# Patient Record
Sex: FEMALE | Race: BLACK OR AFRICAN AMERICAN | NOT HISPANIC OR LATINO | Employment: OTHER | URBAN - METROPOLITAN AREA
[De-identification: names, ages, dates, MRNs, and addresses within clinical notes are randomized per-mention and may not be internally consistent; named-entity substitution may affect disease eponyms.]

---

## 2017-03-12 PROBLEM — D64.9 ANEMIA: Status: ACTIVE | Noted: 2017-03-12

## 2017-03-12 PROBLEM — R06.02 SHORTNESS OF BREATH: Status: ACTIVE | Noted: 2017-03-12

## 2017-03-12 PROBLEM — R06.00 DYSPNEA: Status: ACTIVE | Noted: 2017-03-12

## 2017-03-12 PROBLEM — E87.20 LACTIC ACID ACIDOSIS: Status: ACTIVE | Noted: 2017-03-12

## 2017-03-13 PROBLEM — E87.20 LACTIC ACID ACIDOSIS: Status: RESOLVED | Noted: 2017-03-12 | Resolved: 2017-03-13

## 2017-03-13 PROBLEM — I48.91 ATRIAL FIBRILLATION WITH RVR: Status: ACTIVE | Noted: 2017-03-13

## 2017-03-14 PROBLEM — J44.9 COPD (CHRONIC OBSTRUCTIVE PULMONARY DISEASE): Status: ACTIVE | Noted: 2017-03-14

## 2017-03-14 PROBLEM — J44.1 CHRONIC OBSTRUCTIVE PULMONARY DISEASE WITH ACUTE EXACERBATION: Status: ACTIVE | Noted: 2017-03-14

## 2017-03-27 ENCOUNTER — TELEPHONE (OUTPATIENT)
Dept: CARDIOLOGY | Facility: CLINIC | Age: 82
End: 2017-03-27

## 2017-03-27 NOTE — TELEPHONE ENCOUNTER
Received records from Dr. Clifford Jasmine for Mitral Valve referral.  Patient recently discharged from [facility] for heart failure.  Attempted to contact patient.  No answer at either number.  Left message.  Will need CD of echo done at [facility] for Dr. Mathew to review before scheduling appointments.  Office numbers provided.

## 2017-04-03 DIAGNOSIS — I50.9 CONGESTIVE HEART FAILURE, UNSPECIFIED CONGESTIVE HEART FAILURE CHRONICITY, UNSPECIFIED CONGESTIVE HEART FAILURE TYPE: ICD-10-CM

## 2017-04-03 DIAGNOSIS — I34.0 MITRAL VALVE INSUFFICIENCY, UNSPECIFIED ETIOLOGY: Primary | ICD-10-CM

## 2017-05-08 ENCOUNTER — TELEPHONE (OUTPATIENT)
Dept: CARDIOLOGY | Facility: CLINIC | Age: 82
End: 2017-05-08

## 2017-05-08 NOTE — TELEPHONE ENCOUNTER
Patient was referred by Dr Jasmine for evaluation of MR and ws scheduled to see Dr Mathew today with lab work and CCFD.  SHe cancelled and would like to call back to schedule when she can come.

## 2017-06-02 ENCOUNTER — HOSPITAL ENCOUNTER (OUTPATIENT)
Facility: HOSPITAL | Age: 82
Discharge: HOME | End: 2017-06-05
Attending: EMERGENCY MEDICINE | Admitting: INTERNAL MEDICINE
Payer: MEDICARE

## 2017-06-02 DIAGNOSIS — I21.4 NSTEMI (NON-ST ELEVATED MYOCARDIAL INFARCTION): ICD-10-CM

## 2017-06-02 DIAGNOSIS — I48.91 ATRIAL FIBRILLATION WITH RVR: ICD-10-CM

## 2017-06-02 DIAGNOSIS — N18.30 ACUTE RENAL FAILURE SUPERIMPOSED ON STAGE 3 CHRONIC KIDNEY DISEASE: ICD-10-CM

## 2017-06-02 DIAGNOSIS — I35.1 NONRHEUMATIC AORTIC VALVE INSUFFICIENCY: ICD-10-CM

## 2017-06-02 DIAGNOSIS — R06.02 SHORTNESS OF BREATH: ICD-10-CM

## 2017-06-02 DIAGNOSIS — I50.33 ACUTE ON CHRONIC DIASTOLIC HEART FAILURE: ICD-10-CM

## 2017-06-02 DIAGNOSIS — I36.9 TRICUSPID VALVE DISORDERS, SPECIFIED AS NONRHEUMATIC: ICD-10-CM

## 2017-06-02 DIAGNOSIS — R05.9 COUGH: ICD-10-CM

## 2017-06-02 DIAGNOSIS — R00.0 TACHYCARDIA: ICD-10-CM

## 2017-06-02 DIAGNOSIS — J43.2 CENTRILOBULAR EMPHYSEMA: ICD-10-CM

## 2017-06-02 DIAGNOSIS — R74.8 ABNORMAL LIVER ENZYMES: ICD-10-CM

## 2017-06-02 DIAGNOSIS — E87.20 LACTIC ACIDOSIS: ICD-10-CM

## 2017-06-02 DIAGNOSIS — D50.9 IRON DEFICIENCY ANEMIA, UNSPECIFIED IRON DEFICIENCY ANEMIA TYPE: ICD-10-CM

## 2017-06-02 DIAGNOSIS — I35.1 AORTIC VALVE REGURGITATION, UNSPECIFIED ETIOLOGY: ICD-10-CM

## 2017-06-02 DIAGNOSIS — I49.5 SSS (SICK SINUS SYNDROME): ICD-10-CM

## 2017-06-02 DIAGNOSIS — I35.9 NONRHEUMATIC AORTIC VALVE DISORDER: ICD-10-CM

## 2017-06-02 DIAGNOSIS — R05.3 CHRONIC COUGH: Primary | ICD-10-CM

## 2017-06-02 DIAGNOSIS — N17.9 ACUTE RENAL FAILURE SUPERIMPOSED ON STAGE 3 CHRONIC KIDNEY DISEASE: ICD-10-CM

## 2017-06-02 DIAGNOSIS — I27.20 PULMONARY HYPERTENSION: ICD-10-CM

## 2017-06-02 DIAGNOSIS — R05.8 RECURRENT NON-PRODUCTIVE COUGH: ICD-10-CM

## 2017-06-02 PROBLEM — J44.9 COPD (CHRONIC OBSTRUCTIVE PULMONARY DISEASE): Status: ACTIVE | Noted: 2017-06-02

## 2017-06-02 LAB
ALBUMIN SERPL BCP-MCNC: 3.4 G/DL
ALP SERPL-CCNC: 85 U/L
ALT SERPL W/O P-5'-P-CCNC: 96 U/L
ANION GAP SERPL CALC-SCNC: 15 MMOL/L
AST SERPL-CCNC: 141 U/L
B-OH-BUTYR BLD STRIP-SCNC: 0.5 MMOL/L
BASOPHILS # BLD AUTO: 0.03 K/UL
BASOPHILS NFR BLD: 0.5 %
BILIRUB SERPL-MCNC: 2.5 MG/DL
BNP SERPL-MCNC: 2488 PG/ML
BUN SERPL-MCNC: 21 MG/DL
CALCIUM SERPL-MCNC: 10.4 MG/DL
CHLORIDE SERPL-SCNC: 106 MMOL/L
CO2 SERPL-SCNC: 17 MMOL/L
CREAT SERPL-MCNC: 1.4 MG/DL
DIFFERENTIAL METHOD: ABNORMAL
EOSINOPHIL # BLD AUTO: 0 K/UL
EOSINOPHIL NFR BLD: 0.2 %
ERYTHROCYTE [DISTWIDTH] IN BLOOD BY AUTOMATED COUNT: 17.1 %
EST. GFR  (AFRICAN AMERICAN): 40.9 ML/MIN/1.73 M^2
EST. GFR  (NON AFRICAN AMERICAN): 35.5 ML/MIN/1.73 M^2
GLUCOSE SERPL-MCNC: 145 MG/DL
HCT VFR BLD AUTO: 29.9 %
HGB BLD-MCNC: 9.5 G/DL
LACTATE SERPL-SCNC: 3 MMOL/L
LYMPHOCYTES # BLD AUTO: 1.2 K/UL
LYMPHOCYTES NFR BLD: 18.2 %
MCH RBC QN AUTO: 25 PG
MCHC RBC AUTO-ENTMCNC: 31.8 %
MCV RBC AUTO: 79 FL
MONOCYTES # BLD AUTO: 0.7 K/UL
MONOCYTES NFR BLD: 11.4 %
NEUTROPHILS # BLD AUTO: 4.4 K/UL
NEUTROPHILS NFR BLD: 69.4 %
PLATELET # BLD AUTO: 251 K/UL
PMV BLD AUTO: 10.2 FL
POTASSIUM SERPL-SCNC: 4.2 MMOL/L
PROT SERPL-MCNC: 7 G/DL
RBC # BLD AUTO: 3.8 M/UL
SODIUM SERPL-SCNC: 138 MMOL/L
TROPONIN I SERPL DL<=0.01 NG/ML-MCNC: 0.08 NG/ML
WBC # BLD AUTO: 6.31 K/UL

## 2017-06-02 PROCEDURE — G0378 HOSPITAL OBSERVATION PER HR: HCPCS

## 2017-06-02 PROCEDURE — 25000003 PHARM REV CODE 250: Performed by: INTERNAL MEDICINE

## 2017-06-02 PROCEDURE — 25000003 PHARM REV CODE 250: Performed by: STUDENT IN AN ORGANIZED HEALTH CARE EDUCATION/TRAINING PROGRAM

## 2017-06-02 PROCEDURE — 63600175 PHARM REV CODE 636 W HCPCS: Performed by: STUDENT IN AN ORGANIZED HEALTH CARE EDUCATION/TRAINING PROGRAM

## 2017-06-02 PROCEDURE — 99285 EMERGENCY DEPT VISIT HI MDM: CPT | Mod: 25

## 2017-06-02 PROCEDURE — 96374 THER/PROPH/DIAG INJ IV PUSH: CPT

## 2017-06-02 PROCEDURE — 25000003 PHARM REV CODE 250: Performed by: EMERGENCY MEDICINE

## 2017-06-02 PROCEDURE — 83605 ASSAY OF LACTIC ACID: CPT

## 2017-06-02 PROCEDURE — 85025 COMPLETE CBC W/AUTO DIFF WBC: CPT

## 2017-06-02 PROCEDURE — 82010 KETONE BODYS QUAN: CPT

## 2017-06-02 PROCEDURE — 94761 N-INVAS EAR/PLS OXIMETRY MLT: CPT

## 2017-06-02 PROCEDURE — 99285 EMERGENCY DEPT VISIT HI MDM: CPT | Mod: ,,, | Performed by: EMERGENCY MEDICINE

## 2017-06-02 PROCEDURE — 25000242 PHARM REV CODE 250 ALT 637 W/ HCPCS

## 2017-06-02 PROCEDURE — 94640 AIRWAY INHALATION TREATMENT: CPT | Mod: 77

## 2017-06-02 PROCEDURE — 80053 COMPREHEN METABOLIC PANEL: CPT

## 2017-06-02 PROCEDURE — 83880 ASSAY OF NATRIURETIC PEPTIDE: CPT

## 2017-06-02 PROCEDURE — 93010 ELECTROCARDIOGRAM REPORT: CPT | Mod: ,,, | Performed by: INTERNAL MEDICINE

## 2017-06-02 PROCEDURE — 25000242 PHARM REV CODE 250 ALT 637 W/ HCPCS: Performed by: STUDENT IN AN ORGANIZED HEALTH CARE EDUCATION/TRAINING PROGRAM

## 2017-06-02 PROCEDURE — 84484 ASSAY OF TROPONIN QUANT: CPT

## 2017-06-02 PROCEDURE — 63600175 PHARM REV CODE 636 W HCPCS

## 2017-06-02 RX ORDER — HYDRALAZINE HYDROCHLORIDE 50 MG/1
50 TABLET, FILM COATED ORAL EVERY 8 HOURS
Status: DISCONTINUED | OUTPATIENT
Start: 2017-06-02 | End: 2017-06-05 | Stop reason: HOSPADM

## 2017-06-02 RX ORDER — FUROSEMIDE 10 MG/ML
20 INJECTION INTRAMUSCULAR; INTRAVENOUS ONCE
Status: COMPLETED | OUTPATIENT
Start: 2017-06-02 | End: 2017-06-02

## 2017-06-02 RX ORDER — ACETAMINOPHEN 325 MG/1
650 TABLET ORAL EVERY 6 HOURS PRN
Status: DISCONTINUED | OUTPATIENT
Start: 2017-06-02 | End: 2017-06-05 | Stop reason: HOSPADM

## 2017-06-02 RX ORDER — FUROSEMIDE 10 MG/ML
10 INJECTION INTRAMUSCULAR; INTRAVENOUS
Status: COMPLETED | OUTPATIENT
Start: 2017-06-02 | End: 2017-06-02

## 2017-06-02 RX ORDER — IPRATROPIUM BROMIDE AND ALBUTEROL SULFATE 2.5; .5 MG/3ML; MG/3ML
3 SOLUTION RESPIRATORY (INHALATION) EVERY 6 HOURS PRN
Status: DISCONTINUED | OUTPATIENT
Start: 2017-06-02 | End: 2017-06-02

## 2017-06-02 RX ORDER — IPRATROPIUM BROMIDE AND ALBUTEROL SULFATE 2.5; .5 MG/3ML; MG/3ML
3 SOLUTION RESPIRATORY (INHALATION)
Status: COMPLETED | OUTPATIENT
Start: 2017-06-02 | End: 2017-06-02

## 2017-06-02 RX ORDER — PROMETHAZINE HYDROCHLORIDE AND CODEINE PHOSPHATE 6.25; 1 MG/5ML; MG/5ML
5 SOLUTION ORAL
Status: COMPLETED | OUTPATIENT
Start: 2017-06-02 | End: 2017-06-02

## 2017-06-02 RX ORDER — ISOSORBIDE DINITRATE 10 MG/1
20 TABLET ORAL 3 TIMES DAILY
Status: DISCONTINUED | OUTPATIENT
Start: 2017-06-02 | End: 2017-06-05 | Stop reason: HOSPADM

## 2017-06-02 RX ORDER — IPRATROPIUM BROMIDE AND ALBUTEROL SULFATE 2.5; .5 MG/3ML; MG/3ML
3 SOLUTION RESPIRATORY (INHALATION) EVERY 4 HOURS PRN
Status: DISCONTINUED | OUTPATIENT
Start: 2017-06-02 | End: 2017-06-05 | Stop reason: HOSPADM

## 2017-06-02 RX ORDER — ROSUVASTATIN CALCIUM 10 MG/1
10 TABLET, COATED ORAL DAILY
Status: DISCONTINUED | OUTPATIENT
Start: 2017-06-03 | End: 2017-06-05 | Stop reason: HOSPADM

## 2017-06-02 RX ORDER — PANTOPRAZOLE SODIUM 40 MG/1
40 TABLET, DELAYED RELEASE ORAL DAILY
Status: DISCONTINUED | OUTPATIENT
Start: 2017-06-03 | End: 2017-06-05 | Stop reason: HOSPADM

## 2017-06-02 RX ORDER — PROMETHAZINE HYDROCHLORIDE AND CODEINE PHOSPHATE 6.25; 1 MG/5ML; MG/5ML
5 SOLUTION ORAL EVERY 4 HOURS PRN
Status: DISCONTINUED | OUTPATIENT
Start: 2017-06-02 | End: 2017-06-05 | Stop reason: HOSPADM

## 2017-06-02 RX ORDER — NAPROXEN SODIUM 220 MG/1
81 TABLET, FILM COATED ORAL DAILY
Status: DISCONTINUED | OUTPATIENT
Start: 2017-06-03 | End: 2017-06-05 | Stop reason: HOSPADM

## 2017-06-02 RX ORDER — DILTIAZEM HYDROCHLORIDE 120 MG/1
240 CAPSULE, COATED, EXTENDED RELEASE ORAL DAILY
Status: DISCONTINUED | OUTPATIENT
Start: 2017-06-02 | End: 2017-06-05 | Stop reason: HOSPADM

## 2017-06-02 RX ORDER — ONDANSETRON 4 MG/1
8 TABLET, ORALLY DISINTEGRATING ORAL EVERY 8 HOURS PRN
Status: DISCONTINUED | OUTPATIENT
Start: 2017-06-02 | End: 2017-06-05 | Stop reason: HOSPADM

## 2017-06-02 RX ADMIN — FUROSEMIDE 20 MG: 10 INJECTION, SOLUTION INTRAVENOUS at 09:06

## 2017-06-02 RX ADMIN — IPRATROPIUM BROMIDE AND ALBUTEROL SULFATE 3 ML: .5; 3 SOLUTION RESPIRATORY (INHALATION) at 03:06

## 2017-06-02 RX ADMIN — DILTIAZEM HYDROCHLORIDE 240 MG: 240 CAPSULE, EXTENDED RELEASE ORAL at 06:06

## 2017-06-02 RX ADMIN — HYDRALAZINE HYDROCHLORIDE 50 MG: 50 TABLET ORAL at 11:06

## 2017-06-02 RX ADMIN — IPRATROPIUM BROMIDE AND ALBUTEROL SULFATE 3 ML: .5; 3 SOLUTION RESPIRATORY (INHALATION) at 07:06

## 2017-06-02 RX ADMIN — PROMETHAZINE HYDROCHLORIDE AND CODEINE PHOSPHATE 5 ML: 6.25; 1 SYRUP ORAL at 09:06

## 2017-06-02 RX ADMIN — APIXABAN 2.5 MG: 2.5 TABLET, FILM COATED ORAL at 06:06

## 2017-06-02 RX ADMIN — FUROSEMIDE 10 MG: 10 INJECTION, SOLUTION INTRAVENOUS at 03:06

## 2017-06-02 RX ADMIN — ISOSORBIDE DINITRATE 20 MG: 10 TABLET ORAL at 11:06

## 2017-06-02 RX ADMIN — PROMETHAZINE HYDROCHLORIDE AND CODEINE PHOSPHATE 5 ML: 6.25; 1 SYRUP ORAL at 03:06

## 2017-06-02 NOTE — ED NOTES
Patient identifiers verified and correct for Christy Webber.    LOC: The patient is awake, alert and aware of environment with an appropriate affect, the patient is oriented x 3 and speaking appropriately.  APPEARANCE: Patient resting comfortably and in no acute distress, patient is clean and well groomed, patient's clothing is properly fastened.  SKIN: The skin is warm and dry, color consistent with ethnicity, patient has normal skin turgor and moist mucus membranes, skin intact, no breakdown or bruising noted.  MUSCULOSKELETAL: Patient moving all extremities spontaneously, no obvious swelling or deformities noted.  RESPIRATORY: Airway is open and patent, respirations are spontaneous, patient has a normal effort and rate, no accessory muscle use noted, clear bilateral breath sounds noted through out the chest.

## 2017-06-02 NOTE — ED PROVIDER NOTES
"Encounter Date: 6/2/2017    SCRIBE #1 NOTE: I, Heidy Bass, am scribing for, and in the presence of,  Dr. Melgar. I have scribed the entire note.       History     Chief Complaint   Patient presents with    Cough     "cold symptoms"/cough x 3 weeks     Review of patient's allergies indicates:  No Known Allergies  Time patient was seen by the provider: 12:53 PM      The patient is a 81 y.o. female with hx of: A-flutter, HLD, HTN, and sick sinus syndrome that presents to the ED with a complaint of dry cough associated decrease in activity, shortness of breath with exertion, lightheadedness, and headache. The cough has been intermittent over the last 6 months and most recently returned and has been persistent for the last 3 weeks. The cough is worse with laying down, and patient denies fever or chest pain. Patient started lisinopril recently, but she is no longer taking the lisinopril but the cough is persisting. Patient does not believe she was ever scoped to determine if she has reflux, though she did undergo many tests at outside facilities regarding this cough. She was admitted at [facility] 3 months ago after an episode of dizziness and A-fib. She presented here after her son urged her to come to the ED. Patient was given inhalers                   Past Medical History:   Diagnosis Date    Atrial flutter     Hyperlipidemia     Hypertension     Pacemaker 08/2016    Single lead [employer] Pacemaker for sick sinus syndrome    Sick sinus syndrome      Past Surgical History:   Procedure Laterality Date    CARDIAC PACEMAKER PLACEMENT      HYSTERECTOMY      KIDNEY SURGERY       History reviewed. No pertinent family history.  Social History   Substance Use Topics    Smoking status: Former Smoker     Packs/day: 1.50     Years: 63.00     Types: Cigarettes     Quit date: 6/14/2016    Smokeless tobacco: Not on file    Alcohol use No     Review of Systems   Constitutional: Positive for activity change " (decreased). Negative for fever.   Respiratory: Positive for cough (dry) and shortness of breath (with exertion).    Cardiovascular: Negative for chest pain.   Neurological: Positive for light-headedness and headaches.       Physical Exam     Initial Vitals [06/02/17 1230]   BP Pulse Resp Temp SpO2   (!) 191/96 104 16 98.3 °F (36.8 °C) 98 %     Physical Exam    ED Course   Procedures  Labs Reviewed - No data to display  EKG Readings: (Independently Interpreted)   Heart Rate: 98.   A-fib/flutter          Medical Decision Making:   History:   Old Medical Records: I decided to obtain old medical records.  Old Records Summarized: other records.       <> Summary of Records: Patient with a history of HTN, HLD, sick sinus syndrome with A-flutter status post pacemaker placement            Scribe Attestation:   Scribe #1: I performed the above scribed service and the documentation accurately describes the services I performed. I attest to the accuracy of the note.    Attending Attestation:           Physician Attestation for Scribe:  Physician Attestation Statement for Scribe #1: I, Dr. Melgar, reviewed documentation, as scribed by Heidy Bass in my presence, and it is both accurate and complete.                 ED Course     Clinical Impression:   The encounter diagnosis was Tachycardia.

## 2017-06-02 NOTE — ED TRIAGE NOTES
Patient came in with a c/o a non productive cough x's a few weeks. Patient states that she was recently seen by her PCP and was prescribed and inhaler. Patient states that she still been coughing.

## 2017-06-02 NOTE — PROVIDER PROGRESS NOTES - EMERGENCY DEPT.
Encounter Date: 6/2/2017    ED Physician Progress Notes       SCRIBE NOTE: I, Heidy Bass, am scribing for, and in the presence of,  Dr. Melgar.  Physician Statement: I, Dr. Melgar, personally performed the services described in this documentation as scribed by Heidy Bass in my presence, and it is both accurate and complete.     Physician Note:   Time patient was seen by the provider: 12:53 PM      The patient is a 81 y.o. female with hx of: HTN, HLD, sick sinus syndrome with A-flutter status post pacemaker placement that presents to the ED per her son's urging with a complaint of dry cough associated decrease in activity, shortness of breath with exertion, lightheadedness, and headache. The cough has been intermittent over the last 6 months and most recently returned and has been persistent for the last 3 weeks. The cough is worse with laying down, and patient denies fever or chest pain. Patient started lisinopril recently, but she is no longer taking the lisinopril. Patient does not believe she was ever scoped to determine if she has reflux, though she did undergo many tests at outside facilities regarding this cough. She was admitted at [facility] 3 months ago after an episode of dizziness and A-fib. Patient was given inhalers in the past for this persistent cough, though she was not given a cough syrup or cough suppressants at any time. She denies a history of DM.     EKG A-fib/flutter at 98 BPM.     On exam, patient is coughing throughout encounter. Lungs clear. Cardiovascular: patient is tachycardic but with normal heart sounds. Abdomen soft, non-tender. No pedal edema.     In view of history and physical exam, I have initiated work up. Will transfer patient to main ED. I initially evaluated this patient and ordered workup while in intake.  The patient will receive a full evaluation in an ED pod when space is available.  All results from tests ordered in intake will not be followed by the  intake team, including myself. All results will be followed by the ED Pod team.

## 2017-06-02 NOTE — ED PROVIDER NOTES
"Encounter Date: 6/2/2017    SCRIBE #1 NOTE: I, Colt Rendon, am scribing for, and in the presence of,  Javon Barreto MD. I have scribed the following portions of the note - the Resident attestation. Other sections scribed: CXR Reading.       History     Chief Complaint   Patient presents with    Cough     "cold symptoms"/cough x 3 weeks     Review of patient's allergies indicates:  No Known Allergies  Christy Webber is a 81 y.o. Lady with a pMHx of centrilobar emphysema, Atrial fibrillation on anticoagulation, sick sinus syndrome, diastolic dysfunction, HTN who presents to [facility] ED for evaluation of cough.  History is provided by the patient and her son.  They state that her symptoms began approximately 3 weeks ago, but may have been going on for a longer period of time.  She states she has a chronic dry, non-productive cough that is worse than lying down.  Patient states that she has been adherent to her breo and ventolin without much improvement of her symptoms.  She also notes being progressively short of breath with exertion, stating she would be short of breath when walking approximately 10-15 feet.  Otherwise ,denies fevers, chills, peripheral swelling, chest or abdominal pains, urinary symptoms, and weaknesses.            Past Medical History:   Diagnosis Date    Atrial flutter     Hyperlipidemia     Hypertension     Pacemaker 08/2016    Single lead [employer] Pacemaker for sick sinus syndrome    Sick sinus syndrome      Past Surgical History:   Procedure Laterality Date    CARDIAC PACEMAKER PLACEMENT      HYSTERECTOMY      KIDNEY SURGERY       History reviewed. No pertinent family history.  Social History   Substance Use Topics    Smoking status: Former Smoker     Packs/day: 1.50     Years: 63.00     Types: Cigarettes     Quit date: 6/14/2016    Smokeless tobacco: Not on file    Alcohol use No     Review of Systems   Constitutional: Positive for fatigue. Negative for chills, diaphoresis and " unexpected weight change.   HENT: Negative for sinus pressure and sore throat.    Respiratory: Positive for cough and shortness of breath. Negative for choking and wheezing.    Cardiovascular: Negative for chest pain, palpitations and leg swelling.   Gastrointestinal: Negative for abdominal distention, abdominal pain, constipation, diarrhea, nausea and vomiting.   Genitourinary: Negative for difficulty urinating, dysuria and flank pain.   Musculoskeletal: Negative for arthralgias and myalgias.   Skin: Negative for pallor and rash.   Neurological: Positive for headaches. Negative for syncope, weakness and light-headedness.   Psychiatric/Behavioral: Negative for confusion and decreased concentration.       Physical Exam     Initial Vitals [06/02/17 1230]   BP Pulse Resp Temp SpO2   (!) 191/96 104 16 98.3 °F (36.8 °C) 98 %     Physical Exam    Vitals reviewed.  Constitutional: She appears well-developed and well-nourished. She is not diaphoretic. She appears distressed (coughing ).   HENT:   Head: Normocephalic and atraumatic.   Mouth/Throat: No oropharyngeal exudate.   Eyes: Pupils are equal, round, and reactive to light. No scleral icterus.   Neck: Normal range of motion. No thyromegaly present. No JVD present.   No accessory muscle usage    Cardiovascular: Normal rate and normal heart sounds.   No murmur heard.  Pulmonary/Chest: No respiratory distress. She has no wheezes. She has no rales.   Abdominal: Soft. Bowel sounds are normal. She exhibits no distension. There is no tenderness.   Musculoskeletal: Normal range of motion. She exhibits no edema.   Neurological: She is alert and oriented to person, place, and time. No cranial nerve deficit.   Skin: Skin is warm and dry. There is erythema.   Psychiatric: She has a normal mood and affect. Thought content normal.         ED Course   Procedures  Labs Reviewed   CBC W/ AUTO DIFFERENTIAL - Abnormal; Notable for the following:        Result Value    RBC 3.80 (*)      Hemoglobin 9.5 (*)     Hematocrit 29.9 (*)     MCV 79 (*)     MCH 25.0 (*)     MCHC 31.8 (*)     RDW 17.1 (*)     All other components within normal limits   COMPREHENSIVE METABOLIC PANEL - Abnormal; Notable for the following:     CO2 17 (*)     Glucose 145 (*)     Albumin 3.4 (*)     Total Bilirubin 2.5 (*)      (*)     ALT 96 (*)     eGFR if  40.9 (*)     eGFR if non  35.5 (*)     All other components within normal limits   B-TYPE NATRIURETIC PEPTIDE - Abnormal; Notable for the following:     BNP 2,488 (*)     All other components within normal limits   TROPONIN I - Abnormal; Notable for the following:     Troponin I 0.080 (*)     All other components within normal limits    Narrative:     ADD ON PER DR LOW ORDER [id number] TROP I @1621 6/2/17   LACTIC ACID, PLASMA - Abnormal; Notable for the following:     Lactate (Lactic Acid) 3.0 (*)     All other components within normal limits   TROPONIN I   BETA - HYDROXYBUTYRATE, SERUM     EKG Readings: (Independently Interpreted)    A-fib with some premature ventricular contractions. Non specific ST/T wave changes. No STEMI.       X-Rays:   Independently Interpreted Readings:   Chest X-Ray: Cardiomegaly is unchanged. Has some obscuring of left costophrenic angle, possible effusion. No congestive changes. Some engorgement of proximal pulmonary vasculature.     Medical Decision Making:   History:   Old Medical Records: I decided to obtain old medical records.  Initial Assessment:   Patient with COPD and possible HFpEF with cough and shortness of breath.  CXR without acute changes, chronic lung disease and cardiomegaly with possible LLL effusion, but minimal.  BNP elevated to 2.5k, considering heart failure at this time.  CMP also revealing anion gap metabolic acidosis.  Will have nebulizers at this time.  BP also elevated on admission with , recheck at 170 SBP.    Differential Diagnosis:   Differential includes COPD  exacerbation, acute heart failure exacerbation, pneumonia, URI   Independently Interpreted Test(s):   I have ordered and independently interpreted X-rays - see prior notes.  I have ordered and independently interpreted EKG Reading(s) - see prior notes  Clinical Tests:   Lab Tests: Ordered and Reviewed  Radiological Study: Ordered and Reviewed  Medical Tests: Ordered and Reviewed       APC / Resident Notes:   1526: Patient seen, labs reviewed.  New NURIA and transaminitis, likely 2/2 CHF exacerbation and fluid overload.  Also noted AGMA.  Will anticipate admission to observation.  Patient informed.         Scribe Attestation:   Scribe #1: I performed the above scribed service and the documentation accurately describes the services I performed. I attest to the accuracy of the note.    Attending Attestation:   Physician Attestation Statement for Resident:  As the supervising MD   Physician Attestation Statement: I have personally seen and examined this patient.   I agree with the above history. -: 81 year old female with Hx of A-flutter presents for evaluation of increasing SOB with HYLTON. Pt unable to walk more than 15 steps without becoming short of breath.    As the supervising MD I agree with the above PE.   -: Lungs clear. Heart is irregular. Grade 2/6 systolic murmur at left sternal border. Abd soft, non tender. Extremities non tender. Pulses 2+. Trace edema noted.   As the supervising MD I agree with the above treatment, course, plan, and disposition.  I have reviewed and agree with the residents interpretation of the following: lab data, x-rays and EKG.          Physician Attestation for Scribe:  Physician Attestation Statement for Scribe #1: I, Javon Barreto MD, reviewed documentation, as scribed by Colt Rendon in my presence, and it is both accurate and complete.                 ED Course     Clinical Impression:   The primary encounter diagnosis was Chronic cough. Diagnoses of Tachycardia, Cough, Recurrent  non-productive cough, NSTEMI (non-ST elevated myocardial infarction), Acute on chronic diastolic heart failure, Atrial fibrillation with RVR, Abnormal liver enzymes, Acute renal failure superimposed on stage 3 chronic kidney disease, Aortic valve regurgitation, unspecified etiology, Centrilobular emphysema, Iron deficiency anemia, unspecified iron deficiency anemia type, Lactic acidosis, Pulmonary hypertension, Shortness of breath, SSS (sick sinus syndrome), Tricuspid valve disorders, specified as nonrheumatic, Nonrheumatic aortic valve insufficiency, and Nonrheumatic aortic valve disorder were also pertinent to this visit.    Disposition:   Disposition: Placed in Observation  Condition: Stable  Placed in observation.         Javon Barreto MD  06/04/17 2018

## 2017-06-03 PROBLEM — I50.33 ACUTE ON CHRONIC DIASTOLIC HEART FAILURE: Status: ACTIVE | Noted: 2017-06-03

## 2017-06-03 PROBLEM — D50.9 IRON DEFICIENCY ANEMIA: Status: ACTIVE | Noted: 2017-03-12

## 2017-06-03 LAB
ALBUMIN SERPL BCP-MCNC: 3.3 G/DL
ALP SERPL-CCNC: 71 U/L
ALT SERPL W/O P-5'-P-CCNC: 83 U/L
ANION GAP SERPL CALC-SCNC: 14 MMOL/L
ANISOCYTOSIS BLD QL SMEAR: SLIGHT
AST SERPL-CCNC: 81 U/L
BASO STIPL BLD QL SMEAR: ABNORMAL
BASOPHILS # BLD AUTO: ABNORMAL K/UL
BASOPHILS NFR BLD: 0.5 %
BILIRUB SERPL-MCNC: 2.2 MG/DL
BUN SERPL-MCNC: 22 MG/DL
CALCIUM SERPL-MCNC: 10.2 MG/DL
CHLORIDE SERPL-SCNC: 104 MMOL/L
CO2 SERPL-SCNC: 20 MMOL/L
CREAT SERPL-MCNC: 1.3 MG/DL
DIFFERENTIAL METHOD: ABNORMAL
EOSINOPHIL # BLD AUTO: ABNORMAL K/UL
EOSINOPHIL NFR BLD: 1.5 %
ERYTHROCYTE [DISTWIDTH] IN BLOOD BY AUTOMATED COUNT: 17 %
EST. GFR  (AFRICAN AMERICAN): 44.8 ML/MIN/1.73 M^2
EST. GFR  (NON AFRICAN AMERICAN): 38.8 ML/MIN/1.73 M^2
FERRITIN SERPL-MCNC: 28 NG/ML
GLUCOSE SERPL-MCNC: 130 MG/DL
HCT VFR BLD AUTO: 27.8 %
HGB BLD-MCNC: 8.7 G/DL
HYPOCHROMIA BLD QL SMEAR: ABNORMAL
INR PPP: 1.3
IRON SERPL-MCNC: 14 UG/DL
LACTATE SERPL-SCNC: 2.6 MMOL/L
LYMPHOCYTES # BLD AUTO: ABNORMAL K/UL
LYMPHOCYTES NFR BLD: 16 %
MAGNESIUM SERPL-MCNC: 1.5 MG/DL
MCH RBC QN AUTO: 24.5 PG
MCHC RBC AUTO-ENTMCNC: 31.3 %
MCV RBC AUTO: 78 FL
MONOCYTES # BLD AUTO: ABNORMAL K/UL
MONOCYTES NFR BLD: 9 %
NEUTROPHILS NFR BLD: 73 %
NRBC BLD-RTO: ABNORMAL /100 WBC
PHOSPHATE SERPL-MCNC: 2.7 MG/DL
PLATELET # BLD AUTO: 212 K/UL
PLATELET BLD QL SMEAR: ABNORMAL
PMV BLD AUTO: 10.9 FL
POLYCHROMASIA BLD QL SMEAR: ABNORMAL
POTASSIUM SERPL-SCNC: 3.5 MMOL/L
PROT SERPL-MCNC: 6.4 G/DL
PROTHROMBIN TIME: 13.5 SEC
RBC # BLD AUTO: 3.55 M/UL
SATURATED IRON: 4 %
SODIUM SERPL-SCNC: 138 MMOL/L
TOTAL IRON BINDING CAPACITY: 400 UG/DL
TRANSFERRIN SERPL-MCNC: 270 MG/DL
TROPONIN I SERPL DL<=0.01 NG/ML-MCNC: 0.07 NG/ML
TROPONIN I SERPL DL<=0.01 NG/ML-MCNC: 0.08 NG/ML
TROPONIN I SERPL DL<=0.01 NG/ML-MCNC: 0.09 NG/ML
WBC # BLD AUTO: 6.89 K/UL

## 2017-06-03 PROCEDURE — 97530 THERAPEUTIC ACTIVITIES: CPT

## 2017-06-03 PROCEDURE — 25000003 PHARM REV CODE 250: Performed by: INTERNAL MEDICINE

## 2017-06-03 PROCEDURE — 84484 ASSAY OF TROPONIN QUANT: CPT | Mod: 91

## 2017-06-03 PROCEDURE — 83605 ASSAY OF LACTIC ACID: CPT

## 2017-06-03 PROCEDURE — 93005 ELECTROCARDIOGRAM TRACING: CPT

## 2017-06-03 PROCEDURE — 25000242 PHARM REV CODE 250 ALT 637 W/ HCPCS: Performed by: STUDENT IN AN ORGANIZED HEALTH CARE EDUCATION/TRAINING PROGRAM

## 2017-06-03 PROCEDURE — 94640 AIRWAY INHALATION TREATMENT: CPT

## 2017-06-03 PROCEDURE — 97165 OT EVAL LOW COMPLEX 30 MIN: CPT

## 2017-06-03 PROCEDURE — 93010 ELECTROCARDIOGRAM REPORT: CPT | Mod: ,,, | Performed by: INTERNAL MEDICINE

## 2017-06-03 PROCEDURE — 80053 COMPREHEN METABOLIC PANEL: CPT

## 2017-06-03 PROCEDURE — 85007 BL SMEAR W/DIFF WBC COUNT: CPT | Mod: NCS

## 2017-06-03 PROCEDURE — 85610 PROTHROMBIN TIME: CPT

## 2017-06-03 PROCEDURE — 83540 ASSAY OF IRON: CPT

## 2017-06-03 PROCEDURE — 99220 PR INITIAL OBSERVATION CARE,LEVL III: CPT | Mod: ,,, | Performed by: INTERNAL MEDICINE

## 2017-06-03 PROCEDURE — G8988 SELF CARE GOAL STATUS: HCPCS | Mod: CH

## 2017-06-03 PROCEDURE — G8989 SELF CARE D/C STATUS: HCPCS | Mod: CH

## 2017-06-03 PROCEDURE — 63600175 PHARM REV CODE 636 W HCPCS: Performed by: STUDENT IN AN ORGANIZED HEALTH CARE EDUCATION/TRAINING PROGRAM

## 2017-06-03 PROCEDURE — 25000003 PHARM REV CODE 250: Performed by: STUDENT IN AN ORGANIZED HEALTH CARE EDUCATION/TRAINING PROGRAM

## 2017-06-03 PROCEDURE — G8987 SELF CARE CURRENT STATUS: HCPCS | Mod: CH

## 2017-06-03 PROCEDURE — 82728 ASSAY OF FERRITIN: CPT

## 2017-06-03 PROCEDURE — 83735 ASSAY OF MAGNESIUM: CPT

## 2017-06-03 PROCEDURE — G0378 HOSPITAL OBSERVATION PER HR: HCPCS

## 2017-06-03 PROCEDURE — 84484 ASSAY OF TROPONIN QUANT: CPT

## 2017-06-03 PROCEDURE — 36415 COLL VENOUS BLD VENIPUNCTURE: CPT

## 2017-06-03 PROCEDURE — 85027 COMPLETE CBC AUTOMATED: CPT

## 2017-06-03 PROCEDURE — 84100 ASSAY OF PHOSPHORUS: CPT

## 2017-06-03 RX ORDER — FUROSEMIDE 10 MG/ML
20 INJECTION INTRAMUSCULAR; INTRAVENOUS DAILY
Status: DISCONTINUED | OUTPATIENT
Start: 2017-06-03 | End: 2017-06-04

## 2017-06-03 RX ORDER — MAGNESIUM SULFATE HEPTAHYDRATE 40 MG/ML
2 INJECTION, SOLUTION INTRAVENOUS ONCE
Status: COMPLETED | OUTPATIENT
Start: 2017-06-03 | End: 2017-06-03

## 2017-06-03 RX ADMIN — IPRATROPIUM BROMIDE AND ALBUTEROL SULFATE 3 ML: .5; 3 SOLUTION RESPIRATORY (INHALATION) at 12:06

## 2017-06-03 RX ADMIN — HYDRALAZINE HYDROCHLORIDE 50 MG: 50 TABLET ORAL at 01:06

## 2017-06-03 RX ADMIN — DILTIAZEM HYDROCHLORIDE 240 MG: 240 CAPSULE, EXTENDED RELEASE ORAL at 08:06

## 2017-06-03 RX ADMIN — PROMETHAZINE HYDROCHLORIDE AND CODEINE PHOSPHATE 5 ML: 6.25; 1 SYRUP ORAL at 09:06

## 2017-06-03 RX ADMIN — APIXABAN 2.5 MG: 2.5 TABLET, FILM COATED ORAL at 10:06

## 2017-06-03 RX ADMIN — PROMETHAZINE HYDROCHLORIDE AND CODEINE PHOSPHATE 5 ML: 6.25; 1 SYRUP ORAL at 01:06

## 2017-06-03 RX ADMIN — PROMETHAZINE HYDROCHLORIDE AND CODEINE PHOSPHATE 5 ML: 6.25; 1 SYRUP ORAL at 10:06

## 2017-06-03 RX ADMIN — HYDRALAZINE HYDROCHLORIDE 50 MG: 50 TABLET ORAL at 10:06

## 2017-06-03 RX ADMIN — IPRATROPIUM BROMIDE AND ALBUTEROL SULFATE 3 ML: .5; 3 SOLUTION RESPIRATORY (INHALATION) at 04:06

## 2017-06-03 RX ADMIN — APIXABAN 2.5 MG: 2.5 TABLET, FILM COATED ORAL at 08:06

## 2017-06-03 RX ADMIN — PROMETHAZINE HYDROCHLORIDE AND CODEINE PHOSPHATE 5 ML: 6.25; 1 SYRUP ORAL at 05:06

## 2017-06-03 RX ADMIN — MAGNESIUM SULFATE IN WATER 2 G: 40 INJECTION, SOLUTION INTRAVENOUS at 08:06

## 2017-06-03 RX ADMIN — ISOSORBIDE DINITRATE 20 MG: 10 TABLET ORAL at 10:06

## 2017-06-03 RX ADMIN — ISOSORBIDE DINITRATE 20 MG: 10 TABLET ORAL at 06:06

## 2017-06-03 RX ADMIN — PANTOPRAZOLE SODIUM 40 MG: 40 TABLET, DELAYED RELEASE ORAL at 08:06

## 2017-06-03 RX ADMIN — ROSUVASTATIN CALCIUM 10 MG: 10 TABLET ORAL at 08:06

## 2017-06-03 RX ADMIN — ASPIRIN 81 MG CHEWABLE TABLET 81 MG: 81 TABLET CHEWABLE at 08:06

## 2017-06-03 RX ADMIN — FUROSEMIDE 20 MG: 10 INJECTION, SOLUTION INTRAVENOUS at 11:06

## 2017-06-03 RX ADMIN — ISOSORBIDE DINITRATE 20 MG: 10 TABLET ORAL at 01:06

## 2017-06-03 RX ADMIN — HYDRALAZINE HYDROCHLORIDE 50 MG: 50 TABLET ORAL at 06:06

## 2017-06-03 NOTE — HOSPITAL COURSE
6/2 In the ED, patient was found in aflutter with HR from . Denied palpitations, chest pain, or shortness of breath unless during her coughing spells. Her SpO2 remained at 95% or above on room air despite frequent dry coughing spells during interview. Labs were significant for NURIA with creatinine of 1.4 and liver dysfunction with tbili of 2.5 and AST//96. BNP was significantly elevated at 2488. Lactic acid was elevated at 3.0. Physical examination was not significant for accessory muscle use and without crackles or wheezing on lung auscultation. No LE edema was present. Patient was diuresed in ED with furosemide 10 mg IV once and continued on 20 mg IV daily. Furosemide was transitioned to PO 6/4 to 40 mg daily. Otherwise, patient's cough significantly improved during her stay with BNP on 6/4 was 693. Echo otherwise was demonstrable for pEF with a estimated PA pressure of 53.  Patient discharged 6/5, however missed cardiology appointment.

## 2017-06-03 NOTE — ASSESSMENT & PLAN NOTE
- Placed back on home Breo-Ellipta.  - Duo-neb PRN for wheezing and shortness of breath.   - Cough and shortness of breath may have component of COPD, but patient without purulent sputum and with good SpO2. Not on home O2.

## 2017-06-03 NOTE — ASSESSMENT & PLAN NOTE
- Multifactorial, with COPD, pulmonary hypertension, CHF exacerbation and ACEI (stopped here).  - Non-productive and 6-month history.  - Given promethazine-codeine syrup and duo-neb PRN.   - Good SpO2 on room air.   - BNP significantly elevated on admission at 2488 with lab findings suspicious for congestive hepatopathy although physical examination no clear on admission for fluid overload. With appreciable JVP today.   - Repeat echo to investigate for pulmonary hypertension and heart function, strong suspicion of RV disease.   - Diurese with furosemide 20 mg IV daily.

## 2017-06-03 NOTE — PROGRESS NOTES
Pt was admitted for chronic cough, and promethezine-codeine was administered at 0903. SpO2 was 96% on room air Upon morning assessment, pt became SOB while laying in bed. Denied chest pain, but stated she has abdominal tightness. Pt was able to converse and answer questions appropriately. 2L O2 via n/c was applied to help easy with dyspnea. Pt was pulled up in bed x 2 assist and HOB was elevated. Paged IM3 and Dr. Lewis came to bedside for assessment. MD ordered to call RT and give PRN breathing treatment. This nurse notified RT. Will continue to monitor.

## 2017-06-03 NOTE — H&P
"[facility] Medicine  History & Physical    Patient Name: Christy Webber  MRN: [id number]  Admission Date: 6/2/2017  Attending Physician: Veronica Moncada MD   Primary Care Provider: Elisabet George MD    [facility] [place] Medicine Team: [facility] [place] [facility]P MED 3 Charlette Rahman MD     Patient information was obtained from patient, caregiver / friend, past medical records and ER records.     Subjective:     Principal Problem:Chronic cough    Chief Complaint:   Chief Complaint   Patient presents with    Cough     "cold symptoms"/cough x 3 weeks        HPI: Christy Webber is an 81 year old female with a medical history significant for paroxysmal atrial fibrillation/aflutter (on apixaban and diltiazem), sick sinus syndrome (with PPM), COPD/centrilobar emphysema), essential HTN, and CHFpEF (with most recent echo on 3/2017 with moderate AR, MR, and TR) who presents to the ED on 6/2 with chief complaint of chronic, dry non-productive cough for nearly half a year. History is gathered from patient and son and daughter-in-law sitting at bedside. The patient has been a chronic issue that has gotten worse in the past 3 weeks. Patient's family states that she had been on nebulizers recently without much efficacy. The patient takes Breo at home and states that she only uses her rescue albuterol inhaler approximately twice/week without resolution of symptoms. Patient's cough is worse when she lays down flat and on exertion, but the cough has essentially been constant and not worsened otherwise positionally. Patient does states that she seems to have worsening cough at nighttime. Patient states that her appetite has been poor for the last several weeks and has increasing fatigue just from her cough. She did have some nausea and diarrhea two days ago. She denies productive sputum, fevers, chills, rhinorrhea, post-nasal drip, nausea, vomiting, chest pain, or diaphoresis.     Her medical history is significant for smoking " history of 100+ pack years from age 18 to last year. Patient does states she lives in a very old house without any recent changes to house or otherwise symptoms from other inhabitants in the household. She had been recently admitted in [place] on 3/12-3/15 for dizziness and atrial fibrillation; at the time she was given EGD for concern for UGIB as there was precipitous drop in H/H; iron studies were suspicious for anemia of chronic disease. Per chart review, she was also admitted cardiology on 8/20-8/25 for dizziness/weakness and found to be in aflutter and discharged on apixaban and diltiazem. Recent echo on 3/13 found diastolic HF with pulmonary HTN with estimated PA pressure of 44 with moderate MR, AR, and TR.       6/2 In the ED, patient was found in aflutter with HR from . Denied palpitations, chest pain, or shortness of breath unless during her coughing spells. Her SpO2 remained at 95% or above on room air despite frequent dry coughing spells during interview. Labs were significant for NURIA with creatinine of 1.4 and liver dysfunction with tbili of 2.5 and AST//96. BNP was significantly elevated at 2488. Lactic acid was elevated at 3.0. Physical examination was not significant for accessory muscle use and without crackles or wheezing on lung auscultation. No LE edema was present. Patient was diuresed in ED with furosemide 10 mg IV once.     Past Medical History:   Diagnosis Date    Atrial flutter     Hyperlipidemia     Hypertension     Pacemaker 08/2016    Single lead [employer] Pacemaker for sick sinus syndrome    Sick sinus syndrome        Past Surgical History:   Procedure Laterality Date    CARDIAC PACEMAKER PLACEMENT      HYSTERECTOMY      KIDNEY SURGERY         Review of patient's allergies indicates:  No Known Allergies    No current facility-administered medications on file prior to encounter.      Current Outpatient Prescriptions on File Prior to Encounter   Medication Sig     albuterol (VENTOLIN HFA) 90 mcg/actuation inhaler Inhale 2 puffs into the lungs every 6 (six) hours as needed for Wheezing. Rescue    apixaban 2.5 mg Tab Take 1 tablet (2.5 mg total) by mouth 2 (two) times daily.    aspirin 81 MG Chew Take 81 mg by mouth once daily.    diltiazem (CARDIZEM CD) 240 MG 24 hr capsule Take 1 capsule (240 mg total) by mouth once daily.    ferrous sulfate 325 (65 FE) MG EC tablet Take 1 tablet (325 mg total) by mouth 2 (two) times daily.    fluticasone-vilanterol (BREO) 100-25 mcg/dose diskus inhaler Inhale 1 puff into the lungs once daily. Controller    hydrALAZINE (APRESOLINE) 50 MG tablet Take 1 tablet (50 mg total) by mouth every 8 (eight) hours.    hydrochlorothiazide (HYDRODIURIL) 12.5 MG Tab Take 12.5 mg by mouth once daily.    isosorbide dinitrate (ISORDIL) 20 MG tablet Take 1 tablet (20 mg total) by mouth 3 (three) times daily.    lisinopril (PRINIVIL,ZESTRIL) 40 MG tablet Take 1 tablet (40 mg total) by mouth once daily.    omeprazole (PRILOSEC) 20 MG capsule Take 20 mg by mouth once daily.    rosuvastatin (CRESTOR) 10 MG tablet Take 10 mg by mouth once daily.     Family History     None        Social History Main Topics    Smoking status: Former Smoker     Packs/day: 1.50     Years: 63.00     Types: Cigarettes     Quit date: 6/14/2016    Smokeless tobacco: Not on file    Alcohol use No    Drug use: No    Sexual activity: No     Review of Systems   Constitutional: Positive for activity change, appetite change and fatigue. Negative for chills and fever.   HENT: Positive for sore throat. Negative for congestion.    Respiratory: Positive for cough, shortness of breath and wheezing. Negative for chest tightness.    Cardiovascular: Negative for chest pain, palpitations and leg swelling.   Gastrointestinal: Positive for nausea and vomiting. Negative for abdominal distention and abdominal pain.   Genitourinary: Negative for dysuria.   Musculoskeletal: Negative for  arthralgias.   Skin: Negative for color change and rash.   Neurological: Positive for weakness.     Objective:     Vital Signs (Most Recent):  Temp: 98.3 °F (36.8 °C) (06/02/17 1230)  Pulse: 100 (06/02/17 2111)  Resp: (!) 27 (06/02/17 1956)  BP: (!) 166/99 (06/02/17 2111)  SpO2: 97 % (06/02/17 2111) Vital Signs (24h Range):  Temp:  [98.3 °F (36.8 °C)] 98.3 °F (36.8 °C)  Pulse:  [] 100  Resp:  [16-27] 27  SpO2:  [96 %-100 %] 97 %  BP: (114-191)/(77-99) 166/99     Weight: 70.3 kg (155 lb)  Body mass index is 27.46 kg/m².    Physical Exam   Constitutional: She is oriented to person, place, and time. No distress.   Frail  woman coughing during interview; dry, hacking, and non-productive.    HENT:   Head: Normocephalic and atraumatic.   Eyes: Pupils are equal, round, and reactive to light.   Neck: Normal range of motion. Neck supple. No JVD present.   Cardiovascular: Intact distal pulses.    Irregularly irregular rhythm, tachycardic, murmurs unable to be auscultated secondary to arrhythmia.    Pulmonary/Chest: Breath sounds normal. No respiratory distress. She has no wheezes. She has no rales.   Poor air movement, but no appreciable wheezing or rales   Abdominal: Soft. Bowel sounds are normal. She exhibits no distension. There is no tenderness.   Musculoskeletal: Normal range of motion. She exhibits no edema.   Neurological: She is alert and oriented to person, place, and time.   Skin: Skin is warm and dry. Capillary refill takes less than 2 seconds. She is not diaphoretic.        Significant Labs:   Bilirubin:   Recent Labs  Lab 06/02/17  1310   BILITOT 2.5*     CBC:   Recent Labs  Lab 06/02/17  1310   WBC 6.31   HGB 9.5*   HCT 29.9*        CMP:   Recent Labs  Lab 06/02/17  1310      K 4.2      CO2 17*   *   BUN 21   CREATININE 1.4   CALCIUM 10.4   PROT 7.0   ALBUMIN 3.4*   BILITOT 2.5*   ALKPHOS 85   *   ALT 96*   ANIONGAP 15   EGFRNONAA 35.5*     Lactic Acid:    Recent Labs  Lab 06/02/17  1955   LACTATE 3.0*     Troponin:   Recent Labs  Lab 06/02/17  1310   TROPONINI 0.080*       Significant Imaging:   Imaging Results          X-Ray Chest PA And Lateral (Final result)  Result time 06/02/17 13:42:12    Final result by Robbie Lambert MD (06/02/17 13:42:12)                 Impression:     Continued demonstration of cardiomegaly and a small amount of right pleural fluid, but there has been no significant detrimental interval change in the appearance of the chest since 3/12/17.      Electronically signed by: Robbie Lambert MD  Date:     06/02/17  Time:    13:42              Narrative:    2 views of the chest were obtained, with PA and lateral projections submitted.  Comparison is made to 3/12/17.    Cardiac pacemaker again observed.  The heart is enlarged, but the degree of cardiomegaly and the appearance of the cardiomediastinal silhouette have not changed appreciably since the examination referenced above.  Pulmonary vascularity is also unchanged in appearance.  Lung zones are stable, and free of significant airspace consolidation or volume loss.  Very minimal blunting of the right costophrenic sulcus is consistent with a small amount of pleural fluid, but no pleural fluid of any substantial volume is seen on either side.  No pneumothorax.  Calcification in the wall of the aortic arch is again incidentally observed.                                Assessment/Plan:     * Chronic cough    - Multifactorial, with COPD, pulmonary hypertension, and ACEI (stopped here).  - Non-productive and 6-month history.  - Given promethazine-codeine syrup and duo-neb PRN.   - Good SpO2 on room air.   - Repeat echo to investigate for pulmonary hypertension, strong suspicion.          Shortness of breath    - Multifactorial, including pulmonary hypertension, worsening COPD, anemia, and poorly controlled atrial fibrillation/flutter.   - See chronic cough.           Acute renal failure superimposed on  stage 3 chronic kidney disease    - Creatinine on admission at 1.4.(last on 3/2017 at 0.9)   - Per history, patient is fluid down for past three weeks and likely pre-renal azotemia in play.  - Urine lytes ordered to investigate etiology.  - Per lab findings, there is suspicion for CHF exacerbation and possible cardio-renal, but not significant for fluid overload on physical examination or CXR; trial diuresis at this time but per trend of creatinine may consider providing IVF.           Abnormal liver enzymes    - Elevated Tbili at 2.5 with AST//96.  - Labs are suggestive of congestive hepatopathy (also with elevated BNP at 2488), although physical examination is unremarkable for fluid overload.  - Cause of liver dysfunction unclear; will trial diuresis to see if values resolve.  - Physical examination and history suggests patient is fluid down and may need fluids.  - Daily CMP; reassess in morning.           Atrial fibrillation with RVR    - On home diltiazem 240 mg PO daily and apixaban 2.5 mg PO BID.   - CHADVASC 4.             Pulmonary hypertension    - Seen on echo 3/13/2017.  - Estimated PA pressure at time 44  - Repeat 2D echo with CFD.            COPD (chronic obstructive pulmonary disease)    - Placed back on home Breo-Ellipta.  - Duo-neb PRN for wheezing and shortness of breath.   - Cough and shortness of breath may have component of COPD, but patient without purulent sputum and with good SpO2. Not on home O2.          Tricuspid valve disorders, specified as nonrheumatic    - Seen on echo 3/13/2017.  - Repeat 2D echo with CFD.          Aortic regurgitation    - Seen on echo 3/13/2017.  - Repeat 2D echo with CFD.          Anemia    - Patient scoped at OSH recently, no findings of GIB and iron studies at time suspicious for anemia of chronic disease.  - Hgb stable at 9.5 with MCV of 79.   - Iron studies in the AM.          Lactic acidosis    - Unclear etiology at this current stage.  - No  leukocytosis. Patient has remained afebrile without clear source of infection.  - UA ordered.  - Consideration including poor perfusion with diastolic HF with moderate TR and MR with poorly controlled atrial fibrillation/flutter.  - Repeat Echo and lactic acid.           SSS (sick sinus syndrome)    - S/p PPM.          Chronic kidney disease, stage III (moderate)              Essential hypertension    - Placed back on home blood pressure medication including diltiazem 240 mg PO daily, isosorbide dinitrate 20 mg PO TID, hydralazine 50 mg PO TID.   - Holding HCTZ considering NURIA as well as lisinopril (also for concern for chronic cough).             VTE Risk Mitigation         Ordered     apixaban tablet 2.5 mg  2 times daily     Route:  Oral        06/02/17 1446        Iain Rahman MD  PGY-1 Internal Medicine  [phone]    Department of Hospital Medicine   [facility]

## 2017-06-03 NOTE — PLAN OF CARE
"Problem: Patient Care Overview  Goal: Plan of Care Review  Outcome: Ongoing (interventions implemented as appropriate)  Pt AAOx4. Pt had two short episodes early this morning of SOB. Able to continue to talk in complete sentences during both. Possibly could have resulted from coughing spell. This nurse put some supplemental O2 on patient when SOB noted, it has been removed since and O2 sats >95% on room air. Since 1000, pt continued to have an intermittent dry cough but no episodes of SOB noted or reported. Family at bedside and stated they "have noticed her cough is much better than when they were taking care of her at home". Have been administering promethazine-codeine every 4 hours PRN to keep cough at a minimum and to avoid another coughing spell. Appears to be working well. No further complaints from patient. Appetite good. T/P self in bed frequently. Ambulates to BR with SBA only. Gait/balance good. This nurse spoke with RT about duo-nebs and administering those throughout shift. VSS. Participated well with OT. Call light in reach.      "

## 2017-06-03 NOTE — HPI
Christy Webber, a 81 year old female with a medical history significant for paroxysmal atrial fibrillation/aflutter (on apixaban and diltiazem), sick sinus syndrome (with PPM), COPD/centrilobar emphysema), essential HTN, and CHFpEF (with most recent echo on 3/2017 with moderate AR, MR, and TR) who presented to the ED on 6/2 with chief complaint of chronic, dry non-productive cough for nearly half a year. History was initially gathered from patient and son and daughter-in-law sitting at bedside. The patient has been a chronic issue that has gotten worse in the past 3 weeks. Patient's family stated that she had been on nebulizers recently without much efficacy. The patient taked Breo at home and states that she only uses her rescue albuterol inhaler approximately twice/week without resolution of symptoms. Patient's cough was worse when she laid down flat and on exertion, but the cough has essentially been constant and not worsened otherwise positionally. Patient stated that she seems to have worsening cough at nighttime. Patient stated that her appetite has been poor for the last several weeks and has increasing fatigue just from her cough. She did have some nausea and diarrhea two days ago. She denied productive sputum, fevers, chills, rhinorrhea, post-nasal drip, nausea, vomiting, chest pain, or diaphoresis.     Her medical history is significant for smoking history of 100+ pack years from age 18 to last year. Patient stated she lives in a very old house without any recent changes to house or otherwise symptoms from other inhabitants in the household. She had been recently admitted in [place] on 3/12-3/15 for dizziness and atrial fibrillation; at the time she was given EGD for concern for UGIB as there was precipitous drop in H/H; iron studies were suspicious for anemia of chronic disease.   Per chart review, she was also admitted cardiology on 8/20-8/25 for dizziness/weakness and found to be in aflutter and  discharged on apixaban and diltiazem. Recent echo on 3/13 found diastolic HF with pulmonary HTN with estimated PA pressure of 44 with moderate MR, AR, and TR.

## 2017-06-03 NOTE — SUBJECTIVE & OBJECTIVE
Past Medical History:   Diagnosis Date    Atrial flutter     Hyperlipidemia     Hypertension     Pacemaker 08/2016    Single lead [employer] Pacemaker for sick sinus syndrome    Sick sinus syndrome        Past Surgical History:   Procedure Laterality Date    CARDIAC PACEMAKER PLACEMENT      HYSTERECTOMY      KIDNEY SURGERY         Review of patient's allergies indicates:  No Known Allergies    No current facility-administered medications on file prior to encounter.      Current Outpatient Prescriptions on File Prior to Encounter   Medication Sig    albuterol (VENTOLIN HFA) 90 mcg/actuation inhaler Inhale 2 puffs into the lungs every 6 (six) hours as needed for Wheezing. Rescue    apixaban 2.5 mg Tab Take 1 tablet (2.5 mg total) by mouth 2 (two) times daily.    aspirin 81 MG Chew Take 81 mg by mouth once daily.    diltiazem (CARDIZEM CD) 240 MG 24 hr capsule Take 1 capsule (240 mg total) by mouth once daily.    ferrous sulfate 325 (65 FE) MG EC tablet Take 1 tablet (325 mg total) by mouth 2 (two) times daily.    fluticasone-vilanterol (BREO) 100-25 mcg/dose diskus inhaler Inhale 1 puff into the lungs once daily. Controller    hydrALAZINE (APRESOLINE) 50 MG tablet Take 1 tablet (50 mg total) by mouth every 8 (eight) hours.    hydrochlorothiazide (HYDRODIURIL) 12.5 MG Tab Take 12.5 mg by mouth once daily.    isosorbide dinitrate (ISORDIL) 20 MG tablet Take 1 tablet (20 mg total) by mouth 3 (three) times daily.    lisinopril (PRINIVIL,ZESTRIL) 40 MG tablet Take 1 tablet (40 mg total) by mouth once daily.    omeprazole (PRILOSEC) 20 MG capsule Take 20 mg by mouth once daily.    rosuvastatin (CRESTOR) 10 MG tablet Take 10 mg by mouth once daily.     Family History     None        Social History Main Topics    Smoking status: Former Smoker     Packs/day: 1.50     Years: 63.00     Types: Cigarettes     Quit date: 6/14/2016    Smokeless tobacco: Not on file    Alcohol use No    Drug use: No    Sexual  activity: No     Review of Systems   Constitutional: Positive for activity change, appetite change and fatigue. Negative for chills and fever.   HENT: Positive for sore throat. Negative for congestion.    Respiratory: Positive for cough, shortness of breath and wheezing. Negative for chest tightness.    Cardiovascular: Negative for chest pain, palpitations and leg swelling.   Gastrointestinal: Positive for nausea and vomiting. Negative for abdominal distention and abdominal pain.   Genitourinary: Negative for dysuria.   Musculoskeletal: Negative for arthralgias.   Skin: Negative for color change and rash.   Neurological: Positive for weakness.     Objective:     Vital Signs (Most Recent):  Temp: 98.3 °F (36.8 °C) (06/02/17 1230)  Pulse: 100 (06/02/17 2111)  Resp: (!) 27 (06/02/17 1956)  BP: (!) 166/99 (06/02/17 2111)  SpO2: 97 % (06/02/17 2111) Vital Signs (24h Range):  Temp:  [98.3 °F (36.8 °C)] 98.3 °F (36.8 °C)  Pulse:  [] 100  Resp:  [16-27] 27  SpO2:  [96 %-100 %] 97 %  BP: (114-191)/(77-99) 166/99     Weight: 70.3 kg (155 lb)  Body mass index is 27.46 kg/m².    Physical Exam   Constitutional: She is oriented to person, place, and time. No distress.   Frail  woman coughing during interview; dry, hacking, and non-productive.    HENT:   Head: Normocephalic and atraumatic.   Eyes: Pupils are equal, round, and reactive to light.   Neck: Normal range of motion. Neck supple. No JVD present.   Cardiovascular: Intact distal pulses.    Irregularly irregular rhythm, tachycardic, murmurs unable to be auscultated secondary to arrhythmia.    Pulmonary/Chest: Breath sounds normal. No respiratory distress. She has no wheezes. She has no rales.   Poor air movement, but no appreciable wheezing or rales   Abdominal: Soft. Bowel sounds are normal. She exhibits no distension. There is no tenderness.   Musculoskeletal: Normal range of motion. She exhibits no edema.   Neurological: She is alert and oriented to  person, place, and time.   Skin: Skin is warm and dry. Capillary refill takes less than 2 seconds. She is not diaphoretic.        Significant Labs:   Bilirubin:   Recent Labs  Lab 06/02/17  1310   BILITOT 2.5*     CBC:   Recent Labs  Lab 06/02/17  1310   WBC 6.31   HGB 9.5*   HCT 29.9*        CMP:   Recent Labs  Lab 06/02/17  1310      K 4.2      CO2 17*   *   BUN 21   CREATININE 1.4   CALCIUM 10.4   PROT 7.0   ALBUMIN 3.4*   BILITOT 2.5*   ALKPHOS 85   *   ALT 96*   ANIONGAP 15   EGFRNONAA 35.5*     Lactic Acid:   Recent Labs  Lab 06/02/17  1955   LACTATE 3.0*     Troponin:   Recent Labs  Lab 06/02/17  1310   TROPONINI 0.080*       Significant Imaging:   Imaging Results          X-Ray Chest PA And Lateral (Final result)  Result time 06/02/17 13:42:12    Final result by Robbie Lambert MD (06/02/17 13:42:12)                 Impression:     Continued demonstration of cardiomegaly and a small amount of right pleural fluid, but there has been no significant detrimental interval change in the appearance of the chest since 3/12/17.      Electronically signed by: Robbie Lambert MD  Date:     06/02/17  Time:    13:42              Narrative:    2 views of the chest were obtained, with PA and lateral projections submitted.  Comparison is made to 3/12/17.    Cardiac pacemaker again observed.  The heart is enlarged, but the degree of cardiomegaly and the appearance of the cardiomediastinal silhouette have not changed appreciably since the examination referenced above.  Pulmonary vascularity is also unchanged in appearance.  Lung zones are stable, and free of significant airspace consolidation or volume loss.  Very minimal blunting of the right costophrenic sulcus is consistent with a small amount of pleural fluid, but no pleural fluid of any substantial volume is seen on either side.  No pneumothorax.  Calcification in the wall of the aortic arch is again incidentally observed.

## 2017-06-03 NOTE — ASSESSMENT & PLAN NOTE
- Patient scoped at OSH recently, no findings of GIB and iron studies at time suspicious for anemia of chronic disease.  - Hgb stable; here, at 8.7 this morning  - Iron studies here suggestive of iron deficiency anemia with total iron of 15 and saturated iron of 4%; MCV 78.  - Likely for poor PO intake.  - Continue home ferrous sulfate on discharge.

## 2017-06-03 NOTE — ASSESSMENT & PLAN NOTE
- Creatinine on admission at 1.4.(last on 3/2017 at 0.9)   - Per history, patient is fluid down for past three weeks and likely pre-renal azotemia in play.  - Urine lytes ordered to investigate etiology.  - Per lab findings, there is suspicion for CHF exacerbation and possible cardio-renal, but not significant for fluid overload on physical examination or CXR; trial diuresis at this time.  - Creatinine has not worsened with mild improvement of creatinine to 1.3 today.

## 2017-06-03 NOTE — PROGRESS NOTES
[facility] Hospital Medicine  Progress Note    Patient Name: Christy Webber  MRN: [id number]  Patient Class: OP- Observation   Admission Date: 6/2/2017  Length of Stay: 0 days  Attending Physician: Veronica Moncada MD  Primary Care Provider: Elisabet George MD    [facility] Medicine Team: [facility] MED 3 Iain Rahman MD    Subjective:     Principal Problem:Acute on chronic diastolic heart failure    HPI:  Christy Webber is an 81 year old female with a medical history significant for paroxysmal atrial fibrillation/aflutter (on apixaban and diltiazem), sick sinus syndrome (with PPM), COPD/centrilobar emphysema), essential HTN, and CHFpEF (with most recent echo on 3/2017 with moderate AR, MR, and TR) who presents to the ED on 6/2 with chief complaint of chronic, dry non-productive cough for nearly half a year. History is gathered from patient and son and daughter-in-law sitting at bedside. The patient has been a chronic issue that has gotten worse in the past 3 weeks. Patient's family states that she had been on nebulizers recently without much efficacy. The patient takes Breo at home and states that she only uses her rescue albuterol inhaler approximately twice/week without resolution of symptoms. Patient's cough is worse when she lays down flat and on exertion, but the cough has essentially been constant and not worsened otherwise positionally. Patient does states that she seems to have worsening cough at nighttime. Patient states that her appetite has been poor for the last several weeks and has increasing fatigue just from her cough. She did have some nausea and diarrhea two days ago. She denies productive sputum, fevers, chills, rhinorrhea, post-nasal drip, nausea, vomiting, chest pain, or diaphoresis.     Her medical history is significant for smoking history of 100+ pack years from age 18 to last year. Patient does states she lives in a very old house without any recent changes to house or  otherwise symptoms from other inhabitants in the household. She had been recently admitted in [place] on 3/12-3/15 for dizziness and atrial fibrillation; at the time she was given EGD for concern for UGIB as there was precipitous drop in H/H; iron studies were suspicious for anemia of chronic disease. Per chart review, she was also admitted cardiology on 8/20-8/25 for dizziness/weakness and found to be in aflutter and discharged on apixaban and diltiazem. Recent echo on 3/13 found diastolic HF with pulmonary HTN with estimated PA pressure of 44 with moderate MR, AR, and TR.         Hospital Course:  6/2 In the ED, patient was found in aflutter with HR from . Denied palpitations, chest pain, or shortness of breath unless during her coughing spells. Her SpO2 remained at 95% or above on room air despite frequent dry coughing spells during interview. Labs were significant for NURIA with creatinine of 1.4 and liver dysfunction with tbili of 2.5 and AST//96. BNP was significantly elevated at 2488. Lactic acid was elevated at 3.0. Physical examination was not significant for accessory muscle use and without crackles or wheezing on lung auscultation. No LE edema was present. Patient was diuresed in ED with furosemide 10 mg IV once.     Interval History:   No acute events overnight. Slept well but still with intermittent dry cough. Denies fevers, chills, nausea, vomiting, or shortness of breath.     Review of Systems   Constitutional: Positive for appetite change and fatigue. Negative for chills and fever.   HENT: Negative for congestion and sore throat.    Respiratory: Positive for cough and shortness of breath. Negative for chest tightness and wheezing.    Cardiovascular: Negative for chest pain, palpitations and leg swelling.   Gastrointestinal: Negative for abdominal distention, abdominal pain, nausea and vomiting.   Genitourinary: Negative for dysuria.   Musculoskeletal: Negative for arthralgias.   Skin:  Negative for color change and rash.   Neurological: Positive for weakness.     Objective:     Vital Signs (Most Recent):  Temp: 97.6 °F (36.4 °C) (06/03/17 0754)  Pulse: 62 (06/03/17 0754)  Resp: 17 (06/03/17 0754)  BP: (!) 145/58 (06/03/17 0754)  SpO2: 96 % (06/03/17 0754) Vital Signs (24h Range):  Temp:  [97.6 °F (36.4 °C)-99.3 °F (37.4 °C)] 97.6 °F (36.4 °C)  Pulse:  [] 62  Resp:  [16-27] 17  SpO2:  [95 %-100 %] 96 %  BP: (114-191)/(58-99) 145/58     Weight: 70.3 kg (155 lb)  Body mass index is 27.46 kg/m².  No intake or output data in the 24 hours ending 06/03/17 0829   Physical Exam   Constitutional: She is oriented to person, place, and time. No distress.   Frail  woman coughing during interview; dry, hacking, and non-productive.    HENT:   Head: Normocephalic and atraumatic.   Eyes: Pupils are equal, round, and reactive to light.   Neck: Normal range of motion. Neck supple. No JVD (To jawline) present.   Cardiovascular: Intact distal pulses.    Irregularly irregular rhythm, tachycardic, murmurs unable to be auscultated secondary to arrhythmia.    Pulmonary/Chest: Breath sounds normal. No respiratory distress. She has no wheezes. She has no rales.   Poor air movement, but no appreciable wheezing or rales   Abdominal: Soft. Bowel sounds are normal. She exhibits no distension. There is no tenderness.   Musculoskeletal: Normal range of motion. She exhibits no edema.   Neurological: She is alert and oriented to person, place, and time.   Skin: Skin is warm and dry. Capillary refill takes less than 2 seconds. She is not diaphoretic.       Significant Labs:   BMP:   Recent Labs  Lab 06/03/17  0220   *      K 3.5      CO2 20*   BUN 22   CREATININE 1.3   CALCIUM 10.2   MG 1.5*     CBC:   Recent Labs  Lab 06/02/17  1310 06/03/17  0220   WBC 6.31 6.89   HGB 9.5* 8.7*   HCT 29.9* 27.8*    212       Significant Imaging:   Imaging Results          X-Ray Chest PA And Lateral  (Final result)  Result time 06/02/17 13:42:12    Final result by Robbie Lambert MD (06/02/17 13:42:12)                 Impression:     Continued demonstration of cardiomegaly and a small amount of right pleural fluid, but there has been no significant detrimental interval change in the appearance of the chest since 3/12/17.      Electronically signed by: Robbie Lambert MD  Date:     06/02/17  Time:    13:42              Narrative:    2 views of the chest were obtained, with PA and lateral projections submitted.  Comparison is made to 3/12/17.    Cardiac pacemaker again observed.  The heart is enlarged, but the degree of cardiomegaly and the appearance of the cardiomediastinal silhouette have not changed appreciably since the examination referenced above.  Pulmonary vascularity is also unchanged in appearance.  Lung zones are stable, and free of significant airspace consolidation or volume loss.  Very minimal blunting of the right costophrenic sulcus is consistent with a small amount of pleural fluid, but no pleural fluid of any substantial volume is seen on either side.  No pneumothorax.  Calcification in the wall of the aortic arch is again incidentally observed.                                  Assessment/Plan:      Chronic cough    - See acute on chronic diastolic HF.           * Acute on chronic diastolic heart failure    - Multifactorial, with COPD, pulmonary hypertension, CHF exacerbation and ACEI (stopped here).  - Non-productive and 6-month history.  - Given promethazine-codeine syrup and duo-neb PRN.   - Good SpO2 on room air.   - BNP significantly elevated on admission at 2488 with lab findings suspicious for congestive hepatopathy although physical examination no clear on admission for fluid overload. With appreciable JVP today.   - Repeat echo to investigate for pulmonary hypertension and heart function, strong suspicion of RV disease.   - Diurese with furosemide 20 mg IV daily.           Shortness of breath     - Multifactorial, including pulmonary hypertension, worsening COPD, anemia, and poorly controlled atrial fibrillation/flutter.   - See chronic cough.           Acute renal failure superimposed on stage 3 chronic kidney disease    - Creatinine on admission at 1.4.(last on 3/2017 at 0.9)   - Per history, patient is fluid down for past three weeks and likely pre-renal azotemia in play.  - Urine lytes ordered to investigate etiology.  - Per lab findings, there is suspicion for CHF exacerbation and possible cardio-renal, but not significant for fluid overload on physical examination or CXR; trial diuresis at this time.  - Creatinine has not worsened with mild improvement of creatinine to 1.3 today.           Abnormal liver enzymes    - Elevated Tbili at 2.5 with AST//96.  - Labs are suggestive of congestive hepatopathy (also with elevated BNP at 2488), although physical examination is unremarkable for fluid overload.  - Cause of liver dysfunction unclear; will trial diuresis to see if values resolve.  - Physical examination and history suggests patient is fluid down and may need fluids.  - Improving with AST/ALT now at 81/83 with Tbili 2.2.  - Continued diuresis.          Atrial fibrillation with RVR    - On home diltiazem 240 mg PO daily and apixaban 2.5 mg PO BID.   - CHADVASC 4.             Pulmonary hypertension    - Seen on echo 3/13/2017.  - Estimated PA pressure at time 44  - Repeat 2D echo with CFD.            COPD (chronic obstructive pulmonary disease)    - Placed back on home Breo-Ellipta.  - Duo-neb PRN for wheezing and shortness of breath.   - Cough and shortness of breath may have component of COPD, but patient without purulent sputum and with good SpO2. Not on home O2.          Tricuspid valve disorders, specified as nonrheumatic    - Seen on echo 3/13/2017.  - Repeat 2D echo with CFD.          Aortic regurgitation    - Seen on echo 3/13/2017.  - Repeat 2D echo with CFD.          Iron deficiency  anemia    - Patient scoped at OSH recently, no findings of GIB and iron studies at time suspicious for anemia of chronic disease.  - Hgb stable; here, at 8.7 this morning  - Iron studies here suggestive of iron deficiency anemia with total iron of 15 and saturated iron of 4%; MCV 78.  - Likely for poor PO intake.  - Continue home ferrous sulfate on discharge.           Lactic acidosis    - Unclear etiology at this current stage.  - No leukocytosis. Patient has remained afebrile without clear source of infection.  - UA ordered.  - Consideration including poor perfusion with diastolic HF with moderate TR and MR with poorly controlled atrial fibrillation/flutter.  - Lactic acid trended from 3 to 2.6.          SSS (sick sinus syndrome)    - S/p PPM.          Chronic kidney disease, stage III (moderate)              Essential hypertension    - Placed back on home blood pressure medication including diltiazem 240 mg PO daily, isosorbide dinitrate 20 mg PO TID, hydralazine 50 mg PO TID.   - Holding HCTZ considering NURIA as well as lisinopril (also for concern for chronic cough).             VTE Risk Mitigation         Ordered     apixaban tablet 2.5 mg  2 times daily     Route:  Oral        06/02/17 9335        Disposition: Pending echo. Continue diuresis for presumed acute on chronic diastolic heart failure.     Iain Rahman MD  PGY-1 Internal Medicine  [phone]    Department of Hospital Medicine   [facility]

## 2017-06-03 NOTE — ASSESSMENT & PLAN NOTE
- Multifactorial, including pulmonary hypertension, worsening COPD, anemia, and poorly controlled atrial fibrillation/flutter.   - See chronic cough.

## 2017-06-03 NOTE — ASSESSMENT & PLAN NOTE
- Unclear etiology at this current stage.  - No leukocytosis. Patient has remained afebrile without clear source of infection.  - UA ordered.  - Consideration including poor perfusion with diastolic HF with moderate TR and MR with poorly controlled atrial fibrillation/flutter.  - Lactic acid trended from 3 to 2.6.

## 2017-06-03 NOTE — PT/OT/SLP EVAL
Occupational Therapy  Evaluation/ Treatment/ Discharge Summary    Christy Webber   MRN: [id number]   Admitting Diagnosis: Acute on chronic diastolic heart failure    OT Date of Treatment: 06/03/17   OT Start Time: 0748  OT Stop Time: 0815  OT Total Time (min): 27 min    Billable Minutes:  Evaluation 15 minutes  Therapeutic Activity 8 minutes    Diagnosis: Acute on chronic diastolic heart failure   Decreased endurance    Past Medical History:   Diagnosis Date    Atrial flutter     Hyperlipidemia     Hypertension     Pacemaker 08/2016    Single lead [employer] Pacemaker for sick sinus syndrome    Sick sinus syndrome       Past Surgical History:   Procedure Laterality Date    CARDIAC PACEMAKER PLACEMENT      HYSTERECTOMY      KIDNEY SURGERY         Referring physician: LIZ Moncada  Date referred to OT: 6/3/2017    General Precautions: Standard, fall    Do you have any cultural, spiritual, Shinto conflicts, given your current situation?: none stated     Patient History:  Living Environment  Lives With: child(courtney), adult  Living Arrangements: house  Home Accessibility: stairs to enter home  Number of Stairs to Enter Home: 5  Stair Railings at Home: outside, present at both sides  Transportation Available: car, family or friend will provide  Living Environment Comment: Pt lives with 2 adult sons in a 1SH with 5 TERESA and B rails. Pt has a tub/shower with grab bars and regular toilet. Pt was (I) in ADLs and ambulation. Pt enjoys pier fishing.  Equipment Currently Used at Home: grab bar    Prior level of function:   Bed Mobility/Transfers: independent  Grooming: independent  Bathing: needs device (grab bars)  Upper Body Dressing: independent  Lower Body Dressing: independent  Toileting: independent  Home Management Skills: independent  Homemaking Responsibilities: Yes  Mode of Transportation: Family     Dominant hand: right    Subjective:  Communicated with RN prior to session.    Chief Complaint:  fatigue  Patient/Family stated goals: go home    Pain/Comfort  Pain Rating 1: 0/10  Pain Rating Post-Intervention 1: 0/10    Objective:   Pt found supine with HOB elevated and agreeable to OT eval.    Cognitive Exam:  Oriented to: Person, Place, Time and Situation  Follows Commands/attention: Follows multistep  commands  Communication: clear/fluent  Memory:  No Deficits noted  Safety awareness/insight to disability: intact  Coping skills/emotional control: Appropriate to situation    Visual/perceptual:  Intact    Physical Exam:  Postural examination/scapula alignment: No postural abnormalities identified  Skin integrity: Visible skin intact  Edema: None noted     Sensation:   Intact    Upper Extremity Range of Motion:  Right Upper Extremity: WFL  Left Upper Extremity: WFL    Upper Extremity Strength:  Right Upper Extremity: WFL  Left Upper Extremity: WFL   Strength: Good    Fine motor coordination:   Intact    Gross motor coordination: WFL    Functional Mobility:  Bed Mobility:  Rolling/Turning to Left: Independent  Rolling/Turning Right: Independent  Scooting/Bridging: Independent  Supine to Sit: Independent    Transfers:  Sit <> Stand Assistance: Modified Independent  Sit <> Stand Assistive Device: No Assistive Device  Bed <> Chair Technique: Stand Pivot  Bed <> Chair Transfer Assistance: Modified Independent  Bed <> Chair Assistive Device: No Assistive Device  Toilet Transfer Technique: Stand Pivot  Toilet Transfer Assistance: Independent  Toilet Transfer Assistive Device: No Assistive Device    Functional Ambulation: (I) - Mod (I) for increased time and rest breaks; ambulated in hallway and asc/descended stairs with SBA and use of rails.    Activities of Daily Living:  Feeding Level of Assistance: Set-up Assistance  UE Dressing Level of Assistance: Set-up Assistance (gown as robe)  LE Dressing Level of Assistance: Set-up Assistance (socks)  Grooming Position: Standing at sink  Grooming Level of Assistance:  "Independent (washed hands at the sink)  Toileting Where Assessed: Toilet  Toileting Level of Assistance: Independent    Balance:   Static Sit: NORMAL: No deviations seen in posture held statically  Dynamic Sit: GOOD+: Maintains balance through MAXIMAL excursions of active trunk motion  Static Stand: GOOD+: Takes MAXIMAL challenges from all directions  Dynamic stand: GOOD+: Independent gait (with or without assistive device)    Therapeutic Activities and Exercises:  Pt ed re OT role and POC. Pt performed supine to sit and scoot to EOB. Pt performed strength and ROM testing. Pt donned gown as robe and socks. Pt performed sit to stand t/f and ambulated into hallway. Pt rested in chair at end of hallway. Pt stood, then entered stairwell. Pt asc/descended 1 flight of stairs with SBA and use of HR. Pt returned to room and entered the bathroom for (I) toileting and hand washing. Pt returned to the room and sat in recliner.    AM-PAC 6 CLICK ADL  How much help from another person does this patient currently need?  1 = Unable, Total/Dependent Assistance  2 = A lot, Maximum/Moderate Assistance  3 = A little, Minimum/Contact Guard/Supervision  4 = None, Modified [place]/Independent    Putting on and taking off regular lower body clothing? : 4  Bathing (including washing, rinsing, drying)?: 4  Toileting, which includes using toilet, bedpan, or urinal? : 4  Putting on and taking off regular upper body clothing?: 4  Taking care of personal grooming such as brushing teeth?: 4  Eating meals?: 4  Total Score: 24    AM-PAC Raw Score CMS "G-Code Modifier Level of Impairment Assistance   6 % Total / Unable   7 - 9 CM 80 - 100% Maximal Assist   10-14 CL 60 - 80% Moderate Assist   15 - 19 CK 40 - 60% Moderate Assist   20 - 22 CJ 20 - 40% Minimal Assist   23 CI 1-20% SBA / CGA   24 CH 0% Independent/ Mod I       Patient left up in chair with all lines intact and call button in reach    Assessment:  Christy Webber is a 81 y.o. " female with a medical diagnosis of Acute on chronic diastolic heart failure and presents with the impairments listed below. Pt is pleasant and motivated to return home. Pt is able to perform all necessary ADLs and is safe with mobility as she is aware of her environment and takes appropriate rest breaks. Pt is safe to return home with no needs. Pt ed re continued physical activity to improve endurance. Pt understood. OT to d/c.    Rehab identified problem list/impairments: Rehab identified problem list/impairments: impaired endurance, impaired cardiopulmonary response to activity    Rehab potential is good.    Activity tolerance: Fair-Good    Discharge recommendations: Discharge Facility/Level Of Care Needs: home     Barriers to discharge: Barriers to Discharge: None    Equipment recommendations: none     GOALS:    Occupational Therapy Goals     Not on file          Multidisciplinary Problems (Resolved)        Problem: Occupational Therapy Goal    Goal Priority Disciplines Outcome Interventions   Occupational Therapy Goal   (Resolved)     OT, PT/OT Outcome(s) achieved                    PLAN:  OT to d/c.    MARKOS Brown  6/3/2017  Pager: [phone]

## 2017-06-03 NOTE — ASSESSMENT & PLAN NOTE
- Patient scoped at OSH recently, no findings of GIB and iron studies at time suspicious for anemia of chronic disease.  - Hgb stable at 9.5 with MCV of 79.   - Iron studies in the AM.

## 2017-06-03 NOTE — PLAN OF CARE
Problem: Occupational Therapy Goal  Goal: Occupational Therapy Goal  Outcome: Outcome(s) achieved Date Met: 06/03/17  Pt is able to perform all necessary ADLs and is safe during mobility. Pt has good family support. OT to d/cristhian.    MARKOS Brown  6/3/2017  Pager: [phone]

## 2017-06-03 NOTE — ASSESSMENT & PLAN NOTE
- Creatinine on admission at 1.4.(last on 3/2017 at 0.9)   - Per history, patient is fluid down for past three weeks and likely pre-renal azotemia in play.  - Urine lytes ordered to investigate etiology.  - Per lab findings, there is suspicion for CHF exacerbation and possible cardio-renal, but not significant for fluid overload on physical examination or CXR; trial diuresis at this time but per trend of creatinine may consider providing IVF.

## 2017-06-03 NOTE — ASSESSMENT & PLAN NOTE
- Unclear etiology at this current stage.  - No leukocytosis. Patient has remained afebrile without clear source of infection.  - UA ordered.  - Consideration including poor perfusion with diastolic HF with moderate TR and MR with poorly controlled atrial fibrillation/flutter.  - Repeat Echo and lactic acid.

## 2017-06-03 NOTE — ASSESSMENT & PLAN NOTE
- Placed back on home blood pressure medication including diltiazem 240 mg PO daily, isosorbide dinitrate 20 mg PO TID, hydralazine 50 mg PO TID.   - Holding HCTZ considering NURIA as well as lisinopril (also for concern for chronic cough).

## 2017-06-03 NOTE — SUBJECTIVE & OBJECTIVE
Interval History:   No acute events overnight. Slept well but still with intermittent dry cough. Denies fevers, chills, nausea, vomiting, or shortness of breath.     Review of Systems   Constitutional: Positive for appetite change and fatigue. Negative for chills and fever.   HENT: Negative for congestion and sore throat.    Respiratory: Positive for cough and shortness of breath. Negative for chest tightness and wheezing.    Cardiovascular: Negative for chest pain, palpitations and leg swelling.   Gastrointestinal: Negative for abdominal distention, abdominal pain, nausea and vomiting.   Genitourinary: Negative for dysuria.   Musculoskeletal: Negative for arthralgias.   Skin: Negative for color change and rash.   Neurological: Positive for weakness.     Objective:     Vital Signs (Most Recent):  Temp: 97.6 °F (36.4 °C) (06/03/17 0754)  Pulse: 62 (06/03/17 0754)  Resp: 17 (06/03/17 0754)  BP: (!) 145/58 (06/03/17 0754)  SpO2: 96 % (06/03/17 0754) Vital Signs (24h Range):  Temp:  [97.6 °F (36.4 °C)-99.3 °F (37.4 °C)] 97.6 °F (36.4 °C)  Pulse:  [] 62  Resp:  [16-27] 17  SpO2:  [95 %-100 %] 96 %  BP: (114-191)/(58-99) 145/58     Weight: 70.3 kg (155 lb)  Body mass index is 27.46 kg/m².  No intake or output data in the 24 hours ending 06/03/17 0829   Physical Exam   Constitutional: She is oriented to person, place, and time. No distress.   Frail  woman coughing during interview; dry, hacking, and non-productive.    HENT:   Head: Normocephalic and atraumatic.   Eyes: Pupils are equal, round, and reactive to light.   Neck: Normal range of motion. Neck supple. No JVD (To jawline) present.   Cardiovascular: Intact distal pulses.    Irregularly irregular rhythm, tachycardic, murmurs unable to be auscultated secondary to arrhythmia.    Pulmonary/Chest: Breath sounds normal. No respiratory distress. She has no wheezes. She has no rales.   Poor air movement, but no appreciable wheezing or rales    Abdominal: Soft. Bowel sounds are normal. She exhibits no distension. There is no tenderness.   Musculoskeletal: Normal range of motion. She exhibits no edema.   Neurological: She is alert and oriented to person, place, and time.   Skin: Skin is warm and dry. Capillary refill takes less than 2 seconds. She is not diaphoretic.       Significant Labs:   BMP:   Recent Labs  Lab 06/03/17  0220   *      K 3.5      CO2 20*   BUN 22   CREATININE 1.3   CALCIUM 10.2   MG 1.5*     CBC:   Recent Labs  Lab 06/02/17  1310 06/03/17  0220   WBC 6.31 6.89   HGB 9.5* 8.7*   HCT 29.9* 27.8*    212       Significant Imaging:   Imaging Results          X-Ray Chest PA And Lateral (Final result)  Result time 06/02/17 13:42:12    Final result by Robbie Lambert MD (06/02/17 13:42:12)                 Impression:     Continued demonstration of cardiomegaly and a small amount of right pleural fluid, but there has been no significant detrimental interval change in the appearance of the chest since 3/12/17.      Electronically signed by: Robbie Lambert MD  Date:     06/02/17  Time:    13:42              Narrative:    2 views of the chest were obtained, with PA and lateral projections submitted.  Comparison is made to 3/12/17.    Cardiac pacemaker again observed.  The heart is enlarged, but the degree of cardiomegaly and the appearance of the cardiomediastinal silhouette have not changed appreciably since the examination referenced above.  Pulmonary vascularity is also unchanged in appearance.  Lung zones are stable, and free of significant airspace consolidation or volume loss.  Very minimal blunting of the right costophrenic sulcus is consistent with a small amount of pleural fluid, but no pleural fluid of any substantial volume is seen on either side.  No pneumothorax.  Calcification in the wall of the aortic arch is again incidentally observed.

## 2017-06-03 NOTE — ASSESSMENT & PLAN NOTE
- Elevated Tbili at 2.5 with AST//96.  - Labs are suggestive of congestive hepatopathy (also with elevated BNP at 2488), although physical examination is unremarkable for fluid overload.  - Cause of liver dysfunction unclear; will trial diuresis to see if values resolve.  - Physical examination and history suggests patient is fluid down and may need fluids.  - Daily CMP; reassess in morning.

## 2017-06-03 NOTE — ASSESSMENT & PLAN NOTE
- Multifactorial, with COPD, pulmonary hypertension, and ACEI (stopped here).  - Non-productive and 6-month history.  - Given promethazine-codeine syrup and duo-neb PRN.   - Good SpO2 on room air.   - Repeat echo to investigate for pulmonary hypertension, strong suspicion.

## 2017-06-03 NOTE — ASSESSMENT & PLAN NOTE
- Elevated Tbili at 2.5 with AST//96.  - Labs are suggestive of congestive hepatopathy (also with elevated BNP at 2488), although physical examination is unremarkable for fluid overload.  - Cause of liver dysfunction unclear; will trial diuresis to see if values resolve.  - Physical examination and history suggests patient is fluid down and may need fluids.  - Improving with AST/ALT now at 81/83 with Tbili 2.2.  - Continued diuresis.

## 2017-06-04 PROBLEM — K76.9 HEPATOPATHY: Status: ACTIVE | Noted: 2017-06-02

## 2017-06-04 LAB
ALBUMIN SERPL BCP-MCNC: 3 G/DL
ALP SERPL-CCNC: 64 U/L
ALT SERPL W/O P-5'-P-CCNC: 67 U/L
ANION GAP SERPL CALC-SCNC: 11 MMOL/L
ANION GAP SERPL CALC-SCNC: 9 MMOL/L
AORTIC VALVE REGURGITATION: ABNORMAL
AORTIC VALVE STENOSIS: ABNORMAL
APTT BLDCRRT: 23.2 SEC
AST SERPL-CCNC: 52 U/L
BASOPHILS # BLD AUTO: 0.03 K/UL
BASOPHILS NFR BLD: 0.5 %
BILIRUB SERPL-MCNC: 1.6 MG/DL
BNP SERPL-MCNC: 693 PG/ML
BUN SERPL-MCNC: 21 MG/DL
BUN SERPL-MCNC: 22 MG/DL
CALCIUM SERPL-MCNC: 10.3 MG/DL
CALCIUM SERPL-MCNC: 9.7 MG/DL
CHLORIDE SERPL-SCNC: 104 MMOL/L
CHLORIDE SERPL-SCNC: 105 MMOL/L
CO2 SERPL-SCNC: 22 MMOL/L
CO2 SERPL-SCNC: 23 MMOL/L
CREAT SERPL-MCNC: 1.3 MG/DL
CREAT SERPL-MCNC: 1.6 MG/DL
DIFFERENTIAL METHOD: ABNORMAL
EOSINOPHIL # BLD AUTO: 0.3 K/UL
EOSINOPHIL NFR BLD: 4.9 %
ERYTHROCYTE [DISTWIDTH] IN BLOOD BY AUTOMATED COUNT: 17.5 %
ERYTHROCYTE [DISTWIDTH] IN BLOOD BY AUTOMATED COUNT: 17.5 %
EST. GFR  (AFRICAN AMERICAN): 34.8 ML/MIN/1.73 M^2
EST. GFR  (AFRICAN AMERICAN): 44.8 ML/MIN/1.73 M^2
EST. GFR  (NON AFRICAN AMERICAN): 30.2 ML/MIN/1.73 M^2
EST. GFR  (NON AFRICAN AMERICAN): 38.8 ML/MIN/1.73 M^2
ESTIMATED PA SYSTOLIC PRESSURE: 53.19
GLUCOSE SERPL-MCNC: 108 MG/DL
GLUCOSE SERPL-MCNC: 124 MG/DL
HCT VFR BLD AUTO: 25.7 %
HCT VFR BLD AUTO: 28.7 %
HGB BLD-MCNC: 8.3 G/DL
HGB BLD-MCNC: 9 G/DL
INR PPP: 1.2
LYMPHOCYTES # BLD AUTO: 1 K/UL
LYMPHOCYTES NFR BLD: 16.9 %
MAGNESIUM SERPL-MCNC: 2.2 MG/DL
MCH RBC QN AUTO: 24.9 PG
MCH RBC QN AUTO: 25.2 PG
MCHC RBC AUTO-ENTMCNC: 31.4 %
MCHC RBC AUTO-ENTMCNC: 32.3 %
MCV RBC AUTO: 78 FL
MCV RBC AUTO: 79 FL
MITRAL VALVE REGURGITATION: ABNORMAL
MONOCYTES # BLD AUTO: 0.9 K/UL
MONOCYTES NFR BLD: 14.6 %
NEUTROPHILS # BLD AUTO: 3.8 K/UL
NEUTROPHILS NFR BLD: 62.9 %
PHOSPHATE SERPL-MCNC: 2.6 MG/DL
PLATELET # BLD AUTO: 225 K/UL
PLATELET # BLD AUTO: 253 K/UL
PMV BLD AUTO: 10.7 FL
PMV BLD AUTO: 11.3 FL
POTASSIUM SERPL-SCNC: 2.9 MMOL/L
POTASSIUM SERPL-SCNC: 4.5 MMOL/L
PROT SERPL-MCNC: 6 G/DL
PROTHROMBIN TIME: 12.2 SEC
RBC # BLD AUTO: 3.3 M/UL
RBC # BLD AUTO: 3.62 M/UL
RETIRED EF AND QEF - SEE NOTES: 58 (ref 55–65)
SODIUM SERPL-SCNC: 136 MMOL/L
SODIUM SERPL-SCNC: 138 MMOL/L
TRICUSPID VALVE REGURGITATION: ABNORMAL
WBC # BLD AUTO: 5.97 K/UL
WBC # BLD AUTO: 6.33 K/UL

## 2017-06-04 PROCEDURE — 80053 COMPREHEN METABOLIC PANEL: CPT

## 2017-06-04 PROCEDURE — 25000003 PHARM REV CODE 250: Performed by: INTERNAL MEDICINE

## 2017-06-04 PROCEDURE — 99226 PR SUBSEQUENT OBSERVATION CARE,LEVEL III: CPT | Mod: ,,, | Performed by: INTERNAL MEDICINE

## 2017-06-04 PROCEDURE — 84100 ASSAY OF PHOSPHORUS: CPT

## 2017-06-04 PROCEDURE — 25000003 PHARM REV CODE 250: Performed by: STUDENT IN AN ORGANIZED HEALTH CARE EDUCATION/TRAINING PROGRAM

## 2017-06-04 PROCEDURE — 83880 ASSAY OF NATRIURETIC PEPTIDE: CPT

## 2017-06-04 PROCEDURE — 63600175 PHARM REV CODE 636 W HCPCS: Performed by: STUDENT IN AN ORGANIZED HEALTH CARE EDUCATION/TRAINING PROGRAM

## 2017-06-04 PROCEDURE — 83735 ASSAY OF MAGNESIUM: CPT

## 2017-06-04 PROCEDURE — 63600175 PHARM REV CODE 636 W HCPCS: Performed by: INTERNAL MEDICINE

## 2017-06-04 PROCEDURE — 85027 COMPLETE CBC AUTOMATED: CPT

## 2017-06-04 PROCEDURE — 85025 COMPLETE CBC W/AUTO DIFF WBC: CPT

## 2017-06-04 PROCEDURE — 80048 BASIC METABOLIC PNL TOTAL CA: CPT

## 2017-06-04 PROCEDURE — 93306 TTE W/DOPPLER COMPLETE: CPT | Mod: 26,,, | Performed by: INTERNAL MEDICINE

## 2017-06-04 PROCEDURE — 36415 COLL VENOUS BLD VENIPUNCTURE: CPT

## 2017-06-04 PROCEDURE — 85610 PROTHROMBIN TIME: CPT

## 2017-06-04 PROCEDURE — G0378 HOSPITAL OBSERVATION PER HR: HCPCS

## 2017-06-04 PROCEDURE — 93306 TTE W/DOPPLER COMPLETE: CPT

## 2017-06-04 PROCEDURE — 85730 THROMBOPLASTIN TIME PARTIAL: CPT

## 2017-06-04 RX ORDER — LOSARTAN POTASSIUM 50 MG/1
50 TABLET ORAL DAILY
Qty: 90 TABLET | Refills: 3 | Status: SHIPPED | OUTPATIENT
Start: 2017-06-04 | End: 2017-06-05 | Stop reason: HOSPADM

## 2017-06-04 RX ORDER — FUROSEMIDE 10 MG/ML
40 INJECTION INTRAMUSCULAR; INTRAVENOUS DAILY
Status: DISCONTINUED | OUTPATIENT
Start: 2017-06-04 | End: 2017-06-04

## 2017-06-04 RX ORDER — FUROSEMIDE 40 MG/1
40 TABLET ORAL DAILY
Status: DISCONTINUED | OUTPATIENT
Start: 2017-06-05 | End: 2017-06-05 | Stop reason: HOSPADM

## 2017-06-04 RX ORDER — POTASSIUM CHLORIDE 20 MEQ/1
40 TABLET, EXTENDED RELEASE ORAL EVERY 4 HOURS
Status: COMPLETED | OUTPATIENT
Start: 2017-06-04 | End: 2017-06-04

## 2017-06-04 RX ORDER — ALBUTEROL SULFATE 0.83 MG/ML
2.5 SOLUTION RESPIRATORY (INHALATION) EVERY 6 HOURS PRN
Status: ON HOLD | COMMUNITY
End: 2017-08-02 | Stop reason: SDUPTHER

## 2017-06-04 RX ORDER — FUROSEMIDE 40 MG/1
40 TABLET ORAL DAILY
Qty: 60 TABLET | Refills: 6 | Status: ON HOLD | OUTPATIENT
Start: 2017-06-05 | End: 2017-08-02

## 2017-06-04 RX ORDER — BENZONATATE 100 MG/1
100 CAPSULE ORAL 3 TIMES DAILY PRN
Status: DISCONTINUED | OUTPATIENT
Start: 2017-06-04 | End: 2017-06-05 | Stop reason: HOSPADM

## 2017-06-04 RX ORDER — BENZONATATE 100 MG/1
100 CAPSULE ORAL 3 TIMES DAILY PRN
Qty: 60 CAPSULE | Refills: 1 | Status: SHIPPED | OUTPATIENT
Start: 2017-06-04 | End: 2017-06-14

## 2017-06-04 RX ORDER — SODIUM FERRIC GLUCONATE COMPLEX IN SUCROSE 12.5 MG/ML
125 INJECTION INTRAVENOUS ONCE
Status: DISCONTINUED | OUTPATIENT
Start: 2017-06-04 | End: 2017-06-04

## 2017-06-04 RX ADMIN — SODIUM FERRIC GLUCONATE COMPLEX 125 MG: 12.5 INJECTION INTRAVENOUS at 11:06

## 2017-06-04 RX ADMIN — FUROSEMIDE 40 MG: 10 INJECTION, SOLUTION INTRAVENOUS at 08:06

## 2017-06-04 RX ADMIN — ISOSORBIDE DINITRATE 20 MG: 10 TABLET ORAL at 02:06

## 2017-06-04 RX ADMIN — ISOSORBIDE DINITRATE 20 MG: 10 TABLET ORAL at 06:06

## 2017-06-04 RX ADMIN — PROMETHAZINE HYDROCHLORIDE AND CODEINE PHOSPHATE 5 ML: 6.25; 1 SYRUP ORAL at 09:06

## 2017-06-04 RX ADMIN — PROMETHAZINE HYDROCHLORIDE AND CODEINE PHOSPHATE 5 ML: 6.25; 1 SYRUP ORAL at 08:06

## 2017-06-04 RX ADMIN — DILTIAZEM HYDROCHLORIDE 240 MG: 240 CAPSULE, EXTENDED RELEASE ORAL at 08:06

## 2017-06-04 RX ADMIN — HYDRALAZINE HYDROCHLORIDE 50 MG: 50 TABLET ORAL at 06:06

## 2017-06-04 RX ADMIN — PANTOPRAZOLE SODIUM 40 MG: 40 TABLET, DELAYED RELEASE ORAL at 08:06

## 2017-06-04 RX ADMIN — ASPIRIN 81 MG CHEWABLE TABLET 81 MG: 81 TABLET CHEWABLE at 08:06

## 2017-06-04 RX ADMIN — PROMETHAZINE HYDROCHLORIDE AND CODEINE PHOSPHATE 5 ML: 6.25; 1 SYRUP ORAL at 02:06

## 2017-06-04 RX ADMIN — HYDRALAZINE HYDROCHLORIDE 50 MG: 50 TABLET ORAL at 09:06

## 2017-06-04 RX ADMIN — HYDRALAZINE HYDROCHLORIDE 50 MG: 50 TABLET ORAL at 02:06

## 2017-06-04 RX ADMIN — ROSUVASTATIN CALCIUM 10 MG: 10 TABLET ORAL at 08:06

## 2017-06-04 RX ADMIN — ISOSORBIDE DINITRATE 20 MG: 10 TABLET ORAL at 09:06

## 2017-06-04 RX ADMIN — APIXABAN 2.5 MG: 2.5 TABLET, FILM COATED ORAL at 08:06

## 2017-06-04 RX ADMIN — APIXABAN 2.5 MG: 2.5 TABLET, FILM COATED ORAL at 09:06

## 2017-06-04 RX ADMIN — POTASSIUM CHLORIDE 40 MEQ: 1500 TABLET, EXTENDED RELEASE ORAL at 09:06

## 2017-06-04 RX ADMIN — POTASSIUM CHLORIDE 40 MEQ: 1500 TABLET, EXTENDED RELEASE ORAL at 05:06

## 2017-06-04 RX ADMIN — POTASSIUM CHLORIDE 40 MEQ: 1500 TABLET, EXTENDED RELEASE ORAL at 02:06

## 2017-06-04 NOTE — PHARMACY MED REC
"MedMined Medication Reconciliation  Template  Admission Medication Reconciliation - Pharmacy Consult Note    The home medication history was taken by Nathaly Galindo, Pharmacy Technician.  Based on information gathered and subsequent review by the clinical pharmacist, the items below may need attention.     You may go to "Admission" then "Reconcile Home Medications" tabs to review and/or act upon these items. Based on information gathered and subsequent review by the clinical pharmacist, the items below may need attention.    Potentially problematic discrepancies with current MAR  o Patient IS taking the following which was not ordered upon admit  o Ferrous sulfate 325mg po BID  o Fluticasone-vilanterol (BREO) 100-25mcg/dose - Inhale 1 puff once daily   o Patient IS NOT taking the following which was ordered upon admit  o Patient has not filled rosuvastatin 10mg po daily since December 2016 which was for a 3 month supply.     Please address this information as you see fit.  Feel free to contact us if you have any questions or require assistance.    Lorraine Barnes, PharmD, PGY-1 Pharmacy Resident  EXT [id number]    Patient's prior to admission medication regimen was as follows:  Prescriptions Prior to Admission   Medication Sig Dispense Refill Last Dose    albuterol (PROVENTIL) 2.5 mg /3 mL (0.083 %) nebulizer solution Take 2.5 mg by nebulization every 6 (six) hours as needed for Wheezing or Shortness of Breath. Rescue       albuterol (VENTOLIN HFA) 90 mcg/actuation inhaler Inhale 2 puffs into the lungs every 6 (six) hours as needed for Wheezing. Rescue 1 each 3     aspirin 81 MG Chew Take 81 mg by mouth once daily.   3/11/2017    ferrous sulfate 325 (65 FE) MG EC tablet Take 1 tablet (325 mg total) by mouth 2 (two) times daily.  0     fluticasone-vilanterol (BREO) 100-25 mcg/dose diskus inhaler Inhale 1 puff into the lungs once daily. Controller 60 each 3     hydrochlorothiazide (HYDRODIURIL) 12.5 MG Tab " Take 12.5 mg by mouth once daily.   3/11/2017    omeprazole (PRILOSEC) 20 MG capsule Take 20 mg by mouth once daily.   3/11/2017    apixaban 2.5 mg Tab Take 1 tablet (2.5 mg total) by mouth 2 (two) times daily. 60 tablet 11 3/11/2017    diltiazem (CARDIZEM CD) 240 MG 24 hr capsule Take 1 capsule (240 mg total) by mouth once daily. 30 capsule 11 Unknown    hydrALAZINE (APRESOLINE) 50 MG tablet Take 1 tablet (50 mg total) by mouth every 8 (eight) hours. 90 tablet 3     isosorbide dinitrate (ISORDIL) 20 MG tablet Take 1 tablet (20 mg total) by mouth 3 (three) times daily. 90 tablet 3     lisinopril (PRINIVIL,ZESTRIL) 40 MG tablet Take 1 tablet (40 mg total) by mouth once daily. 30 tablet 3          Please add appropriate    SmartPhrase below:

## 2017-06-04 NOTE — PLAN OF CARE
Problem: Patient Care Overview  Goal: Plan of Care Review  Outcome: Ongoing (interventions implemented as appropriate)  Pt AAOx4. No episodes of SOB noted. PRN cough medicine provided, working well. Cough is becoming less and less, continues to be nonproductive. Up to BR multiple times due to lasix. Urine clear, yellow. Attempted to accurately measure output but pt kept dumping hat on accident. Performs ADL's independently. Very minimal assist needed. Family at bedside. T/P self independently in bed. Nonskid sock in place, call light in reach, room clear of clutter.

## 2017-06-05 VITALS
WEIGHT: 155 LBS | DIASTOLIC BLOOD PRESSURE: 55 MMHG | TEMPERATURE: 98 F | HEART RATE: 67 BPM | RESPIRATION RATE: 20 BRPM | HEIGHT: 63 IN | SYSTOLIC BLOOD PRESSURE: 115 MMHG | BODY MASS INDEX: 27.46 KG/M2 | OXYGEN SATURATION: 99 %

## 2017-06-05 LAB
ALBUMIN SERPL BCP-MCNC: 3.1 G/DL
ALP SERPL-CCNC: 68 U/L
ALT SERPL W/O P-5'-P-CCNC: 62 U/L
ANION GAP SERPL CALC-SCNC: 9 MMOL/L
AST SERPL-CCNC: 46 U/L
BASOPHILS # BLD AUTO: 0.03 K/UL
BASOPHILS NFR BLD: 0.5 %
BILIRUB SERPL-MCNC: 1.2 MG/DL
BUN SERPL-MCNC: 20 MG/DL
CALCIUM SERPL-MCNC: 10.1 MG/DL
CHLORIDE SERPL-SCNC: 105 MMOL/L
CO2 SERPL-SCNC: 22 MMOL/L
CREAT SERPL-MCNC: 1.4 MG/DL
DIFFERENTIAL METHOD: ABNORMAL
EOSINOPHIL # BLD AUTO: 0.3 K/UL
EOSINOPHIL NFR BLD: 5.2 %
ERYTHROCYTE [DISTWIDTH] IN BLOOD BY AUTOMATED COUNT: 17.7 %
EST. GFR  (AFRICAN AMERICAN): 40.9 ML/MIN/1.73 M^2
EST. GFR  (NON AFRICAN AMERICAN): 35.5 ML/MIN/1.73 M^2
GLUCOSE SERPL-MCNC: 109 MG/DL
HCT VFR BLD AUTO: 26.5 %
HGB BLD-MCNC: 8.4 G/DL
LYMPHOCYTES # BLD AUTO: 1 K/UL
LYMPHOCYTES NFR BLD: 18.5 %
MAGNESIUM SERPL-MCNC: 2 MG/DL
MCH RBC QN AUTO: 25 PG
MCHC RBC AUTO-ENTMCNC: 31.7 %
MCV RBC AUTO: 79 FL
MONOCYTES # BLD AUTO: 0.9 K/UL
MONOCYTES NFR BLD: 16.3 %
NEUTROPHILS # BLD AUTO: 3.3 K/UL
NEUTROPHILS NFR BLD: 59.1 %
PHOSPHATE SERPL-MCNC: 2.3 MG/DL
PLATELET # BLD AUTO: 261 K/UL
PMV BLD AUTO: 11.1 FL
POTASSIUM SERPL-SCNC: 4.1 MMOL/L
PROT SERPL-MCNC: 6.2 G/DL
RBC # BLD AUTO: 3.36 M/UL
SODIUM SERPL-SCNC: 136 MMOL/L
WBC # BLD AUTO: 5.58 K/UL

## 2017-06-05 PROCEDURE — 83735 ASSAY OF MAGNESIUM: CPT

## 2017-06-05 PROCEDURE — 25000003 PHARM REV CODE 250: Performed by: INTERNAL MEDICINE

## 2017-06-05 PROCEDURE — 84100 ASSAY OF PHOSPHORUS: CPT

## 2017-06-05 PROCEDURE — 80053 COMPREHEN METABOLIC PANEL: CPT

## 2017-06-05 PROCEDURE — 99217 PR OBSERVATION CARE DISCHARGE: CPT | Mod: ,,, | Performed by: INTERNAL MEDICINE

## 2017-06-05 PROCEDURE — 85025 COMPLETE CBC W/AUTO DIFF WBC: CPT

## 2017-06-05 PROCEDURE — 25000003 PHARM REV CODE 250: Performed by: STUDENT IN AN ORGANIZED HEALTH CARE EDUCATION/TRAINING PROGRAM

## 2017-06-05 PROCEDURE — G0378 HOSPITAL OBSERVATION PER HR: HCPCS

## 2017-06-05 PROCEDURE — 36415 COLL VENOUS BLD VENIPUNCTURE: CPT

## 2017-06-05 RX ORDER — PROMETHAZINE HYDROCHLORIDE AND CODEINE PHOSPHATE 6.25; 1 MG/5ML; MG/5ML
5 SOLUTION ORAL EVERY 4 HOURS PRN
Qty: 1 BOTTLE | Refills: 0 | Status: SHIPPED | OUTPATIENT
Start: 2017-06-05 | End: 2017-06-15

## 2017-06-05 RX ADMIN — PANTOPRAZOLE SODIUM 40 MG: 40 TABLET, DELAYED RELEASE ORAL at 09:06

## 2017-06-05 RX ADMIN — ASPIRIN 81 MG CHEWABLE TABLET 81 MG: 81 TABLET CHEWABLE at 09:06

## 2017-06-05 RX ADMIN — DILTIAZEM HYDROCHLORIDE 240 MG: 240 CAPSULE, EXTENDED RELEASE ORAL at 09:06

## 2017-06-05 RX ADMIN — APIXABAN 2.5 MG: 2.5 TABLET, FILM COATED ORAL at 09:06

## 2017-06-05 RX ADMIN — ISOSORBIDE DINITRATE 20 MG: 10 TABLET ORAL at 05:06

## 2017-06-05 RX ADMIN — ROSUVASTATIN CALCIUM 10 MG: 10 TABLET ORAL at 09:06

## 2017-06-05 RX ADMIN — HYDRALAZINE HYDROCHLORIDE 50 MG: 50 TABLET ORAL at 05:06

## 2017-06-05 RX ADMIN — FUROSEMIDE 40 MG: 40 TABLET ORAL at 09:06

## 2017-06-05 NOTE — PROGRESS NOTES
Patient seen and examined around 2pm 6/4/2014.     STAFF PHYSICIAN NOTE  Attending Attestation for Rounds with Resident  I have personally interviewed and examined the patient at bedside. I have reviewed and agree with Dr. Rahman's history, physical, assessment, and plan with the following additions and/or modifications:    Iron Deficiency Anemia - Severe. Fe Sat 4%! Will give IV Iron. This could be nutritional, vs malabsorption, vs chronic occult blood loss. Patient is not sure whether she's taking her home Iron PO BID. Recommend IV iron infusions as an outpatient. Patient in need of age appropriate CA screening if she's amenable.   Acute on chronic diastolic CHF exacerbation in setting of AFwRVR, HTN Urgency, superimposed on valvular regurgitation - Patient is warm but congested. Diurese. F/U Echo. Telemetry. Daily WTs. Keep Mg > 2, K > 4. Rate control. Patient not on BB in setting of SSS, however she has a PPM, thus would recommend starting low dose BB and GDMT with cardiology. F/U amb O2 sat.   NSTEMI - likely demand ischemia in setting of above. F/U repeat ECG. Continue ASA, Statin. D/C'd ACEi in setting of NURIA and cough. Would trial ARB in future.   NURIA on CKD - Stable Cr while in house despite some diuresis. Possibly cardiorenal vs intrinsic. F/U lytes. Trend Cr. Suspect we haven't diuresed patient enough.  Strict I/O's. Daily WTs needed.   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 Trend LFTS and Lactate. Further w/u with labs, abdominal imaging if does not improve.   COPD/Emphesema - Suspect may be contributing to symptoms. Patient with long tobacco use history and centrilobular emphysema on CT Chest 3/2017. Nebs. F/U Echo.      Noted bilateral renal hypo densities on prior CT Chest 3/2017. Will obtain further corroborating history from patient in AM.   PT/OT  Medication review     I spent 35 minutes with the patient; more than 50% was in evaluating, treating, counseling and coordinating  care for this patient.     Signed:    Veronica Moncada MD  [facility]

## 2017-06-05 NOTE — PLAN OF CARE
06/05/17 0916   Discharge Assessment   Assessment Type Discharge Planning Assessment   Confirmed/corrected address and phone number on facesheet? Yes   Assessment information obtained from? Patient;Medical Record   Expected Length of Stay (days) 3   Communicated expected length of stay with patient/caregiver yes   Prior to hospitilization cognitive status: Alert/Oriented   Prior to hospitalization functional status: Independent   Current cognitive status: Alert/Oriented   Current Functional Status: Independent   Arrived From home or self-care   Lives With child(courtney), adult  (son and DIL)   Able to Return to Prior Arrangements yes   Is patient able to care for self after discharge? Yes   How many people do you have in your home that can help with your care after discharge? 2   Readmission Within The Last 30 Days no previous admission in last 30 days   Patient currently being followed by outpatient case management? No   Patient currently receives home health services? No   Does the patient currently use HME? No   Patient currently receives private duty nursing? No   Patient currently receives any other outside agency services? No   Equipment Currently Used at Home none   Do you have any problems affording any of your prescribed medications? No   Is the patient taking medications as prescribed? yes   Do you have any financial concerns preventing you from receiving the healthcare you need? No   Does the patient have transportation to healthcare appointments? Yes   Transportation Available car;family or friend will provide  (son will transport home from the hospital)   On Dialysis? No   Does the patient receive services at the [facility]mad[facility] Clinic? No   Are there any open cases? No   Discharge Plan A Home with family   Patient/Family In Agreement With Plan yes

## 2017-06-05 NOTE — ASSESSMENT & PLAN NOTE
- See acute on chronic diastolic HF.   - discharged with prescription of codeine to help with cough symptoms.

## 2017-06-05 NOTE — PROGRESS NOTES
Discharge education completed with patient. Patient verbalized understanding. Prescriptions given to patient. IV removed, catheter intact, patient tolerated well. Awaiting transportation. Family at the bedside. TREE.

## 2017-06-05 NOTE — ASSESSMENT & PLAN NOTE
- Stable troponin leak at ~0.08 likely type II demand with acute on chronic diastolic HF with poorly controlled atrial fibrillation/flutter.

## 2017-06-05 NOTE — ASSESSMENT & PLAN NOTE
- Seen on echo 3/13/2017.  - Estimated PA pressure at time 44  - Repeat 2D echo with CFD with estimated PA pressure of 53.

## 2017-06-05 NOTE — ASSESSMENT & PLAN NOTE
- Patient scoped at OSH recently, no findings of GIB and iron studies at time suspicious for anemia of chronic disease.  - Hgb stable. 9 today.   - Iron studies here suggestive of iron deficiency anemia with total iron of 15 and saturated iron of 4%; MCV 78.  - Likely for poor PO intake as without known signs of GIB.  - Continue home ferrous sulfate on discharge and heme/onc appointment made for iron deficiency anemia.

## 2017-06-05 NOTE — ASSESSMENT & PLAN NOTE
- Placed back on home Breo-Ellipta.  - Duo-neb PRN for wheezing and shortness of breath.   - Cough and shortness of breath may have component of COPD, but patient without purulent sputum and with good SpO2. Not on home O2.  - Good SpO2 here without supplemental O2; place with SpO2>88%

## 2017-06-05 NOTE — ASSESSMENT & PLAN NOTE
- Multifactorial, including pulmonary hypertension, worsening COPD, anemia, and poorly controlled atrial fibrillation/flutter.   - See chronic cough.   - SOB improving

## 2017-06-05 NOTE — ASSESSMENT & PLAN NOTE
- Unclear etiology at this current stage.  - No leukocytosis. Patient has remained afebrile without clear source of infection.  - UA ordered.  - Consideration including poor perfusion with diastolic HF with moderate TR and MR with poorly controlled atrial fibrillation/flutter.  - Lactic acid trended from 3 to 2.6 and resolved.

## 2017-06-05 NOTE — ASSESSMENT & PLAN NOTE
- On home diltiazem 240 mg PO daily and apixaban 2.5 mg PO BID.   - Now rate controlled.  - CHADVASC 4.

## 2017-06-05 NOTE — ASSESSMENT & PLAN NOTE
- Placed back on home blood pressure medication including diltiazem 240 mg PO daily, isosorbide dinitrate 20 mg PO TID, hydralazine 50 mg PO TID  - Continued to hold ARB (losartan) until follow-up with PCP or cardiology appointment.

## 2017-06-05 NOTE — ASSESSMENT & PLAN NOTE
- Multifactorial, with COPD, pulmonary hypertension, CHF exacerbation and ACEI (stopped here).  - Non-productive and 6-month history.  - Given promethazine-codeine syrup and duo-neb PRN.   - Good SpO2 on room air.   - BNP significantly elevated on admission at 2488 with lab findings suspicious for congestive hepatopathy although physical examination no clear on admission for fluid overload. Without appreciable JVP   - Repeat BNP improved at 693.   - Diurese with furosemide 20 mg IV daily now transitioned to 40 mg PO daily.   - Repeat echo to investigate for pulmonary hypertension and heart function, strong suspicion of RV disease   - Findings of increased PA pressure of 53 and preserved EF. Sniff test demonstrable for elevated RA pressure (note with moderate to severe triscuspid regurgitation which could effect this test)   - Would need outpatient cardiology follow-up; would like to keep appointment tomorrow 6/5 with Dr. Mathew - labs ordered for visit.   -

## 2017-06-05 NOTE — ASSESSMENT & PLAN NOTE
- Multifactorial, with COPD, pulmonary hypertension, CHF exacerbation and ACEI (stopped here).  - Non-productive and 6-month history.  - Given promethazine-codeine syrup and duo-neb PRN.   - Good SpO2 on room air.   - BNP significantly elevated on admission at 2488 with lab findings suspicious for congestive hepatopathy although physical examination no clear on admission for fluid overload. Without appreciable JVP   - Repeat BNP improved at 693.   - Diurese with furosemide 20 mg IV daily now transitioned to 40 mg PO daily.   - Repeat echo to investigate for pulmonary hypertension and heart function, strong suspicion of RV disease   - Findings of increased PA pressure of 53 and preserved EF. Sniff test demonstrable for elevated RA pressure (note with moderate to severe triscuspid regurgitation which could effect this test)

## 2017-06-05 NOTE — ASSESSMENT & PLAN NOTE
- Creatinine on admission at 1.4.(last on 3/2017 at 0.9).  - Urine lytes were ordered but not collected; yield low now that she has been diuresed.  - Per history, patient is fluid down for past three weeks and likely pre-renal azotemia in play.  - Per lab findings, there is suspicion for CHF exacerbation and possible cardio-renal, but not significant for fluid overload on physical examination or CXR; trial diuresis at this time.  - Creatinine has not worsened with mild improvement of creatinine to 1.3 today.

## 2017-06-05 NOTE — ASSESSMENT & PLAN NOTE
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 Diuresis successful in resolving enzymes.  - Improving with AST/ALT now at 52/38 with Tbili 1.6  - Continued diuresis.

## 2017-06-05 NOTE — DISCHARGE SUMMARY
[facility] Hospital Medicine  Discharge Summary      Patient Name: Christy Webber  MRN: [id number]  Admission Date: 6/2/2017  Hospital Length of Stay: 0 days  Discharge Date and Time: 6/5/2017 11:21 AM  Attending Physician: Veronica Moncada MD   Discharging Provider: Keith Boswell DO  Primary Care Provider: Elisabet George MD  [facility] Medicine Team: [facility] 3 Keith Boswell DO    HPI:   Christy Webber, a 81 year old female with a medical history significant for paroxysmal atrial fibrillation/aflutter (on apixaban and diltiazem), sick sinus syndrome (with PPM), COPD/centrilobar emphysema), essential HTN, and CHFpEF (with most recent echo on 3/2017 with moderate AR, MR, and TR) who presented to the ED on 6/2 with chief complaint of chronic, dry non-productive cough for nearly half a year. History was initially gathered from patient and son and daughter-in-law sitting at bedside. The patient has been a chronic issue that has gotten worse in the past 3 weeks. Patient's family stated that she had been on nebulizers recently without much efficacy. The patient taked Breo at home and states that she only uses her rescue albuterol inhaler approximately twice/week without resolution of symptoms. Patient's cough was worse when she laid down flat and on exertion, but the cough has essentially been constant and not worsened otherwise positionally. Patient stated that she seems to have worsening cough at nighttime. Patient stated that her appetite has been poor for the last several weeks and has increasing fatigue just from her cough. She did have some nausea and diarrhea two days ago. She denied productive sputum, fevers, chills, rhinorrhea, post-nasal drip, nausea, vomiting, chest pain, or diaphoresis.     Her medical history is significant for smoking history of 100+ pack years from age 18 to last year. Patient stated she lives in a very old house without any recent changes to house or  otherwise symptoms from other inhabitants in the household. She had been recently admitted in [facility] on 3/12-3/15 for dizziness and atrial fibrillation; at the time she was given EGD for concern for UGIB as there was precipitous drop in H/H; iron studies were suspicious for anemia of chronic disease.   Per chart review, she was also admitted cardiology on 8/20-8/25 for dizziness/weakness and found to be in aflutter and discharged on apixaban and diltiazem. Recent echo on 3/13 found diastolic HF with pulmonary HTN with estimated PA pressure of 44 with moderate MR, AR, and TR.         * No surgery found *      Review of Systems   Constitutional: Positive for appetite change and fatigue. Negative for chills and fever.   HENT: Negative for congestion and sore throat.    Respiratory: Positive for cough and shortness of breath. Negative for chest tightness and wheezing.    Cardiovascular: Negative for chest pain, palpitations and leg swelling.   Gastrointestinal: Negative for abdominal distention, abdominal pain, nausea and vomiting.   Genitourinary: Negative for dysuria.   Musculoskeletal: Negative for arthralgias.   Skin: Negative for color change and rash.   Neurological: Positive for weakness.     Indwelling Lines/Drains at time of discharge:   Lines/Drains/Airways          No matching active lines, drains, or airways        Hospital Course:   6/2 In the ED, patient was found in aflutter with HR from . Denied palpitations, chest pain, or shortness of breath unless during her coughing spells. Her SpO2 remained at 95% or above on room air despite frequent dry coughing spells during interview. Labs were significant for NURIA with creatinine of 1.4 and liver dysfunction with tbili of 2.5 and AST//96. BNP was significantly elevated at 2488. Lactic acid was elevated at 3.0. Physical examination was not significant for accessory muscle use and without crackles or wheezing on lung auscultation. No LE edema was  present. Patient was diuresed in ED with furosemide 10 mg IV once and continued on 20 mg IV daily. Furosemide was transitioned to PO 6/4 to 40 mg daily. Otherwise, patient's cough significantly improved during her stay with BNP on 6/4 was 693. Echo otherwise was demonstrable for pEF with a estimated PA pressure of 53.  Patient discharged 6/5, however missed cardiology appointment.    Physical Exam   Constitutional: She is oriented to person, place, and time. No distress.   Frail  woman intermittent dry coughs during interview.    HENT:   Head: Normocephalic and atraumatic.   Eyes: Pupils are equal, round, and reactive to light.   Neck: Normal range of motion. Neck supple. No JVD (To jawline) present.   Cardiovascular: Normal rate, regular rhythm and intact distal pulses.    Murmur (2/6 Holosystolic murmur at apex) heard.  Pulmonary/Chest: Breath sounds normal. No respiratory distress. She has no wheezes. She has no rales.   Poor air movement, but no appreciable wheezing or rales   Abdominal: Soft. Bowel sounds are normal. She exhibits no distension. There is no tenderness.   Musculoskeletal: Normal range of motion. She exhibits no edema.   Neurological: She is alert and oriented to person, place, and time.   Skin: Skin is warm and dry. Capillary refill takes less than 2 seconds. She is not diaphoretic.     Consults:     Significant Diagnostic Studies: Labs:   CMP   Recent Labs  Lab 06/04/17 0619 06/04/17  2208 06/05/17  0519    138 136   K 2.9* 4.5 4.1    105 105   CO2 23 22* 22*    124* 109   BUN 21 22 20   CREATININE 1.3 1.6* 1.4   CALCIUM 9.7 10.3 10.1   PROT 6.0  --  6.2   ALBUMIN 3.0*  --  3.1*   BILITOT 1.6*  --  1.2*   ALKPHOS 64  --  68   AST 52*  --  46*   ALT 67*  --  62*   ANIONGAP 9 11 9   ESTGFRAFRICA 44.8* 34.8* 40.9*   EGFRNONAA 38.8* 30.2* 35.5*    and CBC   Recent Labs  Lab 06/04/17  0619 06/04/17  1353 06/05/17  0519   WBC 5.97 6.33 5.58   HGB 8.3* 9.0* 8.4*    HCT 25.7* 28.7* 26.5*    253 261       Pending Diagnostic Studies:     None        Final Active Diagnoses:    Diagnosis Date Noted POA    PRINCIPAL PROBLEM:  Acute on chronic diastolic heart failure [I50.33] 06/03/2017 Yes    NSTEMI (non-ST elevated myocardial infarction) [I21.4]  Yes    Chronic cough [R05] 06/02/2017 Yes    Hepatopathy [K76.9] 06/02/2017 Yes    Aortic regurgitation [I35.1] 06/02/2017 Yes    Tricuspid valve disorders, specified as nonrheumatic [I36.9] 06/02/2017 Yes    COPD (chronic obstructive pulmonary disease) [J44.9] 06/02/2017 Yes    Pulmonary hypertension [I27.2] 06/02/2017 Yes    Acute renal failure superimposed on stage 3 chronic kidney disease [N17.9, N18.3] 06/02/2017 Yes    Atrial fibrillation with RVR [I48.91] 03/13/2017 Yes    Iron deficiency anemia [D50.9] 03/12/2017 Yes    Shortness of breath [R06.02] 03/12/2017 Yes    Lactic acidosis [E87.2] 03/12/2017 Yes    SSS (sick sinus syndrome) [I49.5] 08/24/2016 Yes    Essential hypertension [I10] 08/21/2016 Yes      Problems Resolved During this Admission:    Diagnosis Date Noted Date Resolved POA      NSTEMI (non-ST elevated myocardial infarction)    - Stable troponin leak at ~0.08 likely type II demand with acute on chronic diastolic HF with poorly controlled atrial fibrillation/flutter.           Acute renal failure superimposed on stage 3 chronic kidney disease    - Creatinine on admission at 1.4.(last on 3/2017 at 0.9).  - Urine lytes were ordered but not collected; yield low now that she has been diuresed.  - Per history, patient is fluid down for past three weeks and likely pre-renal azotemia in play.  - Per lab findings, there is suspicion for CHF exacerbation and possible cardio-renal, but not significant for fluid overload on physical examination or CXR; trial diuresis at this time.  - Improved with diuresis and discharged with PO Lasix.          Pulmonary hypertension    - Seen on echo 3/13/2017.  -  Estimated PA pressure at time 44  - Repeat 2D echo with CFD with estimated PA pressure of 53.             COPD (chronic obstructive pulmonary disease)    - Placed back on home Breo-Ellipta.  - Duo-neb PRN for wheezing and shortness of breath.   - Cough and shortness of breath may have component of COPD, but patient without purulent sputum and with good SpO2. Not on home O2.  - Good SpO2 here without supplemental O2; place with SpO2>88%          Tricuspid valve disorders, specified as nonrheumatic    - Seen on echo 3/13/2017 w/ continued moderate to severe tricuspid regurg.           Aortic regurgitation    - Seen on echo 3/13/2017 w/ trivial to mild noted on updated echo.          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 Diuresis successful in resolving enzymes.  - Improving with AST/ALT now at 52/38 with Tbili 1.6  - Continued diuresis and discharge with new 40 mg of PO Lasix.           Chronic cough    - See acute on chronic diastolic HF.   - discharged with prescription of codeine to help with cough symptoms.           Atrial fibrillation with RVR    - On home diltiazem 240 mg PO daily and apixaban 2.5 mg PO BID.   - Now rate controlled.  - CHADVASC 4.             Iron deficiency anemia    - Patient scoped at OSH recently, no findings of GIB and iron studies at time suspicious for anemia of chronic disease.  - Hgb stable. 9 today.   - Iron studies here suggestive of iron deficiency anemia with total iron of 15 and saturated iron of 4%; MCV 78.  - Likely for poor PO intake as without known signs of GIB.  - Continue home ferrous sulfate on discharge and heme/onc appointment made for iron deficiency anemia.            Lactic acidosis    - Unclear etiology at this current stage.  - No leukocytosis. Patient has remained afebrile without  clear source of infection.  - UA ordered.  - Consideration including poor perfusion with diastolic HF with moderate TR and MR with poorly controlled atrial fibrillation/flutter.  - Lactic acid trended from 3 to 2.6 and resolved.           Shortness of breath    - Multifactorial, including pulmonary hypertension, worsening COPD, anemia, and poorly controlled atrial fibrillation/flutter.   - See chronic cough.   - SOB improving           SSS (sick sinus syndrome)    - S/p PPM.          Essential hypertension    - Placed back on home blood pressure medication including diltiazem 240 mg PO daily, isosorbide dinitrate 20 mg PO TID, hydralazine 50 mg PO TID  - Continued to hold ARB (losartan) until follow-up with PCP or cardiology appointment.           * Acute on chronic diastolic heart failure    - Multifactorial, with COPD, pulmonary hypertension, CHF exacerbation and ACEI (stopped here).  - Non-productive and 6-month history.  - Given promethazine-codeine syrup and duo-neb PRN.   - Good SpO2 on room air.   - BNP significantly elevated on admission at 2488 with lab findings suspicious for congestive hepatopathy although physical examination no clear on admission for fluid overload. Without appreciable JVP   - Repeat BNP improved at 693.   - Diurese with furosemide 20 mg IV daily now transitioned to 40 mg PO daily.   - Repeat echo to investigate for pulmonary hypertension and heart function, strong suspicion of RV disease   - Findings of increased PA pressure of 53 and preserved EF. Sniff test demonstrable for elevated RA pressure (note with moderate to severe triscuspid regurgitation which could effect this test)            Discharged Condition: fair    Disposition: Home or Self Care    Follow Up:  Follow-up Information     Juan Mathew MD.    Specialties:  Cardiology, INTERVENTIONAL CARDIOLOGY  Why:  office will call you to reschedule  Contact information:  [id number]  [facility]  [id number]  [phone]             Elisabet George MD On 6/8/2017.    Specialty:  General Practice  Why:  11:00am  Contact information:  [address] [place] [address] [address]  [id number]  [place] [address]  [phone]                 Patient Instructions:     Ambulatory Referral to Hematology / Oncology   Referral Priority: Routine Referral Type: Consultation   Referral Reason: Specialty Services Required    Requested Specialty: Hematology and Oncology    Number of Visits Requested: 1        Medications:  Reconciled Home Medications:   Discharge Medication List as of 6/5/2017 10:35 AM      START taking these medications    Details   benzonatate (TESSALON) 100 MG capsule Take 1 capsule (100 mg total) by mouth 3 (three) times daily as needed for Cough., Starting Sun 6/4/2017, Until Wed 6/14/2017, Normal      furosemide (LASIX) 40 MG tablet Take 1 tablet (40 mg total) by mouth once daily., Starting Mon 6/5/2017, Until Tue 6/5/2018, Normal      promethazine-codeine 6.25-10 mg/5 ml (PHENERGAN WITH CODEINE) 6.25-10 mg/5 mL syrup Take 5 mLs by mouth every 4 (four) hours as needed for Cough., Starting Mon 6/5/2017, Until Thu 6/15/2017, Print         CONTINUE these medications which have NOT CHANGED    Details   albuterol (PROVENTIL) 2.5 mg /3 mL (0.083 %) nebulizer solution Take 2.5 mg by nebulization every 6 (six) hours as needed for Wheezing or Shortness of Breath. Rescue, Historical Med      albuterol (VENTOLIN HFA) 90 mcg/actuation inhaler Inhale 2 puffs into the lungs every 6 (six) hours as needed for Wheezing. Rescue, Starting 3/15/2017, Until Discontinued, Normal      apixaban 2.5 mg Tab Take 1 tablet (2.5 mg total) by mouth 2 (two) times daily., Starting 8/25/2016, Until Discontinued, Print      aspirin 81 MG Chew Take 81 mg by mouth once daily., Until Discontinued, Historical Med      diltiazem (CARDIZEM CD) 240 MG 24 hr capsule Take 1 capsule (240 mg total) by mouth once daily., Starting 8/25/2016, Until Fri 8/25/17, Print       ferrous sulfate 325 (65 FE) MG EC tablet Take 1 tablet (325 mg total) by mouth 2 (two) times daily., Starting 3/14/2017, Until Discontinued, OTC      fluticasone-vilanterol (BREO) 100-25 mcg/dose diskus inhaler Inhale 1 puff into the lungs once daily. Controller, Starting 3/15/2017, Until Discontinued, Normal      hydrALAZINE (APRESOLINE) 50 MG tablet Take 1 tablet (50 mg total) by mouth every 8 (eight) hours., Starting 3/15/2017, Until Thu 3/15/18, Normal      hydrochlorothiazide (HYDRODIURIL) 12.5 MG Tab Take 12.5 mg by mouth once daily., Until Discontinued, Historical Med      isosorbide dinitrate (ISORDIL) 20 MG tablet Take 1 tablet (20 mg total) by mouth 3 (three) times daily., Starting 3/15/2017, Until Thu 3/15/18, Normal      omeprazole (PRILOSEC) 20 MG capsule Take 20 mg by mouth once daily., Until Discontinued, Historical Med         STOP taking these medications       lisinopril (PRINIVIL,ZESTRIL) 40 MG tablet Comments:   Reason for Stopping:         rosuvastatin (CRESTOR) 10 MG tablet Comments:   Reason for Stopping:             Time spent on the discharge of patient: 30 minutes    Keith Boswell DO  Department of Hospital Medicine  [facility]

## 2017-06-05 NOTE — ASSESSMENT & PLAN NOTE
- Creatinine on admission at 1.4.(last on 3/2017 at 0.9).  - Urine lytes were ordered but not collected; yield low now that she has been diuresed.  - Per history, patient is fluid down for past three weeks and likely pre-renal azotemia in play.  - Per lab findings, there is suspicion for CHF exacerbation and possible cardio-renal, but not significant for fluid overload on physical examination or CXR; trial diuresis at this time.  - Improved with diuresis and discharged with PO Lasix.

## 2017-06-05 NOTE — ASSESSMENT & PLAN NOTE
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 Diuresis successful in resolving enzymes.  - Improving with AST/ALT now at 52/38 with Tbili 1.6  - Continued diuresis and discharge with new 40 mg of PO Lasix.

## 2017-06-05 NOTE — ASSESSMENT & PLAN NOTE
- Placed back on home blood pressure medication including diltiazem 240 mg PO daily, isosorbide dinitrate 20 mg PO TID, hydralazine 50 mg PO TID.   - Holding HCTZ considering NURIA as well as lisinopril (also for concern for chronic cough).   - Discharge with comparable losartan dosage.  - BP well-controled now; uncontrolled BP at home likely contributing to decompensated heart failure.

## 2017-06-05 NOTE — ASSESSMENT & PLAN NOTE
- Patient scoped at OSH recently, no findings of GIB and iron studies at time suspicious for anemia of chronic disease.  - Hgb stable. 9 today.   - Iron studies here suggestive of iron deficiency anemia with total iron of 15 and saturated iron of 4%; MCV 78.  - Likely for poor PO intake as without known signs of GIB.  - Ordered parenteral iron on 6/4 one time per staff suggestion.   - Continue home ferrous sulfate on discharge.

## 2017-06-05 NOTE — PROGRESS NOTES
[facility] Medicine  Progress Note    Patient Name: Christy Webber  MRN: [id number]  Patient Class: OP- Observation   Admission Date: 6/2/2017  Length of Stay: 0 days  Attending Physician: Veronica Moncada MD  Primary Care Provider: Elisabet George MD    [facility] Medicine Team: [facility] HOSP MED 3 Iain Rahman MD    Subjective:     Principal Problem:Acute on chronic diastolic heart failure    HPI:  Christy Webber is an 81 year old female with a medical history significant for paroxysmal atrial fibrillation/aflutter (on apixaban and diltiazem), sick sinus syndrome (with PPM), COPD/centrilobar emphysema), essential HTN, and CHFpEF (with most recent echo on 3/2017 with moderate AR, MR, and TR) who presents to the ED on 6/2 with chief complaint of chronic, dry non-productive cough for nearly half a year. History is gathered from patient and son and daughter-in-law sitting at bedside. The patient has been a chronic issue that has gotten worse in the past 3 weeks. Patient's family states that she had been on nebulizers recently without much efficacy. The patient takes Breo at home and states that she only uses her rescue albuterol inhaler approximately twice/week without resolution of symptoms. Patient's cough is worse when she lays down flat and on exertion, but the cough has essentially been constant and not worsened otherwise positionally. Patient does states that she seems to have worsening cough at nighttime. Patient states that her appetite has been poor for the last several weeks and has increasing fatigue just from her cough. She did have some nausea and diarrhea two days ago. She denies productive sputum, fevers, chills, rhinorrhea, post-nasal drip, nausea, vomiting, chest pain, or diaphoresis.     Her medical history is significant for smoking history of 100+ pack years from age 18 to last year. Patient does states she lives in a very old house without any recent changes to house or  otherwise symptoms from other inhabitants in the household. She had been recently admitted in [facility] on 3/12-3/15 for dizziness and atrial fibrillation; at the time she was given EGD for concern for UGIB as there was precipitous drop in H/H; iron studies were suspicious for anemia of chronic disease. Per chart review, she was also admitted cardiology on 8/20-8/25 for dizziness/weakness and found to be in aflutter and discharged on apixaban and diltiazem. Recent echo on 3/13 found diastolic HF with pulmonary HTN with estimated PA pressure of 44 with moderate MR, AR, and TR.         Hospital Course:  6/2 In the ED, patient was found in aflutter with HR from . Denied palpitations, chest pain, or shortness of breath unless during her coughing spells. Her SpO2 remained at 95% or above on room air despite frequent dry coughing spells during interview. Labs were significant for NURIA with creatinine of 1.4 and liver dysfunction with tbili of 2.5 and AST//96. BNP was significantly elevated at 2488. Lactic acid was elevated at 3.0. Physical examination was not significant for accessory muscle use and without crackles or wheezing on lung auscultation. No LE edema was present. Patient was diuresed in ED with furosemide 10 mg IV once and continued on 20 mg IV daily. Furosemide was transitioned to PO 6/4 to 40 mg daily. Otherwise, patient's cough significantly improved during her stay with BNP on 6/4 was 693. Echo otherwise was demonstrable for pEF with a estimated PA pressure of 53.    Interval History:   No acute events overnight. Patient states she is still voiding quite frequently. Otherwise, fatigue has mildly improved and she feels that the episodes of her coughing spells are decreasing in frequency significantly. Denies sputum production, fevers, chills, nausea, or vomiting.     Review of Systems   Constitutional: Positive for appetite change and fatigue. Negative for chills and fever.   HENT: Negative for  congestion and sore throat.    Respiratory: Positive for cough and shortness of breath. Negative for chest tightness and wheezing.    Cardiovascular: Negative for chest pain, palpitations and leg swelling.   Gastrointestinal: Negative for abdominal distention, abdominal pain, nausea and vomiting.   Genitourinary: Negative for dysuria.   Musculoskeletal: Negative for arthralgias.   Skin: Negative for color change and rash.   Neurological: Positive for weakness.     Objective:     Vital Signs (Most Recent):  Temp: 98.7 °F (37.1 °C) (06/04/17 1702)  Pulse: 70 (06/04/17 1800)  Resp: 18 (06/04/17 1702)  BP: (!) 101/48 (06/04/17 1702)  SpO2: 95 % (06/04/17 1702) Vital Signs (24h Range):  Temp:  [97.8 °F (36.6 °C)-98.7 °F (37.1 °C)] 98.7 °F (37.1 °C)  Pulse:  [60-80] 70  Resp:  [18] 18  SpO2:  [94 %-98 %] 95 %  BP: (101-130)/(48-61) 101/48     Weight: 70.3 kg (155 lb)  Body mass index is 27.46 kg/m².    Intake/Output Summary (Last 24 hours) at 06/04/17 1941  Last data filed at 06/04/17 1400   Gross per 24 hour   Intake              360 ml   Output                0 ml   Net              360 ml      Physical Exam   Constitutional: She is oriented to person, place, and time. No distress.   Frail  woman intermittent dry coughs during interview.    HENT:   Head: Normocephalic and atraumatic.   Eyes: Pupils are equal, round, and reactive to light.   Neck: Normal range of motion. Neck supple. No JVD (To jawline) present.   Cardiovascular: Normal rate, regular rhythm and intact distal pulses.    Murmur (2/6 Holosystolic murmur at apex) heard.  Pulmonary/Chest: Breath sounds normal. No respiratory distress. She has no wheezes. She has no rales.   Poor air movement, but no appreciable wheezing or rales   Abdominal: Soft. Bowel sounds are normal. She exhibits no distension. There is no tenderness.   Musculoskeletal: Normal range of motion. She exhibits no edema.   Neurological: She is alert and oriented to person,  place, and time.   Skin: Skin is warm and dry. Capillary refill takes less than 2 seconds. She is not diaphoretic.       Significant Labs:   BMP:   Recent Labs  Lab 17  06         K 2.9*      CO2 23   BUN 21   CREATININE 1.3   CALCIUM 9.7   MG 2.2     CBC:   Recent Labs  Lab 17  0220 17  0619 17  1353   WBC 6.89 5.97 6.33   HGB 8.7* 8.3* 9.0*   HCT 27.8* 25.7* 28.7*    225 253     Cardiac Markers:   Recent Labs  Lab 17  1353   *     Coagulation:   Recent Labs  Lab 1719 17  1353   INR 1.2  --    APTT  --  23.2       Significant ImaginD Echo with CFD CONCLUSIONS     1 - Normal left ventricular systolic function (EF 55-60%).     2 - Concentric remodeling.     3 - No wall motion abnormalities.     4 - Biatrial enlargement.     5 - Indeterminate LV diastolic function.     6 - Low normal right ventricular systolic function .     7 - Mild to moderate aortic stenosis, BERNIE = 1.38 cm2, peak velocity = 1.8 m/s, mean gradient = 6 mmHg.     8 - Trivial to mild aortic regurgitation.     9 - Mild to moderate mitral regurgitation.     10 - Moderate to severe tricuspid regurgitation.     11 - Trivial to mild pulmonic regurgitation.     12 - Increased central venous pressure.     13 - Pulmonary hypertension. The estimated PA systolic pressure is 53 mmHg.     This document has been electronically    SIGNED BY: Raheel Betancourt MD On: 2017 15:57    Assessment/Plan:      * Acute on chronic diastolic heart failure    - Multifactorial, with COPD, pulmonary hypertension, CHF exacerbation and ACEI (stopped here).  - Non-productive and 6-month history.  - Given promethazine-codeine syrup and duo-neb PRN.   - Good SpO2 on room air.   - BNP significantly elevated on admission at 2488 with lab findings suspicious for congestive hepatopathy although physical examination no clear on admission for fluid overload. Without appreciable JVP   - Repeat BNP improved  at 693.   - Diurese with furosemide 20 mg IV daily now transitioned to 40 mg PO daily.   - Repeat echo to investigate for pulmonary hypertension and heart function, strong suspicion of RV disease   - Findings of increased PA pressure of 53 and preserved EF. Sniff test demonstrable for elevated RA pressure (note with moderate to severe triscuspid regurgitation which could effect this test)   - Would need outpatient cardiology follow-up; would like to keep appointment tomorrow 6/5 with Dr. Mathew - labs ordered for visit.   -           Chronic cough    - See acute on chronic diastolic HF.           Shortness of breath    - Multifactorial, including pulmonary hypertension, worsening COPD, anemia, and poorly controlled atrial fibrillation/flutter.   - See chronic cough.           Acute renal failure superimposed on stage 3 chronic kidney disease    - Creatinine on admission at 1.4.(last on 3/2017 at 0.9).  - Urine lytes were ordered but not collected; yield low now that she has been diuresed.  - Per history, patient is fluid down for past three weeks and likely pre-renal azotemia in play.  - Per lab findings, there is suspicion for CHF exacerbation and possible cardio-renal, but not significant for fluid overload on physical examination or CXR; trial diuresis at this time.  - Creatinine has not worsened with mild improvement of creatinine to 1.3 today.           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 Diuresis successful in resolving enzymes.  - Improving with AST/ALT now at 52/38 with Tbili 1.6  - Continued diuresis.          Atrial fibrillation with RVR    - On home diltiazem 240 mg PO daily and apixaban 2.5 mg PO BID.   - Now rate controlled.  - CHADVASC 4.             NSTEMI (non-ST elevated myocardial infarction)    - Stable troponin  leak at ~0.08 likely type II demand with acute on chronic diastolic HF with poorly controlled atrial fibrillation/flutter.           Pulmonary hypertension    - Seen on echo 3/13/2017.  - Estimated PA pressure at time 44  - Repeat 2D echo with CFD with estimated PA pressure of 53.            COPD (chronic obstructive pulmonary disease)    - Placed back on home Breo-Ellipta.  - Duo-neb PRN for wheezing and shortness of breath.   - Cough and shortness of breath may have component of COPD, but patient without purulent sputum and with good SpO2. Not on home O2.  - Good SpO2 here without supplemental O2; place with SpO2>88%          Tricuspid valve disorders, specified as nonrheumatic    - Seen on echo 3/13/2017.  - Repeat 2D echo with CFD.          Aortic regurgitation    - Seen on echo 3/13/2017.  - Repeat 2D echo with CFD.          Iron deficiency anemia    - Patient scoped at OSH recently, no findings of GIB and iron studies at time suspicious for anemia of chronic disease.  - Hgb stable. 9 today.   - Iron studies here suggestive of iron deficiency anemia with total iron of 15 and saturated iron of 4%; MCV 78.  - Likely for poor PO intake as without known signs of GIB.  - Ordered parenteral iron on 6/4 one time per staff suggestion.   - Continue home ferrous sulfate on discharge.           Lactic acidosis    - Unclear etiology at this current stage.  - No leukocytosis. Patient has remained afebrile without clear source of infection.  - UA ordered.  - Consideration including poor perfusion with diastolic HF with moderate TR and MR with poorly controlled atrial fibrillation/flutter.  - Lactic acid trended from 3 to 2.6.          SSS (sick sinus syndrome)    - S/p PPM.          Chronic kidney disease, stage III (moderate)              Essential hypertension    - Placed back on home blood pressure medication including diltiazem 240 mg PO daily, isosorbide dinitrate 20 mg PO TID, hydralazine 50 mg PO TID.   - Holding HCTZ  considering NURIA as well as lisinopril (also for concern for chronic cough).   - Discharge with comparable losartan dosage.  - BP well-controled now; uncontrolled BP at home likely contributing to decompensated heart failure.             VTE Risk Mitigation         Ordered     apixaban tablet 2.5 mg  2 times daily     Route:  Oral        06/02/17 1746        Iain Rahman MD  PGY-1 Internal Medicine  [phone]    Department of [facility] Medicine   [facility]

## 2017-06-08 ENCOUNTER — HOSPITAL ENCOUNTER (EMERGENCY)
Facility: HOSPITAL | Age: 82
Discharge: HOME | End: 2017-06-08
Attending: EMERGENCY MEDICINE | Admitting: EMERGENCY MEDICINE
Payer: MEDICARE

## 2017-06-08 VITALS
DIASTOLIC BLOOD PRESSURE: 77 MMHG | TEMPERATURE: 98 F | HEART RATE: 89 BPM | BODY MASS INDEX: 24.91 KG/M2 | SYSTOLIC BLOOD PRESSURE: 164 MMHG | WEIGHT: 155 LBS | RESPIRATION RATE: 18 BRPM | HEIGHT: 66 IN | OXYGEN SATURATION: 100 %

## 2017-06-08 DIAGNOSIS — D64.9 ANEMIA, UNSPECIFIED TYPE: Primary | ICD-10-CM

## 2017-06-08 DIAGNOSIS — I95.9 HYPOTENSION: ICD-10-CM

## 2017-06-08 LAB
ALBUMIN SERPL BCP-MCNC: 3.5 G/DL
ALP SERPL-CCNC: 79 U/L
ALT SERPL W/O P-5'-P-CCNC: 35 U/L
ANION GAP SERPL CALC-SCNC: 9 MMOL/L
AST SERPL-CCNC: 30 U/L
BASOPHILS # BLD AUTO: 0.05 K/UL
BASOPHILS NFR BLD: 0.8 %
BILIRUB SERPL-MCNC: 1 MG/DL
BILIRUB UR QL STRIP: NEGATIVE
BNP SERPL-MCNC: 431 PG/ML
BUN SERPL-MCNC: 26 MG/DL
CALCIUM SERPL-MCNC: 11.1 MG/DL
CHLORIDE SERPL-SCNC: 101 MMOL/L
CLARITY UR REFRACT.AUTO: CLEAR
CO2 SERPL-SCNC: 28 MMOL/L
COLOR UR AUTO: YELLOW
CREAT SERPL-MCNC: 1.8 MG/DL
DIFFERENTIAL METHOD: ABNORMAL
EOSINOPHIL # BLD AUTO: 0.2 K/UL
EOSINOPHIL NFR BLD: 2.7 %
ERYTHROCYTE [DISTWIDTH] IN BLOOD BY AUTOMATED COUNT: 18.2 %
EST. GFR  (AFRICAN AMERICAN): 30.2 ML/MIN/1.73 M^2
EST. GFR  (NON AFRICAN AMERICAN): 26.2 ML/MIN/1.73 M^2
GLUCOSE SERPL-MCNC: 139 MG/DL
GLUCOSE UR QL STRIP: NEGATIVE
HCT VFR BLD AUTO: 35.9 %
HGB BLD-MCNC: 11.1 G/DL
HGB UR QL STRIP: NEGATIVE
KETONES UR QL STRIP: NEGATIVE
LEUKOCYTE ESTERASE UR QL STRIP: NEGATIVE
LYMPHOCYTES # BLD AUTO: 1.6 K/UL
LYMPHOCYTES NFR BLD: 25 %
MCH RBC QN AUTO: 25.1 PG
MCHC RBC AUTO-ENTMCNC: 30.9 %
MCV RBC AUTO: 81 FL
MONOCYTES # BLD AUTO: 0.6 K/UL
MONOCYTES NFR BLD: 9.6 %
NEUTROPHILS # BLD AUTO: 4 K/UL
NEUTROPHILS NFR BLD: 61.6 %
NITRITE UR QL STRIP: NEGATIVE
PH UR STRIP: 6 [PH] (ref 5–8)
PLATELET # BLD AUTO: 354 K/UL
PMV BLD AUTO: 11.5 FL
POTASSIUM SERPL-SCNC: 4.1 MMOL/L
PROT SERPL-MCNC: 7.7 G/DL
PROT UR QL STRIP: NEGATIVE
RBC # BLD AUTO: 4.43 M/UL
SODIUM SERPL-SCNC: 138 MMOL/L
SP GR UR STRIP: 1.01 (ref 1–1.03)
URN SPEC COLLECT METH UR: NORMAL
UROBILINOGEN UR STRIP-ACNC: NEGATIVE EU/DL
WBC # BLD AUTO: 6.55 K/UL

## 2017-06-08 PROCEDURE — 81003 URINALYSIS AUTO W/O SCOPE: CPT

## 2017-06-08 PROCEDURE — 80053 COMPREHEN METABOLIC PANEL: CPT

## 2017-06-08 PROCEDURE — 93010 ELECTROCARDIOGRAM REPORT: CPT | Mod: S$GLB,,, | Performed by: INTERNAL MEDICINE

## 2017-06-08 PROCEDURE — 99285 EMERGENCY DEPT VISIT HI MDM: CPT | Mod: ,,,

## 2017-06-08 PROCEDURE — 85025 COMPLETE CBC W/AUTO DIFF WBC: CPT

## 2017-06-08 PROCEDURE — 99284 EMERGENCY DEPT VISIT MOD MDM: CPT | Mod: 25

## 2017-06-08 PROCEDURE — 93005 ELECTROCARDIOGRAM TRACING: CPT

## 2017-06-08 PROCEDURE — 83880 ASSAY OF NATRIURETIC PEPTIDE: CPT

## 2017-06-08 NOTE — ED NOTES
Bed: 07  Expected date:   Expected time:   Means of arrival:   Comments:  Sanjuana Forrest  LVAD from clinic

## 2017-06-12 DIAGNOSIS — I34.0 MITRAL VALVE INSUFFICIENCY, UNSPECIFIED ETIOLOGY: Primary | ICD-10-CM

## 2017-06-12 DIAGNOSIS — N18.9 CHRONIC KIDNEY DISEASE, UNSPECIFIED STAGE: ICD-10-CM

## 2017-06-26 ENCOUNTER — HOSPITAL ENCOUNTER (OUTPATIENT)
Dept: CARDIOLOGY | Facility: CLINIC | Age: 82
Discharge: HOME | End: 2017-06-26
Payer: MEDICARE

## 2017-06-26 ENCOUNTER — INITIAL CONSULT (OUTPATIENT)
Dept: CARDIOLOGY | Facility: CLINIC | Age: 82
End: 2017-06-26
Payer: MEDICARE

## 2017-06-26 VITALS
WEIGHT: 138.88 LBS | HEIGHT: 64 IN | BODY MASS INDEX: 23.71 KG/M2 | HEART RATE: 70 BPM | DIASTOLIC BLOOD PRESSURE: 80 MMHG | SYSTOLIC BLOOD PRESSURE: 138 MMHG

## 2017-06-26 DIAGNOSIS — I48.0 PAROXYSMAL ATRIAL FIBRILLATION: Primary | ICD-10-CM

## 2017-06-26 DIAGNOSIS — I34.0 NON-RHEUMATIC MITRAL REGURGITATION: ICD-10-CM

## 2017-06-26 DIAGNOSIS — I34.0 MITRAL VALVE INSUFFICIENCY, UNSPECIFIED ETIOLOGY: ICD-10-CM

## 2017-06-26 PROCEDURE — 99204 OFFICE O/P NEW MOD 45 MIN: CPT | Mod: S$GLB,,, | Performed by: INTERNAL MEDICINE

## 2017-06-26 PROCEDURE — 1159F MED LIST DOCD IN RCRD: CPT | Mod: S$GLB,,, | Performed by: INTERNAL MEDICINE

## 2017-06-26 PROCEDURE — 99999 PR PBB SHADOW E&M-EST. PATIENT-LVL III: CPT | Mod: PBBFAC,,, | Performed by: INTERNAL MEDICINE

## 2017-06-26 PROCEDURE — 1126F AMNT PAIN NOTED NONE PRSNT: CPT | Mod: S$GLB,,, | Performed by: INTERNAL MEDICINE

## 2017-06-26 RX ORDER — PROMETHAZINE HYDROCHLORIDE AND CODEINE PHOSPHATE 6.25; 1 MG/5ML; MG/5ML
5 SOLUTION ORAL EVERY 4 HOURS PRN
Status: ON HOLD | COMMUNITY
End: 2017-08-02 | Stop reason: ALTCHOICE

## 2017-06-26 RX ORDER — ASPIRIN 81 MG/1
81 TABLET ORAL DAILY
Status: ON HOLD | COMMUNITY
End: 2020-10-29 | Stop reason: HOSPADM

## 2017-06-26 RX ORDER — HYDROCHLOROTHIAZIDE 12.5 MG/1
CAPSULE ORAL
Refills: 3 | Status: ON HOLD | COMMUNITY
Start: 2017-03-20 | End: 2017-08-02 | Stop reason: HOSPADM

## 2017-06-26 RX ORDER — LOSARTAN POTASSIUM 50 MG/1
50 TABLET ORAL DAILY
Status: ON HOLD | COMMUNITY
End: 2017-08-02

## 2017-06-26 RX ORDER — BENZONATATE 100 MG/1
100 CAPSULE ORAL 3 TIMES DAILY PRN
COMMUNITY
End: 2018-11-12

## 2017-06-26 RX ORDER — ACETAMINOPHEN 500 MG
5000 TABLET ORAL DAILY
COMMUNITY
End: 2017-12-18

## 2017-06-26 RX ORDER — ROSUVASTATIN CALCIUM 10 MG/1
10 TABLET, COATED ORAL DAILY
Status: ON HOLD | COMMUNITY
End: 2017-08-02

## 2017-06-26 NOTE — LETTER
June 26, 2017      Clifford Ely MD  [address] [place] [address] Bld  [address]  Cardiology Center  [place] [id number]           [facility]  [address]  [facility] [address]  Phone: [phone]          Patient: Christy Webber   MR Number: [id number]   YOB: 1935   Date of Visit: 6/26/2017       Dear Dr. Clifford Ely:    Thank you for referring Christy Webber to me for evaluation. Attached you will find relevant portions of my assessment and plan of care.    If you have questions, please do not hesitate to call me. I look forward to following Christy Webber along with you.    Sincerely,    Juan Mathew MD    Enclosure  CC:  No Recipients    If you would like to receive this communication electronically, please contact [email or web address] or [phone] to request more information on [employer] access.    For providers and/or their staff who would like to refer a patient to [facility], please contact us through our one-stop-shop provider referral line, [facility], at [phone].    If you feel you have received this communication in error or would no longer like to receive these types of communications, please e-mail [email or web address]

## 2017-06-26 NOTE — PROGRESS NOTES
Interventional structural Cardiology Clinic Note  Reason for Visit: evaluation for mitral regurgitation intervention  Referring MD: Dr Clifford Jasmine     HPI:   Christy Webber is a 81 y.o. lady referred by Dr Clifford Jasmine for evaluation of mitral regurgitation (NYHA Class I sx). Has history of COPD, HTN, HLD, Atrial fibrillation on Eliquis, ex smoker, s/p PPM (08/16) for SSS, chronic cough that has now resolved after stopping the ACEI. She has never been admitted with heart failure. She does not recall having being diagnosed with heart failure. She is here with her son. Currently her son lives with her, she was admitted 2 weeks ago with chronic cough, that has resolved after stopping ACEI.     The patient has undergone the following mitral clip work-up:   ECHO (Date 06/04/2017): moderate mitral regurgitation, EF= 60%.   LHC : none   STS: 3.8%   Frailty: 2/3 (albumin pending)  CT Surgery risk assessment: pending  Rhythm issues: Atrial fibrillation     ROS:    Constitution: Negative for fever or chills. Negative for weight loss or gain.   HENT: Negative for sore throat or headaches. Negative for rhinorrhea.  Eyes: Negative for blurred or double vision.   Cardiovascular: See above  Pulmonary: Negative for SOB. Negative for cough.   Gastrointestinal: Negative for abdominal pain. Negative for nausea/ vomiting. Negative for diarrhea.   : Negative for dysuria.   Neurological: Negative for focal weakness or sensory changes.  PMH:     Past Medical History:   Diagnosis Date    Atrial flutter     Hyperlipidemia     Hypertension     Pacemaker 08/2016    Single lead [employer] Pacemaker for sick sinus syndrome    Sick sinus syndrome      Past Surgical History:   Procedure Laterality Date    CARDIAC PACEMAKER PLACEMENT      HYSTERECTOMY      KIDNEY SURGERY       Allergies:   Review of patient's allergies indicates:  No Known Allergies  Medications:     Current Outpatient Prescriptions on File Prior to Visit  "  Medication Sig Dispense Refill    albuterol (PROVENTIL) 2.5 mg /3 mL (0.083 %) nebulizer solution Take 2.5 mg by nebulization every 6 (six) hours as needed for Wheezing or Shortness of Breath. Rescue      albuterol (VENTOLIN HFA) 90 mcg/actuation inhaler Inhale 2 puffs into the lungs every 6 (six) hours as needed for Wheezing. Rescue 1 each 3    apixaban 2.5 mg Tab Take 1 tablet (2.5 mg total) by mouth 2 (two) times daily. 60 tablet 11    diltiazem (CARDIZEM CD) 240 MG 24 hr capsule Take 1 capsule (240 mg total) by mouth once daily. 30 capsule 11    furosemide (LASIX) 40 MG tablet Take 1 tablet (40 mg total) by mouth once daily. 60 tablet 6    hydrALAZINE (APRESOLINE) 50 MG tablet Take 1 tablet (50 mg total) by mouth every 8 (eight) hours. 90 tablet 3    isosorbide dinitrate (ISORDIL) 20 MG tablet Take 1 tablet (20 mg total) by mouth 3 (three) times daily. 90 tablet 3    [DISCONTINUED] hydrochlorothiazide (HYDRODIURIL) 12.5 MG Tab Take 12.5 mg by mouth once daily.      ferrous sulfate 325 (65 FE) MG EC tablet Take 1 tablet (325 mg total) by mouth 2 (two) times daily.  0    fluticasone-vilanterol (BREO) 100-25 mcg/dose diskus inhaler Inhale 1 puff into the lungs once daily. Controller 60 each 3    omeprazole (PRILOSEC) 20 MG capsule Take 20 mg by mouth once daily.      [DISCONTINUED] aspirin 81 MG Chew Take 81 mg by mouth once daily.       No current facility-administered medications on file prior to visit.      Social History:     Social History   Substance Use Topics    Smoking status: Former Smoker     Packs/day: 1.50     Years: 63.00     Types: Cigarettes     Quit date: 6/14/2016    Smokeless tobacco: Never Used    Alcohol use No     Family History:   History reviewed. No pertinent family history.  Physical Exam:   /80 (BP Location: Left arm, Patient Position: Sitting, BP Method: Manual)   Pulse 70   Ht 5' 3.5" (1.613 m)   Wt 63 kg (138 lb 14.2 oz)   LMP  (LMP Unknown)   BMI 24.22 " kg/m²      Constitutional: No acute distress, conversant  HEENT: Sclera anicteric, Pupils equal, round and reactive to light, extraocular motions intact, Oropharynx clear  Neck: No JVD, no carotid bruits  Cardiovascular: irregular rate and rhythm, mid systolic murmur, no rubs or gallops, normal S1/S2  Pulmonary: Clear to auscultation bilaterally  Abdominal: Abdomen soft, nontender, nondistended, positive bowel sounds  Extremities: No lower extremity edema,   Pulses: 2+ BL Radial, 2+ BL carotid, 2+ BL DP, 2+ BL PT, normal Allens Test BL  Skin: No ecchymosis, erythema, or ulcers  Psych: Alert and oriented x 3, appropriate affect  Neuro: CNII-XII intact, no focal deficits    Labs:     Lab Results   Component Value Date     2017    K 4.1 2017     2017    CO2 28 2017    BUN 26 (H) 2017    CREATININE 1.8 (H) 2017    ANIONGAP 9 2017     Lab Results   Component Value Date    AST 30 2017    ALT 35 2017    ALKPHOS 79 2017    BILITOT 1.0 2017    ALBUMIN 3.5 2017     Lab Results   Component Value Date    CALCIUM 11.1 (H) 2017    MG 2.0 2017    PHOS 2.3 (L) 2017     Lab Results   Component Value Date     (H) 2017     (H) 2017    BNP 2,488 (H) 2017    Lab Results   Component Value Date    WBC 4.35 2017    HGB 10.9 (L) 2017    HCT 34.6 (L) 2017     2017    GRAN 4.0 2017    GRAN 61.6 2017     Lab Results   Component Value Date    INR 1.0 2017     Lab Results   Component Value Date    CHOL 88 (L) 2017    HDL 28 (L) 2017    LDLCALC 49.2 (L) 2017    TRIG 54 2017     No results found for: HGBA1C  Lab Results   Component Value Date    TSH 1.160 2017          Imagin2017   1 - Normal left ventricular systolic function (EF 55-60%).     2 - Concentric remodeling.     3 - No wall motion abnormalities.     4 - Biatrial  enlargement.     5 - Indeterminate LV diastolic function.     6 - Low normal right ventricular systolic function .     7 - Mild to moderate aortic stenosis, BERNIE = 1.38 cm2, peak velocity = 1.8 m/s, mean gradient = 6 mmHg.     8 - Trivial to mild aortic regurgitation.     9 - Mild to moderate mitral regurgitation.     10 - Moderate to severe tricuspid regurgitation.     11 - Trivial to mild pulmonic regurgitation.     12 - Increased central venous pressure.     13 - Pulmonary hypertension. The estimated PA systolic pressure is 53 mmHg.    EF   Date Value Ref Range Status   06/04/2017 58 55 - 65    03/13/2017 60 55 - 65    08/22/2016 57 55 - 65        EKG: atrial fibrillation   Assessment:   Christy Webber is a 81 y.o. lady referred by Dr Clifford Jasmine for evaluation of mitral regurgitation (NYHA Class I sx). Has history of COPD, HTN, HLD, Atrial fibrillation on Eliquis, ex smoker, s/p PPM (08/16) for SSS, chronic cough that has now resolved after stopping the ACEI.  1. Moderate mitral regurgitation, asymptomatic hence not a mitral clip candidate at this time  2. Permanent atrial fibrillation on Eliqus with bi-atrial enlargement  3. HTN    Plan:     Regular follow up with Dr Clifford Jasmine  Follow up with Dr Nestor GATES for possible atrial fibrillation ablation and evaluation for watchman  Discussed in details with the patient and her son about LAAO device, if she would like to be considered for this in the future.   Appointment for EP and Dr Mathew's contact information mailed to her as pt left before giving her the appointment   Pt seen and case discussed with Dr Mathew    Signed:  Subhash Dobbins MD  Interventional / Structural  Cardiology Fellow  Beeper:  [phone]  6/26/2017 9:32 AM    Staff:  I have personally taken the history and examined this patient and agree with the fellow's note as stated above and amended it accordingly :-)  This nice lady doesn't have significant mitral regurgitation.  She does have  bilateral atrial enlargement and chronic afib/aflutter.  She may be a candidate for watchman or other EP treatments so will ask Gerardo Baez to see her.

## 2017-07-10 DIAGNOSIS — I48.0 PAF (PAROXYSMAL ATRIAL FIBRILLATION): Primary | ICD-10-CM

## 2017-07-11 ENCOUNTER — HOSPITAL ENCOUNTER (OUTPATIENT)
Dept: CARDIOLOGY | Facility: CLINIC | Age: 82
Discharge: HOME | End: 2017-07-11
Payer: MEDICARE

## 2017-07-11 ENCOUNTER — CLINICAL SUPPORT (OUTPATIENT)
Dept: ELECTROPHYSIOLOGY | Facility: CLINIC | Age: 82
End: 2017-07-11
Payer: MEDICARE

## 2017-07-11 ENCOUNTER — OFFICE VISIT (OUTPATIENT)
Dept: ELECTROPHYSIOLOGY | Facility: CLINIC | Age: 82
End: 2017-07-11
Payer: MEDICARE

## 2017-07-11 VITALS
SYSTOLIC BLOOD PRESSURE: 128 MMHG | BODY MASS INDEX: 23.07 KG/M2 | HEIGHT: 64 IN | HEART RATE: 74 BPM | DIASTOLIC BLOOD PRESSURE: 62 MMHG | WEIGHT: 135.13 LBS

## 2017-07-11 DIAGNOSIS — I10 ESSENTIAL HYPERTENSION: ICD-10-CM

## 2017-07-11 DIAGNOSIS — I48.0 PAF (PAROXYSMAL ATRIAL FIBRILLATION): ICD-10-CM

## 2017-07-11 DIAGNOSIS — I49.5 SSS (SICK SINUS SYNDROME): ICD-10-CM

## 2017-07-11 DIAGNOSIS — D50.9 IRON DEFICIENCY ANEMIA, UNSPECIFIED IRON DEFICIENCY ANEMIA TYPE: ICD-10-CM

## 2017-07-11 DIAGNOSIS — R05.3 CHRONIC COUGH: Primary | ICD-10-CM

## 2017-07-11 DIAGNOSIS — I48.21 PERMANENT ATRIAL FIBRILLATION: ICD-10-CM

## 2017-07-11 DIAGNOSIS — Z95.0 CARDIAC PACEMAKER IN SITU: ICD-10-CM

## 2017-07-11 DIAGNOSIS — I49.5 SSS (SICK SINUS SYNDROME): Primary | ICD-10-CM

## 2017-07-11 PROCEDURE — 99999 PR PBB SHADOW E&M-EST. PATIENT-LVL III: CPT | Mod: PBBFAC,,, | Performed by: INTERNAL MEDICINE

## 2017-07-11 PROCEDURE — 1159F MED LIST DOCD IN RCRD: CPT | Mod: S$GLB,,, | Performed by: INTERNAL MEDICINE

## 2017-07-11 PROCEDURE — 93279 PRGRMG DEV EVAL PM/LDLS PM: CPT | Mod: S$GLB,,, | Performed by: INTERNAL MEDICINE

## 2017-07-11 PROCEDURE — 1126F AMNT PAIN NOTED NONE PRSNT: CPT | Mod: S$GLB,,, | Performed by: INTERNAL MEDICINE

## 2017-07-11 PROCEDURE — 93000 ELECTROCARDIOGRAM COMPLETE: CPT | Mod: S$GLB,,, | Performed by: INTERNAL MEDICINE

## 2017-07-11 PROCEDURE — 99205 OFFICE O/P NEW HI 60 MIN: CPT | Mod: S$GLB,,, | Performed by: INTERNAL MEDICINE

## 2017-07-11 RX ORDER — PROMETHAZINE HYDROCHLORIDE 6.25 MG/5ML
12.5 SYRUP ORAL EVERY 6 HOURS PRN
Qty: 240 ML | Refills: 1 | Status: ON HOLD | OUTPATIENT
Start: 2017-07-11 | End: 2017-11-14

## 2017-07-11 NOTE — PROGRESS NOTES
Subjective:    Patient ID:  Christy Webber is a 81 y.o. female who presents for evaluation of Atrial Fibrillation and Pacemaker Check      81 yoF chronic AF here for arrhythmia management. She has had AF for over 10 years, maybe 15 years. She has a SC PM that is functioning normally. She was started on eliquis 2.5 mg bid for CVA prophylaxis. She had recent severe iron deficient anemia with drop on Hgb from 14 to 8. She was started on iron with modest improvement in Hgb. She is asymptomatic in AF with good rate control. She was seen by Dr Mathew for mitral valve clip but was not deemed suitable for the procedure. She has no history of TIA/CVA, CHF, PVD. She paces <40% of the time. Her EF is normal.     Echo 6/17:  CONCLUSIONS     1 - Normal left ventricular systolic function (EF 55-60%).     2 - Concentric remodeling.     3 - No wall motion abnormalities.     4 - Biatrial enlargement.     5 - Indeterminate LV diastolic function.     6 - Low normal right ventricular systolic function .     7 - Mild to moderate aortic stenosis, BERNIE = 1.38 cm2, peak velocity = 1.8 m/s, mean gradient = 6 mmHg.     8 - Trivial to mild aortic regurgitation.     9 - Mild to moderate mitral regurgitation.     10 - Moderate to severe tricuspid regurgitation.     11 - Trivial to mild pulmonic regurgitation.     12 - Increased central venous pressure.     13 - Pulmonary hypertension. The estimated PA systolic pressure is 53 mmHg.     Past Medical History:  No date: Atrial flutter  No date: Hyperlipidemia  No date: Hypertension  08/2016: Pacemaker      Comment: Single lead [employer] Pacemaker for sick sinus                syndrome  No date: Sick sinus syndrome    Past Surgical History:  No date: CARDIAC PACEMAKER PLACEMENT  No date: HYSTERECTOMY  No date: KIDNEY SURGERY    Social History    Marital status:              Spouse name:                       Years of education:                 Number of children:               Occupational  History    None on file    Social History Main Topics    Smoking status: Former Smoker                                                                Packs/day: 1.50      Years: 63.00          Types: Cigarettes       Quit date: 6/14/2016    Smokeless tobacco: Never Used                        Alcohol use: No              Drug use: No              Sexual activity: No                   Other Topics            Concern    None on file    Social History Narrative    None on file        History reviewed.  No pertinent family history.          Review of Systems   Constitution: Negative.   HENT: Negative.    Eyes: Negative.    Cardiovascular: Negative for chest pain, dyspnea on exertion, leg swelling, near-syncope, palpitations and syncope.   Respiratory: Negative.  Negative for shortness of breath.    Endocrine: Negative.    Hematologic/Lymphatic: Negative.    Skin: Negative.    Musculoskeletal: Negative.    Gastrointestinal: Negative.    Genitourinary: Negative.    Neurological: Negative.  Negative for dizziness and light-headedness.   Psychiatric/Behavioral: Negative.    Allergic/Immunologic: Negative.         Objective:    Physical Exam   Constitutional: She is oriented to person, place, and time. She appears well-developed and well-nourished. No distress.   HENT:   Head: Normocephalic and atraumatic.   Eyes: EOM are normal. Pupils are equal, round, and reactive to light.   Neck: Normal range of motion. No JVD present. No thyromegaly present.   Cardiovascular: Normal rate, S1 normal, S2 normal and normal heart sounds.  An irregularly irregular rhythm present. PMI is not displaced.  Exam reveals no gallop and no friction rub.    No murmur heard.  Pulmonary/Chest: Effort normal and breath sounds normal. No respiratory distress. She has no wheezes. She has no rales.   Abdominal: Soft. Bowel sounds are normal. She exhibits no distension. There is no tenderness. There is no rebound and no guarding.   Musculoskeletal: Normal  range of motion. She exhibits no edema or tenderness.   Neurological: She is alert and oriented to person, place, and time. No cranial nerve deficit.   Skin: Skin is warm and dry. No rash noted. No erythema.   Psychiatric: She has a normal mood and affect. Her behavior is normal. Judgment and thought content normal.   Vitals reviewed.    ECG: AF with controlled V rate and intermittent V pacing        Assessment:       1. Chronic cough    2. Permanent atrial fibrillation    3. Essential hypertension    4. Iron deficiency anemia, unspecified iron deficiency anemia type         Plan:       81 yoM chronic AF, HTN here for arrhythmia evaluation. She has rate controlled, asymptomatic AF with a functioning SC PM. She has developed severe iron deficiency anemia since starting eliquis. She has a CHADS VASC score of 4. I offered LAAO (watchman) to limit long term OAC. Extensive discussion regarding risks, benefits, and alternative approaches to the procedure was had with the patient.  The patient voices understanding and all questions have been answered.  The patient would like to proceed as planned. She wishes to pursue the procedure sometime in late September at the earliest.

## 2017-07-11 NOTE — Clinical Note
Can we get this patient set up for a Watchman. She wishes to have it performed in late September. We will need to switch to warfarin one month prior.   Thanks, MB

## 2017-07-11 NOTE — LETTER
July 11, 2017      Juan Mathew MD  [address]  [facility] [address]           [facility] - Arrhythmia  [address]  [facility] [address]  Phone: [phone]  Fax: [phone]          Patient: Christy Webber   MR Number: [id number]   YOB: 1935   Date of Visit: 7/11/2017       Dear Dr. Juan Mathew:    Thank you for referring Christy Webber to me for evaluation. Attached you will find relevant portions of my assessment and plan of care.    If you have questions, please do not hesitate to call me. I look forward to following Christy Webber along with you.    Sincerely,    Edvin Baez MD    Enclosure  CC:  No Recipients    If you would like to receive this communication electronically, please contact [email or web address] or [phone] to request more information on [email or web address] Link access.    For providers and/or their staff who would like to refer a patient to [facility], please contact us through our one-stop-shop provider referral line, [facility], at [phone].    If you feel you have received this communication in error or would no longer like to receive these types of communications, please e-mail [email or web address]

## 2017-07-11 NOTE — Clinical Note
Karson,   I saw Mrs Webber today for AF management. She has been in AF for over 10 years and is mostly asymptomatic therefore ablation is not indicated. I do think she would benefit from LAAO given her marked Hgb drop on eliquis (14 to 8). She wishes to have the procedure performed in late September at the earliest. I will have my team begin the pre-procedure process.   Thanks, Gerardo

## 2017-07-12 DIAGNOSIS — I48.19 PERSISTENT ATRIAL FIBRILLATION: Primary | ICD-10-CM

## 2017-07-14 DIAGNOSIS — I49.5 SSS (SICK SINUS SYNDROME): ICD-10-CM

## 2017-07-14 DIAGNOSIS — Z95.0 CARDIAC PACEMAKER IN SITU: Primary | ICD-10-CM

## 2017-07-31 ENCOUNTER — HOSPITAL ENCOUNTER (INPATIENT)
Facility: HOSPITAL | Age: 82
LOS: 1 days | Discharge: HOME HEALTH | DRG: 291 | End: 2017-08-02
Attending: EMERGENCY MEDICINE | Admitting: HOSPITALIST
Payer: MEDICARE

## 2017-07-31 DIAGNOSIS — R42 DIZZINESS: ICD-10-CM

## 2017-07-31 DIAGNOSIS — I50.9 ACUTE ON CHRONIC CONGESTIVE HEART FAILURE, UNSPECIFIED CONGESTIVE HEART FAILURE TYPE: Primary | ICD-10-CM

## 2017-07-31 LAB
ALBUMIN SERPL BCP-MCNC: 3.2 G/DL
ALP SERPL-CCNC: 126 U/L
ALT SERPL W/O P-5'-P-CCNC: 147 U/L
ANION GAP SERPL CALC-SCNC: 12 MMOL/L
ANISOCYTOSIS BLD QL SMEAR: SLIGHT
AST SERPL-CCNC: 149 U/L
BASOPHILS # BLD AUTO: 0.05 K/UL
BASOPHILS NFR BLD: 0.7 %
BILIRUB SERPL-MCNC: 3.2 MG/DL
BNP SERPL-MCNC: 2312 PG/ML
BUN SERPL-MCNC: 23 MG/DL
CALCIUM SERPL-MCNC: 10.3 MG/DL
CHLORIDE SERPL-SCNC: 109 MMOL/L
CO2 SERPL-SCNC: 19 MMOL/L
CREAT SERPL-MCNC: 1.3 MG/DL
DIFFERENTIAL METHOD: ABNORMAL
EOSINOPHIL # BLD AUTO: 0.1 K/UL
EOSINOPHIL NFR BLD: 0.7 %
ERYTHROCYTE [DISTWIDTH] IN BLOOD BY AUTOMATED COUNT: 20.7 %
EST. GFR  (AFRICAN AMERICAN): 44.8 ML/MIN/1.73 M^2
EST. GFR  (NON AFRICAN AMERICAN): 38.8 ML/MIN/1.73 M^2
GLUCOSE SERPL-MCNC: 114 MG/DL
HCT VFR BLD AUTO: 31.6 %
HGB BLD-MCNC: 10.4 G/DL
HYPOCHROMIA BLD QL SMEAR: ABNORMAL
LYMPHOCYTES # BLD AUTO: 1.1 K/UL
LYMPHOCYTES NFR BLD: 15.2 %
MAGNESIUM SERPL-MCNC: 1.8 MG/DL
MCH RBC QN AUTO: 26.3 PG
MCHC RBC AUTO-ENTMCNC: 32.9 G/DL
MCV RBC AUTO: 80 FL
MONOCYTES # BLD AUTO: 0.9 K/UL
MONOCYTES NFR BLD: 11.6 %
NEUTROPHILS # BLD AUTO: 5.3 K/UL
NEUTROPHILS NFR BLD: 71.5 %
OVALOCYTES BLD QL SMEAR: ABNORMAL
PHOSPHATE SERPL-MCNC: 2.7 MG/DL
PLATELET # BLD AUTO: 195 K/UL
PLATELET BLD QL SMEAR: ABNORMAL
PMV BLD AUTO: 11.2 FL
POIKILOCYTOSIS BLD QL SMEAR: SLIGHT
POLYCHROMASIA BLD QL SMEAR: ABNORMAL
POTASSIUM SERPL-SCNC: 4.4 MMOL/L
PROT SERPL-MCNC: 6.7 G/DL
RBC # BLD AUTO: 3.96 M/UL
SODIUM SERPL-SCNC: 140 MMOL/L
TROPONIN I SERPL DL<=0.01 NG/ML-MCNC: 0.03 NG/ML
WBC # BLD AUTO: 7.43 K/UL

## 2017-07-31 PROCEDURE — 93010 ELECTROCARDIOGRAM REPORT: CPT | Mod: 77,,, | Performed by: INTERNAL MEDICINE

## 2017-07-31 PROCEDURE — 96375 TX/PRO/DX INJ NEW DRUG ADDON: CPT

## 2017-07-31 PROCEDURE — 94640 AIRWAY INHALATION TREATMENT: CPT

## 2017-07-31 PROCEDURE — 85007 BL SMEAR W/DIFF WBC COUNT: CPT

## 2017-07-31 PROCEDURE — 99285 EMERGENCY DEPT VISIT HI MDM: CPT | Mod: 25

## 2017-07-31 PROCEDURE — 80053 COMPREHEN METABOLIC PANEL: CPT

## 2017-07-31 PROCEDURE — 84100 ASSAY OF PHOSPHORUS: CPT

## 2017-07-31 PROCEDURE — 96374 THER/PROPH/DIAG INJ IV PUSH: CPT

## 2017-07-31 PROCEDURE — 83880 ASSAY OF NATRIURETIC PEPTIDE: CPT

## 2017-07-31 PROCEDURE — 25000003 PHARM REV CODE 250: Performed by: STUDENT IN AN ORGANIZED HEALTH CARE EDUCATION/TRAINING PROGRAM

## 2017-07-31 PROCEDURE — 83735 ASSAY OF MAGNESIUM: CPT

## 2017-07-31 PROCEDURE — 84484 ASSAY OF TROPONIN QUANT: CPT

## 2017-07-31 PROCEDURE — 99284 EMERGENCY DEPT VISIT MOD MDM: CPT | Mod: ,,, | Performed by: EMERGENCY MEDICINE

## 2017-07-31 PROCEDURE — 94761 N-INVAS EAR/PLS OXIMETRY MLT: CPT

## 2017-07-31 PROCEDURE — 93005 ELECTROCARDIOGRAM TRACING: CPT

## 2017-07-31 PROCEDURE — 82962 GLUCOSE BLOOD TEST: CPT

## 2017-07-31 PROCEDURE — 93010 ELECTROCARDIOGRAM REPORT: CPT | Mod: ,,, | Performed by: INTERNAL MEDICINE

## 2017-07-31 PROCEDURE — 85027 COMPLETE CBC AUTOMATED: CPT

## 2017-07-31 RX ORDER — DILTIAZEM HYDROCHLORIDE 5 MG/ML
10 INJECTION INTRAVENOUS
Status: COMPLETED | OUTPATIENT
Start: 2017-07-31 | End: 2017-07-31

## 2017-07-31 RX ORDER — FUROSEMIDE 10 MG/ML
40 INJECTION INTRAMUSCULAR; INTRAVENOUS
Status: DISCONTINUED | OUTPATIENT
Start: 2017-07-31 | End: 2017-07-31

## 2017-07-31 RX ORDER — FUROSEMIDE 10 MG/ML
60 INJECTION INTRAMUSCULAR; INTRAVENOUS
Status: COMPLETED | OUTPATIENT
Start: 2017-07-31 | End: 2017-08-01

## 2017-07-31 RX ORDER — IPRATROPIUM BROMIDE AND ALBUTEROL SULFATE 2.5; .5 MG/3ML; MG/3ML
3 SOLUTION RESPIRATORY (INHALATION)
Status: DISCONTINUED | OUTPATIENT
Start: 2017-07-31 | End: 2017-08-01

## 2017-07-31 RX ADMIN — DILTIAZEM HYDROCHLORIDE 10 MG: 5 INJECTION INTRAVENOUS at 09:07

## 2017-08-01 PROBLEM — I35.0 AORTIC STENOSIS: Status: ACTIVE | Noted: 2017-08-01

## 2017-08-01 PROBLEM — E78.5 DYSLIPIDEMIA: Status: ACTIVE | Noted: 2017-08-01

## 2017-08-01 PROBLEM — I50.9 ACUTE ON CHRONIC CONGESTIVE HEART FAILURE: Status: ACTIVE | Noted: 2017-08-01

## 2017-08-01 LAB
ANION GAP SERPL CALC-SCNC: 12 MMOL/L
BASOPHILS # BLD AUTO: 0.04 K/UL
BASOPHILS NFR BLD: 0.6 %
BUN SERPL-MCNC: 23 MG/DL
CALCIUM SERPL-MCNC: 9.8 MG/DL
CHLORIDE SERPL-SCNC: 106 MMOL/L
CO2 SERPL-SCNC: 21 MMOL/L
CREAT SERPL-MCNC: 1.3 MG/DL
DIFFERENTIAL METHOD: ABNORMAL
EOSINOPHIL # BLD AUTO: 0.1 K/UL
EOSINOPHIL NFR BLD: 1.3 %
ERYTHROCYTE [DISTWIDTH] IN BLOOD BY AUTOMATED COUNT: 20.8 %
EST. GFR  (AFRICAN AMERICAN): 44.8 ML/MIN/1.73 M^2
EST. GFR  (NON AFRICAN AMERICAN): 38.8 ML/MIN/1.73 M^2
GLUCOSE SERPL-MCNC: 108 MG/DL
HCT VFR BLD AUTO: 30.1 %
HGB BLD-MCNC: 9.7 G/DL
INR PPP: 1.4
LYMPHOCYTES # BLD AUTO: 1.4 K/UL
LYMPHOCYTES NFR BLD: 19 %
MCH RBC QN AUTO: 25.9 PG
MCHC RBC AUTO-ENTMCNC: 32.2 G/DL
MCV RBC AUTO: 81 FL
MONOCYTES # BLD AUTO: 0.9 K/UL
MONOCYTES NFR BLD: 12.4 %
NEUTROPHILS # BLD AUTO: 4.8 K/UL
NEUTROPHILS NFR BLD: 66.7 %
PLATELET # BLD AUTO: 184 K/UL
PLATELET BLD QL SMEAR: ABNORMAL
PMV BLD AUTO: 12.1 FL
POTASSIUM SERPL-SCNC: 3.5 MMOL/L
PROTHROMBIN TIME: 14.1 SEC
RBC # BLD AUTO: 3.74 M/UL
SODIUM SERPL-SCNC: 139 MMOL/L
WBC # BLD AUTO: 7.15 K/UL

## 2017-08-01 PROCEDURE — 94761 N-INVAS EAR/PLS OXIMETRY MLT: CPT

## 2017-08-01 PROCEDURE — 85610 PROTHROMBIN TIME: CPT

## 2017-08-01 PROCEDURE — 99223 1ST HOSP IP/OBS HIGH 75: CPT | Mod: AI,GC,, | Performed by: HOSPITALIST

## 2017-08-01 PROCEDURE — 36415 COLL VENOUS BLD VENIPUNCTURE: CPT

## 2017-08-01 PROCEDURE — 25000003 PHARM REV CODE 250: Performed by: STUDENT IN AN ORGANIZED HEALTH CARE EDUCATION/TRAINING PROGRAM

## 2017-08-01 PROCEDURE — A4216 STERILE WATER/SALINE, 10 ML: HCPCS | Performed by: STUDENT IN AN ORGANIZED HEALTH CARE EDUCATION/TRAINING PROGRAM

## 2017-08-01 PROCEDURE — 11000001 HC ACUTE MED/SURG PRIVATE ROOM

## 2017-08-01 PROCEDURE — 63600175 PHARM REV CODE 636 W HCPCS: Performed by: STUDENT IN AN ORGANIZED HEALTH CARE EDUCATION/TRAINING PROGRAM

## 2017-08-01 PROCEDURE — 85025 COMPLETE CBC W/AUTO DIFF WBC: CPT

## 2017-08-01 PROCEDURE — 80048 BASIC METABOLIC PNL TOTAL CA: CPT

## 2017-08-01 PROCEDURE — 25000242 PHARM REV CODE 250 ALT 637 W/ HCPCS: Performed by: STUDENT IN AN ORGANIZED HEALTH CARE EDUCATION/TRAINING PROGRAM

## 2017-08-01 RX ORDER — ALBUTEROL SULFATE 90 UG/1
2 AEROSOL, METERED RESPIRATORY (INHALATION) EVERY 6 HOURS PRN
Status: DISCONTINUED | OUTPATIENT
Start: 2017-08-01 | End: 2017-08-02 | Stop reason: HOSPADM

## 2017-08-01 RX ORDER — BENZONATATE 100 MG/1
100 CAPSULE ORAL 3 TIMES DAILY PRN
Status: DISCONTINUED | OUTPATIENT
Start: 2017-08-01 | End: 2017-08-02 | Stop reason: HOSPADM

## 2017-08-01 RX ORDER — PANTOPRAZOLE SODIUM 40 MG/1
40 TABLET, DELAYED RELEASE ORAL DAILY
Status: DISCONTINUED | OUTPATIENT
Start: 2017-08-01 | End: 2017-08-02

## 2017-08-01 RX ORDER — FUROSEMIDE 10 MG/ML
40 INJECTION INTRAMUSCULAR; INTRAVENOUS 2 TIMES DAILY
Status: DISCONTINUED | OUTPATIENT
Start: 2017-08-01 | End: 2017-08-02

## 2017-08-01 RX ORDER — ISOSORBIDE DINITRATE 20 MG/1
20 TABLET ORAL 3 TIMES DAILY
Status: DISCONTINUED | OUTPATIENT
Start: 2017-08-01 | End: 2017-08-02

## 2017-08-01 RX ORDER — FERROUS SULFATE 325(65) MG
325 TABLET, DELAYED RELEASE (ENTERIC COATED) ORAL 2 TIMES DAILY
Status: DISCONTINUED | OUTPATIENT
Start: 2017-08-01 | End: 2017-08-02 | Stop reason: HOSPADM

## 2017-08-01 RX ORDER — FLUTICASONE FUROATE AND VILANTEROL 100; 25 UG/1; UG/1
1 POWDER RESPIRATORY (INHALATION) DAILY
Status: DISCONTINUED | OUTPATIENT
Start: 2017-08-01 | End: 2017-08-02 | Stop reason: HOSPADM

## 2017-08-01 RX ORDER — DILTIAZEM HYDROCHLORIDE 120 MG/1
240 CAPSULE, COATED, EXTENDED RELEASE ORAL DAILY
Status: DISCONTINUED | OUTPATIENT
Start: 2017-08-01 | End: 2017-08-02 | Stop reason: HOSPADM

## 2017-08-01 RX ORDER — ACETAMINOPHEN 500 MG
500 TABLET ORAL EVERY 6 HOURS PRN
Status: DISCONTINUED | OUTPATIENT
Start: 2017-08-01 | End: 2017-08-02 | Stop reason: HOSPADM

## 2017-08-01 RX ORDER — HYDROCHLOROTHIAZIDE 12.5 MG/1
12.5 TABLET ORAL DAILY
Status: DISCONTINUED | OUTPATIENT
Start: 2017-08-01 | End: 2017-08-02

## 2017-08-01 RX ORDER — LOSARTAN POTASSIUM 50 MG/1
50 TABLET ORAL DAILY
Status: DISCONTINUED | OUTPATIENT
Start: 2017-08-01 | End: 2017-08-02

## 2017-08-01 RX ORDER — ONDANSETRON 8 MG/1
8 TABLET, ORALLY DISINTEGRATING ORAL EVERY 8 HOURS PRN
Status: DISCONTINUED | OUTPATIENT
Start: 2017-08-01 | End: 2017-08-02 | Stop reason: HOSPADM

## 2017-08-01 RX ORDER — ROSUVASTATIN CALCIUM 10 MG/1
10 TABLET, COATED ORAL DAILY
Status: DISCONTINUED | OUTPATIENT
Start: 2017-08-01 | End: 2017-08-02 | Stop reason: HOSPADM

## 2017-08-01 RX ORDER — AMOXICILLIN 250 MG
1 CAPSULE ORAL DAILY PRN
Status: DISCONTINUED | OUTPATIENT
Start: 2017-08-01 | End: 2017-08-02 | Stop reason: HOSPADM

## 2017-08-01 RX ORDER — ENOXAPARIN SODIUM 100 MG/ML
40 INJECTION SUBCUTANEOUS EVERY 24 HOURS
Status: DISCONTINUED | OUTPATIENT
Start: 2017-08-01 | End: 2017-08-01

## 2017-08-01 RX ORDER — HYDRALAZINE HYDROCHLORIDE 50 MG/1
50 TABLET, FILM COATED ORAL EVERY 8 HOURS
Status: DISCONTINUED | OUTPATIENT
Start: 2017-08-01 | End: 2017-08-02

## 2017-08-01 RX ORDER — ASPIRIN 81 MG/1
81 TABLET ORAL DAILY
Status: DISCONTINUED | OUTPATIENT
Start: 2017-08-01 | End: 2017-08-02 | Stop reason: HOSPADM

## 2017-08-01 RX ORDER — SODIUM CHLORIDE 0.9 % (FLUSH) 0.9 %
3 SYRINGE (ML) INJECTION EVERY 8 HOURS
Status: DISCONTINUED | OUTPATIENT
Start: 2017-08-01 | End: 2017-08-02 | Stop reason: HOSPADM

## 2017-08-01 RX ADMIN — ASPIRIN 81 MG: 81 TABLET, COATED ORAL at 07:08

## 2017-08-01 RX ADMIN — FUROSEMIDE 60 MG: 10 INJECTION, SOLUTION INTRAVENOUS at 12:08

## 2017-08-01 RX ADMIN — HYDROCHLOROTHIAZIDE 12.5 MG: 12.5 TABLET ORAL at 07:08

## 2017-08-01 RX ADMIN — Medication 3 ML: at 02:08

## 2017-08-01 RX ADMIN — DILTIAZEM HYDROCHLORIDE 240 MG: 120 CAPSULE, EXTENDED RELEASE ORAL at 07:08

## 2017-08-01 RX ADMIN — Medication 3 ML: at 06:08

## 2017-08-01 RX ADMIN — HYDRALAZINE HYDROCHLORIDE 50 MG: 50 TABLET ORAL at 09:08

## 2017-08-01 RX ADMIN — Medication 3 ML: at 09:08

## 2017-08-01 RX ADMIN — STANDARDIZED SENNA CONCENTRATE AND DOCUSATE SODIUM 1 TABLET: 8.6; 5 TABLET, FILM COATED ORAL at 09:08

## 2017-08-01 RX ADMIN — PANTOPRAZOLE SODIUM 40 MG: 40 TABLET, DELAYED RELEASE ORAL at 07:08

## 2017-08-01 RX ADMIN — LOSARTAN POTASSIUM 50 MG: 50 TABLET, FILM COATED ORAL at 07:08

## 2017-08-01 RX ADMIN — HYDRALAZINE HYDROCHLORIDE 50 MG: 50 TABLET ORAL at 01:08

## 2017-08-01 RX ADMIN — ISOSORBIDE DINITRATE 20 MG: 20 TABLET ORAL at 09:08

## 2017-08-01 RX ADMIN — ROSUVASTATIN CALCIUM 10 MG: 10 TABLET ORAL at 07:08

## 2017-08-01 RX ADMIN — FERROUS SULFATE TAB EC 325 MG (65 MG FE EQUIVALENT) 325 MG: 325 (65 FE) TABLET DELAYED RESPONSE at 07:08

## 2017-08-01 RX ADMIN — ISOSORBIDE DINITRATE 20 MG: 20 TABLET ORAL at 01:08

## 2017-08-01 RX ADMIN — APIXABAN 2.5 MG: 2.5 TABLET, FILM COATED ORAL at 09:08

## 2017-08-01 RX ADMIN — FUROSEMIDE 40 MG: 10 INJECTION, SOLUTION INTRAVENOUS at 05:08

## 2017-08-01 RX ADMIN — APIXABAN 2.5 MG: 2.5 TABLET, FILM COATED ORAL at 07:08

## 2017-08-01 RX ADMIN — FERROUS SULFATE TAB EC 325 MG (65 MG FE EQUIVALENT) 325 MG: 325 (65 FE) TABLET DELAYED RESPONSE at 09:08

## 2017-08-01 RX ADMIN — FLUTICASONE FUROATE AND VILANTEROL TRIFENATATE 1 PUFF: 100; 25 POWDER RESPIRATORY (INHALATION) at 01:08

## 2017-08-01 RX ADMIN — FUROSEMIDE 40 MG: 10 INJECTION, SOLUTION INTRAVENOUS at 07:08

## 2017-08-01 NOTE — H&P
[facility] Hospital Medicine  History & Physical    Patient Name: Christy Webber  MRN: [id number]  Admission Date: 7/31/2017  Attending Physician: Nehal Mathew MD   Primary Care Provider: Elisabet George MD    [place] [facility] [place] 3 Shraddha Ramirez MD     Patient information was obtained from patient and past medical records.     Subjective:     Principal Problem:Acute on chronic congestive heart failure    Chief Complaint:   Chief Complaint   Patient presents with    Dizziness     pt felt dizzy and fell and hit her head. pt not on blood thinners    Fall     three days ago        HPI: Ms. Webber is a 82 y/o F pAfib (on apixaban & diltiazem), SSS (s/p PPM), COPD/centrilobar emphysema (2/2 100+ pack year tobacco use), essential HTN, and HFpEF (ECHO 3/2017 55%, interdeterminate diastolic dysfunction, moderate AR, MR, and TR) who presents with shortness of breath and cough. She has shortness of breath at baseline, which is worse with exertion. Pt brought to ER by her son.   SOB associate with chronic dry nonproductive cough. Pt denies any fevers, chills, night sweats at home. Son not available bedside for questioning.       [place] [facility] [place] acute HF exacerbation and was diuresed with IV lasix prior to discharge with ECHO demonstrating significant TR, MR and AR, elevated PA pressure.     Pt notes that she fell when she was playing with her 2 yr old grandchild, and hit her head. But she tells this episode occurred few weeks ago. Regardless, ER did CT head which was negative for acute pathology    Past Medical History:   Diagnosis Date    Atrial flutter     Hyperlipidemia     Hypertension     Pacemaker 08/2016    Single lead [employer] Pacemaker for sick sinus syndrome    Sick sinus syndrome        Past Surgical History:   Procedure Laterality Date    CARDIAC PACEMAKER PLACEMENT      HYSTERECTOMY      KIDNEY SURGERY         Review of patient's  allergies indicates:  No Known Allergies    No current facility-administered medications on file prior to encounter.      Current Outpatient Prescriptions on File Prior to Encounter   Medication Sig    albuterol (PROVENTIL) 2.5 mg /3 mL (0.083 %) nebulizer solution Take 2.5 mg by nebulization every 6 (six) hours as needed for Wheezing or Shortness of Breath. Rescue    albuterol (VENTOLIN HFA) 90 mcg/actuation inhaler Inhale 2 puffs into the lungs every 6 (six) hours as needed for Wheezing. Rescue    apixaban 2.5 mg Tab Take 1 tablet (2.5 mg total) by mouth 2 (two) times daily.    aspirin (ECOTRIN) 81 MG EC tablet Take 81 mg by mouth once daily.    benzonatate (TESSALON) 100 MG capsule Take 100 mg by mouth 3 (three) times daily as needed for Cough.    cholecalciferol, vitamin D3, (VITAMIN D3) 5,000 unit Tab Take 5,000 Units by mouth once daily.    diltiazem (CARDIZEM CD) 240 MG 24 hr capsule Take 1 capsule (240 mg total) by mouth once daily.    ferrous sulfate 325 (65 FE) MG EC tablet Take 1 tablet (325 mg total) by mouth 2 (two) times daily.    fluticasone-vilanterol (BREO) 100-25 mcg/dose diskus inhaler Inhale 1 puff into the lungs once daily. Controller    furosemide (LASIX) 40 MG tablet Take 1 tablet (40 mg total) by mouth once daily.    hydrALAZINE (APRESOLINE) 50 MG tablet Take 1 tablet (50 mg total) by mouth every 8 (eight) hours.    hydrochlorothiazide (MICROZIDE) 12.5 mg capsule TAKE 1 CAPEULE BY MOUTH EVERY DAY    isosorbide dinitrate (ISORDIL) 20 MG tablet Take 1 tablet (20 mg total) by mouth 3 (three) times daily.    losartan (COZAAR) 50 MG tablet Take 50 mg by mouth once daily.    omeprazole (PRILOSEC) 20 MG capsule Take 20 mg by mouth once daily.    promethazine (PHENERGAN) 6.25 mg/5 mL syrup Take 10 mLs (12.5 mg total) by mouth every 6 (six) hours as needed (cough).    promethazine-codeine 6.25-10 mg/5 ml (PHENERGAN WITH CODEINE) 6.25-10 mg/5 mL syrup Take 5 mLs by mouth every 4 (four)  hours as needed for Cough.    rosuvastatin (CRESTOR) 10 MG tablet Take 10 mg by mouth once daily.     Family History     None        Social History Main Topics    Smoking status: Former Smoker     Packs/day: 1.50     Years: 63.00     Types: Cigarettes     Quit date: 6/14/2016    Smokeless tobacco: Never Used    Alcohol use No    Drug use: No    Sexual activity: No     Review of Systems   Constitutional: Positive for unexpected weight change. Negative for activity change, appetite change and fever. Chills: loss.   HENT: Negative for congestion.    Respiratory: Positive for cough and shortness of breath. Negative for wheezing.    Cardiovascular: Negative for chest pain, palpitations and leg swelling.   Gastrointestinal: Positive for constipation (self medicates with castor oil every other day). Negative for abdominal distention, abdominal pain, diarrhea, nausea and vomiting.   Genitourinary: Negative for difficulty urinating.   Skin: Negative for color change.   Allergic/Immunologic: Negative for immunocompromised state.   Neurological: Negative for dizziness, weakness and light-headedness.     Objective:     Vital Signs (Most Recent):  Temp: 97.4 °F (36.3 °C) (08/01/17 0400)  Pulse: 91 (08/01/17 0400)  Resp: 18 (08/01/17 0400)  BP: (!) 169/77 (08/01/17 0400)  SpO2: 100 % (08/01/17 0400) Vital Signs (24h Range):  Temp:  [97.4 °F (36.3 °C)-99 °F (37.2 °C)] 97.4 °F (36.3 °C)  Pulse:  [] 91  Resp:  [16-21] 18  SpO2:  [90 %-100 %] 100 %  BP: (116-188)/() 169/77     Weight: 68.9 kg (152 lb)  Body mass index is 26.93 kg/m².    Physical Exam   Constitutional: She is oriented to person, place, and time. She appears well-developed and well-nourished.   HENT:   Head: Normocephalic and atraumatic.   Neck: No JVD present.   Cardiovascular: Normal rate, regular rhythm and intact distal pulses.    Murmur heard.  3/6 Systolic blowing murmur in mitral valve radiating to axilla   Pulmonary/Chest: Effort normal and  breath sounds normal. No respiratory distress.   Abdominal: Soft. Bowel sounds are normal. She exhibits no distension. There is no tenderness.   Musculoskeletal: She exhibits no edema.   Neurological: She is alert and oriented to person, place, and time.   Skin: Skin is warm and dry. Capillary refill takes less than 2 seconds.   Psychiatric: She has a normal mood and affect. Her behavior is normal. Judgment and thought content normal.     Significant Labs:   CBC:   Recent Labs  Lab 07/31/17 2047   WBC 7.43   HGB 10.4*   HCT 31.6*        CMP:   Recent Labs  Lab 07/31/17 2047      K 4.4      CO2 19*   *   BUN 23   CREATININE 1.3   CALCIUM 10.3   PROT 6.7   ALBUMIN 3.2*   BILITOT 3.2*   ALKPHOS 126   *   *   ANIONGAP 12   EGFRNONAA 38.8*     Significant Imaging: I have reviewed and interpreted all pertinent imaging results/findings within the past 24 hours.    CXR: no acute pathology     Assessment/Plan:   Ms. Webber is a 80 y/o F pAfib (on apixaban & diltiazem), SSS (s/p PPM), COPD/centrilobar emphysema (2/2 100+ pack year tobacco use), essential HTN, and HFpEF (ECHO 3/2017 55%, interdeterminate diastolic dysfunction, moderate AR, MR, and TR) who presents with shortness of breath and cough which appears to be an acute exacerbation of her CHF.    In the ER, she was noted to be in Afib with RVR (HR in 130s) for which she was given diltiazem 10mg x1.   BNP 2300 prompted lasix IV 60mg x1.   She complained of a fall to the ER doctors (to me - she said the fall happened few months ago). Regardless, CT head done and negative for acute intracranial bleed.     Shortness of breath  - likely 2/2 pAfib vs. acute CHF exacerbation vs. Pneumonia vs. Acute COPD vs. Worsening aortic stenosis  - paroxysmal atrial fibrillation: given the acute onset and resolution of her SOB, it is most likely consistent with when she goes into and out of afib.   - acute CHF: will be addressed with IV  diuresis  - pneumonia: unlikely given lack of productive cough, CXR findings or physical exam findings  - acute COPD: possible but pt does not have higher oxygen requirements, not coughing productively.  - aortic stenosis: not severe enough to cause symptoms or warrant AVR    Paroxysmal Atrial fibrillation  - on elliquis; CHADsVASC: 5 for age, female, CHF & HTN (7.2% annual stroke risk)  -rate control with cardizem 240mg daily    Acute exacerbation of diastolic heart failure  - ECHO (17): EF 58%, mild-mod MR, mild AR, mild - mod TR and diastolic dysfunction  - s/p IV lasix 60mg in ER. Will continue IV lasix 40mg BID   - strict in and out  - daily weights  - mild -mod MR - has been seen by Interventional cardiology (Dr Mathew) for mitral valve clip but was not deemed suitable candidate for procedure.   - continuing aspirin, CCB, statin. ACEi likely being avoided due to Crcl in 30s.  - may benefit from digital CHF program, education on dietary compliance     HTN  - continuing home meds hydralazine, HCTZ, losartan  - goal BP< 150/90    Aortic stenosis  - BERNIE: 1.38, PV: 1.8 m/s, M suggestive of mild-mod AS  - not indicated for AVR unless pt symptomatic (syncope, angina, dyspnea)    SSS - s/p PPM in place     Dyslipidemia  - continuing home rosuvastatin    COPD  - continuing home albuerol treatments q6h prn    Chronic cough  - tessalon pearls    Fall   - her story/timeline of fall is not consistent with what she told ER physician  - May want collaborative story from son this AM. If pt is truly at risk for fall, may escalate her elective for Watchman (vs. Current apixaban)    VTE Risk Mitigation         Ordered     apixaban tablet 2.5 mg  2 times daily     Route:  Oral        17 0659     Medium Risk of VTE  Once      17 0224        Shraddha Ramirez MD  Department of Hospital Medicine   [facility]

## 2017-08-01 NOTE — MEDICAL/APP STUDENT
"Christy Webber  81 y.o.    CC: Dizziness/lightheadedness    HPI: Pt reports "dizziness" over the past week. She describes this dizziness as lightheadedness rather than the room spinning. She denies any worsening when standing or positionally. It is not constant but occurs intermittently.    Pt also reports blurriness of vision over that past week which she says has improved since being in the hospital. She denies any loss of consciousness. Pt reports she had a fall a few months ago (pt not well oriented to time/date/year.) Per note, pt had fall 3 days ago. She states she fell when trying to lift her granddaughter out of her high chair and hit the right side of her head on the floor. She reports pain on the right side of her face that is very severe about once per month since the fall. She also states she gets severe headaches about once per week since the fall. When asked about memory and confusion she states her family members tell her she "loses her days" and she attributes this to old age.     Ms. Webber is also bothered by a dry cough that has been going on for "months." She denies sputum production. She endorses SOB both at rest and from activity. She says she lays flat to sleep and this does not cause SOB. Pt denies fever, chills. Pt denies chest pain and palpitations.     Review of Systems   Constitutional: Positive for weight loss. Negative for chills and fever.        Pt reports 15-20 pound weight loss over past couple months.   Eyes: Positive for blurred vision. Negative for double vision.   Respiratory: Positive for cough and shortness of breath. Negative for sputum production.    Cardiovascular: Negative for chest pain, palpitations, orthopnea and leg swelling.   Gastrointestinal: Negative for abdominal pain, diarrhea, heartburn, nausea and vomiting.   Genitourinary: Positive for frequency and urgency. Negative for dysuria.        Pt unsure if she is on Lasix at home. Reports increased urinary frequency " since fall.   Musculoskeletal: Positive for joint pain.        Knees   Skin: Negative.    Neurological: Positive for dizziness, weakness and headaches. Negative for tingling, sensory change, focal weakness, seizures and loss of consciousness.   Psychiatric/Behavioral: Positive for memory loss.        Memory loss and confusion       Past Medical History:   Diagnosis Date    Atrial flutter     Hyperlipidemia     Hypertension     Pacemaker 08/2016    Single lead [employer] Pacemaker for sick sinus syndrome    Sick sinus syndrome    Pt reports history of heart failure, AF, and COPD     Past Surgical History:   Procedure Laterality Date    CARDIAC PACEMAKER PLACEMENT      HYSTERECTOMY      KIDNEY SURGERY     Cataract surgery, per pt only left eye    History reviewed. No pertinent family history.      Current Facility-Administered Medications:     acetaminophen tablet 500 mg, 500 mg, Oral, Q6H PRN, Shraddha Ramirez MD    albuterol inhaler 2 puff, 2 puff, Inhalation, Q6H PRN, Shraddha Ramirez MD    apixaban tablet 2.5 mg, 2.5 mg, Oral, BID, Shraddha Ramirez MD, 2.5 mg at 08/01/17 0749    aspirin EC tablet 81 mg, 81 mg, Oral, Daily, Shraddha Ramirez MD, 81 mg at 08/01/17 0749    benzonatate capsule 100 mg, 100 mg, Oral, TID PRN, Shraddha Ramirez MD    diltiaZEM 24 hr capsule 240 mg, 240 mg, Oral, Daily, Shraddha Ramirez MD, 240 mg at 08/01/17 0748    ferrous sulfate EC tablet 325 mg, 325 mg, Oral, BID, Shraddha Ramirez MD, 325 mg at 08/01/17 0749    fluticasone-vilanterol 100-25 mcg/dose diskus inhaler 1 puff, 1 puff, Inhalation, Daily, Shraddha Ramirez MD    furosemide injection 40 mg, 40 mg, Intravenous, BID, Balbina Martinez MD, 40 mg at 08/01/17 0749    hydrALAZINE tablet 50 mg, 50 mg, Oral, Q8H, Shraddha Ramirez MD    hydrochlorothiazide tablet 12.5 mg, 12.5 mg, Oral, Daily, Shraddha Ramirez MD, 12.5 mg at 08/01/17 0748    isosorbide dinitrate tablet 20 mg, 20 mg, Oral, TID, Shraddha Ramirez MD    losartan tablet 50 mg, 50 mg,  Oral, Daily, Shraddha Ramirez MD, 50 mg at 08/01/17 0749    ondansetron disintegrating tablet 8 mg, 8 mg, Oral, Q8H PRN, Shraddha Ramirez MD    pantoprazole EC tablet 40 mg, 40 mg, Oral, Daily, Shraddha Ramirez MD, 40 mg at 08/01/17 0749    rosuvastatin tablet 10 mg, 10 mg, Oral, Daily, Shraddha Ramirez MD, 10 mg at 08/01/17 0748    senna-docusate 8.6-50 mg per tablet 1 tablet, 1 tablet, Oral, Daily PRN, Shraddha Ramirez MD    sodium chloride 0.9% flush 3 mL, 3 mL, Intravenous, Q8H, Shraddha Ramirez MD, 3 mL at 08/01/17 0614    Pt unable to name medications taken at home, said she would need to see the bottles. Reports her doctor mentioned lasix at last visit (may make sense with increased urinary frequency) but unsure if taken at home.   Review of patient's allergies indicates:  No Known Allergies    Social History     Social History    Marital status:      Spouse name: N/A    Number of children: N/A    Years of education: N/A     Occupational History    Not on file.     Social History Main Topics    Smoking status: Former Smoker     Packs/day: 1.50     Years: 63.00     Types: Cigarettes     Quit date: 6/14/2016    Smokeless tobacco: Never Used    Alcohol use No    Drug use: No    Sexual activity: No     Other Topics Concern    Not on file     Social History Narrative    No narrative on file       Social History  Alcohol- pt denies  Smoking- pt reports she quit last year, smoked since teens, two packs per day. 120 pack year history(?)  Drugs - pt denies  Living arrangement- lives with her son and his family in [place]   Barriers to treatment/disposition- pt has memory loss and confusion, pt reports she only sometimes takes medications as prescribed.      Vitals:    08/01/17 0234 08/01/17 0400 08/01/17 0700 08/01/17 0745   BP:  (!) 169/77  (!) 169/79   BP Location:  Left arm     Patient Position:  Lying     BP Method:  Automatic     Pulse:  91 87 83   Resp:  18  16   Temp:  97.4 °F (36.3 °C)  97.8 °F (36.6  "°C)   TempSrc:  Oral     SpO2: 97% 100%  98%   Weight:       Height:           Physical Exam   Constitutional: She is well-developed, well-nourished, and in no distress. No distress.   HENT:   Head: Normocephalic and atraumatic.   Eyes: EOM are normal. Pupils are equal, round, and reactive to light.   Neck: JVD present.   Cardiovascular: Normal rate and intact distal pulses.  An irregularly irregular rhythm present.   Significant holosystolic murmur heard in mitral region   Pulmonary/Chest: Effort normal and breath sounds normal. No respiratory distress. She has no wheezes.   Did not appreciate any wheezes or crackles.   Abdominal: Soft. Bowel sounds are normal. She exhibits no distension and no mass. There is no tenderness. There is no rebound and no guarding.   Musculoskeletal: Normal range of motion. She exhibits no edema or tenderness.   Neurological: She is alert.   Pt oriented to self, knew she was in hospital but thought [facility], stated year was "2007" month was "2002"   Skin: Skin is warm and dry. She is not diaphoretic.   Psychiatric: Affect normal. She exhibits abnormal recent memory and abnormal remote memory.     Recent Labs      08/01/17   0722   WBC  7.15   RBC  3.74*   HCT  30.1*   MCV  81*   MCH  25.9*   INR  1.4*   NA  139   K  3.5   CL  106   CO2  21*     Recent Labs      07/31/17   2047  08/01/17   0722   BUN  23  23   GLU  114*  108   ALT  147*   --    AST  149*   --    ALBUMIN  3.2*   --    BILITOT  3.2*   --    BNP  2,312*   --        Imaging    CT Head w/o contrast: 1. Punctate hypoattenuating foci within the right caudate, and anterior limbs of the bilateral internal capsules/coronal radiata, nonspecific, however suggest sequela of remote infarcts.  Please note, no previous exams are available for comparison, therefore this may reflect age-indeterminate infarcts and would need correlation with current symptoms.    2. Age-appropriate senescent changes.  CXR: no acute changes, no " pneumothorax or pleural effusions. Pacemaker present.    Echo 6/4/17: Normal LV systolic function, concentric remodeling, bilateral atrial enlargement, indeterminate LV diastolic function, mild/mod AS, mild AR, mild/mod MR, mod/severe TR, mild MT, increased central venous pressure, pulm HTN (PA systolic pressure 53mmHg)          Assessment  Ms. Christy Webber is an 80yo woman with a pmhx of AFib, COPD, and diastolic heart failure who presents with dizziness, SOB, and cough.    Clinical Reasoning  Ddx for dizziness includes paroxysmal AF, diastolic hf, worsening AS or MR (murmur prominent on exam, evidence on ECHO), iatrogenic.  Cough/SOB may be d/t COPD, HF (no pleural effusions on CXR), malignancy (wt loss, but no evidence of mass on CXR), pneumonia unlikely (no fevers, no evidence on CXR)    Plan  Diastolic HF: Ensure BP is maintained at appropriate level. Pt has been hypertensive this visit so consider increasing or changing medications. BNP was elevated at 2312. Rate control required.     COPD: Cough is unchanged in nature. There is no sputum production and pt reports it has been going on for about a year. Likely d/t COPD. Pt unable to report exact inhalers used, however if possible, obtain current regimen and consider increasing doses or additional medications as cough and SOB are still very bothersome. Per med history, pt on fluticasone-vilanterol qd (ICS-LABA) and was given one time albuterol-ipratropium in ED. Consider adding JAZMYNE prn, LAMA.    Dizziness: Likely related to paroxysmal AF. CT head showed no acute changes. Possibly vision related as pt reports worsening blurry vision over same time period. May also be d/t worsening AS. Consider iatrogenic causes- for example pt reports increased urinary frequency and says her doctor mentioned lasix at last visit but is not sure if she is currently taking this medication at home & lasix may cause dizziness. Additional medications that potentially cause dizziness  in this pt include hydralazine, isosorbide dinitrate. ACEi may also cause dizziness- pt is on ARB (losartan.)    AF: continue apixaban 2.5mg bid. Pt on diltiazem in hospital, unsure if home medication. Ensure rate control with diltiazem at home if not already in place. Intermittent lightheadedness likely related paroxysms of atrial fibrillation.       Sharyn Murphy, MS3  8/1/17

## 2017-08-01 NOTE — ASSESSMENT & PLAN NOTE
- on elliquis; CHADsVASC: 5 for age, female, CHF & HTN (7.2% annual stroke risk)  -rate control with cardizem 240mg daily

## 2017-08-01 NOTE — ED TRIAGE NOTES
Pt states that she has been sick for the past few days with non-productive cough. Pt states that she has been SOB for the past few days and today got dizzy but did not have a syncopal episod. Pt denies C/P at this time.

## 2017-08-01 NOTE — PLAN OF CARE
08/01/17 1640   Discharge Assessment   Assessment Type Discharge Planning Assessment   Confirmed/corrected address and phone number on facesheet? Yes   Assessment information obtained from? Medical Record   Expected Length of Stay (days) 3   Communicated expected length of stay with patient/caregiver no   Prior to hospitilization cognitive status: Alert/Oriented   Prior to hospitalization functional status: Independent   Current cognitive status: Alert/Oriented   Current Functional Status: Independent   Arrived From home or self-care   Lives With child(courtney), adult  (son and DIL)   Able to Return to Prior Arrangements yes   Is patient able to care for self after discharge? Unable to determine at this time (comments)   How many people do you have in your home that can help with your care after discharge? 2   Who are your caregiver(s) and their phone number(s)? son and DIL   Patient's perception of discharge disposition home or selfcare;home health   Readmission Within The Last 30 Days no previous admission in last 30 days   Patient currently being followed by outpatient case management? No   Patient currently receives home health services? No   Does the patient currently use HME? No   Patient currently receives private duty nursing? No   Patient currently receives any other outside agency services? No   Equipment Currently Used at Home none   Do you have any problems affording any of your prescribed medications? No   Is the patient taking medications as prescribed? no   If no, which medications is patient not taking? some confusion about med set up; may benefit from  for medication mgmt   Do you have any financial concerns preventing you from receiving the healthcare you need? No   Does the patient have transportation to healthcare appointments? Yes   Transportation Available family or friend will provide  (son provides transport)   On Dialysis? No   Does the patient receive services at the [facility]mad[facility] Clinic? No    Are there any open cases? No   Discharge Plan A Home with family;Home Health   Patient/Family In Agreement With Plan unable to assess

## 2017-08-01 NOTE — HPI
Ms. Webber is a 80 y/o F pAfib (on apixaban & diltiazem), SSS (s/p PPM), COPD/centrilobar emphysema (2/2 100+ pack year tobacco use), essential HTN, and HFpEF (ECHO 3/2017 55%, interdeterminate diastolic dysfunction, moderate AR, MR, and TR) who presents with shortness of breath and cough. She has shortness of breath at baseline, which is worse with exertion. Pt brought to ER by her son.   SOB associate with chronic dry nonproductive cough. Pt denies any fevers, chills, night sweats at home. Son not available bedside for questioning.       She was recently admitted to [facility] [place], [place] 6/2 - 6/5 for acute HF exacerbation and was diuresed with IV lasix prior to discharge with ECHO demonstrating significant TR, MR and AR, elevated PA pressure.

## 2017-08-01 NOTE — ED PROVIDER NOTES
Encounter Date: 7/31/2017       History     Chief Complaint   Patient presents with    Dizziness     pt felt dizzy and fell and hit her head. pt not on blood thinners    Fall     three days ago     HPI     Pt is a 80 YO M with history of afib with RVR, CKD 3, sick sinus syndrome, COPD, chronic cough. Pt presents with worsening cough over past 2 months. As per pts  pt was on promethazine for past 2 months however, this has not helped cough. Cough has gotten progressively worse, non productive, no hemoptysis, worse at night. Pt has non compliance with COPD medications. Denies chest pain, SOB, dizziness, headache, abdominal pain, N/V, diarrhea at this time. No sick contacts. Pt son clarified that dizziness and fall occurred 2 months ago pt is on eliquis.    Review of patient's allergies indicates:  No Known Allergies  Past Medical History:   Diagnosis Date    Atrial flutter     Hyperlipidemia     Hypertension     Pacemaker 08/2016    Single lead [employer] Pacemaker for sick sinus syndrome    Sick sinus syndrome      Past Surgical History:   Procedure Laterality Date    CARDIAC PACEMAKER PLACEMENT      HYSTERECTOMY      KIDNEY SURGERY       No family history on file.  Social History   Substance Use Topics    Smoking status: Former Smoker     Packs/day: 1.50     Years: 63.00     Types: Cigarettes     Quit date: 6/14/2016    Smokeless tobacco: Never Used    Alcohol use No     Review of Systems   Constitutional: Negative for appetite change, chills, diaphoresis and fever.   HENT: Negative.    Respiratory: Positive for cough. Negative for chest tightness and shortness of breath.    Cardiovascular: Negative for chest pain, palpitations and leg swelling.   Gastrointestinal: Positive for abdominal pain. Negative for abdominal distention, constipation, diarrhea, nausea and vomiting.   Genitourinary: Negative for dysuria and urgency.   Musculoskeletal: Negative for back pain and gait problem.   Skin: Negative  for rash.   Neurological: Negative for dizziness and light-headedness.       Physical Exam     Initial Vitals [07/31/17 1949]   BP Pulse Resp Temp SpO2   (!) 188/117 (!) 130 17 99 °F (37.2 °C) 100 %      MAP       140.67         Physical Exam    Constitutional: She appears well-developed and well-nourished. She is not diaphoretic. No distress.   HENT:   Head: Normocephalic and atraumatic.   Right Ear: External ear normal.   Left Ear: External ear normal.   Mouth/Throat: Oropharynx is clear and moist.   Eyes: EOM are normal. Pupils are equal, round, and reactive to light. Right eye exhibits no discharge. Left eye exhibits no discharge.   Neck: Normal range of motion. Neck supple. No JVD present.   Cardiovascular: Intact distal pulses. Exam reveals no gallop.    Irregular heart beat   Pulmonary/Chest: No stridor. No respiratory distress. She has no wheezes. She has rhonchi. She has no rales.   Abdominal: Soft. Bowel sounds are normal. She exhibits no distension. There is tenderness.   Musculoskeletal: Normal range of motion. She exhibits no edema or tenderness.   Neurological: She is alert and oriented to person, place, and time. She has normal strength. No sensory deficit.   Skin: Skin is warm and dry. Capillary refill takes less than 2 seconds.         ED Course   Procedures  Labs Reviewed   CBC W/ AUTO DIFFERENTIAL - Abnormal; Notable for the following:        Result Value    RBC 3.96 (*)     Hemoglobin 10.4 (*)     Hematocrit 31.6 (*)     MCV 80 (*)     MCH 26.3 (*)     RDW 20.7 (*)     Lymph% 15.2 (*)     All other components within normal limits    Narrative:     ADD-ON MG #[id number]; PHOS #[id number] PER CORNELIO CORDERO MD 21:39    21:39  07/31/2017    COMPREHENSIVE METABOLIC PANEL - Abnormal; Notable for the following:     CO2 19 (*)     Glucose 114 (*)     Albumin 3.2 (*)     Total Bilirubin 3.2 (*)      (*)      (*)     eGFR if  44.8 (*)     eGFR if non  38.8  (*)     All other components within normal limits   TROPONIN I - Abnormal; Notable for the following:     Troponin I 0.028 (*)     All other components within normal limits   B-TYPE NATRIURETIC PEPTIDE - Abnormal; Notable for the following:     BNP 2,312 (*)     All other components within normal limits   MAGNESIUM   PHOSPHORUS   MAGNESIUM    Narrative:     ADD-ON MG #[id number]; PHOS #[id number] PER CORNELIO CORDERO MD 21:39    21:39  07/31/2017    PHOSPHORUS    Narrative:     ADD-ON MG #[id number]; PHOS #[id number] PER CORNELIO CORDERO MD 21:39    21:39  07/31/2017      EKG Readings: (Independently Interpreted)   afib w/ rvr, no acute st elevation       X-Rays:   Independently Interpreted Readings:   Other Readings:  Cxr:  Mild congestive changes, no consolidation                      ED Course     Clinical Impression:   The primary encounter diagnosis was Acute on chronic congestive heart failure, unspecified congestive heart failure type. A diagnosis of Dizziness was also pertinent to this visit.            Resident MDM PGY-1  Pt presents with chronic cough, gradually worsening for past 2 months. Pt non compliant with medications. HR 120s-160s no respiratory distress. Differential not limited to Pneumonia, CHF exacerbation, COPD exacerbation, GERD, acute bronchitis  Plan diltiazem 10mg, CXR, BNP, TROP, EKG, MAG, PHOS, CBC, CMP, albuterol/ipratropium neb tx,   Balbina Martinez MD MPH  [facility] Emergency Medicine PGY-1  9:24 PM 7/31/2017      Reevaluation PGY-1: Pts heart rate now controled between 80-90s, BNP 2312, troponin .028, transaminitis AST//147 Tbili 3.2 pt admits to chronic 1/10 RUQ abdominal pain. Will admit the patient to observation for CHF exacerbation. EKG reads undetermined rhythm 130s no st elevations  Balbina Martinez MD MPH  [facility] Emergency Medicine PGY-1  10:19 PM 7/31/2017    Reevaluation PGY-1: due to inconsistent patient history about dizziness and fall. Head CT ordered. Medicine service aware  of patient.   Balbina Martinez MD MPH  [facility] Emergency Medicine PGY-1  11:43 PM 7/31/2017    Reevaluation PGY-1: Head CT shows no acute bleed. Admit to inpatient medicine by medicine team request.   Balbina Martinez MD MPH  [facility] Emergency Medicine PGY-1  12:18 AM 8/1/2017       Edvin Parsons MD  08/03/17 1946

## 2017-08-01 NOTE — ASSESSMENT & PLAN NOTE
- likely 2/2 pAfib vs. acute CHF exacerbation vs. Pneumonia vs. Acute COPD vs. Worsening aortic stenosis  - paroxysmal atrial fibrillation: given the acute onset and resolution of her SOB, it is most likely consistent with when she goes into and out of afib.   - acute CHF: will be addressed with IV diuresis  - pneumonia: unlikely given lack of productive cough, CXR findings or physical exam findings  - acute COPD: possible but pt does not have higher oxygen requirements, not coughing productively.  - aortic stenosis: not severe enough to cause symptoms or warrant AVR

## 2017-08-01 NOTE — ASSESSMENT & PLAN NOTE
- BERNIE: 1.38, PV: 1.8 m/s, M suggestive of mild-mod AS  - not indicated for AVR unless pt symptomatic (syncope, angina, dyspnea)

## 2017-08-01 NOTE — PROGRESS NOTES
Pt seen laying in bed, resting comfortably.  Pt receiving IV lasix BID.  Fall education reviewed with pt.  Instructed to call if assistance is needed, verbalized understanding.  Pt has no questions at this time.  Will continue to monitor.

## 2017-08-01 NOTE — SUBJECTIVE & OBJECTIVE
Past Medical History:   Diagnosis Date    Atrial flutter     Hyperlipidemia     Hypertension     Pacemaker 08/2016    Single lead [employer] Pacemaker for sick sinus syndrome    Sick sinus syndrome        Past Surgical History:   Procedure Laterality Date    CARDIAC PACEMAKER PLACEMENT      HYSTERECTOMY      KIDNEY SURGERY         Review of patient's allergies indicates:  No Known Allergies    No current facility-administered medications on file prior to encounter.      Current Outpatient Prescriptions on File Prior to Encounter   Medication Sig    albuterol (PROVENTIL) 2.5 mg /3 mL (0.083 %) nebulizer solution Take 2.5 mg by nebulization every 6 (six) hours as needed for Wheezing or Shortness of Breath. Rescue    albuterol (VENTOLIN HFA) 90 mcg/actuation inhaler Inhale 2 puffs into the lungs every 6 (six) hours as needed for Wheezing. Rescue    apixaban 2.5 mg Tab Take 1 tablet (2.5 mg total) by mouth 2 (two) times daily.    aspirin (ECOTRIN) 81 MG EC tablet Take 81 mg by mouth once daily.    benzonatate (TESSALON) 100 MG capsule Take 100 mg by mouth 3 (three) times daily as needed for Cough.    cholecalciferol, vitamin D3, (VITAMIN D3) 5,000 unit Tab Take 5,000 Units by mouth once daily.    diltiazem (CARDIZEM CD) 240 MG 24 hr capsule Take 1 capsule (240 mg total) by mouth once daily.    ferrous sulfate 325 (65 FE) MG EC tablet Take 1 tablet (325 mg total) by mouth 2 (two) times daily.    fluticasone-vilanterol (BREO) 100-25 mcg/dose diskus inhaler Inhale 1 puff into the lungs once daily. Controller    furosemide (LASIX) 40 MG tablet Take 1 tablet (40 mg total) by mouth once daily.    hydrALAZINE (APRESOLINE) 50 MG tablet Take 1 tablet (50 mg total) by mouth every 8 (eight) hours.    hydrochlorothiazide (MICROZIDE) 12.5 mg capsule TAKE 1 CAPEULE BY MOUTH EVERY DAY    isosorbide dinitrate (ISORDIL) 20 MG tablet Take 1 tablet (20 mg total) by mouth 3 (three) times daily.    losartan (COZAAR) 50  MG tablet Take 50 mg by mouth once daily.    omeprazole (PRILOSEC) 20 MG capsule Take 20 mg by mouth once daily.    promethazine (PHENERGAN) 6.25 mg/5 mL syrup Take 10 mLs (12.5 mg total) by mouth every 6 (six) hours as needed (cough).    promethazine-codeine 6.25-10 mg/5 ml (PHENERGAN WITH CODEINE) 6.25-10 mg/5 mL syrup Take 5 mLs by mouth every 4 (four) hours as needed for Cough.    rosuvastatin (CRESTOR) 10 MG tablet Take 10 mg by mouth once daily.     Family History     None        Social History Main Topics    Smoking status: Former Smoker     Packs/day: 1.50     Years: 63.00     Types: Cigarettes     Quit date: 6/14/2016    Smokeless tobacco: Never Used    Alcohol use No    Drug use: No    Sexual activity: No     Review of Systems   Constitutional: Positive for unexpected weight change. Negative for activity change, appetite change and fever. Chills: loss.   HENT: Negative for congestion.    Respiratory: Positive for cough and shortness of breath. Negative for wheezing.    Cardiovascular: Negative for chest pain, palpitations and leg swelling.   Gastrointestinal: Positive for constipation (self medicates with castor oil every other day). Negative for abdominal distention, abdominal pain, diarrhea, nausea and vomiting.   Genitourinary: Negative for difficulty urinating.   Skin: Negative for color change.   Allergic/Immunologic: Negative for immunocompromised state.   Neurological: Negative for dizziness, weakness and light-headedness.     Objective:     Vital Signs (Most Recent):  Temp: 97.4 °F (36.3 °C) (08/01/17 0400)  Pulse: 91 (08/01/17 0400)  Resp: 18 (08/01/17 0400)  BP: (!) 169/77 (08/01/17 0400)  SpO2: 100 % (08/01/17 0400) Vital Signs (24h Range):  Temp:  [97.4 °F (36.3 °C)-99 °F (37.2 °C)] 97.4 °F (36.3 °C)  Pulse:  [] 91  Resp:  [16-21] 18  SpO2:  [90 %-100 %] 100 %  BP: (116-188)/() 169/77     Weight: 68.9 kg (152 lb)  Body mass index is 26.93 kg/m².    Physical Exam    Constitutional: She is oriented to person, place, and time. She appears well-developed and well-nourished.   HENT:   Head: Normocephalic and atraumatic.   Neck: No JVD present.   Cardiovascular: Normal rate, regular rhythm and intact distal pulses.    Murmur heard.  3/6 Systolic blowing murmur in mitral valve radiating to axilla   Pulmonary/Chest: Effort normal and breath sounds normal. No respiratory distress.   Abdominal: Soft. Bowel sounds are normal. She exhibits no distension. There is no tenderness.   Musculoskeletal: She exhibits no edema.   Neurological: She is alert and oriented to person, place, and time.   Skin: Skin is warm and dry. Capillary refill takes less than 2 seconds.   Psychiatric: She has a normal mood and affect. Her behavior is normal. Judgment and thought content normal.     Significant Labs:   CBC:   Recent Labs  Lab 07/31/17 2047   WBC 7.43   HGB 10.4*   HCT 31.6*        CMP:   Recent Labs  Lab 07/31/17 2047      K 4.4      CO2 19*   *   BUN 23   CREATININE 1.3   CALCIUM 10.3   PROT 6.7   ALBUMIN 3.2*   BILITOT 3.2*   ALKPHOS 126   *   *   ANIONGAP 12   EGFRNONAA 38.8*     Significant Imaging: I have reviewed and interpreted all pertinent imaging results/findings within the past 24 hours.

## 2017-08-01 NOTE — ASSESSMENT & PLAN NOTE
- ECHO (6/4/17): EF 58%, mild-mod MR, mild AR, mild - mod TR and diastolic dysfunction  - s/p IV lasix 60mg in ER. Will continue IV lasix 40mg BID   - strict in and out  - daily weights  - mild -mod MR - has been seen by Interventional cardiology (Dr Mathew) for mitral valve clip but was not deemed suitable candidate for procedure.   - continuing aspirin, CCB, statin. ACEi likely being avoided due to Crcl in 30s.  - may benefit from digital CHF program, education on dietary compliance

## 2017-08-02 VITALS
WEIGHT: 152 LBS | HEART RATE: 73 BPM | OXYGEN SATURATION: 100 % | HEIGHT: 63 IN | DIASTOLIC BLOOD PRESSURE: 57 MMHG | RESPIRATION RATE: 18 BRPM | SYSTOLIC BLOOD PRESSURE: 111 MMHG | BODY MASS INDEX: 26.93 KG/M2 | TEMPERATURE: 98 F

## 2017-08-02 LAB
ANION GAP SERPL CALC-SCNC: 10 MMOL/L
ANION GAP SERPL CALC-SCNC: 11 MMOL/L
ANISOCYTOSIS BLD QL SMEAR: SLIGHT
BASOPHILS # BLD AUTO: 0.04 K/UL
BASOPHILS NFR BLD: 0.6 %
BUN SERPL-MCNC: 26 MG/DL
BUN SERPL-MCNC: 27 MG/DL
BURR CELLS BLD QL SMEAR: ABNORMAL
CALCIUM SERPL-MCNC: 10.1 MG/DL
CALCIUM SERPL-MCNC: 9.7 MG/DL
CHLORIDE SERPL-SCNC: 102 MMOL/L
CHLORIDE SERPL-SCNC: 103 MMOL/L
CO2 SERPL-SCNC: 24 MMOL/L
CO2 SERPL-SCNC: 25 MMOL/L
CREAT SERPL-MCNC: 1.6 MG/DL
CREAT SERPL-MCNC: 1.6 MG/DL
DIFFERENTIAL METHOD: ABNORMAL
EOSINOPHIL # BLD AUTO: 0.3 K/UL
EOSINOPHIL NFR BLD: 3.9 %
ERYTHROCYTE [DISTWIDTH] IN BLOOD BY AUTOMATED COUNT: 20.9 %
EST. GFR  (AFRICAN AMERICAN): 34.8 ML/MIN/1.73 M^2
EST. GFR  (AFRICAN AMERICAN): 34.8 ML/MIN/1.73 M^2
EST. GFR  (NON AFRICAN AMERICAN): 30.2 ML/MIN/1.73 M^2
EST. GFR  (NON AFRICAN AMERICAN): 30.2 ML/MIN/1.73 M^2
GLUCOSE SERPL-MCNC: 127 MG/DL
GLUCOSE SERPL-MCNC: 131 MG/DL
HCT VFR BLD AUTO: 30.7 %
HGB BLD-MCNC: 10.2 G/DL
HYPOCHROMIA BLD QL SMEAR: ABNORMAL
LYMPHOCYTES # BLD AUTO: 0.9 K/UL
LYMPHOCYTES NFR BLD: 14.7 %
MAGNESIUM SERPL-MCNC: 1.6 MG/DL
MCH RBC QN AUTO: 26.6 PG
MCHC RBC AUTO-ENTMCNC: 33.2 G/DL
MCV RBC AUTO: 80 FL
MONOCYTES # BLD AUTO: 0.8 K/UL
MONOCYTES NFR BLD: 11.9 %
NEUTROPHILS # BLD AUTO: 4.4 K/UL
NEUTROPHILS NFR BLD: 68.9 %
OVALOCYTES BLD QL SMEAR: ABNORMAL
PLATELET # BLD AUTO: 211 K/UL
PMV BLD AUTO: ABNORMAL FL
POIKILOCYTOSIS BLD QL SMEAR: SLIGHT
POLYCHROMASIA BLD QL SMEAR: ABNORMAL
POTASSIUM SERPL-SCNC: 2.8 MMOL/L
POTASSIUM SERPL-SCNC: 3.4 MMOL/L
RBC # BLD AUTO: 3.84 M/UL
SCHISTOCYTES BLD QL SMEAR: ABNORMAL
SODIUM SERPL-SCNC: 137 MMOL/L
SODIUM SERPL-SCNC: 138 MMOL/L
WBC # BLD AUTO: 6.38 K/UL

## 2017-08-02 PROCEDURE — 63600175 PHARM REV CODE 636 W HCPCS: Performed by: HOSPITALIST

## 2017-08-02 PROCEDURE — 97165 OT EVAL LOW COMPLEX 30 MIN: CPT

## 2017-08-02 PROCEDURE — 25000003 PHARM REV CODE 250: Performed by: STUDENT IN AN ORGANIZED HEALTH CARE EDUCATION/TRAINING PROGRAM

## 2017-08-02 PROCEDURE — 83735 ASSAY OF MAGNESIUM: CPT

## 2017-08-02 PROCEDURE — 80048 BASIC METABOLIC PNL TOTAL CA: CPT | Mod: 91

## 2017-08-02 PROCEDURE — 25000242 PHARM REV CODE 250 ALT 637 W/ HCPCS: Performed by: STUDENT IN AN ORGANIZED HEALTH CARE EDUCATION/TRAINING PROGRAM

## 2017-08-02 PROCEDURE — 25000003 PHARM REV CODE 250: Performed by: HOSPITALIST

## 2017-08-02 PROCEDURE — 99239 HOSP IP/OBS DSCHRG MGMT >30: CPT | Mod: GC,,, | Performed by: HOSPITALIST

## 2017-08-02 PROCEDURE — A4216 STERILE WATER/SALINE, 10 ML: HCPCS | Performed by: STUDENT IN AN ORGANIZED HEALTH CARE EDUCATION/TRAINING PROGRAM

## 2017-08-02 PROCEDURE — 80048 BASIC METABOLIC PNL TOTAL CA: CPT

## 2017-08-02 PROCEDURE — 85025 COMPLETE CBC W/AUTO DIFF WBC: CPT

## 2017-08-02 PROCEDURE — 36415 COLL VENOUS BLD VENIPUNCTURE: CPT

## 2017-08-02 RX ORDER — ROSUVASTATIN CALCIUM 10 MG/1
10 TABLET, COATED ORAL DAILY
Qty: 30 TABLET | Refills: 5 | Status: ON HOLD | OUTPATIENT
Start: 2017-08-02 | End: 2018-07-29

## 2017-08-02 RX ORDER — DILTIAZEM HYDROCHLORIDE 240 MG/1
240 CAPSULE, COATED, EXTENDED RELEASE ORAL DAILY
Qty: 30 CAPSULE | Refills: 11 | Status: SHIPPED | OUTPATIENT
Start: 2017-08-02 | End: 2017-08-02

## 2017-08-02 RX ORDER — DILTIAZEM HYDROCHLORIDE 240 MG/1
240 CAPSULE, COATED, EXTENDED RELEASE ORAL DAILY
Qty: 30 CAPSULE | Refills: 11 | Status: ON HOLD | OUTPATIENT
Start: 2017-08-02 | End: 2018-11-18

## 2017-08-02 RX ORDER — LOSARTAN POTASSIUM 50 MG/1
50 TABLET ORAL DAILY
Qty: 30 TABLET | Refills: 6 | Status: ON HOLD | OUTPATIENT
Start: 2017-08-02 | End: 2018-02-01

## 2017-08-02 RX ORDER — FUROSEMIDE 40 MG/1
40 TABLET ORAL DAILY
Qty: 60 TABLET | Refills: 6 | Status: ON HOLD | OUTPATIENT
Start: 2017-08-02 | End: 2017-11-14

## 2017-08-02 RX ORDER — POTASSIUM CHLORIDE 20 MEQ/1
40 TABLET, EXTENDED RELEASE ORAL DAILY
Status: DISCONTINUED | OUTPATIENT
Start: 2017-08-02 | End: 2017-08-02

## 2017-08-02 RX ORDER — POTASSIUM CHLORIDE 20 MEQ/1
40 TABLET, EXTENDED RELEASE ORAL ONCE
Status: COMPLETED | OUTPATIENT
Start: 2017-08-02 | End: 2017-08-02

## 2017-08-02 RX ADMIN — ISOSORBIDE DINITRATE 20 MG: 20 TABLET ORAL at 05:08

## 2017-08-02 RX ADMIN — FERROUS SULFATE TAB EC 325 MG (65 MG FE EQUIVALENT) 325 MG: 325 (65 FE) TABLET DELAYED RESPONSE at 09:08

## 2017-08-02 RX ADMIN — ROSUVASTATIN CALCIUM 10 MG: 10 TABLET ORAL at 09:08

## 2017-08-02 RX ADMIN — Medication 3 ML: at 05:08

## 2017-08-02 RX ADMIN — POTASSIUM CHLORIDE 40 MEQ: 1500 TABLET, EXTENDED RELEASE ORAL at 04:08

## 2017-08-02 RX ADMIN — FLUTICASONE FUROATE AND VILANTEROL TRIFENATATE 1 PUFF: 100; 25 POWDER RESPIRATORY (INHALATION) at 09:08

## 2017-08-02 RX ADMIN — PANTOPRAZOLE SODIUM 40 MG: 40 TABLET, DELAYED RELEASE ORAL at 09:08

## 2017-08-02 RX ADMIN — MAGNESIUM SULFATE HEPTAHYDRATE 1 G: 500 INJECTION, SOLUTION INTRAMUSCULAR; INTRAVENOUS at 10:08

## 2017-08-02 RX ADMIN — ASPIRIN 81 MG: 81 TABLET, COATED ORAL at 09:08

## 2017-08-02 RX ADMIN — APIXABAN 2.5 MG: 2.5 TABLET, FILM COATED ORAL at 09:08

## 2017-08-02 RX ADMIN — POTASSIUM CHLORIDE 40 MEQ: 1500 TABLET, EXTENDED RELEASE ORAL at 09:08

## 2017-08-02 RX ADMIN — POTASSIUM CHLORIDE 40 MEQ: 1500 TABLET, EXTENDED RELEASE ORAL at 12:08

## 2017-08-02 NOTE — NURSING
/54 HR 61. Notified IM 3. Dr. Jaswinder Obregon instructed to hold blood pressure medications; diltiazem 240 mg, hydrochlorothiazide 12.5 mg, and losartan 50 mg. Will continue to monitor.

## 2017-08-02 NOTE — DISCHARGE SUMMARY
[facility] Hospital Medicine  Discharge Summary      Patient Name: Christy Webber  MRN: [id number]  Admission Date: 7/31/2017  Hospital Length of Stay: 1 days  Discharge Date and Time:  08/02/2017 3:48 PM  Attending Physician: Michael Rodriguez MD   Discharging Provider: Jaswinder Obregon MD  Primary Care Provider: Elisabet George MD    Hospital Medicine Team: [facility] HOSP MED 3 Jaswinder Obregon MD    HPI:   Ms. Webber is a 82 y/o F pAfib (on apixaban & diltiazem), SSS (s/p PPM), COPD/centrilobar emphysema (2/2 100+ pack year tobacco use), essential HTN, and HFpEF (ECHO 3/2017 55%, interdeterminate diastolic dysfunction, moderate AR, MR, and TR) who presents with shortness of breath and cough. She has shortness of breath at baseline, which is worse with exertion. Pt brought to ER by her son.   SOB associate with chronic dry nonproductive cough. Pt denies any fevers, chills, night sweats at home. Son not available bedside for questioning.       She was recently admitted to [facility] 6/2 - 6/5 for acute HF exacerbation and was diuresed with IV lasix prior to discharge with ECHO demonstrating significant TR, MR and AR, elevated PA pressure.      Pt notes that she fell when she was playing with her 2 yr old grandchild, and hit her head. But she tells this episode occurred few weeks ago. Regardless, ER did CT head which was negative for acute pathology       Indwelling Lines/Drains at time of discharge:   Lines/Drains/Airways          No matching active lines, drains, or airways        Hospital Course:   8/1/17 - Admitted  8/2/17 - NAEON, patient was hypotensive, so BP meds were held, replaced electrolytes, patient is medically stable       Consults:   Consults         Status Ordering Provider     Inpatient consult to Dietary  Once     Provider:  (Not yet assigned)    Acknowledged PIA [place]          Significant Diagnostic Studies: Labs:   BMP:   Recent Labs  Lab 07/31/17  2047 08/01/17  0722 08/02/17  0530  08/02/17  0740 08/02/17  1246   * 108 127*  --  131*    139 138  --  137   K 4.4 3.5 2.8*  --  3.4*    106 102  --  103   CO2 19* 21* 25  --  24   BUN 23 23 26*  --  27*   CREATININE 1.3 1.3 1.6*  --  1.6*   CALCIUM 10.3 9.8 10.1  --  9.7   MG 1.8  --   --  1.6  --    , CMP   Recent Labs  Lab 07/31/17 2047 08/01/17 0722 08/02/17  0537 08/02/17  1246    139 138 137   K 4.4 3.5 2.8* 3.4*    106 102 103   CO2 19* 21* 25 24   * 108 127* 131*   BUN 23 23 26* 27*   CREATININE 1.3 1.3 1.6* 1.6*   CALCIUM 10.3 9.8 10.1 9.7   PROT 6.7  --   --   --    ALBUMIN 3.2*  --   --   --    BILITOT 3.2*  --   --   --    ALKPHOS 126  --   --   --    *  --   --   --    *  --   --   --    ANIONGAP 12 12 11 10   ESTGFRAFRICA 44.8* 44.8* 34.8* 34.8*   EGFRNONAA 38.8* 38.8* 30.2* 30.2*   , CBC   Recent Labs  Lab 07/31/17 2047 08/01/17 0722 08/02/17  0537   WBC 7.43 7.15 6.38   HGB 10.4* 9.7* 10.2*   HCT 31.6* 30.1* 30.7*    184 211    and INR   Lab Results   Component Value Date    INR 1.4 (H) 08/01/2017    INR 1.0 06/26/2017    INR 1.2 06/04/2017     Microbiology:   Blood Culture   Lab Results   Component Value Date    LABBLOO No growth after 5 days. 03/12/2017     Radiology: X-Ray: CXR: X-Ray Chest 1 View (CXR): No results found for this visit on 07/31/17.    Pending Diagnostic Studies:     None        Final Active Diagnoses:    Diagnosis Date Noted POA    PRINCIPAL PROBLEM:  Shortness of breath [R06.02] 03/12/2017 Yes    Acute on chronic congestive heart failure [I50.9] 08/01/2017 Yes    Aortic stenosis [I35.0] 08/01/2017 Yes    Dyslipidemia [E78.5] 08/01/2017 Yes    Chronic cough [R05] 06/02/2017 Yes    Chronic obstructive pulmonary disease [J44.1] 03/14/2017 Yes    SSS (sick sinus syndrome) [I49.5] 08/24/2016 Yes    Essential hypertension [I10] 08/21/2016 Yes    Chronic kidney disease, stage III (moderate) [N18.3] 08/21/2016 Yes    Permanent atrial fibrillation  [I48.2] 2016 Yes      Problems Resolved During this Admission:    Diagnosis Date Noted Date Resolved POA      Shortness of breath  - likely 2/2 pAfib vs. acute CHF exacerbation vs. Pneumonia vs. Acute COPD vs. Worsening aortic stenosis  - has improved with diuresis      Paroxysmal Atrial fibrillation  - on elliquis; CHADsVASC: 5 for age, female, CHF & HTN (7.2% annual stroke risk)  -rate control with cardizem 240mg daily     Acute exacerbation of diastolic heart failure  - ECHO (17): EF 58%, mild-mod MR, mild AR, mild - mod TR and diastolic dysfunction  - Held Lasix this am due to drop in K and rise in Cr  - strict in and out  - daily weights  - mild -mod MR - has been seen by Interventional cardiology (Dr Mathew) for mitral valve clip but was not deemed suitable candidate for procedure.   - continuing aspirin, CCB, statin. ACEi likely being avoided due to Crcl in 30s.  - Will discharge with home health to help patient with medications      HTN  - continuing home meds hydralazine, HCTZ, losartan  - goal BP< 150/90  - hypotensive today, patient has not been filling medications, will simplify BP regimen to diltiazem, Lasix, and losartan    Aortic stenosis  - BERNIE: 1.38, PV: 1.8 m/s, M suggestive of mild-mod AS  - not indicated for AVR unless pt symptomatic (syncope, angina, dyspnea)     SSS - s/p PPM in place      Dyslipidemia  - continuing home rosuvastatin     COPD  - continuing home albuerol treatments q6h prn     Chronic cough  - tessalon pearls      Discharged Condition: stable    Disposition: Home-Health Care [facility]    Follow Up:  Follow-up Information     [facility] On 2017.    Specialty:  [facility]  Why:  10am for hospital follow up  Contact information:  [address] [facility]  [facility] [address] [phone]  Additional information:  [facility]               Patient Instructions:   No discharge procedures on  file.  Medications:  Reconciled Home Medications:   Current Discharge Medication List      CONTINUE these medications which have CHANGED    Details   diltiaZEM (CARDIZEM CD) 240 MG 24 hr capsule Take 1 capsule (240 mg total) by mouth once daily.  Qty: 30 capsule, Refills: 11      furosemide (LASIX) 40 MG tablet Take 1 tablet (40 mg total) by mouth once daily.  Qty: 60 tablet, Refills: 6      losartan (COZAAR) 50 MG tablet Take 1 tablet (50 mg total) by mouth once daily.  Qty: 30 tablet, Refills: 6      rosuvastatin (CRESTOR) 10 MG tablet Take 1 tablet (10 mg total) by mouth once daily.  Qty: 30 tablet, Refills: 5         CONTINUE these medications which have NOT CHANGED    Details   albuterol (VENTOLIN HFA) 90 mcg/actuation inhaler Inhale 2 puffs into the lungs every 6 (six) hours as needed for Wheezing. Rescue  Qty: 1 each, Refills: 3      apixaban 2.5 mg Tab Take 1 tablet (2.5 mg total) by mouth 2 (two) times daily.  Qty: 60 tablet, Refills: 11      aspirin (ECOTRIN) 81 MG EC tablet Take 81 mg by mouth once daily.      benzonatate (TESSALON) 100 MG capsule Take 100 mg by mouth 3 (three) times daily as needed for Cough.      cholecalciferol, vitamin D3, (VITAMIN D3) 5,000 unit Tab Take 5,000 Units by mouth once daily.      ferrous sulfate 325 (65 FE) MG EC tablet Take 1 tablet (325 mg total) by mouth 2 (two) times daily.  Refills: 0      fluticasone-vilanterol (BREO) 100-25 mcg/dose diskus inhaler Inhale 1 puff into the lungs once daily. Controller  Qty: 60 each, Refills: 3      promethazine (PHENERGAN) 6.25 mg/5 mL syrup Take 10 mLs (12.5 mg total) by mouth every 6 (six) hours as needed (cough).  Qty: 240 mL, Refills: 1         STOP taking these medications       hydrALAZINE (APRESOLINE) 50 MG tablet Comments:   Reason for Stopping:         hydrochlorothiazide (MICROZIDE) 12.5 mg capsule Comments:   Reason for Stopping:         isosorbide dinitrate (ISORDIL) 20 MG tablet Comments:   Reason for Stopping:              Time spent on the discharge of patient: 45 minutes    Jaswinder Obregon MD  Department of Hospital Medicine  [facility]

## 2017-08-02 NOTE — MEDICAL/APP STUDENT
Ms. Christy Webber is an 82yo F with a pmhx of diastolic heart failure, AF, COPD, HTN, and hyperlipidemia who presented with lightheadedness/dizziness, cough, and SOB.    She reports no adverse events overnight. She states she has not had any lightheadedness this morning and also denies any palpitations. Her chronic dry cough is unchanged however pt reports slight improvement of her SOB. Pt reports her vision is doing well after reporting blurriness yesterday. Ms. Webber reports a minor headache this morning which has now subsided.     Review of Systems   Constitutional: Negative for chills and fever.   Eyes: Negative for blurred vision and double vision.   Respiratory: Positive for cough and shortness of breath. Negative for hemoptysis and sputum production.    Cardiovascular: Negative for chest pain, palpitations, orthopnea and leg swelling.   Gastrointestinal: Negative for abdominal pain, diarrhea, heartburn, nausea and vomiting.   Genitourinary: Positive for frequency and urgency. Negative for dysuria.   Musculoskeletal: Negative for myalgias.   Neurological: Positive for headaches. Negative for dizziness, focal weakness, loss of consciousness and weakness.   Psychiatric/Behavioral: Positive for memory loss.       Vitals:    08/01/17 2000 08/02/17 0300 08/02/17 0500 08/02/17 0810   BP: (!) 140/69 (!) 102/50 (!) 94/52 (!) 114/54   BP Location: Left arm Right arm Right arm Left arm   Patient Position: Lying Lying Lying Lying   BP Method: Automatic Automatic Automatic Automatic   Pulse:  73 63 61   Resp:  16 16 18   Temp: 97.7 °F (36.5 °C) 98.5 °F (36.9 °C) 98.4 °F (36.9 °C) 97.8 °F (36.6 °C)   TempSrc: Oral Oral Oral Oral   SpO2: 97% 98% 99% 100%   Weight:       Height:       *Although orders for daily weights and INOs were placed, none were entered.    Physical Exam   Constitutional: No distress.   HENT:   Head: Normocephalic and atraumatic.   Eyes: EOM are normal.   Neck: Normal range of motion. JVD present.  "  Cardiovascular: An irregularly irregular rhythm present.   Murmur heard.   Systolic murmur is present   Systolic murmur heard over left 5th IC space. Bilateral carotid bruits.   Pulmonary/Chest: Effort normal and breath sounds normal. No respiratory distress. She has no wheezes. She has no rales.   Abdominal: Soft. Bowel sounds are normal. She exhibits no distension and no mass. There is no tenderness. There is no rebound and no guarding. No hernia.   Musculoskeletal: Normal range of motion. She exhibits edema.   Minor pitting edema in both ankles   Neurological: She is alert.   Pt oriented to self, states she is at [facility], reports date as "8th month" "2002." Pt seems to have more insight this morning that she is disoriented.    Skin: Skin is warm and dry. She is not diaphoretic.   Psychiatric: She has a normal mood and affect.     Recent Labs      08/01/17   0722  08/02/17   0537   WBC  7.15   --    HGB  9.7*  10.2*   HCT  30.1*  30.7*   PLT  184   --    NA  139  138   K  3.5  2.8*   CL  106  102   CO2  21*  25   BUN  23  26*   CREATININE  1.3  1.6*   GLU  108  127*      apixaban  2.5 mg Oral BID    aspirin  81 mg Oral Daily    diltiaZEM  240 mg Oral Daily    ferrous sulfate  325 mg Oral BID    fluticasone-vilanterol  1 puff Inhalation Daily    hydrALAZINE  50 mg Oral Q8H    hydrochlorothiazide  12.5 mg Oral Daily    isosorbide dinitrate  20 mg Oral TID    losartan  50 mg Oral Daily    pantoprazole  40 mg Oral Daily    potassium chloride  40 mEq Oral Daily    rosuvastatin  10 mg Oral Daily    sodium chloride 0.9%  3 mL Intravenous Q8H   For now d/c hydralazine, HCTZ, isosirbide dinitrate, losartan d/t hypotension.    Assessment  Ms. Webber is an 82yo lady with pmhx of diastolic heart failure, AF, COPD, HTN, and hyperlipidemia who is admitted for lightheadedness, cough, and SOB which are likely related to AF, COPD, and diastolic HF respectively.    Plan  AF: Continue rate control with " "diltiazem. Pt reports improving lightheadedness and denies palpitations this morning.     Diastolic HF: Pt reports slightly improved SOB which may be due to lasix. Pt became hypokalemic overnight and also had some hypotension, so d/c lasix, hydralazine, HCTZ, isosorbide dinitrate, losartan. Continue diltiazem and add back losartan if pt becomes hypertensive.    Hypokalemia: D/c lasix, replace potassium. Check magnesium.    COPD: Cough unchanged, consider increasing inhaler dose or including a LAMA or JAZMYNE prn (pt currently on fluticasone-vliatnerol which is an ICS-LABA)    Confusion: Pt continues to be disoriented to place and time. Consider referral to PCP to discuss with pt and family. Pt stated today "maybe I need to see one of those doctors for when you're losing it." Blinds opened this am, encouraged pt to keep them open during the day to help with orientation. Will attempt to call son today.    HTN: Discontinue hydralazine, HCTZ, losartan, lasix IV. Resume losartan if pt becomes hypertensive.    HLD: Continue rosuvastatin    Sharyn Murphy, MS3  8/2/17    "

## 2017-08-02 NOTE — PT/OT/SLP EVAL
Occupational Therapy  Evaluation/Discharge Summary    Christy Webber   MRN: [id number]   Admitting Diagnosis: Shortness of breath    OT Date of Treatment: 08/02/17   OT Start Time: 0843  OT Stop Time: 0902  OT Total Time (min): 19 min    Billable Minutes:  Evaluation 19    Diagnosis: Shortness of breath     Past Medical History:   Diagnosis Date    Atrial flutter     Hyperlipidemia     Hypertension     Pacemaker 08/2016    Single lead [employer] Pacemaker for sick sinus syndrome    Sick sinus syndrome       Past Surgical History:   Procedure Laterality Date    CARDIAC PACEMAKER PLACEMENT      HYSTERECTOMY      KIDNEY SURGERY         Referring physician: Jacquie Talbot MD  Date referred to OT: 8/2/2017    General Precautions: Standard, fall  Orthopedic Precautions: N/A  Braces: N/A    Do you have any cultural, spiritual, Zoroastrianism conflicts, given your current situation?: None stated     Patient History:  Living Environment  Lives With: child(courtney), adult (son and his family)  Living Arrangements: house  Home Accessibility: stairs to enter home  Number of Stairs to Enter Home: 5  Stair Railings at Home: outside, present at both sides  Transportation Available: family or friend will provide  Living Environment Comment: Pt lives in mobile home with son and his family, 5STE, HR present on both sides.  Bathroom contains tub/shower combination.  PTA pt reports being (I) with ADLs and mobility.  Granddaughter home during day with pt.  Equipment Currently Used at Home: none    Prior level of function:   Bed Mobility/Transfers: independent  Grooming: independent  Bathing: independent  Upper Body Dressing: independent  Lower Body Dressing: independent  Toileting: independent  Home Management Skills: independent  Driving License: Yes  Mode of Transportation: Car  Leisure and Hobbies: fishing     Dominant hand: right    Subjective:  Communicated with RN prior to session.    Chief Complaint: Fatigue  Patient/Family  stated goals: Return home    Pain/Comfort  Pain Rating 1: 0/10  Pain Rating Post-Intervention 1: 0/10    Objective:  Patient found with: telemetry    Cognitive Exam:  Oriented to: Person, Place, Time and Situation  Follows Commands/attention: Follows multistep  commands  Communication: clear/fluent  Memory:  No Deficits noted  Safety awareness/insight to disability: intact  Coping skills/emotional control: Appropriate to situation    Visual/perceptual:  Intact    Physical Exam:  Postural examination/scapula alignment: No postural abnormalities identified  Skin integrity: Visible skin intact  Edema: None noted     Sensation:   Intact    Upper Extremity Range of Motion:  Right Upper Extremity: WNL  Left Upper Extremity: WNL    Upper Extremity Strength:  Right Upper Extremity: WNL  Left Upper Extremity: WNL   Strength: 5/5 both hands    Fine motor coordination:   Intact    Gross motor coordination: WFL    Functional Mobility:  Bed Mobility:  Rolling/Turning to Left: Independent  Rolling/Turning Right: Independent  Scooting/Bridging: Independent  Supine to Sit: Independent  Sit to Supine: Independent    Transfers:  Sit <> Stand Assistance: Independent x 1 trial from EOB  Sit <> Stand Assistive Device: No Assistive Device    Functional Ambulation: Pt walked 150 ft with supervision and no AD; pt declined stairs 2* feeling fatigued.     Activities of Daily Living:    UE Dressing Level of Assistance: Independent for donning gown on backside while seated EOB  LE Dressing Level of Assistance: Independent for donning socks while seated EOB    Balance:   Static Sit: GOOD: Takes MODERATE challenges from all directions  Dynamic Sit: GOOD: Maintains balance through MODERATE excursions of active trunk movement  Static Stand: GOOD: Takes MODERATE challenges from all directions  Dynamic stand: GOOD: Needs SUPERVISION only during gait and able to self right with moderate   *Pt able to bend down to  something off ground  "without LOB or postural sway.    Therapeutic Activities and Exercises:  *Pt educated on role of OT   *Pt safe to ambulate with 1 person assist; supervision and no AD    AM-PAC 6 CLICK ADL  How much help from another person does this patient currently need?  1 = Unable, Total/Dependent Assistance  2 = A lot, Maximum/Moderate Assistance  3 = A little, Minimum/Contact Guard/Supervision  4 = None, Modified [place]/Independent    Putting on and taking off regular lower body clothing? : 4  Bathing (including washing, rinsing, drying)?: 4  Toileting, which includes using toilet, bedpan, or urinal? : 4  Putting on and taking off regular upper body clothing?: 4  Taking care of personal grooming such as brushing teeth?: 4  Eating meals?: 4  Total Score: 24    AM-PAC Raw Score CMS "G-Code Modifier Level of Impairment Assistance   6 % Total / Unable   7 - 9 CM 80 - 100% Maximal Assist   10-14 CL 60 - 80% Moderate Assist   15 - 19 CK 40 - 60% Moderate Assist   20 - 22 CJ 20 - 40% Minimal Assist   23 CI 1-20% SBA / CGA   24 CH 0% Independent/ Mod I       Patient left up in chair with all lines intact and call button in reach    Assessment:  Christy Webber is a 81 y.o. female with a medical diagnosis of Shortness of breath and presents with decreased endurance.  Pt demonstrates strength and ROM in (B) UE needed for ADLs that is WNL ,and is able to ambulate with supervision.  PTA pt reports being (I) with all ADLs and mobility.  Pt does not require OT services in the acute care setting at this time.  Pt to d/c from OT with no further therapy needs recommended upon d/c from acute care.    Rehab identified problem list/impairments: Rehab identified problem list/impairments: impaired endurance    Pt evaluation falls under low complexity for evaluation coding due to performance deficits noted in 1-3 areas as stated above and 0 co-morbities affecting current functional status. Data obtained from problem focused assessments. " No modifications or assistance was required for completion of evaluation. Only brief occupational profile and history review completed.     Rehab potential is good.    Activity tolerance: Good    Discharge recommendations: Discharge Facility/Level Of Care Needs: home     Barriers to discharge: Barriers to Discharge: None    Equipment recommendations: none     GOALS:    Occupational Therapy Goals     Not on file          Multidisciplinary Problems (Resolved)        Problem: Occupational Therapy Goal    Goal Priority Disciplines Outcome Interventions   Occupational Therapy Goal   (Resolved)     OT, PT/OT Outcome(s) achieved                    PLAN:  Patient to d/c from OT with no further therapy needs recommended upon d/c from acute care.  Plan of Care reviewed with: patient         Fozia MARKOS Fisher  08/02/2017

## 2017-08-02 NOTE — PLAN OF CARE
Problem: Occupational Therapy Goal  Goal: Occupational Therapy Goal  Outcome: Outcome(s) achieved Date Met: 08/02/17    OT evaluation complete.  Pt does not require OT services in the acute care setting at this time 2* no major deficits noted with ADLs or mobility.  Pt to d/c from OT with no further therapy needs recommended upon d/c from acute care.    MARKOS Landry  8/2/2017

## 2017-08-02 NOTE — PLAN OF CARE
CM met with patient at bedside. Patient states that she feels much better and feels able to go home. Patient could benefit from home health nursing for medication mgmt and patient is agreeable; no preference for agency. CM attempted to call son and daughter in law without success. Spoke with Maya STEELE and informed that patient is ready for discharge from case mgmt standpoint. [facility] can staff patient.

## 2017-08-02 NOTE — PLAN OF CARE
Problem: Patient Care Overview  Goal: Plan of Care Review  Outcome: Outcome(s) achieved Date Met: 08/02/17  Pt free of falls/trauma/injuries. Bed in low position, wheels locked, and call light within reach. Skin integrity remains unchanged. Electrolyte replacements given as ordered. PIV removed and intact with gauze and co-band applied. Discharge orders/instructions from AVS given and reviewed with pt, verbalized understanding. No distress noted/reported at this time. Will continue to monitor while awaiting transport.

## 2017-08-02 NOTE — PLAN OF CARE
Problem: Patient Care Overview  Goal: Plan of Care Review  Outcome: Ongoing (interventions implemented as appropriate)  Pt free of falls/injuries throughout the shift. Bed locked, in lowest position, call bell within reach. Pt afebrile, pain denied.  VSS, pt in no distress will continue to monitor.

## 2017-08-02 NOTE — PROGRESS NOTES
Progress Note  Hospital Medicine    Provider team: [facility]  Admit Date: 7/31/2017  Encounter Date: 08/02/2017     SUBJECTIVE:     Follow-up Visit for: Shortness of breath    HPI/(See H&P for complete P,F,SHx):    Ms. Webber is a 80 y/o F pAfib (on apixaban & diltiazem), SSS (s/p PPM), COPD/centrilobar emphysema (2/2 100+ pack year tobacco use), essential HTN, and HFpEF (ECHO 3/2017 55%, interdeterminate diastolic dysfunction, moderate AR, MR, and TR) who presents with shortness of breath and cough. She has shortness of breath at baseline, which is worse with exertion. Pt brought to ER by her son.   SOB associate with chronic dry nonproductive cough. Pt denies any fevers, chills, night sweats at home. Son not available bedside for questioning.       She was recently admitted to [facility] 6/2 - 6/5 for acute HF exacerbation and was diuresed with IV lasix prior to discharge with ECHO demonstrating significant TR, MR and AR, elevated PA pressure.      Pt notes that she fell when she was playing with her 2 yr old grandchild, and hit her head. But she tells this episode occurred few weeks ago. Regardless, ER did CT head which was negative for acute pathology    Hospital Course:  8/1/17 - Admitted    Interval History  8/2/17 - NAEON, patient was hypotensive, so BP meds were held, replaced electrolytes, patient is medically stable       Review of Systems:  Review of Systems   Constitutional: Positive for malaise/fatigue and weight loss. Negative for chills and fever.   HENT: Negative for sore throat.    Eyes: Negative for blurred vision and double vision.   Respiratory: Positive for cough. Negative for sputum production, shortness of breath and wheezing.    Cardiovascular: Positive for palpitations. Negative for chest pain and leg swelling.   Gastrointestinal: Negative for abdominal pain, diarrhea, nausea and vomiting.   Genitourinary: Negative for dysuria.   Skin: Negative for rash.   Neurological: Negative for dizziness,  "loss of consciousness, weakness and headaches.       OBJECTIVE:     Intake/Output Summary (Last 24 hours) at 08/02/17 1536  Last data filed at 08/01/17 1700   Gross per 24 hour   Intake              525 ml   Output                0 ml   Net              525 ml     Vital Signs Range (Last 24H):  Temp:  [97.7 °F (36.5 °C)-98.5 °F (36.9 °C)]   Pulse:  [60-74]   Resp:  [16-18]   BP: ()/(50-69)   SpO2:  [97 %-100 %]   Body mass index is 26.93 kg/m².    Objective:  General Appearance:  Comfortable.    Vital signs: (most recent): Blood pressure (!) 95/52, pulse 70, temperature 97.8 °F (36.6 °C), temperature source Oral, resp. rate 18, height 5' 3" (1.6 m), weight 68.9 kg (152 lb), SpO2 98 %, not currently breastfeeding.  Vital signs are normal.  No fever.    Output: Producing urine and producing stool.    HEENT: Normal HEENT exam.    Lungs:  Normal effort and normal respiratory rate.  Breath sounds clear to auscultation.  She is not in respiratory distress.  No wheezes.    Heart: Normal rate.  Regular rhythm.  S1 normal and S2 normal.  No murmur, gallop or friction rub.   Abdomen: Abdomen is soft and non-distended.  Bowel sounds are normal.   There is no abdominal tenderness.  There is no epigastric area tenderness.     Pulses: Distal pulses are intact.    Neurological: Patient is alert and oriented to person, place and time.    Pupils:  Pupils are equal, round, and reactive to light.    Skin:  Warm.        Laboratory:  Recent Labs      08/02/17   0537   WBC  6.38   RBC  3.84*   HGB  10.2*   HCT  30.7*   PLT  211   MCV  80*   MCH  26.6*   MCHC  33.2   GRAN  68.9  4.4   LYMPH  14.7*  0.9*   MONO  11.9  0.8   EOS  0.3      Recent Labs      07/31/17   2047   08/02/17   0740  08/02/17   1246   GLU  114*   < >   --   131*   NA  140   < >   --   137   K  4.4   < >   --   3.4*   CL  109   < >   --   103   CO2  19*   < >   --   24   BUN  23   < >   --   27*   CREATININE  1.3   < >   --   1.6*   CALCIUM  10.3   < >   --   " 9.7   ANIONGAP  12   < >   --   10   MG  1.8   --   1.6   --    PHOS  2.7   --    --    --     < > = values in this interval not displayed.       ASSESSMENT/PLAN:     Active Hospital Problems    Diagnosis  POA    *Shortness of breath [R06.02]  Yes    Acute on chronic congestive heart failure [I50.9]  Yes    Aortic stenosis [I35.0]  Yes    Dyslipidemia [E78.5]  Yes    Chronic cough [R05]  Yes    Chronic obstructive pulmonary disease [J44.1]  Yes    SSS (sick sinus syndrome) [I49.5]  Yes    Essential hypertension [I10]  Yes    Chronic kidney disease, stage III (moderate) [N18.3]  Yes     History of partial left nephrectomy.      Permanent atrial fibrillation [I48.2]  Yes      Resolved Hospital Problems    Diagnosis Date Resolved POA   No resolved problems to display.        Shortness of breath  - likely 2/2 pAfib vs. acute CHF exacerbation vs. Pneumonia vs. Acute COPD vs. Worsening aortic stenosis  - has improved with diuresis      Paroxysmal Atrial fibrillation  - on elliquis; CHADsVASC: 5 for age, female, CHF & HTN (7.2% annual stroke risk)  -rate control with cardizem 240mg daily     Acute exacerbation of diastolic heart failure  - ECHO (17): EF 58%, mild-mod MR, mild AR, mild - mod TR and diastolic dysfunction  - Held Lasix this am due to drop in K and rise in Cr  - strict in and out  - daily weights  - mild -mod MR - has been seen by Interventional cardiology (Dr Mathew) for mitral valve clip but was not deemed suitable candidate for procedure.   - continuing aspirin, CCB, statin. ACEi likely being avoided due to Crcl in 30s.  - Will discharge with home health to help patient with medications      HTN  - continuing home meds hydralazine, HCTZ, losartan  - goal BP< 150/90  - hypotensive today, patient has not been filling medications, will simplify BP regimen to diltiazem, Lasix, and losartan    Aortic stenosis  - BERNIE: 1.38, PV: 1.8 m/s, M suggestive of mild-mod AS  - not indicated for AVR unless  pt symptomatic (syncope, angina, dyspnea)     SSS - s/p PPM in place      Dyslipidemia  - continuing home rosuvastatin     COPD  - continuing home albuerol treatments q6h prn     Chronic cough  - tessalon pearls       Anticipated discharge date and disposition:   Discharge today  Jaswinder Obreogn MD  [facility]

## 2017-08-02 NOTE — PHARMACY MED REC
[facility] Medication Reconciliation  Template    Patient was admitted on 7/31/2017 for Shortness of breath.    Patient's prior to admission medication regimen was as follows:  Prescriptions Prior to Admission   Medication Sig Dispense Refill Last Dose    albuterol (PROVENTIL) 2.5 mg /3 mL (0.083 %) nebulizer solution Take 2.5 mg by nebulization every 6 (six) hours as needed for Wheezing or Shortness of Breath. Rescue   Taking    albuterol (VENTOLIN HFA) 90 mcg/actuation inhaler Inhale 2 puffs into the lungs every 6 (six) hours as needed for Wheezing. Rescue 1 each 3 Taking    apixaban 2.5 mg Tab Take 1 tablet (2.5 mg total) by mouth 2 (two) times daily. 60 tablet 11 Taking    aspirin (ECOTRIN) 81 MG EC tablet Take 81 mg by mouth once daily.   Taking    benzonatate (TESSALON) 100 MG capsule Take 100 mg by mouth 3 (three) times daily as needed for Cough.   Taking    cholecalciferol, vitamin D3, (VITAMIN D3) 5,000 unit Tab Take 5,000 Units by mouth once daily.   Taking    diltiazem (CARDIZEM CD) 240 MG 24 hr capsule Take 1 capsule (240 mg total) by mouth once daily. 30 capsule 11 Taking    ferrous sulfate 325 (65 FE) MG EC tablet Take 1 tablet (325 mg total) by mouth 2 (two) times daily.  0 Taking    fluticasone-vilanterol (BREO) 100-25 mcg/dose diskus inhaler Inhale 1 puff into the lungs once daily. Controller 60 each 3 Taking    furosemide (LASIX) 40 MG tablet Take 1 tablet (40 mg total) by mouth once daily. 60 tablet 6 Taking    hydrALAZINE (APRESOLINE) 50 MG tablet Take 1 tablet (50 mg total) by mouth every 8 (eight) hours. 90 tablet 3 Taking    hydrochlorothiazide (MICROZIDE) 12.5 mg capsule TAKE 1 CAPEULE BY MOUTH EVERY DAY  3 Taking    isosorbide dinitrate (ISORDIL) 20 MG tablet Take 1 tablet (20 mg total) by mouth 3 (three) times daily. 90 tablet 3 Taking    losartan (COZAAR) 50 MG tablet Take 50 mg by mouth once daily.   Taking    omeprazole (PRILOSEC) 20 MG capsule Take 20 mg by mouth once  "daily.   Taking    promethazine (PHENERGAN) 6.25 mg/5 mL syrup Take 10 mLs (12.5 mg total) by mouth every 6 (six) hours as needed (cough). 240 mL 1     promethazine-codeine 6.25-10 mg/5 ml (PHENERGAN WITH CODEINE) 6.25-10 mg/5 mL syrup Take 5 mLs by mouth every 4 (four) hours as needed for Cough.   Taking    rosuvastatin (CRESTOR) 10 MG tablet Take 10 mg by mouth once daily.   Taking     Please add appropriate    SmartPhrase below:  Admission Medication Reconciliation - Pharmacy Consult Note    The home medication history was taken by Nathaly Galindo pharmacy technician.  Based on information gathered and subsequent review by the clinical pharmacist, the items below may need attention.     You may go to "Admission" then "Reconcile Home Medications" tabs to review and/or act upon these items. Based on information gathered and subsequent review by the clinical pharmacist, the items below may need attention.    Potentially problematic discrepancies with current MAR  o Patient IS taking the following which was not ordered upon admit  o Aspirin 81 mg QD   o Vitamin D3 5000 Units QD  o Furosemide 40 mg QD (last filled 3/15/17)  o Promethazine 12.5 mg Q 6 hr PRN cough   o Patient IS NOT taking the following which was ordered upon admit  o Omeprazole 20 mg QD- last filled on 12/2016 (ordered Protonix 40 mg QD)   o Rosuvastatin 10 mg QD- last filled 12/2016     Potential issues to be addressed PRIOR TO DISCHARGE  o Patient lacks understanding of therapy and compliance with medications   o Apixaban- last filled 3/21/17  o Diltiazem- last filled 3/21/17  o Breo- last filled 3/15/17  o Hydralazine- last filled 3/15/17  o Isosorbide- last filled 3/15/17    Please address this information as you see fit.  Feel free to contact us if you have any questions or require assistance.    Prema Salinas  EXT [phone]        "

## 2017-08-02 NOTE — PLAN OF CARE
[facility]    HOME HEALTH ORDERS  FACE TO FACE ENCOUNTER    Patient Name: Christy Webber  YOB: 1935    PCP: Elisabet George MD   PCP Address: [address] [place] [address] [address] / [place] [address]  PCP Phone Number: [phone]  PCP Fax: [phone]    Encounter Date: 08/02/2017    Admit to Home Health    Diagnoses:  Active Hospital Problems    Diagnosis  POA    *Shortness of breath [R06.02]  Yes    Acute on chronic congestive heart failure [I50.9]  Yes    Aortic stenosis [I35.0]  Yes    Dyslipidemia [E78.5]  Yes    Chronic cough [R05]  Yes    Chronic obstructive pulmonary disease [J44.1]  Yes    SSS (sick sinus syndrome) [I49.5]  Yes    Essential hypertension [I10]  Yes    Chronic kidney disease, stage III (moderate) [N18.3]  Yes     History of partial left nephrectomy.      Permanent atrial fibrillation [I48.2]  Yes      Resolved Hospital Problems    Diagnosis Date Resolved POA   No resolved problems to display.       Future Appointments  Date Time Provider Department Center   8/7/2017 10:00 AM MITCH [facility] PCW   10/16/2017 8:00 AM HOME MONITOR DEVICE CHECK, [facility]     Follow-up Information     [facility]athana[facility] - [facility] On 8/7/2017.    Specialty:  Priority Care  Why:  10am for hospital follow up  Contact information:  [address]  [facility] [address] [phone]  Additional information:  [facility]                   I have seen and examined this patient face to face today. My clinical findings that support the need for the home health skilled services and home bound status are the following:  Patient with medication mismanagement issues requiring home bound status as evidenced by  Poor understanding of medication regimen/dosage and Poor adherence to medication regimen/dosage.-- Patient presented with an exacerbation of her heart failure  due to not taking her medications.     Allergies:Review of patient's allergies indicates:  No Known Allergies    Diet: cardiac diet 2g sodium, fluid restricted to 2 L    Activities: activity as tolerated    Nursing:   SN to complete comprehensive assessment including routine vital signs. Instruct on disease process and s/s of complications to report to MD. Review/verify medication list sent home with the patient at time of discharge  and instruct patient/caregiver as needed. Frequency may be adjusted depending on start of care date.    Notify MD if SBP > 160 or < 90; DBP > 90 or < 50; HR > 120 or < 50; Temp > 101      MISCELLANEOUS CARE:  N/A    WOUND CARE ORDERS  n/a      Medications: Review discharge medications with patient and family and provide education.      Current Discharge Medication List      CONTINUE these medications which have CHANGED    Details   diltiaZEM (CARDIZEM CD) 240 MG 24 hr capsule Take 1 capsule (240 mg total) by mouth once daily.  Qty: 30 capsule, Refills: 11      furosemide (LASIX) 40 MG tablet Take 1 tablet (40 mg total) by mouth once daily.  Qty: 60 tablet, Refills: 6      losartan (COZAAR) 50 MG tablet Take 1 tablet (50 mg total) by mouth once daily.  Qty: 30 tablet, Refills: 6      rosuvastatin (CRESTOR) 10 MG tablet Take 1 tablet (10 mg total) by mouth once daily.  Qty: 30 tablet, Refills: 5         CONTINUE these medications which have NOT CHANGED    Details   albuterol (VENTOLIN HFA) 90 mcg/actuation inhaler Inhale 2 puffs into the lungs every 6 (six) hours as needed for Wheezing. Rescue  Qty: 1 each, Refills: 3      apixaban 2.5 mg Tab Take 1 tablet (2.5 mg total) by mouth 2 (two) times daily.  Qty: 60 tablet, Refills: 11      aspirin (ECOTRIN) 81 MG EC tablet Take 81 mg by mouth once daily.      benzonatate (TESSALON) 100 MG capsule Take 100 mg by mouth 3 (three) times daily as needed for Cough.      cholecalciferol, vitamin D3, (VITAMIN D3) 5,000 unit Tab Take 5,000 Units by  mouth once daily.      ferrous sulfate 325 (65 FE) MG EC tablet Take 1 tablet (325 mg total) by mouth 2 (two) times daily.  Refills: 0      fluticasone-vilanterol (BREO) 100-25 mcg/dose diskus inhaler Inhale 1 puff into the lungs once daily. Controller  Qty: 60 each, Refills: 3      promethazine (PHENERGAN) 6.25 mg/5 mL syrup Take 10 mLs (12.5 mg total) by mouth every 6 (six) hours as needed (cough).  Qty: 240 mL, Refills: 1         STOP taking these medications       hydrALAZINE (APRESOLINE) 50 MG tablet Comments:   Reason for Stopping:         hydrochlorothiazide (MICROZIDE) 12.5 mg capsule Comments:   Reason for Stopping:         isosorbide dinitrate (ISORDIL) 20 MG tablet Comments:   Reason for Stopping:               I certify that this patient is confined to her home and needs intermittent skilled nursing care

## 2017-08-06 PROBLEM — J44.1 CHRONIC OBSTRUCTIVE PULMONARY DISEASE WITH ACUTE EXACERBATION: Status: RESOLVED | Noted: 2017-03-14 | Resolved: 2017-08-06

## 2017-08-06 PROBLEM — R06.02 SHORTNESS OF BREATH: Status: RESOLVED | Noted: 2017-03-12 | Resolved: 2017-08-06

## 2017-08-06 PROBLEM — E87.20 LACTIC ACIDOSIS: Status: RESOLVED | Noted: 2017-03-12 | Resolved: 2017-08-06

## 2017-08-15 ENCOUNTER — TELEPHONE (OUTPATIENT)
Dept: ELECTROPHYSIOLOGY | Facility: CLINIC | Age: 82
End: 2017-08-15

## 2017-08-15 DIAGNOSIS — I48.91 ATRIAL FIBRILLATION WITH RVR: Primary | ICD-10-CM

## 2017-08-15 DIAGNOSIS — I49.5 SSS (SICK SINUS SYNDROME): ICD-10-CM

## 2017-08-15 NOTE — TELEPHONE ENCOUNTER
Attempted to call pt to schedule watchman pre op test; CTA chest and AUGUSTINE. No answer, no voicemail box available.    ----- Message from Tonya Pacheco RN sent at 8/14/2017  2:07 PM CDT -----      ----- Message -----  From: Edvin Baez MD  Sent: 7/11/2017   6:16 PM  To: Tonya Pacheco RN    Can we get this patient set up for a Watchman. She wishes to have it performed in late September. We will need to switch to warfarin one month prior.     Thanks, MB

## 2017-08-21 ENCOUNTER — TELEPHONE (OUTPATIENT)
Dept: ELECTROPHYSIOLOGY | Facility: CLINIC | Age: 82
End: 2017-08-21

## 2017-08-21 NOTE — TELEPHONE ENCOUNTER
Attempted to contact the pt and her daughter to schedule CTA Chest and AUGUSTINE, no answer, voicemail not available on pt phone. Left voicemail on daughter's phone.

## 2017-08-23 ENCOUNTER — TELEPHONE (OUTPATIENT)
Dept: ELECTROPHYSIOLOGY | Facility: CLINIC | Age: 82
End: 2017-08-23

## 2017-08-23 NOTE — TELEPHONE ENCOUNTER
Attempted to contact pt and daughter to scheduled CTA chest and AUGUSTINE for watchman. No answer on either phone, pt's number does not have a voicemail, voicemail left on the daughter's phone.

## 2017-09-13 ENCOUNTER — TELEPHONE (OUTPATIENT)
Dept: ELECTROPHYSIOLOGY | Facility: CLINIC | Age: 82
End: 2017-09-13

## 2017-09-13 NOTE — TELEPHONE ENCOUNTER
Attempted to call pt to schedule CTA and AUGUSTINE for watchman procedure. Pt home number is disconnected. Called the daughter's number, no answer, left voicemail.

## 2017-11-02 ENCOUNTER — TELEPHONE (OUTPATIENT)
Dept: ELECTROPHYSIOLOGY | Facility: CLINIC | Age: 82
End: 2017-11-02

## 2017-11-02 NOTE — TELEPHONE ENCOUNTER
"Attempted to contact the patient at the home # listed however, the recording says," this person is not accepting calls at this time".  Patient is scheduled for a Remote Pacemaker home monitor check on today and [email or web address] shows the patient is registered to [facility].  Did NOT submit transfer request until able to speak with the patient. Called and spoke to patient's LIZ-MICHAEL Olson who tells me [facility] as to the patient just lost two of her sons and they were making  arrangements.  Asked Whitney to have the patient contact the clinic at her earliest convenience due to the current situation.  Expressed  condolences to Whitney for their family's loss.  Contact information given.   "

## 2017-11-13 PROBLEM — E87.6 HYPOKALEMIA: Status: ACTIVE | Noted: 2017-11-13

## 2017-11-13 PROBLEM — I50.33 ACUTE ON CHRONIC DIASTOLIC CONGESTIVE HEART FAILURE: Status: ACTIVE | Noted: 2017-08-01

## 2017-11-13 PROBLEM — I50.9 CONGESTIVE HEART FAILURE: Status: ACTIVE | Noted: 2017-11-13

## 2017-11-13 PROBLEM — I16.0 HYPERTENSIVE URGENCY: Status: ACTIVE | Noted: 2017-11-13

## 2017-11-17 ENCOUNTER — HOSPITAL ENCOUNTER (EMERGENCY)
Facility: HOSPITAL | Age: 82
Discharge: HOME | End: 2017-11-17
Attending: EMERGENCY MEDICINE
Payer: MEDICARE

## 2017-11-17 VITALS
BODY MASS INDEX: 26.58 KG/M2 | WEIGHT: 150 LBS | DIASTOLIC BLOOD PRESSURE: 64 MMHG | HEIGHT: 63 IN | HEART RATE: 95 BPM | OXYGEN SATURATION: 96 % | SYSTOLIC BLOOD PRESSURE: 139 MMHG | RESPIRATION RATE: 17 BRPM | TEMPERATURE: 98 F

## 2017-11-17 DIAGNOSIS — I48.91 ATRIAL FIBRILLATION, UNSPECIFIED TYPE: Primary | ICD-10-CM

## 2017-11-17 LAB
ALBUMIN SERPL BCP-MCNC: 3.4 G/DL
ALP SERPL-CCNC: 135 U/L
ALT SERPL W/O P-5'-P-CCNC: 19 U/L
ANION GAP SERPL CALC-SCNC: 9 MMOL/L
AST SERPL-CCNC: 17 U/L
BASOPHILS # BLD AUTO: 0.11 K/UL
BASOPHILS NFR BLD: 2 %
BILIRUB SERPL-MCNC: 1.3 MG/DL
BNP SERPL-MCNC: 1038 PG/ML
BUN SERPL-MCNC: 21 MG/DL
CALCIUM SERPL-MCNC: 10.3 MG/DL
CHLORIDE SERPL-SCNC: 104 MMOL/L
CO2 SERPL-SCNC: 28 MMOL/L
CREAT SERPL-MCNC: 1.5 MG/DL
DIFFERENTIAL METHOD: ABNORMAL
EOSINOPHIL # BLD AUTO: 0.3 K/UL
EOSINOPHIL NFR BLD: 5.2 %
ERYTHROCYTE [DISTWIDTH] IN BLOOD BY AUTOMATED COUNT: 16.7 %
EST. GFR  (AFRICAN AMERICAN): 37.4 ML/MIN/1.73 M^2
EST. GFR  (NON AFRICAN AMERICAN): 32.4 ML/MIN/1.73 M^2
GLUCOSE SERPL-MCNC: 97 MG/DL
HCT VFR BLD AUTO: 37.4 %
HGB BLD-MCNC: 11.7 G/DL
IMM GRANULOCYTES # BLD AUTO: 0.01 K/UL
IMM GRANULOCYTES NFR BLD AUTO: 0.2 %
LYMPHOCYTES # BLD AUTO: 1.1 K/UL
LYMPHOCYTES NFR BLD: 20.6 %
MCH RBC QN AUTO: 26.6 PG
MCHC RBC AUTO-ENTMCNC: 31.3 G/DL
MCV RBC AUTO: 85 FL
MONOCYTES # BLD AUTO: 0.7 K/UL
MONOCYTES NFR BLD: 11.8 %
NEUTROPHILS # BLD AUTO: 3.3 K/UL
NEUTROPHILS NFR BLD: 60.2 %
NRBC BLD-RTO: 0 /100 WBC
PLATELET # BLD AUTO: 326 K/UL
PMV BLD AUTO: 10.9 FL
POTASSIUM SERPL-SCNC: 3.6 MMOL/L
PROT SERPL-MCNC: 7.6 G/DL
RBC # BLD AUTO: 4.4 M/UL
SODIUM SERPL-SCNC: 141 MMOL/L
TROPONIN I SERPL DL<=0.01 NG/ML-MCNC: 0.01 NG/ML
WBC # BLD AUTO: 5.53 K/UL

## 2017-11-17 PROCEDURE — 80053 COMPREHEN METABOLIC PANEL: CPT

## 2017-11-17 PROCEDURE — 83880 ASSAY OF NATRIURETIC PEPTIDE: CPT

## 2017-11-17 PROCEDURE — 25000003 PHARM REV CODE 250: Performed by: EMERGENCY MEDICINE

## 2017-11-17 PROCEDURE — 93005 ELECTROCARDIOGRAM TRACING: CPT

## 2017-11-17 PROCEDURE — 93010 ELECTROCARDIOGRAM REPORT: CPT | Mod: ,,, | Performed by: INTERNAL MEDICINE

## 2017-11-17 PROCEDURE — 99284 EMERGENCY DEPT VISIT MOD MDM: CPT | Mod: 25

## 2017-11-17 PROCEDURE — 84484 ASSAY OF TROPONIN QUANT: CPT

## 2017-11-17 PROCEDURE — 85025 COMPLETE CBC W/AUTO DIFF WBC: CPT

## 2017-11-17 PROCEDURE — 99284 EMERGENCY DEPT VISIT MOD MDM: CPT | Mod: ,,, | Performed by: EMERGENCY MEDICINE

## 2017-11-17 RX ORDER — DILTIAZEM HYDROCHLORIDE 240 MG/1
240 CAPSULE, COATED, EXTENDED RELEASE ORAL DAILY
Status: DISCONTINUED | OUTPATIENT
Start: 2017-11-17 | End: 2017-11-17

## 2017-11-17 RX ORDER — DILTIAZEM HYDROCHLORIDE 240 MG/1
240 CAPSULE, COATED, EXTENDED RELEASE ORAL DAILY
Status: DISCONTINUED | OUTPATIENT
Start: 2017-11-17 | End: 2017-11-17 | Stop reason: HOSPADM

## 2017-11-17 RX ADMIN — DILTIAZEM HYDROCHLORIDE 240 MG: 240 CAPSULE, EXTENDED RELEASE ORAL at 12:11

## 2017-11-17 NOTE — PROVIDER PROGRESS NOTES - EMERGENCY DEPT.
Encounter Date: 11/17/2017    ED Physician Progress Notes         EKG - STEMI Decision  Initial Reading: No STEMI present.    I, Jessie Felder, am scribing for, and in the presence of, Dr. Chavez. I performed the above scribed service and the documentation accurately describes the services I performed. I attest to the accuracy of the note.

## 2017-11-17 NOTE — ED PROVIDER NOTES
"Encounter Date: 11/17/2017    SCRIBE #1 NOTE: I, Jessie Felder, am scribing for, and in the presence of,  Dr. Chavez. I have scribed the following portions of the note - the Resident attestation.       History     Chief Complaint   Patient presents with    Irregular Heart Beat     Pt sent from MD office for afib with RVR.       HPI     81 yo female with h/o a-fib with RVR presents for eval of abnormal HR and EKG at PCP office.  Per documentation sent with the pt she was noted to be in A-fib with RVR at office.  PT reports compliance with meds "most of the time".  Has not taken home meds this morning.  No CP or SOB currently.  Pt did have SOB last week at which time she was admitted at OSH.  Currently pt has no symptoms.    Review of patient's allergies indicates:  No Known Allergies  Past Medical History:   Diagnosis Date    Atrial flutter     CHF (congestive heart failure)     Hyperlipidemia     Hypertension     Non-STEMI (non-ST elevated myocardial infarction)     Pacemaker 08/2016    Single lead [employer] Pacemaker for sick sinus syndrome    Sick sinus syndrome      Past Surgical History:   Procedure Laterality Date    CARDIAC PACEMAKER PLACEMENT      HYSTERECTOMY      KIDNEY SURGERY       History reviewed. No pertinent family history.  Social History   Substance Use Topics    Smoking status: Former Smoker     Packs/day: 1.50     Years: 63.00     Types: Cigarettes     Quit date: 6/14/2016    Smokeless tobacco: Never Used    Alcohol use No     Review of Systems   Constitutional: Negative for chills, diaphoresis and fever.   HENT: Negative for sore throat and voice change.    Eyes: Negative for pain and visual disturbance.   Respiratory: Negative for cough and shortness of breath.    Cardiovascular: Negative for chest pain, palpitations and leg swelling.   Gastrointestinal: Negative for abdominal pain, nausea and vomiting.   Genitourinary: Negative for dysuria and flank pain.   Musculoskeletal: " Negative for back pain and neck pain.   Skin: Negative for rash and wound.   Neurological: Negative for syncope, weakness, light-headedness and headaches.   Hematological: Does not bruise/bleed easily.       Physical Exam     Initial Vitals [11/17/17 1125]   BP Pulse Resp Temp SpO2   130/80 91 16 98.1 °F (36.7 °C) 97 %      MAP       96.67         Physical Exam    Nursing note and vitals reviewed.  Constitutional: She is not diaphoretic. No distress.   HENT:   Head: Normocephalic and atraumatic.   Mouth/Throat: No oropharyngeal exudate.   Eyes: Conjunctivae are normal. No scleral icterus.   Neck: Normal range of motion. Neck supple.   Cardiovascular: Normal rate and intact distal pulses.   Irregular irregular rhythm   Pulmonary/Chest: Breath sounds normal. No respiratory distress. She has no wheezes.   Abdominal: Soft. There is no tenderness. There is no rebound.   Musculoskeletal: Normal range of motion. She exhibits no edema or tenderness.   Neurological: She is alert and oriented to person, place, and time. No cranial nerve deficit.   Skin: Skin is warm and dry. No rash noted.         ED Course   Procedures  Labs Reviewed   CBC W/ AUTO DIFFERENTIAL - Abnormal; Notable for the following:        Result Value    Hemoglobin 11.7 (*)     MCH 26.6 (*)     MCHC 31.3 (*)     RDW 16.7 (*)     Basophil% 2.0 (*)     All other components within normal limits   COMPREHENSIVE METABOLIC PANEL - Abnormal; Notable for the following:     Creatinine 1.5 (*)     Albumin 3.4 (*)     Total Bilirubin 1.3 (*)     eGFR if  37.4 (*)     eGFR if non  32.4 (*)     All other components within normal limits   B-TYPE NATRIURETIC PEPTIDE - Abnormal; Notable for the following:     BNP 1,038 (*)     All other components within normal limits   TROPONIN I             Medical Decision Making:   History:   Old Medical Records: I decided to obtain old medical records.  Clinical Tests:   Lab Tests: Ordered and  Reviewed  Radiological Study: Ordered and Reviewed  Medical Tests: Ordered and Reviewed      [facility] ED COURSE  A/P:  Pt is a 81 yo female sent for eval of tachycardia  During stay pt was never in RVR other than initial triage EKG  CBC and CMP unremarkable  Tn neg  BNP elevated but at baseline'  CXR with questionable edema/effusion.  Pt satting well on RA.    Administered home meds at beginning of visit; on d\c pt with HR in the 80's.  Will discharge with instructions to f/u with PCP  Case Discussed with Dr. Edgar PIERREII  11/17/2017 5:39 PM                  Scribe Attestation:   Scribe #1: I performed the above scribed service and the documentation accurately describes the services I performed. I attest to the accuracy of the note.    Attending Attestation:   Physician Attestation Statement for Resident:  As the supervising MD   Physician Attestation Statement: I have personally seen and examined this patient.   I agree with the above history. -: 82 year old female with hx of A Fib sent by her PCP for evaluation of AFib with RVR. SOB for past few days. Poor compliance with medications, Pt has been taking cough medicine for last few days. Plan to order cardiac labs and CXR.   As the supervising MD I agree with the above PE.    As the supervising MD I agree with the above treatment, course, plan, and disposition.                    ED Course      Clinical Impression:   The encounter diagnosis was Atrial fibrillation, unspecified type.    Disposition:   Disposition: Discharged  Condition: Stable                        Marielle Scott MD  Resident  11/17/17 5965       Marv Chavez MD  12/04/17 9180

## 2017-11-17 NOTE — ED TRIAGE NOTES
Christy Webber, a 82 y.o. female presents to the ED sent from PCP for abnormal EKG a fib with RVR.  Pt was SOB Sunday 11/12/17 and was d/c during the week.  Pt went to follow up appointment and had an EKG done which showed patient in a fib with RVR.  Pt has not been taking her blood thinner eliquis.      Chief Complaint   Patient presents with    Irregular Heart Beat     Pt sent from MD office for afib with RVR.       Review of patient's allergies indicates:  No Known Allergies  Past Medical History:   Diagnosis Date    Atrial flutter     CHF (congestive heart failure)     Hyperlipidemia     Hypertension     Non-STEMI (non-ST elevated myocardial infarction)     Pacemaker 08/2016    Single lead [employer] Pacemaker for sick sinus syndrome    Sick sinus syndrome      LOC: Patient name and date of birth verified. The patient is awake, alert and aware of environment with an appropriate affect, the patient is oriented x 3 and speaking appropriately.   APPEARANCE: Patient resting comfortably, patient is clean and well groomed, patient's clothing is properly fastened.  SKIN: The skin is warm and dry, color consistent with ethnicity, patient has normal skin turgor and moist mucus membranes, skin intact, no breakdown or bruising noted.  MUSCULOSKELETAL: Patient moving all extremities well, no obvious swelling or deformities noted. Pt ambulated to stretcher from wheelchair  RESPIRATORY: Respirations are spontaneous, patient has a normal effort and rate, no accessory muscle use noted. Pt denies SOB at this time.   CARDIAC: Patient has a normal rate , no periphreal edema noted, capillary refill < 3 seconds. Pt denies any CP at this time. Normal heart rate at time of triage and HPI.    ABDOMEN: Soft and non tender to palpation, no distention noted. Bowel sounds present in all four quadrants. Pt denies abdominal pain at this time.   NEUROLOGIC: Eyes open spontaneously, behavior appropriate to situation, follows commands,  facial expression symmetrical, bilateral hand grasp equal and even, purposeful motor response noted, normal sensation in all extremities when touched with a finger.

## 2017-12-12 ENCOUNTER — DOCUMENTATION ONLY (OUTPATIENT)
Dept: ELECTROPHYSIOLOGY | Facility: CLINIC | Age: 82
End: 2017-12-12

## 2017-12-12 NOTE — PROGRESS NOTES
Patient was scheduled for Remote Pacemaker home monitor check on 12/5/17.  However, this patient is currently enrolled @ [facility] for her pacemaker remote follow up.  Left message on the voice mail on 12/5/17 as well as 12/7/17.  Will attempt to contact the patient again today to find out which clinic she would like her pacemaker follow up to be.

## 2017-12-18 PROBLEM — E83.52 HYPERCALCEMIA: Status: ACTIVE | Noted: 2017-12-18

## 2017-12-18 PROBLEM — R80.9 MICROALBUMINURIA: Status: ACTIVE | Noted: 2017-12-18

## 2018-01-31 PROBLEM — R06.02 SOB (SHORTNESS OF BREATH): Status: ACTIVE | Noted: 2018-01-31

## 2018-02-01 PROBLEM — I16.0 HYPERTENSIVE URGENCY: Status: RESOLVED | Noted: 2017-11-13 | Resolved: 2018-02-01

## 2018-02-01 PROBLEM — I48.91 ATRIAL FIBRILLATION WITH RVR: Status: RESOLVED | Noted: 2017-03-13 | Resolved: 2018-02-01

## 2018-02-01 PROBLEM — M25.552 LEFT HIP PAIN: Status: ACTIVE | Noted: 2018-02-01

## 2018-02-01 PROBLEM — I50.32 CHRONIC DIASTOLIC CONGESTIVE HEART FAILURE: Status: ACTIVE | Noted: 2017-08-01

## 2018-02-01 PROBLEM — R06.02 SOB (SHORTNESS OF BREATH): Status: RESOLVED | Noted: 2018-01-31 | Resolved: 2018-02-01

## 2018-02-01 PROBLEM — E87.6 HYPOKALEMIA: Status: RESOLVED | Noted: 2017-11-13 | Resolved: 2018-02-01

## 2018-02-01 PROBLEM — R79.89 ELEVATED TROPONIN: Status: ACTIVE | Noted: 2018-02-01

## 2018-06-19 PROBLEM — S00.03XA CONTUSION OF SCALP: Status: ACTIVE | Noted: 2018-06-19

## 2018-06-19 PROBLEM — N39.0 URINARY TRACT INFECTION WITHOUT HEMATURIA: Status: ACTIVE | Noted: 2018-06-19

## 2018-07-10 ENCOUNTER — OFFICE VISIT (OUTPATIENT)
Dept: ORTHOPEDICS | Facility: CLINIC | Age: 83
End: 2018-07-10
Payer: MEDICARE

## 2018-07-10 VITALS — BODY MASS INDEX: 23.86 KG/M2 | WEIGHT: 139 LBS

## 2018-07-10 DIAGNOSIS — M25.561 ACUTE PAIN OF RIGHT KNEE: Primary | ICD-10-CM

## 2018-07-10 PROCEDURE — 99202 OFFICE O/P NEW SF 15 MIN: CPT | Mod: S$GLB,,, | Performed by: ORTHOPAEDIC SURGERY

## 2018-07-10 PROCEDURE — 99999 PR PBB SHADOW E&M-EST. PATIENT-LVL II: CPT | Mod: PBBFAC,,, | Performed by: ORTHOPAEDIC SURGERY

## 2018-07-10 NOTE — PROGRESS NOTES
Subjective:      Patient ID: Christy Webber is a 82 y.o. female.    Chief Complaint: Pain of the Right Knee    HPI     They have experienced problems with their  right knee over the past 1 week. The patient reports relevant history of injury/aggravation.  She fell at home Pain is located medially Associated symptoms include swelling.  Symptoms are aggravated by walking. They have been treated with cane/walker and bracing.   Symptoms have recently improved. Ambulation reportedly has not been impaired. Self care ADLs are not painful.     Review of Systems   Constitution: Negative for fever and weight loss.   HENT: Negative for congestion.    Eyes: Negative for visual disturbance.   Cardiovascular: Negative for chest pain.   Respiratory: Negative for shortness of breath.    Hematologic/Lymphatic: Negative for bleeding problem. Does not bruise/bleed easily.   Skin: Negative for poor wound healing.   Musculoskeletal: Positive for joint pain.   Gastrointestinal: Negative for abdominal pain.   Genitourinary: Negative for dysuria.   Neurological: Negative for seizures.   Psychiatric/Behavioral: Negative for altered mental status.   Allergic/Immunologic: Negative for persistent infections.         Objective:            Ortho/SPM Exam    Right Knee    There were no vitals filed for this visit.    The patient is not in acute distress.   Body habitus is normal.   The patient walks with a limp.  Resisted SLR negative.   The skin over the knee is intact.  Knee effusion 1+  Tendernes is located:  Absent  Range of motion- Flexion 140 deg, Extension 0 deg,   Ligament exam:   MCL intact   Lachman intact              Post sag intact    LCL intact  Patellar apprehension negative.  Popliteal cyst negative  Patellar crepitation present.  Flexion/pinch negative.  Pulses DP present, PT present.  Motor normal 5/5 strength in all tested muscle groups.   Sensory normal.    I reviewed the relevant radiographic images for the patient's  condition:  Recent films show osteopenia with moderate arthritic change.  No acute fracture    There were no vitals filed for this visit.            Assessment:       Encounter Diagnosis   Name Primary?    Acute pain of right knee Yes        arthritic flare versus nondisplaced fracture-rapidly improving  Plan:       Christy was seen today for pain.    Diagnoses and all orders for this visit:    Acute pain of right knee        I explained my diagnostic impression and the reasoning behind it in detail, using layman's terms.     Tylenol  for analgesia    Full time use of walker for 1 month-consequences of noncompliance explained

## 2018-07-13 ENCOUNTER — TELEPHONE (OUTPATIENT)
Dept: ORTHOPEDICS | Facility: CLINIC | Age: 83
End: 2018-07-13

## 2018-07-13 NOTE — TELEPHONE ENCOUNTER
Faxed paperwork.  ----- Message from Penelope Austin sent at 7/13/2018  1:45 PM CDT -----  Contact:  Kenna Miller [phone] fax# [phone]  Kenna Miller  is requesting a updated orders for a Rolling Walker with seat and office visit notes fax to [phone]. Please advise.

## 2018-07-25 PROBLEM — K81.0 ACUTE CHOLECYSTITIS: Status: ACTIVE | Noted: 2018-07-25

## 2018-07-25 PROBLEM — E87.5 HYPERKALEMIA: Status: ACTIVE | Noted: 2018-07-25

## 2018-07-25 PROBLEM — R79.89 ELEVATED LFTS: Status: ACTIVE | Noted: 2018-07-25

## 2018-07-25 PROBLEM — E83.52 HYPERCALCEMIA: Status: RESOLVED | Noted: 2017-12-18 | Resolved: 2018-07-25

## 2018-07-25 PROBLEM — N39.0 URINARY TRACT INFECTION WITHOUT HEMATURIA: Status: RESOLVED | Noted: 2018-06-19 | Resolved: 2018-07-25

## 2018-07-26 PROBLEM — E87.5 HYPERKALEMIA: Status: RESOLVED | Noted: 2018-07-25 | Resolved: 2018-07-26

## 2018-07-26 PROBLEM — R79.89 ELEVATED TROPONIN: Status: RESOLVED | Noted: 2018-02-01 | Resolved: 2018-07-26

## 2018-07-27 PROBLEM — E87.6 HYPOKALEMIA: Status: ACTIVE | Noted: 2018-07-27

## 2018-07-29 PROBLEM — E87.6 HYPOKALEMIA: Status: RESOLVED | Noted: 2018-07-27 | Resolved: 2018-07-29

## 2018-07-29 PROBLEM — S00.03XA CONTUSION OF SCALP: Status: RESOLVED | Noted: 2018-06-19 | Resolved: 2018-07-29

## 2018-07-29 PROBLEM — N18.30 ACUTE RENAL FAILURE SUPERIMPOSED ON STAGE 3 CHRONIC KIDNEY DISEASE: Status: RESOLVED | Noted: 2017-06-02 | Resolved: 2018-07-29

## 2018-07-29 PROBLEM — N17.9 ACUTE RENAL FAILURE SUPERIMPOSED ON STAGE 3 CHRONIC KIDNEY DISEASE: Status: RESOLVED | Noted: 2017-06-02 | Resolved: 2018-07-29

## 2018-08-15 ENCOUNTER — TELEPHONE (OUTPATIENT)
Dept: ORTHOPEDICS | Facility: CLINIC | Age: 83
End: 2018-08-15

## 2018-08-15 DIAGNOSIS — M25.552 LEFT HIP PAIN: Primary | ICD-10-CM

## 2018-11-12 ENCOUNTER — OFFICE VISIT (OUTPATIENT)
Dept: FAMILY MEDICINE | Facility: CLINIC | Age: 83
End: 2018-11-12
Payer: MEDICARE

## 2018-11-12 VITALS
DIASTOLIC BLOOD PRESSURE: 80 MMHG | WEIGHT: 140.19 LBS | RESPIRATION RATE: 16 BRPM | HEIGHT: 64 IN | TEMPERATURE: 98 F | SYSTOLIC BLOOD PRESSURE: 136 MMHG | HEART RATE: 85 BPM | BODY MASS INDEX: 23.93 KG/M2 | OXYGEN SATURATION: 96 %

## 2018-11-12 DIAGNOSIS — I35.0 NONRHEUMATIC AORTIC VALVE STENOSIS: ICD-10-CM

## 2018-11-12 DIAGNOSIS — I10 ESSENTIAL HYPERTENSION: ICD-10-CM

## 2018-11-12 DIAGNOSIS — J43.2 CENTRILOBULAR EMPHYSEMA: ICD-10-CM

## 2018-11-12 DIAGNOSIS — F01.50 VASCULAR DEMENTIA WITHOUT BEHAVIORAL DISTURBANCE: ICD-10-CM

## 2018-11-12 DIAGNOSIS — I48.21 PERMANENT ATRIAL FIBRILLATION: ICD-10-CM

## 2018-11-12 DIAGNOSIS — I27.20 PULMONARY HYPERTENSION: ICD-10-CM

## 2018-11-12 DIAGNOSIS — I50.32 CHRONIC DIASTOLIC CONGESTIVE HEART FAILURE: Primary | ICD-10-CM

## 2018-11-12 PROBLEM — R17 ELEVATED BILIRUBIN: Status: ACTIVE | Noted: 2018-11-12

## 2018-11-12 PROBLEM — R05.3 CHRONIC COUGH: Status: RESOLVED | Noted: 2017-06-02 | Resolved: 2018-11-12

## 2018-11-12 PROBLEM — D50.9 IRON DEFICIENCY ANEMIA: Status: RESOLVED | Noted: 2017-03-12 | Resolved: 2018-11-12

## 2018-11-12 PROBLEM — Z79.01 CURRENT USE OF LONG TERM ANTICOAGULATION: Status: ACTIVE | Noted: 2018-11-12

## 2018-11-12 PROBLEM — M25.552 LEFT HIP PAIN: Status: RESOLVED | Noted: 2018-02-01 | Resolved: 2018-11-12

## 2018-11-12 PROBLEM — Z95.0 PRESENCE OF CARDIAC PACEMAKER: Status: ACTIVE | Noted: 2018-11-12

## 2018-11-12 PROCEDURE — 3079F DIAST BP 80-89 MM HG: CPT | Mod: CPTII,S$GLB,, | Performed by: FAMILY MEDICINE

## 2018-11-12 PROCEDURE — 3288F FALL RISK ASSESSMENT DOCD: CPT | Mod: CPTII,S$GLB,, | Performed by: FAMILY MEDICINE

## 2018-11-12 PROCEDURE — 99214 OFFICE O/P EST MOD 30 MIN: CPT | Mod: S$GLB,,, | Performed by: FAMILY MEDICINE

## 2018-11-12 PROCEDURE — 1100F PTFALLS ASSESS-DOCD GE2>/YR: CPT | Mod: CPTII,S$GLB,, | Performed by: FAMILY MEDICINE

## 2018-11-12 PROCEDURE — 99999 PR PBB SHADOW E&M-EST. PATIENT-LVL IV: CPT | Mod: PBBFAC,,, | Performed by: FAMILY MEDICINE

## 2018-11-12 PROCEDURE — 3075F SYST BP GE 130 - 139MM HG: CPT | Mod: CPTII,S$GLB,, | Performed by: FAMILY MEDICINE

## 2018-11-12 RX ORDER — LOSARTAN POTASSIUM 25 MG/1
25 TABLET ORAL DAILY
Status: ON HOLD | COMMUNITY
End: 2018-11-19 | Stop reason: SDUPTHER

## 2018-11-12 RX ORDER — DONEPEZIL HYDROCHLORIDE 5 MG/1
5 TABLET, FILM COATED ORAL NIGHTLY
Qty: 30 TABLET | Refills: 3 | Status: SHIPPED | OUTPATIENT
Start: 2018-11-12 | End: 2021-09-15

## 2018-11-12 NOTE — ASSESSMENT & PLAN NOTE
- well controlled  - reviewed her medications with her since she has duplicate and differing doses of Losartan and Dilitazem  - pt on Losartan 25 mg daily and Diltiazem 360 mg daily

## 2018-11-12 NOTE — PROGRESS NOTES
FAMILY MEDICINE    Patient Active Problem List   Diagnosis    Permanent atrial fibrillation    Essential hypertension    Chronic kidney disease, stage III (moderate)    Aortic regurgitation    Tricuspid valve disorders, non-rheumatic    COPD (chronic obstructive pulmonary disease)    Pulmonary hypertension    Non-rheumatic mitral regurgitation    Chronic diastolic congestive heart failure    Aortic stenosis    Dyslipidemia    Microalbuminuria    Elevated bilirubin    Vascular dementia without behavioral disturbance    Current use of long term anticoagulation    Presence of cardiac pacemaker       CC:   Chief Complaint   Patient presents with    Establish Care    Follow-up    Dizziness    Shortness of Breath    Cough       HPI: Christy Webber is a 83 y.o. female  - with aortic stenosis, aortic insuffiencey, Afib on Eliquis OAC, COPD, pulmonary HTN, SS with h/o pacermaker in place and CHF presents to establish care. She is accompanied with her son Aileen with whom she lives. Reports that her last PCP with Dr. Johnson who is not longer in practice. Her care has all been on the [facility] previously and she would like to continue there. Her Cardiologist and Pulmonologist are at [facility] and reports that she has seen bother recently with Echo, carotid US, and PFTs all done.   Son reports that she does not have a walker. She lives with him in a 1 story house and her room is easily accessible. She is in a wheelchair today because long distances have been difficult for the last 1-2 weeks.   Reports that she has had a cough for 1 year which her pulmonologist is following. She has suffered from dizziness for several years that waxes and wanes and reports that she has seen several specialists in the past without known etiology. He reports that since ER visit she has improved. He denies any h/o dementia but does not some memory issues that he has noticed. She does not have a formal mPOA but reports  that would be her son Aileen since she lives with him.   Pt was seen at [facility] ER 2 days ago for worsening dizziness, SOB and cough 11/10/18.         HEALTH MAINTENANCE:   Health Maintenance   Topic Date Due    TETANUS VACCINE  08/15/1953    DEXA SCAN  08/15/1976    Zoster Vaccine  08/15/1996    Pneumococcal (65+) (1 of 2 - PCV13) 08/15/2001    Influenza Vaccine  08/01/2018    Lipid Panel  03/13/2022       ROS: Review of Systems   Constitutional: Positive for activity change (improving). Negative for appetite change, fatigue and unexpected weight change.   HENT: Positive for postnasal drip and rhinorrhea. Negative for congestion, sinus pressure, sinus pain, sore throat, tinnitus, trouble swallowing and voice change.    Eyes: Negative for photophobia and visual disturbance.   Respiratory: Positive for cough. Negative for chest tightness and shortness of breath.    Cardiovascular: Negative for chest pain, palpitations and leg swelling.   Gastrointestinal: Negative for abdominal distention, abdominal pain, constipation, diarrhea, nausea and vomiting.   Endocrine: Negative for cold intolerance, heat intolerance, polydipsia, polyphagia and polyuria.   Genitourinary: Negative for difficulty urinating and frequency.   Musculoskeletal: Positive for arthralgias. Negative for myalgias.   Skin: Negative for rash.   Neurological: Positive for dizziness. Negative for weakness, light-headedness and headaches.   Psychiatric/Behavioral: Negative for agitation, behavioral problems, confusion, decreased concentration, dysphoric mood and sleep disturbance. The patient is not nervous/anxious.        ALLERGIES:   Review of patient's allergies indicates:  No Known Allergies    MEDS:     Current Outpatient Medications:     albuterol (PROVENTIL/VENTOLIN HFA) 90 mcg/actuation inhaler, Inhale 1-2 puffs into the lungs every 6 (six) hours as needed for Wheezing. Rescue, Disp: 1 Inhaler, Rfl: 0    albuterol (VENTOLIN HFA)  90 mcg/actuation inhaler, Inhale 2 puffs into the lungs every 6 (six) hours as needed for Wheezing. Rescue, Disp: 1 each, Rfl: 3    aspirin (ECOTRIN) 81 MG EC tablet, Take 81 mg by mouth once daily., Disp: , Rfl:     cholecalciferol, vitamin D3, 1,000 unit capsule, Take 1 capsule (1,000 Units total) by mouth once daily., Disp: 90 capsule, Rfl: 3    diltiaZEM (CARDIZEM CD) 240 MG 24 hr capsule, Take 1 capsule (240 mg total) by mouth once daily. (Patient taking differently: Take 360 mg by mouth once daily. ), Disp: 30 capsule, Rfl: 11    hydrALAZINE (APRESOLINE) 25 MG tablet, Take 25 mg by mouth 3 (three) times daily., Disp: , Rfl:     losartan (COZAAR) 25 MG tablet, Take 25 mg by mouth once daily., Disp: , Rfl:     predniSONE (DELTASONE) 20 MG tablet, Take 2 tablets (40 mg total) by mouth once daily. for 5 days, Disp: 10 tablet, Rfl: 0    rosuvastatin (CRESTOR) 10 MG tablet, Take 1 tablet (10 mg total) by mouth once daily. HOLD FOR ONE WEEK AND RESTART ON 8/6/18, Disp: , Rfl:     apixaban 2.5 mg Tab, Take 1 tablet (2.5 mg total) by mouth 2 (two) times daily., Disp: 60 tablet, Rfl: 11    benzonatate (TESSALON) 100 MG capsule, Take 1 capsule (100 mg total) by mouth 3 (three) times daily as needed for Cough., Disp: 20 capsule, Rfl: 0    donepezil (ARICEPT) 5 MG tablet, Take 1 tablet (5 mg total) by mouth every evening., Disp: 30 tablet, Rfl: 3    Past Medical History:   Diagnosis Date    Atrial flutter     CHF (congestive heart failure)     Hyperlipidemia     Hypertension     Non-STEMI (non-ST elevated myocardial infarction)     Pacemaker 08/2016    Single lead [employer] Pacemaker for sick sinus syndrome    Sick sinus syndrome     SSS (sick sinus syndrome) 8/24/2016    - pacemaker in place       Past Surgical History:   Procedure Laterality Date    CARDIAC PACEMAKER PLACEMENT      ESOPHAGOGASTRODUODENOSCOPY (EGD) N/A 3/13/2017    Per[facility] by R[facility]    HYSTERECTOMY       "KIDNEY SURGERY         History reviewed. No pertinent family history.    Social History     Social History Narrative    Not on file       OBJECTIVE:   Vitals:    11/12/18 1030   BP: 136/80   BP Location: Left arm   Patient Position: Sitting   BP Method: Medium (Manual)   Pulse: 85   Resp: 16   Temp: 98.3 °F (36.8 °C)   TempSrc: Oral   SpO2: 96%   Weight: 63.6 kg (140 lb 3.2 oz)   Height: 5' 4" (1.626 m)     Body mass index is 24.07 kg/m².    Physical Exam   Constitutional: No distress.   HENT:   Head: Normocephalic and atraumatic.   Eyes: Conjunctivae are normal. No scleral icterus.   Neck: Neck supple. No thyromegaly present.   Cardiovascular: Normal rate and normal pulses. An irregularly irregular rhythm present.   Murmur heard.  Pulmonary/Chest: Effort normal and breath sounds normal. She has no decreased breath sounds. She has no wheezes. She has no rhonchi. She has no rales.   Abdominal: Soft.   Musculoskeletal: She exhibits no edema.   Lymphadenopathy:     She has no cervical adenopathy.   Neurological: She is alert.   Skin: Skin is warm.         Depression Patient Health Questionnaire 11/12/2018   In the last two weeks how often have you had little interest or pleasure in doing things 0   In the last two weeks how often have you felt down, depressed or hopeless 0   PHQ-2 Total Score 0     MMSE 11/12/2018   What is the (year), (season), (date), (day), (month)? 1   Where are we (state), (country), (town or city), (hospital), (floor)? 1   Name 3 common objects (eg. "apple", "table", "alberto"). Take 1 second to say each. Then ask the patient to repeat all 3. Give 1 point for each correct answer. Then repeat them until he/she learns all 3. Count trials and record. 0   Serial 7's backwards. Stop after 5 answers. (100,93,86,79,72) or alternatively  spell "WORLD" backwards. (D..L..R..O..W). The score is the number of letters in correct order. 0   Ask for the 3 common objects named earlier in the exam. Give 1 point " "for each correct answer. 0   Name a "pencil" and "watch." 2   Repeat the following: "No ifs, ands, or buts." 0   Follow a 3-stage command: "Take a paper in your right hand, fold it in half, & put it on the floor." 3   Read and obey the following: (see paper exam) 1   Write a sentence. 1   Copy the following design: (see paper exam) 0   Total MMSE Score 9   Some recent data might be hidden     Reviewed ER note, labs and imaging    PERTINENT RESULTS:   11/12/2018   XR CHEST AP PORTABLE    CLINICAL HISTORY:  CHF;    TECHNIQUE:  Single frontal view of the chest was performed.    COMPARISON:  July 25, 2008    FINDINGS:  Pacemaker overlies the left lateral chest/axillary region.  There is cardiomegaly similar to prior study.  Calcification present along the wall of the aorta.  Osseous structures show degenerative change.  Lung fields demonstrate prominent interstitial markings with some mild bibasilar opacity.  Findings may reflect mild edema.      Impression       As above     Admission on 11/10/2018, Discharged on 11/10/2018   Component Date Value Ref Range Status    WBC 11/10/2018 5.92  3.90 - 12.70 K/uL Final    RBC 11/10/2018 4.29  4.00 - 5.40 M/uL Final    Hemoglobin 11/10/2018 13.0  12.0 - 16.0 g/dL Final    Hematocrit 11/10/2018 39.5  37.0 - 48.5 % Final    MCV 11/10/2018 92  82 - 98 fL Final    MCH 11/10/2018 30.3  27.0 - 31.0 pg Final    MCHC 11/10/2018 32.9  32.0 - 36.0 g/dL Final    RDW 11/10/2018 14.9* 11.5 - 14.5 % Final    Platelets 11/10/2018 184  150 - 350 K/uL Final    MPV 11/10/2018 12.5  9.2 - 12.9 fL Final    Gran # (ANC) 11/10/2018 3.2  1.8 - 7.7 K/uL Final    Lymph # 11/10/2018 1.8  1.0 - 4.8 K/uL Final    Mono # 11/10/2018 0.6  0.3 - 1.0 K/uL Final    Eos # 11/10/2018 0.3  0.0 - 0.5 K/uL Final    Baso # 11/10/2018 0.11  0.00 - 0.20 K/uL Final    Gran% 11/10/2018 54.0  38.0 - 73.0 % Final    Lymph% 11/10/2018 29.7  18.0 - 48.0 % Final    Mono% 11/10/2018 9.5  4.0 - 15.0 % Final    " Eosinophil% 11/10/2018 5.1  0.0 - 8.0 % Final    Basophil% 11/10/2018 1.9  0.0 - 1.9 % Final    Aniso 11/10/2018 Slight   Final    Differential Method 11/10/2018 Automated   Final    Sodium 11/10/2018 141  136 - 145 mmol/L Final    Potassium 11/10/2018 3.8  3.5 - 5.1 mmol/L Final    Chloride 11/10/2018 106  95 - 110 mmol/L Final    CO2 11/10/2018 25  23 - 29 mmol/L Final    Glucose 11/10/2018 126* 70 - 110 mg/dL Final    BUN, Bld 11/10/2018 20* 7 - 17 mg/dL Final    Creatinine 11/10/2018 1.50* 0.50 - 1.40 mg/dL Final    Calcium 11/10/2018 10.5  8.7 - 10.5 mg/dL Final    Total Protein 11/10/2018 7.3  6.0 - 8.4 g/dL Final    Albumin 11/10/2018 4.0  3.5 - 5.2 g/dL Final    Total Bilirubin 11/10/2018 1.4* 0.1 - 1.0 mg/dL Final    Comment: For infants and newborns, interpretation of results should be based  on gestational age, weight and in agreement with clinical  observations.  Premature Infant recommended reference ranges:  Up to 24 hours.............<8.0 mg/dL  Up to 48 hours............<12.0 mg/dL  3-5 days..................<15.0 mg/dL  6-29 days.................<15.0 mg/dL      Alkaline Phosphatase 11/10/2018 86  38 - 126 U/L Final    AST 11/10/2018 29  15 - 46 U/L Final    ALT 11/10/2018 13  10 - 44 U/L Final    Anion Gap 11/10/2018 10  8 - 16 mmol/L Final    eGFR if  11/10/2018 37.1* >60 mL/min/1.73 m^2 Final    eGFR if non  11/10/2018 32.2* >60 mL/min/1.73 m^2 Final    Comment: Calculation used to obtain the estimated glomerular filtration  rate (eGFR) is the CKD-EPI equation.       Troponin I 11/10/2018 0.015  0.012 - 0.034 ng/mL Final    Prothrombin Time 11/10/2018 14.7* 9.0 - 12.5 sec Final    INR 11/10/2018 1.3* 0.8 - 1.2 Final    Comment: Coumadin Therapy:  2.0 - 3.0 for INR for all indicators except mechanical heart valves  and antiphospholipid syndromes which should use 2.5 - 3.5.      Influenza A Ag, EIA 11/10/2018 Negative  Negative Final     Influenza B Ag, EIA 11/10/2018 Negative  Negative Final    Flu A & B Source 11/10/2018 Nasopharyngeal Swab   Final    NT-proBNP 11/10/2018 75150* 5 - 1800 pg/mL Final     7/25/2018   CT ABDOMEN PELVIS WITHOUT CONTRAST  Impression      Patient motion degrades the quality of the examination.    Abnormal appearance of the gallbladder.  Marked gallbladder wall thickening may be present.  Further evaluation with ultrasound examination is recommended.    Small amount of ascites.    Colonic diverticulosis without evidence for acute diverticulitis.    Bilateral simple renal cysts with renal cortical thinning particularly on the left.      Electronically signed by: Blu Reyes MD  Date: 07/25/2018  Time: 16:53     7/26/2018   US ABDOMEN LIMITED   Impression      1. Marked thickening of the gallbladder wall with possible pericholecystic fluid.  Diagnostic considerations include cholecystitis, hepatic dysfunction, and congestive heart failure.  Punctate echogenic foci within the anterior wall may represent mural calcifications or gas.  If there is clinical concern for cholecystitis, nuclear medicine HIDA scan recommended.  2. Chronic medical renal disease with right renal cysts.  3. Hepatomegaly.    ASSESSMENT:  Problem List Items Addressed This Visit        Neuro    Vascular dementia without behavioral disturbance    Current Assessment & Plan     - MMSE 9  - discussed with son and rec advanced directives and mPOA  - trial Aricept and discussed potential side effects and to notify me with any concerns         Relevant Medications    donepezil (ARICEPT) 5 MG tablet       Pulmonary    COPD (chronic obstructive pulmonary disease)    Current Assessment & Plan     - followed by Pulmonary   - no signs of acute exacerbation  - pt reports no longer on Breo  - on steroids from ER and rec completed  - rec f/u with Pulmonary         Pulmonary hypertension    Current Assessment & Plan     - rec f/u with Pulmonary             Cardiac/Vascular    Permanent atrial fibrillation    Current Assessment & Plan     - rate controlled  - on Eliquis OAC         Essential hypertension    Current Assessment & Plan     - well controlled  - reviewed her medications with her since she has duplicate and differing doses of Losartan and Dilitazem  - pt on Losartan 25 mg daily and Diltiazem 360 mg daily         Chronic diastolic congestive heart failure - Primary    Current Assessment & Plan     - pro BNP was elevated in the ER though was below her baseline  - no signs or acute decompenstation  - rec f/u with Cardiology         Aortic stenosis    Current Assessment & Plan     - son reports that AVR was recommended but they deferred  - supportive care  - with rec with Cardiology               PLAN:   Orders Placed This Encounter    donepezil (ARICEPT) 5 MG tablet     Total duration of face to face visit time 40 minutes.  Total time spent counseling greater than fifty percent of total visit time.  Counseling included discussion regarding imaging findings, diagnosis, possibilities, treatment options, risks and benefits.  Questions elicited and answered    Follow-up 3-4 months  Dr. Jeanette Olmos D.O.   Family Medicine

## 2018-11-12 NOTE — ASSESSMENT & PLAN NOTE
- pro BNP was elevated in the ER though was below her baseline  - no signs or acute decompenstation  - rec f/u with Cardiology

## 2018-11-12 NOTE — ASSESSMENT & PLAN NOTE
- son reports that AVR was recommended but they deferred  - supportive care  - with rec with Cardiology

## 2018-11-12 NOTE — ASSESSMENT & PLAN NOTE
- MMSE 9  - discussed with son and rec advanced directives and mPOA  - trial Aricept and discussed potential side effects and to notify me with any concerns

## 2018-11-12 NOTE — ASSESSMENT & PLAN NOTE
- followed by Pulmonary   - no signs of acute exacerbation  - pt reports no longer on Breo  - on steroids from ER and rec completed  - rec f/u with Pulmonary

## 2018-11-16 ENCOUNTER — HOSPITAL ENCOUNTER (INPATIENT)
Facility: HOSPITAL | Age: 83
LOS: 3 days | Discharge: HOME | DRG: 189 | End: 2018-11-19
Attending: EMERGENCY MEDICINE | Admitting: HOSPITALIST
Payer: MEDICARE

## 2018-11-16 DIAGNOSIS — R06.02 SOB (SHORTNESS OF BREATH): ICD-10-CM

## 2018-11-16 DIAGNOSIS — R79.89 ELEVATED BRAIN NATRIURETIC PEPTIDE (BNP) LEVEL: ICD-10-CM

## 2018-11-16 DIAGNOSIS — R94.31 ABNORMAL EKG: ICD-10-CM

## 2018-11-16 DIAGNOSIS — R53.1 WEAKNESS: ICD-10-CM

## 2018-11-16 DIAGNOSIS — J96.01 ACUTE HYPOXEMIC RESPIRATORY FAILURE: ICD-10-CM

## 2018-11-16 DIAGNOSIS — R55 SYNCOPE: ICD-10-CM

## 2018-11-16 DIAGNOSIS — I50.30 HEART FAILURE WITH PRESERVED LEFT VENTRICULAR FUNCTION (HFPEF): ICD-10-CM

## 2018-11-16 DIAGNOSIS — I50.9 CHF (CONGESTIVE HEART FAILURE): ICD-10-CM

## 2018-11-16 DIAGNOSIS — I50.23 ACUTE ON CHRONIC SYSTOLIC CONGESTIVE HEART FAILURE: Primary | ICD-10-CM

## 2018-11-16 PROBLEM — I27.22 PULMONARY HYPERTENSION DUE TO LEFT HEART DISEASE: Status: ACTIVE | Noted: 2018-11-16

## 2018-11-16 PROBLEM — R74.01 TRANSAMINITIS: Status: ACTIVE | Noted: 2018-11-16

## 2018-11-16 PROBLEM — J43.2 CENTRILOBULAR EMPHYSEMA: Status: ACTIVE | Noted: 2017-06-02

## 2018-11-16 LAB
ALBUMIN SERPL BCP-MCNC: 3.8 G/DL
ALP SERPL-CCNC: 86 U/L
ALT SERPL W/O P-5'-P-CCNC: 14 U/L
ANION GAP SERPL CALC-SCNC: 12 MMOL/L
AST SERPL-CCNC: 26 U/L
BASOPHILS # BLD AUTO: 0.05 K/UL
BASOPHILS NFR BLD: 1 %
BILIRUB SERPL-MCNC: 1.8 MG/DL
BNP SERPL-MCNC: 1686 PG/ML
BUN SERPL-MCNC: 21 MG/DL
CALCIUM SERPL-MCNC: 10.8 MG/DL
CHLORIDE SERPL-SCNC: 103 MMOL/L
CO2 SERPL-SCNC: 25 MMOL/L
CREAT SERPL-MCNC: 1.4 MG/DL
DIFFERENTIAL METHOD: ABNORMAL
EOSINOPHIL # BLD AUTO: 0 K/UL
EOSINOPHIL NFR BLD: 0.2 %
ERYTHROCYTE [DISTWIDTH] IN BLOOD BY AUTOMATED COUNT: 14.9 %
EST. GFR  (AFRICAN AMERICAN): 40.4 ML/MIN/1.73 M^2
EST. GFR  (NON AFRICAN AMERICAN): 35 ML/MIN/1.73 M^2
ESTIMATED AVG GLUCOSE: 105 MG/DL
GLUCOSE SERPL-MCNC: 189 MG/DL
HBA1C MFR BLD HPLC: 5.3 %
HCT VFR BLD AUTO: 42.3 %
HGB BLD-MCNC: 13.6 G/DL
IMM GRANULOCYTES # BLD AUTO: 0.02 K/UL
IMM GRANULOCYTES NFR BLD AUTO: 0.4 %
INR PPP: 1.1
LYMPHOCYTES # BLD AUTO: 0.7 K/UL
LYMPHOCYTES NFR BLD: 12.5 %
MAGNESIUM SERPL-MCNC: 2.3 MG/DL
MCH RBC QN AUTO: 29.9 PG
MCHC RBC AUTO-ENTMCNC: 32.2 G/DL
MCV RBC AUTO: 93 FL
MONOCYTES # BLD AUTO: 0.1 K/UL
MONOCYTES NFR BLD: 2.5 %
NEUTROPHILS # BLD AUTO: 4.4 K/UL
NEUTROPHILS NFR BLD: 83.4 %
NRBC BLD-RTO: 0 /100 WBC
PHOSPHATE SERPL-MCNC: 3.3 MG/DL
PLATELET # BLD AUTO: 233 K/UL
PMV BLD AUTO: 12.7 FL
POTASSIUM SERPL-SCNC: 4.2 MMOL/L
PROT SERPL-MCNC: 7.4 G/DL
PROTHROMBIN TIME: 11.7 SEC
PTH-INTACT SERPL-MCNC: 328 PG/ML
RBC # BLD AUTO: 4.55 M/UL
SODIUM SERPL-SCNC: 140 MMOL/L
TROPONIN I SERPL DL<=0.01 NG/ML-MCNC: 0.03 NG/ML
WBC # BLD AUTO: 5.21 K/UL

## 2018-11-16 PROCEDURE — 93005 ELECTROCARDIOGRAM TRACING: CPT

## 2018-11-16 PROCEDURE — 85025 COMPLETE CBC W/AUTO DIFF WBC: CPT

## 2018-11-16 PROCEDURE — 63600175 PHARM REV CODE 636 W HCPCS: Performed by: STUDENT IN AN ORGANIZED HEALTH CARE EDUCATION/TRAINING PROGRAM

## 2018-11-16 PROCEDURE — 80053 COMPREHEN METABOLIC PANEL: CPT

## 2018-11-16 PROCEDURE — 83036 HEMOGLOBIN GLYCOSYLATED A1C: CPT

## 2018-11-16 PROCEDURE — 99223 1ST HOSP IP/OBS HIGH 75: CPT | Mod: AI,GC,, | Performed by: HOSPITALIST

## 2018-11-16 PROCEDURE — 94640 AIRWAY INHALATION TREATMENT: CPT

## 2018-11-16 PROCEDURE — 63600175 PHARM REV CODE 636 W HCPCS: Performed by: EMERGENCY MEDICINE

## 2018-11-16 PROCEDURE — 83880 ASSAY OF NATRIURETIC PEPTIDE: CPT

## 2018-11-16 PROCEDURE — 99285 EMERGENCY DEPT VISIT HI MDM: CPT | Mod: ,,, | Performed by: EMERGENCY MEDICINE

## 2018-11-16 PROCEDURE — 85610 PROTHROMBIN TIME: CPT

## 2018-11-16 PROCEDURE — 25000242 PHARM REV CODE 250 ALT 637 W/ HCPCS: Performed by: INTERNAL MEDICINE

## 2018-11-16 PROCEDURE — 99285 EMERGENCY DEPT VISIT HI MDM: CPT | Mod: 25

## 2018-11-16 PROCEDURE — 83735 ASSAY OF MAGNESIUM: CPT

## 2018-11-16 PROCEDURE — 25000003 PHARM REV CODE 250: Performed by: STUDENT IN AN ORGANIZED HEALTH CARE EDUCATION/TRAINING PROGRAM

## 2018-11-16 PROCEDURE — 96374 THER/PROPH/DIAG INJ IV PUSH: CPT

## 2018-11-16 PROCEDURE — 84484 ASSAY OF TROPONIN QUANT: CPT

## 2018-11-16 PROCEDURE — G0378 HOSPITAL OBSERVATION PER HR: HCPCS

## 2018-11-16 PROCEDURE — 84100 ASSAY OF PHOSPHORUS: CPT

## 2018-11-16 PROCEDURE — 20600001 HC STEP DOWN PRIVATE ROOM

## 2018-11-16 PROCEDURE — 83970 ASSAY OF PARATHORMONE: CPT

## 2018-11-16 RX ORDER — IBUPROFEN 200 MG
16 TABLET ORAL
Status: DISCONTINUED | OUTPATIENT
Start: 2018-11-16 | End: 2018-11-16

## 2018-11-16 RX ORDER — DILTIAZEM HYDROCHLORIDE 180 MG/1
360 CAPSULE, COATED, EXTENDED RELEASE ORAL DAILY
Status: DISCONTINUED | OUTPATIENT
Start: 2018-11-17 | End: 2018-11-19 | Stop reason: HOSPADM

## 2018-11-16 RX ORDER — SODIUM CHLORIDE 0.9 % (FLUSH) 0.9 %
5 SYRINGE (ML) INJECTION
Status: DISCONTINUED | OUTPATIENT
Start: 2018-11-16 | End: 2018-11-16

## 2018-11-16 RX ORDER — BENZONATATE 100 MG/1
100 CAPSULE ORAL 3 TIMES DAILY PRN
Status: DISCONTINUED | OUTPATIENT
Start: 2018-11-16 | End: 2018-11-19 | Stop reason: HOSPADM

## 2018-11-16 RX ORDER — LOSARTAN POTASSIUM 25 MG/1
25 TABLET ORAL DAILY
Status: DISCONTINUED | OUTPATIENT
Start: 2018-11-17 | End: 2018-11-16

## 2018-11-16 RX ORDER — IBUPROFEN 200 MG
24 TABLET ORAL
Status: DISCONTINUED | OUTPATIENT
Start: 2018-11-16 | End: 2018-11-16

## 2018-11-16 RX ORDER — DONEPEZIL HYDROCHLORIDE 5 MG/1
5 TABLET, FILM COATED ORAL NIGHTLY
Status: DISCONTINUED | OUTPATIENT
Start: 2018-11-16 | End: 2018-11-19 | Stop reason: HOSPADM

## 2018-11-16 RX ORDER — SODIUM CHLORIDE 0.9 % (FLUSH) 0.9 %
5 SYRINGE (ML) INJECTION
Status: DISCONTINUED | OUTPATIENT
Start: 2018-11-16 | End: 2018-11-19 | Stop reason: HOSPADM

## 2018-11-16 RX ORDER — FUROSEMIDE 10 MG/ML
40 INJECTION INTRAMUSCULAR; INTRAVENOUS DAILY
Status: DISCONTINUED | OUTPATIENT
Start: 2018-11-17 | End: 2018-11-17

## 2018-11-16 RX ORDER — FUROSEMIDE 10 MG/ML
20 INJECTION INTRAMUSCULAR; INTRAVENOUS ONCE
Status: COMPLETED | OUTPATIENT
Start: 2018-11-16 | End: 2018-11-16

## 2018-11-16 RX ORDER — RAMELTEON 8 MG/1
8 TABLET ORAL NIGHTLY PRN
Status: DISCONTINUED | OUTPATIENT
Start: 2018-11-16 | End: 2018-11-19 | Stop reason: HOSPADM

## 2018-11-16 RX ORDER — FUROSEMIDE 10 MG/ML
20 INJECTION INTRAMUSCULAR; INTRAVENOUS
Status: COMPLETED | OUTPATIENT
Start: 2018-11-16 | End: 2018-11-16

## 2018-11-16 RX ORDER — GLUCAGON 1 MG
1 KIT INJECTION
Status: DISCONTINUED | OUTPATIENT
Start: 2018-11-16 | End: 2018-11-16

## 2018-11-16 RX ORDER — ASPIRIN 81 MG/1
81 TABLET ORAL DAILY
Status: DISCONTINUED | OUTPATIENT
Start: 2018-11-17 | End: 2018-11-19 | Stop reason: HOSPADM

## 2018-11-16 RX ORDER — ONDANSETRON 4 MG/1
4 TABLET, ORALLY DISINTEGRATING ORAL EVERY 6 HOURS PRN
Status: DISCONTINUED | OUTPATIENT
Start: 2018-11-16 | End: 2018-11-19 | Stop reason: HOSPADM

## 2018-11-16 RX ORDER — IPRATROPIUM BROMIDE AND ALBUTEROL SULFATE 2.5; .5 MG/3ML; MG/3ML
3 SOLUTION RESPIRATORY (INHALATION) EVERY 4 HOURS PRN
Status: DISCONTINUED | OUTPATIENT
Start: 2018-11-16 | End: 2018-11-19 | Stop reason: HOSPADM

## 2018-11-16 RX ORDER — ROSUVASTATIN CALCIUM 5 MG/1
10 TABLET, COATED ORAL DAILY
Status: DISCONTINUED | OUTPATIENT
Start: 2018-11-17 | End: 2018-11-19 | Stop reason: HOSPADM

## 2018-11-16 RX ORDER — ACETAMINOPHEN 325 MG/1
650 TABLET ORAL EVERY 6 HOURS PRN
Status: DISCONTINUED | OUTPATIENT
Start: 2018-11-16 | End: 2018-11-19 | Stop reason: HOSPADM

## 2018-11-16 RX ADMIN — IPRATROPIUM BROMIDE AND ALBUTEROL SULFATE 3 ML: .5; 3 SOLUTION RESPIRATORY (INHALATION) at 11:11

## 2018-11-16 RX ADMIN — FUROSEMIDE 20 MG: 10 INJECTION, SOLUTION INTRAMUSCULAR; INTRAVENOUS at 05:11

## 2018-11-16 RX ADMIN — APIXABAN 2.5 MG: 2.5 TABLET, FILM COATED ORAL at 10:11

## 2018-11-16 RX ADMIN — DONEPEZIL HYDROCHLORIDE 5 MG: 5 TABLET, FILM COATED ORAL at 10:11

## 2018-11-16 NOTE — ED PROVIDER NOTES
Encounter Date: 11/16/2018    SCRIBE #1 NOTE: IMisael, am scribing for, and in the presence of, Marv Chavez MD.   SCRIBE #2 NOTE: IMichael, am scribing for, and in the presence of, Marv Chavez MD.     History     Chief Complaint   Patient presents with    Fatigue     onset x1 month ago, increased for last week, seen by PCP a few days ago for symptoms, told to follow up in 4 months    Dizziness     83 y.o. female patient with PMHx of CHF who presents to the Emergency Department for weakness which onset 2 weeks ago. Per pt's son, pt's weakness has been gradually worsening over the last 2 weeks. Associated sxs include dizziness/lightheadedbess and SOB upon exertion. Patient states that she was evaluated at [place] ED for same however tests were unremarkable and patient was discharged home. Patient reports that she followed-up with her PCP as well. Pt denies any fever, N/V/D, CP, HA, unexpected weight changes, hematuria, vaginal bleeding, anal bleeding, epistaxis, back pain, visual disturbances, and all other sxs at this time. No further complaints or concerns at this time.          Review of patient's allergies indicates:  No Known Allergies  Past Medical History:   Diagnosis Date    Atrial flutter     CHF (congestive heart failure)     Hyperlipidemia     Hypertension     Non-STEMI (non-ST elevated myocardial infarction)     Pacemaker 08/2016    Single lead [employer] Pacemaker for sick sinus syndrome    Sick sinus syndrome     SSS (sick sinus syndrome) 8/24/2016    - pacemaker in place     Past Surgical History:   Procedure Laterality Date    CARDIAC PACEMAKER PLACEMENT      ESOPHAGOGASTRODUODENOSCOPY (EGD) N/A 3/13/2017    [facility] R[facility] ENDO    HYSTERECTOMY      KIDNEY SURGERY       History reviewed. No pertinent family history.  Social History     Tobacco Use    Smoking status: Former Smoker     Packs/day: 1.50     Years: 63.00     Pack  years: 94.50     Types: Cigarettes     Last attempt to quit: 2016     Years since quittin.4    Smokeless tobacco: Never Used   Substance Use Topics    Alcohol use: No    Drug use: No     Review of Systems   Constitutional: Negative for fever and unexpected weight change.   HENT: Negative for nosebleeds and sore throat.    Eyes: Negative for visual disturbance.   Respiratory: Positive for shortness of breath.    Cardiovascular: Negative for chest pain.   Gastrointestinal: Negative for anal bleeding, diarrhea, nausea and vomiting.   Genitourinary: Negative for dysuria, hematuria and vaginal bleeding.   Musculoskeletal: Negative for back pain.   Skin: Negative for rash.   Neurological: Positive for dizziness, weakness and light-headedness. Negative for facial asymmetry and speech difficulty.   Hematological: Does not bruise/bleed easily.       Physical Exam     Initial Vitals [18 1334]   BP Pulse Resp Temp SpO2   (!) 156/68 (!) 59 20 98.4 °F (36.9 °C) 95 %      MAP       --         Physical Exam    Constitutional: She is cooperative.   HENT:   Head: Normocephalic and atraumatic.   Mouth/Throat: Mucous membranes are normal.   Eyes: No scleral icterus.   Neck: No JVD present.   Pulmonary/Chest: Tachypnea noted.   Abdominal: She exhibits no distension. There is no tenderness.   Musculoskeletal: Normal range of motion.   Neurological: She is alert.   Skin: Skin is warm and dry.         ED Course   Procedures  Labs Reviewed   CBC W/ AUTO DIFFERENTIAL - Abnormal; Notable for the following components:       Result Value    RDW 14.9 (*)     Lymph # 0.7 (*)     Mono # 0.1 (*)     Gran% 83.4 (*)     Lymph% 12.5 (*)     Mono% 2.5 (*)     All other components within normal limits   COMPREHENSIVE METABOLIC PANEL - Abnormal; Notable for the following components:    Glucose 189 (*)     Calcium 10.8 (*)     Total Bilirubin 1.8 (*)     eGFR if  40.4 (*)     eGFR if non  35.0 (*)     All  other components within normal limits   B-TYPE NATRIURETIC PEPTIDE - Abnormal; Notable for the following components:    BNP 1,686 (*)     All other components within normal limits   TROPONIN I   PROTIME-INR   MAGNESIUM   PHOSPHORUS   HEMOGLOBIN A1C   HEMOGLOBIN A1C    Narrative:     ADD ON HEMOGLOBIN A1C PER DR MATTHEW ALMAZAN  11/16/2018  19:16    PTH, INTACT   VITAMIN D     EKG Readings: (Independently Interpreted)   Initial Reading: No STEMI. Rhythm: Atrial Fibrillation. Heart Rate: 68.     ECG Results    None       Imaging Results          X-Ray Chest AP Portable (Final result)  Result time 11/16/18 18:06:07    Final result by Bigg Fleming II, MD (11/16/18 18:06:07)                 Impression:      Cardiomegaly with increased left greater than right airspace opacities, likely developing pulmonary edema.    Electronically signed by resident: Juan Floyd  Date:    11/16/2018  Time:    17:52    Electronically signed by: Bigg Fleming  Date:    11/16/2018  Time:    18:06             Narrative:    EXAMINATION:  XR CHEST AP PORTABLE    CLINICAL HISTORY:  Shortness of breath    TECHNIQUE:  Single frontal view of the chest was performed.    COMPARISON:  Chest radiograph 11/10/2018    FINDINGS:  Left chest wall cardiac pacing device is stable.    Increased interstitial opacities bilaterally, with increased left greater than right basilar airspace opacities.  No pneumothorax or pleural effusion.  Aortic knob is calcified.  Cardiac silhouette is enlarged.    Included bones and soft tissues are normal.                              X-Rays:   Independently Interpreted Readings:   Chest X-Ray: Cardiomegaly present.  Increased vascular markings consistent with CHF are present.     Medical Decision Making:   Initial Assessment:   83 year old female presenting with generalized weakness, lightheadedness, and exertional SOB.               Attending Attestation:             Attending ED Notes:   There appears to be multiple  causes for her symptoms however I wonder if this is more cardiac as a source. She has had a 2d echo almost 1 year ago, she may require another this visit.    Patient was evaluated in the ED with blood work x-ray patient appears to be having difficulty doing her daily chores because of continued congestive heart failure patient will be evaluated by Cardiology and hopes of getting a 2D echo however we will go and place the patient in the hospital for further evaluation we will go ahead and give her some Lasix now             Clinical Impression:   The primary encounter diagnosis was Acute on chronic systolic congestive heart failure. Diagnoses of Syncope, Weakness, Elevated brain natriuretic peptide (BNP) level, SOB (shortness of breath), CHF (congestive heart failure), Abnormal EKG, Acute hypoxemic respiratory failure, and NYHA Class III Heart failure with preserved left ventricular function (HFpEF) were also pertinent to this visit.      Disposition:   Disposition: Placed in Observation  Condition: Serious                        Marv Chavez MD  11/21/18 [id number]

## 2018-11-16 NOTE — ED TRIAGE NOTES
Pt with complains of nonproductive cough for the past couple of years and dizziness for the past 6 months or more.  Pt states she has seen her doctors for these complaints but has found no answers.

## 2018-11-16 NOTE — ED NOTES
Pt placed on continuous cardiac and pulse ox monitoring with non-invasive blood pressure to cycle every 30 minutes.  Atrial fibrillation with a HR in the 60s; VS stable.  Bed locked in lowest position; side rails up and locked x 2; call light and personal belongings within reach.  Pt instructed to alert nurse for assistance before attempting to get out of bed; verbalizes understanding.  Pt denies needs or complaints at this time.  Family member at bedside; will continue to monitor pt.

## 2018-11-17 PROBLEM — E21.0 PRIMARY HYPERPARATHYROIDISM: Status: ACTIVE | Noted: 2018-11-17

## 2018-11-17 LAB
ALBUMIN SERPL BCP-MCNC: 3.1 G/DL
ANION GAP SERPL CALC-SCNC: 11 MMOL/L
ANION GAP SERPL CALC-SCNC: 12 MMOL/L
ASCENDING AORTA: 2.78 CM
AV MEAN GRADIENT: 4.59 MMHG
AV PEAK GRADIENT: 6.97 MMHG
AV VALVE AREA: 1.75 CM2
BASOPHILS # BLD AUTO: 0.02 K/UL
BASOPHILS NFR BLD: 0.3 %
BSA FOR ECHO PROCEDURE: 1.69 M2
BUN SERPL-MCNC: 25 MG/DL
BUN SERPL-MCNC: 27 MG/DL
CALCIUM CREATININE RATIO: 0.46
CALCIUM SERPL-MCNC: 10.4 MG/DL
CALCIUM SERPL-MCNC: 9.7 MG/DL
CALCIUM UR-MCNC: 8.2 MG/DL
CHLORIDE SERPL-SCNC: 105 MMOL/L
CHLORIDE SERPL-SCNC: 106 MMOL/L
CO2 SERPL-SCNC: 23 MMOL/L
CO2 SERPL-SCNC: 25 MMOL/L
CREAT SERPL-MCNC: 1.6 MG/DL
CREAT SERPL-MCNC: 1.7 MG/DL
CREAT UR-MCNC: 18 MG/DL
CV ECHO LV RWT: 0.33 CM
DIFFERENTIAL METHOD: ABNORMAL
DOP CALC AO PEAK VEL: 1.32 M/S
DOP CALC AO VTI: 19.12 CM
DOP CALC LVOT AREA: 2.77 CM2
DOP CALC LVOT DIAMETER: 1.88 CM
DOP CALC LVOT STROKE VOLUME: 33.52 CM3
DOP CALCLVOT PEAK VEL VTI: 12.08 CM
ECHO LV POSTERIOR WALL: 0.78 CM (ref 0.6–1.1)
EOSINOPHIL # BLD AUTO: 0 K/UL
EOSINOPHIL NFR BLD: 0.1 %
ERYTHROCYTE [DISTWIDTH] IN BLOOD BY AUTOMATED COUNT: 14.8 %
EST. GFR  (AFRICAN AMERICAN): 31.9 ML/MIN/1.73 M^2
EST. GFR  (AFRICAN AMERICAN): 34.3 ML/MIN/1.73 M^2
EST. GFR  (NON AFRICAN AMERICAN): 27.7 ML/MIN/1.73 M^2
EST. GFR  (NON AFRICAN AMERICAN): 29.8 ML/MIN/1.73 M^2
FRACTIONAL SHORTENING: 35 % (ref 28–44)
GLUCOSE SERPL-MCNC: 142 MG/DL
GLUCOSE SERPL-MCNC: 147 MG/DL
HCT VFR BLD AUTO: 36.5 %
HGB BLD-MCNC: 11.7 G/DL
IMM GRANULOCYTES # BLD AUTO: 0.04 K/UL
IMM GRANULOCYTES NFR BLD AUTO: 0.5 %
INTERVENTRICULAR SEPTUM: 0.93 CM (ref 0.6–1.1)
IVRT: 0.08 MSEC
LA MAJOR: 7.21 CM
LA MINOR: 7.22 CM
LA WIDTH: 5.85 CM
LEFT ATRIUM SIZE: 5.57 CM
LEFT ATRIUM VOLUME INDEX: 118.2 ML/M2
LEFT ATRIUM VOLUME: 199.83 CM3
LEFT INTERNAL DIMENSION IN SYSTOLE: 3.06 CM (ref 2.1–4)
LEFT VENTRICLE DIASTOLIC VOLUME INDEX: 60.2 ML/M2
LEFT VENTRICLE DIASTOLIC VOLUME: 101.74 ML
LEFT VENTRICLE MASS INDEX: 78.6 G/M2
LEFT VENTRICLE SYSTOLIC VOLUME INDEX: 21.7 ML/M2
LEFT VENTRICLE SYSTOLIC VOLUME: 36.68 ML
LEFT VENTRICULAR INTERNAL DIMENSION IN DIASTOLE: 4.69 CM (ref 3.5–6)
LEFT VENTRICULAR MASS: 132.87 G
LYMPHOCYTES # BLD AUTO: 0.9 K/UL
LYMPHOCYTES NFR BLD: 12 %
MAGNESIUM SERPL-MCNC: 1.9 MG/DL
MCH RBC QN AUTO: 30.3 PG
MCHC RBC AUTO-ENTMCNC: 32.1 G/DL
MCV RBC AUTO: 95 FL
MONOCYTES # BLD AUTO: 0.8 K/UL
MONOCYTES NFR BLD: 10.3 %
NEUTROPHILS # BLD AUTO: 5.8 K/UL
NEUTROPHILS NFR BLD: 76.8 %
NRBC BLD-RTO: 0 /100 WBC
PHOSPHATE SERPL-MCNC: 2.9 MG/DL
PISA TR MAX VEL: 3.1 M/S
PLATELET # BLD AUTO: 209 K/UL
PMV BLD AUTO: 12.3 FL
POTASSIUM SERPL-SCNC: 3.3 MMOL/L
POTASSIUM SERPL-SCNC: 3.6 MMOL/L
RA MAJOR: 7.34 CM
RA PRESSURE: 8 MMHG
RA WIDTH: 5.58 CM
RBC # BLD AUTO: 3.86 M/UL
RIGHT VENTRICULAR END-DIASTOLIC DIMENSION: 4.2 CM
RV TISSUE DOPPLER FREE WALL SYSTOLIC VELOCITY 1 (APICAL 4 CHAMBER VIEW): 5.51 M/S
SINUS: 2.77 CM
SODIUM SERPL-SCNC: 139 MMOL/L
SODIUM SERPL-SCNC: 143 MMOL/L
STJ: 2.31 CM
TDI LATERAL: 0.1
TDI SEPTAL: 0.06
TDI: 0.08
TR MAX PG: 38.44 MMHG
TRICUSPID ANNULAR PLANE SYSTOLIC EXCURSION: 1.59 CM
TV REST PULMONARY ARTERY PRESSURE: 46.44 MMHG
WBC # BLD AUTO: 7.49 K/UL

## 2018-11-17 PROCEDURE — G8980 MOBILITY D/C STATUS: HCPCS | Mod: CI

## 2018-11-17 PROCEDURE — 51798 US URINE CAPACITY MEASURE: CPT

## 2018-11-17 PROCEDURE — 83735 ASSAY OF MAGNESIUM: CPT

## 2018-11-17 PROCEDURE — 25000003 PHARM REV CODE 250: Performed by: PEDIATRICS

## 2018-11-17 PROCEDURE — 97161 PT EVAL LOW COMPLEX 20 MIN: CPT

## 2018-11-17 PROCEDURE — G8978 MOBILITY CURRENT STATUS: HCPCS | Mod: CI

## 2018-11-17 PROCEDURE — 36415 COLL VENOUS BLD VENIPUNCTURE: CPT

## 2018-11-17 PROCEDURE — 20600001 HC STEP DOWN PRIVATE ROOM

## 2018-11-17 PROCEDURE — 80069 RENAL FUNCTION PANEL: CPT

## 2018-11-17 PROCEDURE — 25000003 PHARM REV CODE 250: Performed by: HOSPITALIST

## 2018-11-17 PROCEDURE — 80048 BASIC METABOLIC PNL TOTAL CA: CPT

## 2018-11-17 PROCEDURE — 99232 SBSQ HOSP IP/OBS MODERATE 35: CPT | Mod: GC,,, | Performed by: HOSPITALIST

## 2018-11-17 PROCEDURE — 63600175 PHARM REV CODE 636 W HCPCS: Performed by: INTERNAL MEDICINE

## 2018-11-17 PROCEDURE — 25000003 PHARM REV CODE 250: Performed by: INTERNAL MEDICINE

## 2018-11-17 PROCEDURE — 82340 ASSAY OF CALCIUM IN URINE: CPT

## 2018-11-17 PROCEDURE — G8979 MOBILITY GOAL STATUS: HCPCS | Mod: CI

## 2018-11-17 PROCEDURE — 25000003 PHARM REV CODE 250: Performed by: STUDENT IN AN ORGANIZED HEALTH CARE EDUCATION/TRAINING PROGRAM

## 2018-11-17 PROCEDURE — 85025 COMPLETE CBC W/AUTO DIFF WBC: CPT

## 2018-11-17 RX ORDER — FAMOTIDINE 20 MG/1
20 TABLET, FILM COATED ORAL ONCE
Status: COMPLETED | OUTPATIENT
Start: 2018-11-17 | End: 2018-11-17

## 2018-11-17 RX ORDER — POTASSIUM CHLORIDE 20 MEQ/15ML
40 SOLUTION ORAL ONCE
Status: COMPLETED | OUTPATIENT
Start: 2018-11-17 | End: 2018-11-17

## 2018-11-17 RX ADMIN — LOSARTAN POTASSIUM 25 MG: 25 TABLET, FILM COATED ORAL at 08:11

## 2018-11-17 RX ADMIN — ROSUVASTATIN CALCIUM 10 MG: 5 TABLET, FILM COATED ORAL at 08:11

## 2018-11-17 RX ADMIN — BENZONATATE 100 MG: 100 CAPSULE ORAL at 09:11

## 2018-11-17 RX ADMIN — FAMOTIDINE 20 MG: 20 TABLET ORAL at 09:11

## 2018-11-17 RX ADMIN — FUROSEMIDE 40 MG: 10 INJECTION, SOLUTION INTRAMUSCULAR; INTRAVENOUS at 08:11

## 2018-11-17 RX ADMIN — APIXABAN 2.5 MG: 2.5 TABLET, FILM COATED ORAL at 08:11

## 2018-11-17 RX ADMIN — APIXABAN 2.5 MG: 2.5 TABLET, FILM COATED ORAL at 09:11

## 2018-11-17 RX ADMIN — POTASSIUM CHLORIDE 40 MEQ: 20 SOLUTION ORAL at 08:11

## 2018-11-17 RX ADMIN — DONEPEZIL HYDROCHLORIDE 5 MG: 5 TABLET, FILM COATED ORAL at 09:11

## 2018-11-17 RX ADMIN — DILTIAZEM HYDROCHLORIDE 360 MG: 180 CAPSULE, COATED, EXTENDED RELEASE ORAL at 08:11

## 2018-11-17 RX ADMIN — POTASSIUM CHLORIDE 40 MEQ: 20 SOLUTION ORAL at 02:11

## 2018-11-17 RX ADMIN — ASPIRIN 81 MG: 81 TABLET, COATED ORAL at 08:11

## 2018-11-17 NOTE — NURSING
Patient received one time dose of lasix 40mg IVP at 1739 while in ED yesterday. Patient bladder scan d/t RN unaware of patient voiding on arrival to the floor. RN educated patient on the importance of notifying when going to the restroom, so that I/Os can be adequate. Patient voiced understanding. Will cont to monitor patient.

## 2018-11-17 NOTE — NURSING TRANSFER
Nursing Transfer Note      11/16/2018     Transfer to C[facility] Rm 325 from ED     Transfer via stretcher     Transfer with cardiac monitoring and 2L O2 via NC     Transported by transport     Medicines sent: none    Chart send with patient: Yes    Notified: Patient family notified via patient.     Patient reassessed at: 11/16/2018 @ 2040    Upon arrival to floor: Patient admitted to CSU Patient alert and oriented to room. Cardiac monitoring maintained throughout transfer. Patient connected to telemetry, assessed, denies any pain, oriented to room, and instructed to call for assistance or any needs. Call bell in reach. Reviewed goals for today. Will continue to monitor

## 2018-11-17 NOTE — PROGRESS NOTES
[facility] Hospital Medicine  Progress Note    Patient Name: Christy Webber  MRN: [id number]  Patient Class: OP- Observation   Admission Date: 11/16/2018  Length of Stay: 0 days  Attending Physician: Sagar Luna IV, MD  Primary Care Provider: Jeanette Olmos [facility] Medicine Team: [facility] HOSP MED 5 Anthony Melendez MD    Subjective:     Principal Problem:Acute hypoxemic respiratory failure    HPI:  81 y.o.  F with a medical history of COPD (uses oxygen intermittently), HTN, HLD, Atrial fibrillation on Eliquis, ex smoker, s/p PPM (08/16)  for SSS (was suppose to get a watchman device but lost f/u), chronic cough presents to the ED with a 2 week history of progressive weakness along with occasional palpitations. Associated with HYLTON, orthopnea, cough and SOB. Denies recent weight gain or swelling of the legs. Patient complains of epigastric chest pain that does not radiate anywhere. Patient did not have similar symptoms before. Patient went to [place] Tuesday (11/16) because of these symptoms. As per patient, they discharged her on prednisone. Patient progressively felt weaker which caused her to come to [facility] today.     In the ED patient received one dose of lasix 20 mg. CXR shows cardiomegaly with increased left greater than right airspace opacities, likely developing pulmonary edema. BNP >1000. Troponin .025. On examination patient had a JVP between 10-14. 1+pitting edema. Crackles heard throughout lungs.             Hospital Course:  Patient received 40 mg of lasix upon admission. Patient will get 40 mg everyday. Ordered echo . Results are pending    Interval History: Patient states that she is doing much better today. She denies being SOB. Patient complained of chest pain last night that resolved on her own. I/O not being accurately reported. On physical exam patient had elevated JVP however, much less crackles than yesterday.     Review of Systems   Constitutional: Negative for activity  change, chills, diaphoresis and fatigue.   Respiratory: Positive for cough and shortness of breath. Negative for choking and chest tightness.    Cardiovascular: Positive for palpitations. Negative for chest pain and leg swelling.   Gastrointestinal: Negative for abdominal distention, abdominal pain and anal bleeding.   Musculoskeletal: Negative for arthralgias and back pain.   Psychiatric/Behavioral: Negative for confusion and decreased concentration.     Objective:     Vital Signs (Most Recent):  Temp: 97.4 °F (36.3 °C) (11/17/18 0724)  Pulse: 66 (11/17/18 1505)  Resp: 18 (11/17/18 1155)  BP: 138/61 (11/17/18 1155)  SpO2: 95 % (11/17/18 1155) Vital Signs (24h Range):  Temp:  [97.4 °F (36.3 °C)-98.1 °F (36.7 °C)] 97.4 °F (36.3 °C)  Pulse:  [60-83] 66  Resp:  [18-22] 18  SpO2:  [95 %-100 %] 95 %  BP: (110-175)/(59-83) 138/61     Weight: 63 kg (138 lb 14.2 oz)  Body mass index is 23.84 kg/m².    Intake/Output Summary (Last 24 hours) at 11/17/2018 1511  Last data filed at 11/17/2018 0500  Gross per 24 hour   Intake 480 ml   Output 50 ml   Net 430 ml      Physical Exam   Constitutional: She is oriented to person, place, and time. No distress.   Cardiovascular: Intact distal pulses. Exam reveals no gallop and no friction rub.   Murmur heard.  Pacemaker on left side   Pulmonary/Chest: No stridor. She is in respiratory distress. She has wheezes. She has no rales. She exhibits tenderness.   Crackles throughout lungs      Musculoskeletal: She exhibits no edema.   Neurological: She is alert and oriented to person, place, and time.   Skin: Skin is warm and dry. She is not diaphoretic.   Psychiatric: She has a normal mood and affect. Her behavior is normal.       Significant Labs:   BMP:   Recent Labs   Lab 11/17/18  0354 11/17/18  1057   * 142*    143   K 3.3* 3.6    106   CO2 23 25   BUN 27* 25*   CREATININE 1.6* 1.7*   CALCIUM 9.7 10.4   MG 1.9  --      CBC:   Recent Labs   Lab 11/16/18  6577  11/17/18  0354   WBC 5.21 7.49   HGB 13.6 11.7*   HCT 42.3 36.5*    209     CMP:   Recent Labs   Lab 11/16/18  1507 11/17/18  0354 11/17/18  1057    139 143   K 4.2 3.3* 3.6    105 106   CO2 25 23 25   * 147* 142*   BUN 21 27* 25*   CREATININE 1.4 1.6* 1.7*   CALCIUM 10.8* 9.7 10.4   PROT 7.4  --   --    ALBUMIN 3.8 3.1*  --    BILITOT 1.8*  --   --    ALKPHOS 86  --   --    AST 26  --   --    ALT 14  --   --    ANIONGAP 12 11 12   EGFRNONAA 35.0* 29.8* 27.7*       Significant Imaging: I have reviewed all pertinent imaging results/findings within the past 24 hours.    Assessment/Plan:      * Acute hypoxemic respiratory failure    -Please see NYHA CLASS III HR for a better understanding. Otherwise patient started on lasix 40 mg BID. However, today we will stop the lasix as patient's potassium was on the lower end. Patient on O2 as needed. Will follow up with echo       Primary hyperparathyroidism    Calcium today 10.4. We have ordered Calcium/Cr ratio as well as PTH       Pulmonary hypertension           NYHA Class III Heart failure with preserved left ventricular function (HFpEF)    81 y.o. F with a medical history of COPD (uses oxygen intermittently), HTN, HLD, Atrial fibrillation on Eliquis, ex smoker, s/p PPM (08/16)  for SSS (was suppose to get a watchman device but lost f/u), chronic cough presents to the ED with a 2 week history of progressive weakness along with occasional palpitations. Associated with HYLTON, orthopnea, cough and SOB. Denies recent weight gain or swelling of the legs. Patient complains of epigastric chest pain that does not radiate anywhere. Patient did not have similar symptoms before. CXR shows cardiomegaly.   Plan  1) Will reevaluate medications on discharge. However, we have restarted patient's ARB, CCB and statin. Patient use to take lasix (40 mg BID) however, has never got it refilled since it ran out.      Current use of long term anticoagulation    Patient on  abixiban 2.5 mg BID       Vascular dementia without behavioral disturbance    Patient on donepezil        Dyslipidemia    Start rosuvastatin     Centrilobular emphysema    CT done on March, 2017 shows the trachea is patent and free of any intraluminal filling defects. The lungs are well-expanded. There is bilateral apical pleural thickening and scar. There is bilateral centrilobular emphysema. There is bilateral predominantly basilar atelectatic versus fibrotic change. There is mild bilateral pleural fluid.     Chronic kidney disease, stage III (moderate)    History of partial left nephrectomy.     Essential hypertension    Resumed cozaar and diltiazem. Holding HCTZ       Permanent atrial fibrillation    Patient on eliquis 2.5 mg BID         VTE Risk Mitigation (From admission, onward)        Ordered     apixaban tablet 2.5 mg  2 times daily      11/16/18 2937              Anthony Melendez MD  Department of Hospital Medicine   [facility]

## 2018-11-17 NOTE — PLAN OF CARE
Problem: Physical Therapy Goal  Goal: Physical Therapy Goal  Outcome: Outcome(s) achieved Date Met: 11/17/18    Pt is safe and independent with all mobility. Pt no longer requires PT services.  Appropriate transfer level with nursing staff: Bed <> Chair:  Stand Pivot with Supervision with 1 person.    Megan Cruz PT, DPT  11/17/2018  Pager: [phone]

## 2018-11-17 NOTE — ASSESSMENT & PLAN NOTE
81 y.o. F with a medical history of COPD (uses oxygen intermittently), HTN, HLD, Atrial fibrillation on Eliquis, ex smoker, s/p PPM (08/16)  for SSS (was suppose to get a watchman device but lost f/u), chronic cough presents to the ED with a 2 week history of progressive weakness along with occasional palpitations. Associated with HYLTON, orthopnea, cough and SOB. Denies recent weight gain or swelling of the legs. Patient complains of epigastric chest pain that does not radiate anywhere. Patient did not have similar symptoms before. CXR shows cardiomegaly.   Plan  1) Will reevaluate medications on discharge. However, we have restarted patient's ARB, CCB and statin. Patient use to take lasix (40 mg BID) however, has never got it refilled since it ran out.

## 2018-11-17 NOTE — ASSESSMENT & PLAN NOTE
-Please see NYHA CLASS III HR for a better understanding. Otherwise patient started on lasix 40 mg BID. Patient on O2 as needed. Will follow up with echo

## 2018-11-17 NOTE — HPI
81 y.o. F with a medical history of COPD (uses oxygen intermittently), HTN, HLD, Atrial fibrillation on Eliquis, ex smoker, s/p PPM (08/16)  for SSS (was suppose to get a watchman device but lost f/u), chronic cough presents to the ED with a 2 week history of progressive weakness along with occasional palpitations. Associated with HYLTON, orthopnea, cough and SOB. Denies recent weight gain or swelling of the legs. Patient complains of epigastric chest pain that does not radiate anywhere. Patient did not have similar symptoms before. Patient went to [place] Tuesday (11/16) because of these symptoms. As per patient, they discharged her on prednisone. Patient progressively felt weaker which caused her to come to [facility] today.     In the ED patient received one dose of lasix 20 mg. CXR shows cardiomegaly with increased left greater than right airspace opacities, likely developing pulmonary edema. BNP >1000. Troponin .025. On examination patient had a JVP between 10-14. 1+pitting edema. Crackles heard throughout lungs.

## 2018-11-17 NOTE — SUBJECTIVE & OBJECTIVE
Interval History: Patient states that she is doing much better today. She denies being SOB. Patient complained of chest pain last night that resolved on her own. I/O not being accurately reported. On physical exam patient had elevated JVP however, much less crackles than yesterday.     Review of Systems   Constitutional: Negative for activity change, chills, diaphoresis and fatigue.   Respiratory: Positive for cough and shortness of breath. Negative for choking and chest tightness.    Cardiovascular: Positive for palpitations. Negative for chest pain and leg swelling.   Gastrointestinal: Negative for abdominal distention, abdominal pain and anal bleeding.   Musculoskeletal: Negative for arthralgias and back pain.   Psychiatric/Behavioral: Negative for confusion and decreased concentration.     Objective:     Vital Signs (Most Recent):  Temp: 97.4 °F (36.3 °C) (11/17/18 0724)  Pulse: 66 (11/17/18 1505)  Resp: 18 (11/17/18 1155)  BP: 138/61 (11/17/18 1155)  SpO2: 95 % (11/17/18 1155) Vital Signs (24h Range):  Temp:  [97.4 °F (36.3 °C)-98.1 °F (36.7 °C)] 97.4 °F (36.3 °C)  Pulse:  [60-83] 66  Resp:  [18-22] 18  SpO2:  [95 %-100 %] 95 %  BP: (110-175)/(59-83) 138/61     Weight: 63 kg (138 lb 14.2 oz)  Body mass index is 23.84 kg/m².    Intake/Output Summary (Last 24 hours) at 11/17/2018 1511  Last data filed at 11/17/2018 0500  Gross per 24 hour   Intake 480 ml   Output 50 ml   Net 430 ml      Physical Exam   Constitutional: She is oriented to person, place, and time. No distress.   Cardiovascular: Intact distal pulses. Exam reveals no gallop and no friction rub.   Murmur heard.  Pacemaker on left side   Pulmonary/Chest: No stridor. She is in respiratory distress. She has wheezes. She has no rales. She exhibits tenderness.   Crackles throughout lungs      Musculoskeletal: She exhibits no edema.   Neurological: She is alert and oriented to person, place, and time.   Skin: Skin is warm and dry. She is not diaphoretic.    Psychiatric: She has a normal mood and affect. Her behavior is normal.       Significant Labs:   BMP:   Recent Labs   Lab 11/17/18  0354 11/17/18  1057   * 142*    143   K 3.3* 3.6    106   CO2 23 25   BUN 27* 25*   CREATININE 1.6* 1.7*   CALCIUM 9.7 10.4   MG 1.9  --      CBC:   Recent Labs   Lab 11/16/18  1507 11/17/18  0354   WBC 5.21 7.49   HGB 13.6 11.7*   HCT 42.3 36.5*    209     CMP:   Recent Labs   Lab 11/16/18  1507 11/17/18  0354 11/17/18  1057    139 143   K 4.2 3.3* 3.6    105 106   CO2 25 23 25   * 147* 142*   BUN 21 27* 25*   CREATININE 1.4 1.6* 1.7*   CALCIUM 10.8* 9.7 10.4   PROT 7.4  --   --    ALBUMIN 3.8 3.1*  --    BILITOT 1.8*  --   --    ALKPHOS 86  --   --    AST 26  --   --    ALT 14  --   --    ANIONGAP 12 11 12   EGFRNONAA 35.0* 29.8* 27.7*       Significant Imaging: I have reviewed all pertinent imaging results/findings within the past 24 hours.

## 2018-11-17 NOTE — HOSPITAL COURSE
Patient received 40 mg of lasix upon admission. Patient will get 40 mg everyday. Patient had echo done which showed low normal left ventricular systolic function with an estimated ejection fraction of 50%. Patient has a aortic valve area is 1.75 cm2; peak velocity is 1.32 m/s; mean gradient is 4.59 mmHg.

## 2018-11-17 NOTE — PLAN OF CARE
Problem: Patient Care Overview  Goal: Plan of Care Review  Outcome: Ongoing (interventions implemented as appropriate)  Patient diuresed with IV Lasix Push. K replaced with PO potassium. Plan of care discussed with patient, no questions at this time. Fall pre-cautions in place. Will continue to monitor.

## 2018-11-17 NOTE — ASSESSMENT & PLAN NOTE
-Please see NYHA CLASS III HR for a better understanding. Otherwise patient started on lasix 40 mg BID. However, today we will stop the lasix as patient's potassium was on the lower end. Patient on O2 as needed. Will follow up with echo

## 2018-11-17 NOTE — H&P
[facility] Hospital Medicine  History & Physical    Patient Name: Christy Webber  MRN: [id number]  Admission Date: 11/16/2018  Attending Physician: Sagar Luna IV, MD   Primary Care Provider: Jeanette Olmos [facility] Medicine Team: [facility] HOSP MED 5 Anthony Melendez MD     Patient information was obtained from patient, relative(s) and ER records.     Subjective:     Principal Problem:Acute hypoxemic respiratory failure    Chief Complaint:   Chief Complaint   Patient presents with    Fatigue     onset x1 month ago, increased for last week, seen by PCP a few days ago for symptoms, told to follow up in 4 months    Dizziness        HPI: 81 y.o.  F with a medical history of COPD (uses oxygen intermittently), HTN, HLD, Atrial fibrillation on Eliquis, ex smoker, s/p PPM (08/16)  for SSS (was suppose to get a watchman device but lost f/u), chronic cough presents to the ED with a 2 week history of progressive weakness along with occasional palpitations. Associated with HYLTON, orthopnea, cough and SOB. Denies recent weight gain or swelling of the legs. Patient complains of epigastric chest pain that does not radiate anywhere. Patient did not have similar symptoms before. Patient went to [facility] [place] [facility] Tuesday (11/16) because of these symptoms. As per patient, they discharged her on prednisone. Patient progressively felt weaker which caused her to come to [facility] today.     In the ED patient received one dose of lasix 20 mg. CXR shows cardiomegaly with increased left greater than right airspace opacities, likely developing pulmonary edema. BNP >1000. Troponin .025. On examination patient had a JVP between 10-14. 1+pitting edema. Crackles heard throughout lungs.             Past Medical History:   Diagnosis Date    Atrial flutter     CHF (congestive heart failure)     Hyperlipidemia     Hypertension     Non-STEMI (non-ST elevated myocardial infarction)     Pacemaker 08/2016    Single lead [employer] Pacemaker  for sick sinus syndrome    Sick sinus syndrome     SSS (sick sinus syndrome) 8/24/2016    - pacemaker in place       Past Surgical History:   Procedure Laterality Date    CARDIAC PACEMAKER PLACEMENT      ESOPHAGOGASTRODUODENOSCOPY (EGD) N/A 3/13/2017    Performed [facility]    HYSTERECTOMY      KIDNEY SURGERY         Review of patient's allergies indicates:  No Known Allergies    No current facility-administered medications on file prior to encounter.      Current Outpatient Medications on File Prior to Encounter   Medication Sig    albuterol (PROVENTIL/VENTOLIN HFA) 90 mcg/actuation inhaler Inhale 1-2 puffs into the lungs every 6 (six) hours as needed for Wheezing. Rescue    albuterol (VENTOLIN HFA) 90 mcg/actuation inhaler Inhale 2 puffs into the lungs every 6 (six) hours as needed for Wheezing. Rescue    apixaban 2.5 mg Tab Take 1 tablet (2.5 mg total) by mouth 2 (two) times daily.    aspirin (ECOTRIN) 81 MG EC tablet Take 81 mg by mouth once daily.    benzonatate (TESSALON) 100 MG capsule Take 1 capsule (100 mg total) by mouth 3 (three) times daily as needed for Cough.    cholecalciferol, vitamin D3, 1,000 unit capsule Take 1 capsule (1,000 Units total) by mouth once daily.    diltiaZEM (CARDIZEM CD) 240 MG 24 hr capsule Take 1 capsule (240 mg total) by mouth once daily. (Patient taking differently: Take 360 mg by mouth once daily. )    donepezil (ARICEPT) 5 MG tablet Take 1 tablet (5 mg total) by mouth every evening.    hydrALAZINE (APRESOLINE) 25 MG tablet Take 25 mg by mouth 3 (three) times daily.    losartan (COZAAR) 25 MG tablet Take 25 mg by mouth once daily.    predniSONE (DELTASONE) 20 MG tablet Take 2 tablets (40 mg total) by mouth once daily. for 5 days    rosuvastatin (CRESTOR) 10 MG tablet Take 1 tablet (10 mg total) by mouth once daily. HOLD FOR ONE WEEK AND RESTART ON 8/6/18     Family History     None        Tobacco Use    Smoking status: Former Smoker      Packs/day: 1.50     Years: 63.00     Pack years: 94.50     Types: Cigarettes     Last attempt to quit: 2016     Years since quittin.4    Smokeless tobacco: Never Used   Substance and Sexual Activity    Alcohol use: No    Drug use: No    Sexual activity: No     Review of Systems   Constitutional: Positive for activity change and fatigue. Negative for chills, fever and unexpected weight change.   HENT: Negative for congestion, postnasal drip, sinus pressure and sinus pain.    Respiratory: Positive for cough, choking, chest tightness and shortness of breath.    Cardiovascular: Positive for chest pain and palpitations. Negative for leg swelling.   Musculoskeletal: Negative for arthralgias, back pain and gait problem.   Neurological: Positive for dizziness. Negative for tremors, seizures, syncope, speech difficulty, weakness and headaches.   Hematological: Negative for adenopathy.   Psychiatric/Behavioral: Negative for agitation, behavioral problems and confusion.     Objective:     Vital Signs (Most Recent):  Temp: 98.4 °F (36.9 °C) (18 1334)  Pulse: 70 (18 1828)  Resp: 20 (18 1651)  BP: (!) 160/83 (18 1828)  SpO2: 96 % (18 182) Vital Signs (24h Range):  Temp:  [98.4 °F (36.9 °C)] 98.4 °F (36.9 °C)  Pulse:  [59-70] 70  Resp:  [18-22] 20  SpO2:  [95 %-97 %] 96 %  BP: (110-160)/(59-93) 160/83     Weight: 63.5 kg (140 lb)  Body mass index is 24.03 kg/m².    Physical Exam   Constitutional: She is oriented to person, place, and time. No distress.   HENT:   Head: Normocephalic and atraumatic.   Eyes: Conjunctivae and EOM are normal. Pupils are equal, round, and reactive to light.   Cardiovascular: Intact distal pulses. Exam reveals no gallop and no friction rub.   Murmur heard.  Pacemaker on left side   Pulmonary/Chest: No stridor. She is in respiratory distress. She has no wheezes. She has no rales. She exhibits tenderness.   Crackles throughout lungs      Abdominal: Soft.  Bowel sounds are normal. She exhibits no distension and no mass. There is no tenderness. There is no rebound and no guarding.   Musculoskeletal: She exhibits no edema or tenderness.   Neurological: She is alert and oriented to person, place, and time.   Skin: Skin is warm and dry. She is not diaphoretic.   Psychiatric: She has a normal mood and affect. Her behavior is normal.         CRANIAL NERVES     CN III, IV, VI   Pupils are equal, round, and reactive to light.  Extraocular motions are normal.        Significant Labs:   BMP:   Recent Labs   Lab 11/16/18  1507   *      K 4.2      CO2 25   BUN 21   CREATININE 1.4   CALCIUM 10.8*   MG 2.3     CBC:   Recent Labs   Lab 11/16/18  1507   WBC 5.21   HGB 13.6   HCT 42.3        CMP:   Recent Labs   Lab 11/16/18  1507      K 4.2      CO2 25   *   BUN 21   CREATININE 1.4   CALCIUM 10.8*   PROT 7.4   ALBUMIN 3.8   BILITOT 1.8*   ALKPHOS 86   AST 26   ALT 14   ANIONGAP 12   EGFRNONAA 35.0*     Cardiac Markers:   Recent Labs   Lab 11/16/18  1507   BNP 1,686*     Troponin:   Recent Labs   Lab 11/16/18  1507   TROPONINI 0.025       Significant Imaging: I have reviewed all pertinent imaging results/findings within the past 24 hours.    Assessment/Plan:     * Acute hypoxemic respiratory failure    -Please see NYHA CLASS III HR for a better understanding. Otherwise patient started on lasix 40 mg BID. Patient on O2 as needed. Will follow up with echo       NYHA Class III Heart failure with preserved left ventricular function (HFpEF)    81 y.o. F with a medical history of COPD (uses oxygen intermittently), HTN, HLD, Atrial fibrillation on Eliquis, ex smoker, s/p PPM (08/16)  for SSS (was suppose to get a watchman device but lost f/u), chronic cough presents to the ED with a 2 week history of progressive weakness along with occasional palpitations. Associated with HYLTON, orthopnea, cough and SOB. Denies recent weight gain or swelling of the  legs. Patient complains of epigastric chest pain that does not radiate anywhere. Patient did not have similar symptoms before. CXR shows cardiomegaly.   Plan  1) Will reevaluate medications on discharge. However, we have restarted patient's ARB, CCB and statin. Patient use to take lasix (40 mg BID) however, has never got it refilled since it ran out.      Dyslipidemia    Start rosuvastatin     Centrilobular emphysema    CT done on March, 2017 shows the trachea is patent and free of any intraluminal filling defects. The lungs are well-expanded. There is bilateral apical pleural thickening and scar. There is bilateral centrilobular emphysema. There is bilateral predominantly basilar atelectatic versus fibrotic change. There is mild bilateral pleural fluid.     Essential hypertension    Resumed cozaar and diltiazem. Holding lisinopril       Permanent atrial fibrillation    Patient on eliquis 2.5 mg BID         VTE Risk Mitigation (From admission, onward)        Ordered     apixaban tablet 2.5 mg  2 times daily      11/16/18 4548             Anthony Melendez MD  Department of Hospital Medicine   [facility]

## 2018-11-17 NOTE — NURSING
Patient cardiac telemerty alarmed Vfib/Vtach. Rukhsana LEWIS notified. MD ordered STAT EKG and vitals to be taken now. Will cont to monitor patient.

## 2018-11-17 NOTE — PT/OT/SLP EVAL
"Physical Therapy Evaluation / Discharge Summary    Patient Name:  Christy Webber   MRN:  [id number]  Admitting Diagnosis:  Acute hypoxemic respiratory failure   Recent Surgery: * No surgery found *      Recommendations:     Discharge Recommendations:  home   Discharge Equipment Recommendations: none   Barriers to discharge: None    Plan:     Pt is safe and independent with all mobility. Pt no longer requires PT services.  · Plan of Care Expires:  12/16/18   Plan of Care Reviewed with: patient    This Plan of care has been discussed with the patient who was involved in its development and understands and is in agreement with the identified goals and treatment plan    History:     Patient lives with their family in [place], [place]  in a mobile home with 6 TERESA, bilateral rails. Pt's son and DIL work during the day.  Patient reports being independent with mobility & with ADLs.  Patient uses DME as follows: none.    DME owned (not currently used): none.  Hand Dominance: right    Roles/Repsonsibilities:   Work: no.    Drive: no.    Managing Medicines/Managing Home: no.    Hobbies: none stated, pt admits to a sedentary lifestyle.  Upon discharge, patient will have assistance from family as needed.    Past Medical History:   Diagnosis Date    Atrial flutter     CHF (congestive heart failure)     Hyperlipidemia     Hypertension     Non-STEMI (non-ST elevated myocardial infarction)     Pacemaker 08/2016    Single lead [employer] Pacemaker for sick sinus syndrome    Sick sinus syndrome     SSS (sick sinus syndrome) 8/24/2016    - pacemaker in place       Past Surgical History:   Procedure Laterality Date    CARDIAC PACEMAKER PLACEMENT      ESOPHAGOGASTRODUODENOSCOPY (EGD) N/A 3/13/2017    Performed by [facility] at [facility]O    HYSTERECTOMY      KIDNEY SURGERY         Subjective     Communicated with RN prior to session.  Patient found supine upon PT entry to room, agreeable to evaluation.    "I'm doing better. " "My SOB only happens after walking for a long time."  Chief Complaint: SOB  Patient comments/goals: none stated  Pain/Comfort:  · Pain Rating 1: 0/10  · Pain Rating Post-Intervention 1: 0/10    Objective:     Patient found with: telemetry     General Precautions: Standard, Cardiac fall   Orthopedic Precautions:N/A   Braces: N/A   Respiratory Status: room air    Exam:  · Mental Status: Patient is oriented to AxOx4 and follows all verbal, multi-step commands. Pt is Alert and Cooperative during session.  · Skin Integrity: Visible skin intact  · Edema: none noted  · Sensation: Intact  · Hearing: Intact  · Vision:  Intact  · Range of Motion:  · RUE: WFL  · LUE: WFL  · RLE: WFL  · LLE: WFL  · Strength Exam:  · Upper Extremity Strength: grossly WFL  · Lower Extremity Strength: grossly WFL    Functional Mobility:  Bed Mobility:   · Supine to Sit: independence; from left side of bed    Transfers:   · Sit <> Stand Transfer: modified independence with no assistive device   · Stand <> Sit Transfer: modified independence with no assistive device   · x1 from EOB, x1 from toilet      Gait:  · Patient ambulated: 150ft   · Patient required: supervision  · Patient used:  No Assistive Device  · Gait Pattern observed: reciprocal gait  · Gait Deviation(s): decreased breonna  · Impairments due to: decreased endurance    Stairs:  Pt requested to not perform stairs due to fatigue.    Therapeutic Activities & Exercises:   Pt able to perform toileting and perineal care with no assistance from PT.  Education:  Patient provided with daily orientation and goals of this PT session. Patient agreed to participate in session. Patient aware of patient's deficits and therapy progression. They were educated to transfer to bedside chair/bedside commode/bathroom with RN/PCT present. Encouraged patient to perform daily exercises & mobility to increase endurance and decrease effects of bedrest. Time provided for therapeutic counseling and discussion of " health disposition. All questions answered to patient's satisfaction, within scope of PT practice; voiced no other concerns. White board updated in patient's room, RN notified of session.    Patient left up in chair, with head in midline, neutral pelvis & heels floated for skin protection with all lines intact, call button in reach and RN notified.    AM-PAC 6 CLICK MOBILITY  Total Score:23     Assessment:     Christy Webber is a 83 y.o. female admitted with a medical diagnosis of Acute hypoxemic respiratory failure.  Pt is safe and independent with all mobility. Pt no longer requires acute PT services.    Problem List: no impairments identified  Rehab Prognosis: excellent    GOALS: No goals identified at [facility].     Clinical Decision Making:   On examination of body system using standardized tests and measures patient presents with 1-2 elements from any of the following: body structures and functions, activity limitations, and/or participation restrictions.  Leading to a clinical presentation that is considered stable and/or uncomplicated  Evaluation Complexity:  Low- 85603      Time Tracking:     PT Received On: 11/17/18  PT Start Time: 0829     PT Stop Time: 0847  PT Total Time (min): 18 min     Billable Minutes: Evaluation 18    Megan Cruz PT, DPT  11/17/2018  Pager:[phone]

## 2018-11-17 NOTE — ASSESSMENT & PLAN NOTE
CT done on March, 2017 shows the trachea is patent and free of any intraluminal filling defects. The lungs are well-expanded. There is bilateral apical pleural thickening and scar. There is bilateral centrilobular emphysema. There is bilateral predominantly basilar atelectatic versus fibrotic change. There is mild bilateral pleural fluid.

## 2018-11-17 NOTE — PLAN OF CARE
Problem: Patient Care Overview  Goal: Plan of Care Review  Outcome: Ongoing (interventions implemented as appropriate)  Patient remains free from falls and injuries through out shift. Patient AAO and VSS. Patient denies chest pain and SOB. Patient HR has been paced with underlying rhythm of a-fib throughout shift. Last BNP 1686. One time dose of Lasix 40mg IVP in ED. No UOP has been collected since patient arrival to floor. CXR and ECHO completed and resulted on 11/16. PRN breathing treatment gven on shift by RRT per patient request for cough. Eliquis given for permanent A-fib. Calcium/Cr Ratio urine test to be collected this morning. Plan of care reviewed with patient. Patient verbalizes understanding of plan.  Will continue to monitor.

## 2018-11-17 NOTE — PROGRESS NOTES
11/16/18 2310 11/16/18 2316   Vital Signs   Pulse 83 68   Heart Rate Source Monitor Monitor   Resp 18 18   SpO2 --  96 %   Pulse Oximetry Type --  Intermittent   Flow (L/min) 1 1   O2 Device (Oxygen Therapy) nasal cannula nasal cannula   BP (!) 164/74 (!) 175/81   MAP (mmHg) 107 116   BP Location Left arm Left arm   BP Method Automatic Automatic   Patient Position Lying Lying     Patient vitals as above. MD Rukhsana notified. No new orders received. Will cont to monitor patient.

## 2018-11-17 NOTE — SUBJECTIVE & OBJECTIVE
Past Medical History:   Diagnosis Date    Atrial flutter     CHF (congestive heart failure)     Hyperlipidemia     Hypertension     Non-STEMI (non-ST elevated myocardial infarction)     Pacemaker 08/2016    Single lead [employer] Pacemaker for sick sinus syndrome    Sick sinus syndrome     SSS (sick sinus syndrome) 8/24/2016    - pacemaker in place       Past Surgical History:   Procedure Laterality Date    CARDIAC PACEMAKER PLACEMENT      ESOPHAGOGASTRODUODENOSCOPY (EGD) N/A 3/13/2017    Performed by R[facility] at [facility] ENDO    HYSTERECTOMY      KIDNEY SURGERY         Review of patient's allergies indicates:  No Known Allergies    No current facility-administered medications on file prior to encounter.      Current Outpatient Medications on File Prior to Encounter   Medication Sig    albuterol (PROVENTIL/VENTOLIN HFA) 90 mcg/actuation inhaler Inhale 1-2 puffs into the lungs every 6 (six) hours as needed for Wheezing. Rescue    albuterol (VENTOLIN HFA) 90 mcg/actuation inhaler Inhale 2 puffs into the lungs every 6 (six) hours as needed for Wheezing. Rescue    apixaban 2.5 mg Tab Take 1 tablet (2.5 mg total) by mouth 2 (two) times daily.    aspirin (ECOTRIN) 81 MG EC tablet Take 81 mg by mouth once daily.    benzonatate (TESSALON) 100 MG capsule Take 1 capsule (100 mg total) by mouth 3 (three) times daily as needed for Cough.    cholecalciferol, vitamin D3, 1,000 unit capsule Take 1 capsule (1,000 Units total) by mouth once daily.    diltiaZEM (CARDIZEM CD) 240 MG 24 hr capsule Take 1 capsule (240 mg total) by mouth once daily. (Patient taking differently: Take 360 mg by mouth once daily. )    donepezil (ARICEPT) 5 MG tablet Take 1 tablet (5 mg total) by mouth every evening.    hydrALAZINE (APRESOLINE) 25 MG tablet Take 25 mg by mouth 3 (three) times daily.    losartan (COZAAR) 25 MG tablet Take 25 mg by mouth once daily.    predniSONE (DELTASONE) 20 MG tablet Take 2 tablets (40 mg total)  by mouth once daily. for 5 days    rosuvastatin (CRESTOR) 10 MG tablet Take 1 tablet (10 mg total) by mouth once daily. HOLD FOR ONE WEEK AND RESTART ON 18     Family History     None        Tobacco Use    Smoking status: Former Smoker     Packs/day: 1.50     Years: 63.00     Pack years: 94.50     Types: Cigarettes     Last attempt to quit: 2016     Years since quittin.4    Smokeless tobacco: Never Used   Substance and Sexual Activity    Alcohol use: No    Drug use: No    Sexual activity: No     Review of Systems   Constitutional: Positive for activity change and fatigue. Negative for chills, fever and unexpected weight change.   HENT: Negative for congestion, postnasal drip, sinus pressure and sinus pain.    Respiratory: Positive for cough, choking, chest tightness and shortness of breath.    Cardiovascular: Positive for chest pain and palpitations. Negative for leg swelling.   Musculoskeletal: Negative for arthralgias, back pain and gait problem.   Neurological: Positive for dizziness. Negative for tremors, seizures, syncope, speech difficulty, weakness and headaches.   Hematological: Negative for adenopathy.   Psychiatric/Behavioral: Negative for agitation, behavioral problems and confusion.     Objective:     Vital Signs (Most Recent):  Temp: 98.4 °F (36.9 °C) (18 1334)  Pulse: 70 (18 1828)  Resp: 20 (18 1651)  BP: (!) 160/83 (18 1828)  SpO2: 96 % (18 182) Vital Signs (24h Range):  Temp:  [98.4 °F (36.9 °C)] 98.4 °F (36.9 °C)  Pulse:  [59-70] 70  Resp:  [18-22] 20  SpO2:  [95 %-97 %] 96 %  BP: (110-160)/(59-93) 160/83     Weight: 63.5 kg (140 lb)  Body mass index is 24.03 kg/m².    Physical Exam   Constitutional: She is oriented to person, place, and time. No distress.   HENT:   Head: Normocephalic and atraumatic.   Eyes: Conjunctivae and EOM are normal. Pupils are equal, round, and reactive to light.   Cardiovascular: Intact distal pulses. Exam reveals no gallop  and no friction rub.   Murmur heard.  Pacemaker on left side   Pulmonary/Chest: No stridor. She is in respiratory distress. She has no wheezes. She has no rales. She exhibits tenderness.   Crackles throughout lungs      Abdominal: Soft. Bowel sounds are normal. She exhibits no distension and no mass. There is no tenderness. There is no rebound and no guarding.   Musculoskeletal: She exhibits no edema or tenderness.   Neurological: She is alert and oriented to person, place, and time.   Skin: Skin is warm and dry. She is not diaphoretic.   Psychiatric: She has a normal mood and affect. Her behavior is normal.         CRANIAL NERVES     CN III, IV, VI   Pupils are equal, round, and reactive to light.  Extraocular motions are normal.        Significant Labs:   BMP:   Recent Labs   Lab 11/16/18  1507   *      K 4.2      CO2 25   BUN 21   CREATININE 1.4   CALCIUM 10.8*   MG 2.3     CBC:   Recent Labs   Lab 11/16/18  1507   WBC 5.21   HGB 13.6   HCT 42.3        CMP:   Recent Labs   Lab 11/16/18  1507      K 4.2      CO2 25   *   BUN 21   CREATININE 1.4   CALCIUM 10.8*   PROT 7.4   ALBUMIN 3.8   BILITOT 1.8*   ALKPHOS 86   AST 26   ALT 14   ANIONGAP 12   EGFRNONAA 35.0*     Cardiac Markers:   Recent Labs   Lab 11/16/18  1507   BNP 1,686*     Troponin:   Recent Labs   Lab 11/16/18  1507   TROPONINI 0.025       Significant Imaging: I have reviewed all pertinent imaging results/findings within the past 24 hours.

## 2018-11-18 LAB
ALBUMIN SERPL BCP-MCNC: 3.1 G/DL
ANION GAP SERPL CALC-SCNC: 10 MMOL/L
ANION GAP SERPL CALC-SCNC: 9 MMOL/L
BASOPHILS # BLD AUTO: 0.07 K/UL
BASOPHILS NFR BLD: 1 %
BUN SERPL-MCNC: 26 MG/DL
BUN SERPL-MCNC: 26 MG/DL
CALCIUM SERPL-MCNC: 10 MG/DL
CALCIUM SERPL-MCNC: 10.4 MG/DL
CHLORIDE SERPL-SCNC: 107 MMOL/L
CHLORIDE SERPL-SCNC: 107 MMOL/L
CO2 SERPL-SCNC: 23 MMOL/L
CO2 SERPL-SCNC: 24 MMOL/L
CREAT SERPL-MCNC: 1.5 MG/DL
CREAT SERPL-MCNC: 1.6 MG/DL
DIFFERENTIAL METHOD: ABNORMAL
EOSINOPHIL # BLD AUTO: 0.4 K/UL
EOSINOPHIL NFR BLD: 6.1 %
ERYTHROCYTE [DISTWIDTH] IN BLOOD BY AUTOMATED COUNT: 14.9 %
EST. GFR  (AFRICAN AMERICAN): 34.3 ML/MIN/1.73 M^2
EST. GFR  (AFRICAN AMERICAN): 37.1 ML/MIN/1.73 M^2
EST. GFR  (NON AFRICAN AMERICAN): 29.8 ML/MIN/1.73 M^2
EST. GFR  (NON AFRICAN AMERICAN): 32.2 ML/MIN/1.73 M^2
GLUCOSE SERPL-MCNC: 112 MG/DL
GLUCOSE SERPL-MCNC: 114 MG/DL
HCT VFR BLD AUTO: 36.8 %
HGB BLD-MCNC: 12.1 G/DL
IMM GRANULOCYTES # BLD AUTO: 0.02 K/UL
IMM GRANULOCYTES NFR BLD AUTO: 0.3 %
LYMPHOCYTES # BLD AUTO: 1.5 K/UL
LYMPHOCYTES NFR BLD: 21 %
MAGNESIUM SERPL-MCNC: 2 MG/DL
MCH RBC QN AUTO: 30.5 PG
MCHC RBC AUTO-ENTMCNC: 32.9 G/DL
MCV RBC AUTO: 93 FL
MONOCYTES # BLD AUTO: 0.7 K/UL
MONOCYTES NFR BLD: 10 %
NEUTROPHILS # BLD AUTO: 4.3 K/UL
NEUTROPHILS NFR BLD: 61.6 %
NRBC BLD-RTO: 0 /100 WBC
PHOSPHATE SERPL-MCNC: 2.4 MG/DL
PLATELET # BLD AUTO: 205 K/UL
PMV BLD AUTO: 12.2 FL
POTASSIUM SERPL-SCNC: 4.2 MMOL/L
POTASSIUM SERPL-SCNC: 4.8 MMOL/L
RBC # BLD AUTO: 3.97 M/UL
SODIUM SERPL-SCNC: 139 MMOL/L
SODIUM SERPL-SCNC: 141 MMOL/L
WBC # BLD AUTO: 6.92 K/UL

## 2018-11-18 PROCEDURE — G8988 SELF CARE GOAL STATUS: HCPCS | Mod: CH

## 2018-11-18 PROCEDURE — 80048 BASIC METABOLIC PNL TOTAL CA: CPT

## 2018-11-18 PROCEDURE — 97165 OT EVAL LOW COMPLEX 30 MIN: CPT

## 2018-11-18 PROCEDURE — 80069 RENAL FUNCTION PANEL: CPT

## 2018-11-18 PROCEDURE — 36415 COLL VENOUS BLD VENIPUNCTURE: CPT

## 2018-11-18 PROCEDURE — 25000003 PHARM REV CODE 250: Performed by: HOSPITALIST

## 2018-11-18 PROCEDURE — 85025 COMPLETE CBC W/AUTO DIFF WBC: CPT

## 2018-11-18 PROCEDURE — 63600175 PHARM REV CODE 636 W HCPCS: Performed by: STUDENT IN AN ORGANIZED HEALTH CARE EDUCATION/TRAINING PROGRAM

## 2018-11-18 PROCEDURE — 83735 ASSAY OF MAGNESIUM: CPT

## 2018-11-18 PROCEDURE — 25000003 PHARM REV CODE 250: Performed by: INTERNAL MEDICINE

## 2018-11-18 PROCEDURE — G8987 SELF CARE CURRENT STATUS: HCPCS | Mod: CH

## 2018-11-18 PROCEDURE — 99233 SBSQ HOSP IP/OBS HIGH 50: CPT | Mod: GC,,, | Performed by: HOSPITALIST

## 2018-11-18 PROCEDURE — 20600001 HC STEP DOWN PRIVATE ROOM

## 2018-11-18 PROCEDURE — G8989 SELF CARE D/C STATUS: HCPCS | Mod: CH

## 2018-11-18 PROCEDURE — 25000003 PHARM REV CODE 250: Performed by: STUDENT IN AN ORGANIZED HEALTH CARE EDUCATION/TRAINING PROGRAM

## 2018-11-18 RX ORDER — DILTIAZEM HYDROCHLORIDE 360 MG/1
360 CAPSULE, EXTENDED RELEASE ORAL DAILY
COMMUNITY
End: 2021-01-26

## 2018-11-18 RX ORDER — FUROSEMIDE 10 MG/ML
20 INJECTION INTRAMUSCULAR; INTRAVENOUS ONCE
Status: COMPLETED | OUTPATIENT
Start: 2018-11-18 | End: 2018-11-18

## 2018-11-18 RX ADMIN — ROSUVASTATIN CALCIUM 10 MG: 5 TABLET, FILM COATED ORAL at 09:11

## 2018-11-18 RX ADMIN — ASPIRIN 81 MG: 81 TABLET, COATED ORAL at 09:11

## 2018-11-18 RX ADMIN — APIXABAN 2.5 MG: 2.5 TABLET, FILM COATED ORAL at 08:11

## 2018-11-18 RX ADMIN — DONEPEZIL HYDROCHLORIDE 5 MG: 5 TABLET, FILM COATED ORAL at 08:11

## 2018-11-18 RX ADMIN — APIXABAN 2.5 MG: 2.5 TABLET, FILM COATED ORAL at 09:11

## 2018-11-18 RX ADMIN — BENZONATATE 100 MG: 100 CAPSULE ORAL at 09:11

## 2018-11-18 RX ADMIN — BENZONATATE 100 MG: 100 CAPSULE ORAL at 08:11

## 2018-11-18 RX ADMIN — DILTIAZEM HYDROCHLORIDE 360 MG: 180 CAPSULE, COATED, EXTENDED RELEASE ORAL at 09:11

## 2018-11-18 RX ADMIN — FUROSEMIDE 20 MG: 10 INJECTION, SOLUTION INTRAMUSCULAR; INTRAVENOUS at 12:11

## 2018-11-18 NOTE — PLAN OF CARE
Problem: Patient Care Overview  Goal: Plan of Care Review  Outcome: Ongoing (interventions implemented as appropriate)  No significant event during shift, no fall/injury. Pt denies chest pain, c/o HYLTON. Lasix IV x1 administered. Family at bedside for support.

## 2018-11-18 NOTE — PLAN OF CARE
Problem: Patient Care Overview  Goal: Plan of Care Review  Outcome: Ongoing (interventions implemented as appropriate)  Patient remains free from falls and injuries through out shift. Patient AAO and VSS. Patient denies chest pain and SOB. Patient HR has been paced with underlying rhythm of a-fib throughout shift. Patient Lasix d/c on 11/18 d/t decrease in potassium. Last K 3.6. Patient received total replacement of 80mEq of liquid potassium during day shift. PRN cough medication given on shift. Patient had c/o of indigestion/heartburn on shift and MD placed orders for Pepcid POto be given. Calcium/Cr Ratio urine test still pending. Plan of care reviewed with patient. Patient verbalizes understanding of plan.  Will continue to monitor.

## 2018-11-18 NOTE — PLAN OF CARE
Problem: Occupational Therapy Goal  Goal: Occupational Therapy Goal  Outcome: Outcome(s) achieved Date Met: 11/18/18  Yamini and D/C OT 11/18/18

## 2018-11-18 NOTE — PT/OT/SLP EVAL
Occupational Therapy   Evaluation and Discharge Note    Name: Christy Webber  MRN: [id number]  Admitting Diagnosis:  Acute hypoxemic respiratory failure      Recommendations:     Discharge Recommendations: home  Discharge Equipment Recommendations:  walker, rolling (per pt request)  Barriers to discharge:  Inaccessible home environment    History:     Occupational Profile:  Patient lives with her son and daughter-in-law in a mobile home with 6 TERESA, bilateral rails. Pt's son and DIL work during the day.  Patient reports being independent with mobility & with ADLs.  Patient uses DME as follows: none.    DME owned (not currently used): none.  Hand Dominance: right  Family is able to assist when they are not at work.    Past Medical History:   Diagnosis Date    Atrial flutter     CHF (congestive heart failure)     Hyperlipidemia     Hypertension     Non-STEMI (non-ST elevated myocardial infarction)     Pacemaker 08/2016    Single lead [employer] Pacemaker for sick sinus syndrome    Sick sinus syndrome     SSS (sick sinus syndrome) 8/24/2016    - pacemaker in place       Past Surgical History:   Procedure Laterality Date    CARDIAC PACEMAKER PLACEMENT      ESOPHAGOGASTRODUODENOSCOPY (EGD) N/A 3/13/2017    [facility] ENDO    HYSTERECTOMY      KIDNEY SURGERY       Subjective     Chief Complaint: None stated  Patient/Family stated goals: Return home  Communicated with: RN prior to session.  Pain/Comfort:  · Pain Rating 1: 0/10  · Pain Rating Post-Intervention 1: 0/10    Patients cultural, spiritual, Protestant conflicts given the current situation: None    Objective:     Patient found with: telemetry    General Precautions: Standard, fall   Orthopedic Precautions:N/A   Braces: N/A     Occupational Performance:    Bed Mobility:    · Patient completed Supine to Sit with modified independence with HOB elevated    Functional Mobility/Transfers:  · Patient completed Sit <> Stand Transfer  "with modified independence wit no assistive device from EOB and tolet  · Functional Mobility: To and from bathroom household distance with supervision no AD    Activities of Daily Living:  · Grooming: independence standing at sink to wash hands  · Lower Body Dressing: independence to don socks  · Toileting: independence for clothing and hygiene management    Cognitive/Visual Perceptual:  Cognitive/Psychosocial Skills:    -       Oriented to: Person, Place, Time and Situation  -       Follows Commands/attention:Follows multistep  commands  -       Communication: clear/fluent  -       Safety awareness/insight to disability: intact   Visual/Perceptual:  -Intact      Physical Exam:  Pt demo good sitting and standing balance  B UE ROM and MMT observed WFL through functional tasks  Intact fine motor coordination    Patient left with HOB elevated with all lines intact, call button in reach, RN notified and family present    [facility] 6 Click:  [facility] Total Score: 24    Treatment & Education:  OT eval; educated on OT role and POC including no further acute OT needs; pt requesting rolling walker for home use  Education:    Assessment:     Christy Webber is a 83 y.o. female with a medical diagnosis of Acute hypoxemic respiratory failure. At this time, patient is functioning at their prior level of function and does not require further acute OT services.     Clinical Decision Makin.  OT Low:  "Pt evaluation falls under low complexity for evaluation coding due to performance deficits noted in 1-3 areas as stated above and 0 co-morbities affecting current functional status. Data obtained from problem focused assessments. No modifications or assistance was required for completion of evaluation. Only brief occupational profile and history review completed."     Plan:     During this hospitalization, patient does not require further acute OT services.  Please re-consult if situation changes.    · Plan of Care Reviewed with: " patient, family    This Plan of care has been discussed with the patient who was involved in its development and understands and is in agreement with the identified goals and treatment plan    GOALS:   Multidisciplinary Problems     Occupational Therapy Goals     Not on file          Multidisciplinary Problems (Resolved)        Problem: Occupational Therapy Goal    Goal Priority Disciplines Outcome Interventions   Occupational Therapy Goal   (Resolved)     OT, PT/OT Outcome(s) achieved                    Time Tracking:     OT Date of Treatment: 11/18/18  OT Start Time: 1506  OT Stop Time: 1520  OT Total Time (min): 14 min    Billable Minutes:Evaluation 14 minutes    MARKOS Sidhu  11/18/2018

## 2018-11-18 NOTE — ASSESSMENT & PLAN NOTE
Patient is normotensive  -Holding home losartan 25 mg po qd  -Holding home hydralazine 25 mg po TID

## 2018-11-18 NOTE — ASSESSMENT & PLAN NOTE
Estimated PAP = 46 & likely 2/2 to COPD based on smoking history but no PFT's to collaborate with this assessment.

## 2018-11-18 NOTE — ASSESSMENT & PLAN NOTE
81 y.o. F with a medical history of COPD (uses oxygen intermittently), HTN, HLD, Atrial fibrillation on Eliquis, ex smoker, s/p PPM (08/16)  for SSS (was suppose to get a watchman device but lost f/u), chronic cough presents to the ED with a 2 week history of progressive weakness along with occasional palpitations associated with HYLTON, orthopnea, cough and SOB.  Her TTE shows severe TVR but doubt this is the primary  for her HYLTON.  However, she has many reasons to have dyspnea including intrinsic pulmonary disease (94 pack year smoking history but no PFT's on file) vs TVR (this wouldn't explain her elevated PAP and pulmonary congestion however) vs HFpEF (3/2017 TTE w/ E/e' = 11 & MANDY = 59.9 but indeterminate diastolic function since then) vs Symptom of her uncontrolled hyper-PTH.  Would ideally like to diurese to euvolemia but she has worsening renal function when given Lasix 40 mg IV suggesting she is euvolemic (> 14 cm JVD but 5+ TVR).  Dilt can decrease cardiac output but less suspect that this is causing her dyspnea.  Suspect her worsening renal function is 2/2 to cardiorenal syndrome but her worsening renal function also may be 2/2 to over diuresis and she may need a RHC to DDx the source of her Sx.    Plan  1) IV Lasix 20 mg x 1 today and reassess tomorrow, likely consult cardiology re: assistance with identifying the main  of her dyspnea  2) Strict I&O & qd standing weights

## 2018-11-18 NOTE — SUBJECTIVE & OBJECTIVE
Interval History:  No acute events overnight.  Renal function w/o significant change despite holding lasix yesterday.    Review of Systems   Constitutional: Negative for chills and fever.   Respiratory: Negative for cough and shortness of breath.    Cardiovascular: Negative for chest pain and palpitations.   Gastrointestinal: Negative for constipation, diarrhea, nausea and vomiting.     Objective:     Vital Signs (Most Recent):  Temp: 97.9 °F (36.6 °C) (11/18/18 1217)  Pulse: 60 (11/18/18 1217)  Resp: 18 (11/18/18 0904)  BP: 136/66 (11/18/18 1217)  SpO2: 96 % (11/18/18 1217) Vital Signs (24h Range):  Temp:  [97.3 °F (36.3 °C)-97.9 °F (36.6 °C)] 97.9 °F (36.6 °C)  Pulse:  [52-70] 60  Resp:  [18] 18  SpO2:  [93 %-99 %] 96 %  BP: (123-143)/(60-85) 136/66     Weight: 62.7 kg (138 lb 3.7 oz)  Body mass index is 23.73 kg/m².    Intake/Output Summary (Last 24 hours) at 11/18/2018 1311  Last data filed at 11/18/2018 0600  Gross per 24 hour   Intake 1214 ml   Output 0 ml   Net 1214 ml      Physical Exam   Constitutional: She is oriented to person, place, and time. No distress.   Neck: JVD (> 14 cm) present.   Cardiovascular: Normal rate, regular rhythm and intact distal pulses. Exam reveals no gallop and no friction rub.   Murmur heard.   Systolic murmur is present with a grade of 3/6.   No diastolic murmur is present.  3/6 systolic murmur loudest at the apex   Pulmonary/Chest: No tachypnea. No respiratory distress. She has decreased breath sounds. She has wheezes (Mild, persitant, diffuse). She has no rhonchi. She has rales (Significant improvement).   Abdominal: Soft. Bowel sounds are normal. She exhibits no distension. There is no tenderness.   Neurological: She is alert and oriented to person, place, and time.       Significant Labs: All pertinent labs within the past 24 hours have been reviewed.    Significant Imaging: TTE   · Low normal left ventricular systolic function. The estimated ejection fraction is  50%  · Global hypokinetic wall motion.  · Severe left atrial enlargement.  · Severe right atrial enlargement.  · Mildly reduced right ventricular systolic function.  · The estimated PA systolic pressure is 46mm Hg  · Pulmonary hypertension present.  · Aortic valve area is 1.75 cm2; peak velocity is 1.32 m/s; mean gradient is 4.59 mmHg.  · Mild aortic valve stenosis.  · Moderate-to-severe mitral regurgitation.  · Severe tricuspid regurgitation.  · Trivial circumferential pericardial effusion.  · Intermediate central venous pressure (8 mm Hg).  · There is a left pleural effusion. There is a right pleural effusion.

## 2018-11-18 NOTE — ASSESSMENT & PLAN NOTE
-Vitamin D still pending but depressed TSH w/ elevated fT4 indicative of primary hyper-PTH  -She is likely a poor surgical candidate for numerous reasons and be discharged on bisphosphonate therapy

## 2018-11-18 NOTE — ASSESSMENT & PLAN NOTE
Patient on eliquis 2.5 mg BID  -Continue Dilt 360 mg po qd, maybe change to Lopressor and monitor for changes in Sx

## 2018-11-18 NOTE — PROGRESS NOTES
[facility] Hospital Medicine  Progress Note    Patient Name: Christy Webber  MRN: [id number]  Patient Class: IP- Inpatient   Admission Date: 11/16/2018  Length of Stay: 1 days  Attending Physician: Sagar Luna IV, MD  Primary Care Provider: Jeanette Olmos DO    [facility] Medicine Team: [facility] MED 5 Shayan Moralez MD    Subjective:     Principal Problem:Acute hypoxemic respiratory failure    Interval History:  No acute events overnight.  Renal function w/o significant change despite holding lasix yesterday.    Review of Systems   Constitutional: Negative for chills and fever.   Respiratory: Negative for cough and shortness of breath.    Cardiovascular: Negative for chest pain and palpitations.   Gastrointestinal: Negative for constipation, diarrhea, nausea and vomiting.     Objective:     Vital Signs (Most Recent):  Temp: 97.9 °F (36.6 °C) (11/18/18 1217)  Pulse: 60 (11/18/18 1217)  Resp: 18 (11/18/18 0904)  BP: 136/66 (11/18/18 1217)  SpO2: 96 % (11/18/18 1217) Vital Signs (24h Range):  Temp:  [97.3 °F (36.3 °C)-97.9 °F (36.6 °C)] 97.9 °F (36.6 °C)  Pulse:  [52-70] 60  Resp:  [18] 18  SpO2:  [93 %-99 %] 96 %  BP: (123-143)/(60-85) 136/66     Weight: 62.7 kg (138 lb 3.7 oz)  Body mass index is 23.73 kg/m².    Intake/Output Summary (Last 24 hours) at 11/18/2018 1311  Last data filed at 11/18/2018 0600  Gross per 24 hour   Intake 1214 ml   Output 0 ml   Net 1214 ml      Physical Exam   Constitutional: She is oriented to person, place, and time. No distress.   Neck: JVD (> 14 cm) present.   Cardiovascular: Normal rate, regular rhythm and intact distal pulses. Exam reveals no gallop and no friction rub.   Murmur heard.   Systolic murmur is present with a grade of 3/6.   No diastolic murmur is present.  3/6 systolic murmur loudest at the apex   Pulmonary/Chest: No tachypnea. No respiratory distress. She has decreased breath sounds. She has wheezes (Mild, persitant, diffuse). She has no rhonchi. She  has rales (Significant improvement).   Abdominal: Soft. Bowel sounds are normal. She exhibits no distension. There is no tenderness.   Neurological: She is alert and oriented to person, place, and time.       Significant Labs: All pertinent labs within the past 24 hours have been reviewed.    Significant Imaging: TTE   · Low normal left ventricular systolic function. The estimated ejection fraction is 50%  · Global hypokinetic wall motion.  · Severe left atrial enlargement.  · Severe right atrial enlargement.  · Mildly reduced right ventricular systolic function.  · The estimated PA systolic pressure is 46mm Hg  · Pulmonary hypertension present.  · Aortic valve area is 1.75 cm2; peak velocity is 1.32 m/s; mean gradient is 4.59 mmHg.  · Mild aortic valve stenosis.  · Moderate-to-severe mitral regurgitation.  · Severe tricuspid regurgitation.  · Trivial circumferential pericardial effusion.  · Intermediate central venous pressure (8 mm Hg).  · There is a left pleural effusion. There is a right pleural effusion.    Assessment/Plan:      * Acute hypoxemic respiratory failure    81 y.o. F with a medical history of COPD (uses oxygen intermittently), HTN, HLD, Atrial fibrillation on Eliquis, ex smoker, s/p PPM (08/16)  for SSS (was suppose to get a watchman device but lost f/u), chronic cough presents to the ED with a 2 week history of progressive weakness along with occasional palpitations associated with HYLTON, orthopnea, cough and SOB.  Her TTE shows severe TVR but doubt this is the primary  for her HYLTON.  However, she has many reasons to have dyspnea including intrinsic pulmonary disease (94 pack year smoking history but no PFT's on file) vs TVR (this wouldn't explain her elevated PAP and pulmonary congestion however) vs HFpEF (3/2017 TTE w/ E/e' = 11 & MANDY = 59.9 but indeterminate diastolic function since then) vs Symptom of her uncontrolled hyper-PTH.  Would ideally like to diurese to euvolemia but she has  worsening renal function when given Lasix 40 mg IV suggesting she is euvolemic (> 14 cm JVD but 5+ TVR).  Dilt can decrease cardiac output but less suspect that this is causing her dyspnea.  Suspect her worsening renal function is 2/2 to cardiorenal syndrome but her worsening renal function also may be 2/2 to over diuresis and she may need a RHC to DDx the source of her Sx.    Plan  1) IV Lasix 20 mg x 1 today and reassess tomorrow, likely consult cardiology re: assistance with identifying the main  of her dyspnea  2) Strict I&O & qd standing weights       NYHA Class III Heart failure with preserved left ventricular function (HFpEF)    As above     Permanent atrial fibrillation    Patient on eliquis 2.5 mg BID  -Continue Dilt 360 mg po qd, maybe change to Lopressor and monitor for changes in Sx     Essential hypertension    Patient is normotensive  -Holding home losartan 25 mg po qd  -Holding home hydralazine 25 mg po TID     Chronic kidney disease, stage III (moderate)    Assessment as above, likely cardiorenal syndrome Type 1 vs Type 2     Pulmonary hypertension    Estimated PAP = 46 & likely 2/2 to COPD based on smoking history but no PFT's to collaborate with this assessment.     Dyslipidemia    -Continue Crestor 10 mg po qd  -Continue ASA 81 mg po qd     Centrilobular emphysema    CT done on March, 2017 shows the trachea is patent and free of any intraluminal filling defects. The lungs are well-expanded. There is bilateral apical pleural thickening and scar. There is bilateral centrilobular emphysema. There is bilateral predominantly basilar atelectatic versus fibrotic change. There is mild bilateral pleural fluid.     Current use of long term anticoagulation    Patient on abixiban 2.5 mg BID       Primary hyperparathyroidism    -Vitamin D still pending but depressed TSH w/ elevated fT4 indicative of primary hyper-PTH  -She is likely a poor surgical candidate for numerous reasons and be discharged on  bisphosphonate therapy     Vascular dementia without behavioral disturbance    Patient on donepezil          VTE Risk Mitigation (From admission, onward)        Ordered     apixaban tablet 2.5 mg  2 times daily      11/16/18 6512              Shayan Moralez MD  Department of Hospital Medicine   [facility]

## 2018-11-19 VITALS
WEIGHT: 137.81 LBS | OXYGEN SATURATION: 98 % | TEMPERATURE: 97 F | SYSTOLIC BLOOD PRESSURE: 142 MMHG | HEART RATE: 60 BPM | RESPIRATION RATE: 18 BRPM | DIASTOLIC BLOOD PRESSURE: 67 MMHG | BODY MASS INDEX: 23.53 KG/M2 | HEIGHT: 64 IN

## 2018-11-19 LAB
ALBUMIN SERPL BCP-MCNC: 3.2 G/DL
ANION GAP SERPL CALC-SCNC: 9 MMOL/L
BASOPHILS # BLD AUTO: 0.1 K/UL
BASOPHILS NFR BLD: 1.5 %
BUN SERPL-MCNC: 26 MG/DL
CALCIUM SERPL-MCNC: 10.1 MG/DL
CHLORIDE SERPL-SCNC: 105 MMOL/L
CO2 SERPL-SCNC: 25 MMOL/L
CREAT SERPL-MCNC: 1.4 MG/DL
DIFFERENTIAL METHOD: ABNORMAL
EOSINOPHIL # BLD AUTO: 0.5 K/UL
EOSINOPHIL NFR BLD: 7.2 %
ERYTHROCYTE [DISTWIDTH] IN BLOOD BY AUTOMATED COUNT: 14.7 %
EST. GFR  (AFRICAN AMERICAN): 40.4 ML/MIN/1.73 M^2
EST. GFR  (NON AFRICAN AMERICAN): 35 ML/MIN/1.73 M^2
GLUCOSE SERPL-MCNC: 99 MG/DL
HCT VFR BLD AUTO: 36.3 %
HGB BLD-MCNC: 11.7 G/DL
IMM GRANULOCYTES # BLD AUTO: 0.03 K/UL
IMM GRANULOCYTES NFR BLD AUTO: 0.4 %
LYMPHOCYTES # BLD AUTO: 1.9 K/UL
LYMPHOCYTES NFR BLD: 27.4 %
MAGNESIUM SERPL-MCNC: 1.9 MG/DL
MCH RBC QN AUTO: 30.2 PG
MCHC RBC AUTO-ENTMCNC: 32.2 G/DL
MCV RBC AUTO: 94 FL
MONOCYTES # BLD AUTO: 0.7 K/UL
MONOCYTES NFR BLD: 9.9 %
NEUTROPHILS # BLD AUTO: 3.7 K/UL
NEUTROPHILS NFR BLD: 53.6 %
NRBC BLD-RTO: 0 /100 WBC
PHOSPHATE SERPL-MCNC: 2.2 MG/DL
PLATELET # BLD AUTO: 220 K/UL
PMV BLD AUTO: 12.5 FL
POTASSIUM SERPL-SCNC: 3.7 MMOL/L
RBC # BLD AUTO: 3.87 M/UL
SODIUM SERPL-SCNC: 139 MMOL/L
WBC # BLD AUTO: 6.85 K/UL

## 2018-11-19 PROCEDURE — 36415 COLL VENOUS BLD VENIPUNCTURE: CPT

## 2018-11-19 PROCEDURE — 85025 COMPLETE CBC W/AUTO DIFF WBC: CPT

## 2018-11-19 PROCEDURE — 25000003 PHARM REV CODE 250: Performed by: STUDENT IN AN ORGANIZED HEALTH CARE EDUCATION/TRAINING PROGRAM

## 2018-11-19 PROCEDURE — 80069 RENAL FUNCTION PANEL: CPT

## 2018-11-19 PROCEDURE — 25000003 PHARM REV CODE 250: Performed by: INTERNAL MEDICINE

## 2018-11-19 PROCEDURE — 99238 HOSP IP/OBS DSCHRG MGMT 30/<: CPT | Mod: GC,,, | Performed by: HOSPITALIST

## 2018-11-19 PROCEDURE — 99222 1ST HOSP IP/OBS MODERATE 55: CPT | Mod: ,,, | Performed by: INTERNAL MEDICINE

## 2018-11-19 PROCEDURE — 83735 ASSAY OF MAGNESIUM: CPT

## 2018-11-19 RX ORDER — BIOTIN 5 MG
1 CAPSULE ORAL 2 TIMES DAILY
Qty: 60 CAPSULE | Refills: 2 | Status: SHIPPED | OUTPATIENT
Start: 2018-11-19 | End: 2021-09-15

## 2018-11-19 RX ORDER — FUROSEMIDE 20 MG/1
20 TABLET ORAL
Qty: 12 TABLET | Refills: 3 | Status: ON HOLD | OUTPATIENT
Start: 2018-11-19 | End: 2019-11-26 | Stop reason: HOSPADM

## 2018-11-19 RX ORDER — LOSARTAN POTASSIUM 25 MG/1
25 TABLET ORAL DAILY
Status: ON HOLD
Start: 2018-11-19 | End: 2020-11-05 | Stop reason: HOSPADM

## 2018-11-19 RX ORDER — SODIUM,POTASSIUM PHOSPHATES 280-250MG
2 POWDER IN PACKET (EA) ORAL ONCE
Status: COMPLETED | OUTPATIENT
Start: 2018-11-19 | End: 2018-11-19

## 2018-11-19 RX ORDER — DOCUSATE SODIUM 100 MG/1
100 CAPSULE, LIQUID FILLED ORAL 2 TIMES DAILY
Qty: 60 CAPSULE | Refills: 2 | Status: SHIPPED | OUTPATIENT
Start: 2018-11-19 | End: 2021-09-15

## 2018-11-19 RX ADMIN — POTASSIUM & SODIUM PHOSPHATES POWDER PACK 280-160-250 MG 2 PACKET: 280-160-250 PACK at 08:11

## 2018-11-19 RX ADMIN — ASPIRIN 81 MG: 81 TABLET, COATED ORAL at 08:11

## 2018-11-19 RX ADMIN — ROSUVASTATIN CALCIUM 10 MG: 5 TABLET, FILM COATED ORAL at 08:11

## 2018-11-19 RX ADMIN — DILTIAZEM HYDROCHLORIDE 360 MG: 180 CAPSULE, COATED, EXTENDED RELEASE ORAL at 08:11

## 2018-11-19 RX ADMIN — APIXABAN 2.5 MG: 2.5 TABLET, FILM COATED ORAL at 08:11

## 2018-11-19 NOTE — DISCHARGE SUMMARY
[facility] Hospital Medicine  Discharge Summary      Patient Name: Christy Webber  MRN: [id number]  Admission Date: 11/16/2018  Hospital Length of Stay: 2 days  Discharge Date and Time:  11/19/2018 3:34 PM  Attending Physician: Sagar Luna IV, MD   Discharging Provider: Anthony Melendez MD  Primary Care Provider: Jeanette Olmos [facility] Medicine Team: [facility] MED 5 Anthony Melendez MD    HPI:   81 y.o.  F with a medical history of COPD (uses oxygen intermittently), HTN, HLD, Atrial fibrillation on Eliquis, ex smoker, s/p PPM (08/16)  for SSS (was suppose to get a watchman device but lost f/u), chronic cough presents to the ED with a 2 week history of progressive weakness along with occasional palpitations. Associated with HYLTON, orthopnea, cough and SOB. Denies recent weight gain or swelling of the legs. Patient complains of epigastric chest pain that does not radiate anywhere. Patient did not have similar symptoms before. Patient went to [place] Tuesday (11/16) because of these symptoms. As per patient, they discharged her on prednisone. Patient progressively felt weaker which caused her to come to [facility] today.     In the ED patient received one dose of lasix 20 mg. CXR shows cardiomegaly with increased left greater than right airspace opacities, likely developing pulmonary edema. BNP >1000. Troponin .025. On examination patient had a JVP between 10-14. 1+pitting edema. Crackles heard throughout lungs.     * No surgery found *      Hospital Course:   Patient received 40 mg of lasix upon admission. Patient will get 40 mg everyday. Patient had echo done which showed low normal left ventricular systolic function with an estimated ejection fraction of 50%. Patient has a aortic valve area is 1.75 cm2; peak velocity is 1.32 m/s; mean gradient is 4.59 mmHg.     Consults:   Consults (From admission, onward)        Status Ordering Provider     Inpatient consult to Cardiology  Once     Provider:  (Not  yet assigned)    Completed MARIO KIM          * Acute hypoxemic respiratory failure    81 y.o. F with a medical history of COPD (uses oxygen intermittently), HTN, HLD, Atrial fibrillation on Eliquis, ex smoker, s/p PPM (08/16)  for SSS (was suppose to get a watchman device but lost f/u), chronic cough presents to the ED with a 2 week history of progressive weakness along with occasional palpitations associated with HYLTON, orthopnea, cough and SOB.  Her TTE shows severe TVR but doubt this is the primary  for her HYLTON.  However, she has many reasons to have dyspnea including intrinsic pulmonary disease (94 pack year smoking history but no PFT's on file) vs TVR (this wouldn't explain her elevated PAP and pulmonary congestion however) vs HFpEF (3/2017 TTE w/ E/e' = 11 & MANDY = 59.9 but indeterminate diastolic function since then) vs Symptom of her uncontrolled hyper-PTH.  Would ideally like to diurese to euvolemia but she has worsening renal function when given Lasix 40 mg IV suggesting she is euvolemic (> 14 cm JVD but 5+ TVR).  Dilt can decrease cardiac output but less suspect that this is causing her dyspnea.  Suspect her worsening renal function is 2/2 to cardiorenal syndrome but her worsening renal function also may be 2/2 to over diuresis and she may need a RHC to DDx the source of her Sx.    Plan  1) Patient clinically doing much better. Appears euvolemic. Lungs are clear with no change in CXR.  She has moderate valvular issues, moderate PH, COPD, and cough with multi-factorial reasons for her shortness of breath. Excess fluid from heart failure may have played a role, but edema is not evident on serial CXR review, and currently she appears euvolemic.  2) Most likely will be discharged on lasix 20 mg MW based on the fact that she has been able to urinate one hour after giving her the IV lasix    3) Counselling on avoiding salt and limiting fluid intake during the holiday season           Primary  hyperparathyroidism    -Vitamin D still pending but depressed TSH w/ elevated fT4 indicative of primary hyper-PTH  -She is likely a poor surgical candidate for numerous reasons and be discharged on Vit D-Calcium      Pulmonary hypertension    Estimated PAP = 46 & likely 2/2 to COPD based on smoking history but no PFT's to collaborate with this assessment.     NYHA Class III Heart failure with preserved left ventricular function (HFpEF)    As above     Current use of long term anticoagulation    Patient on abixiban 2.5 mg BID       Vascular dementia without behavioral disturbance    Patient on donepezil        Dyslipidemia    -Continue Crestor 10 mg po qd  -Continue ASA 81 mg po qd     Centrilobular emphysema    CT done on March, 2017 shows the trachea is patent and free of any intraluminal filling defects. The lungs are well-expanded. There is bilateral apical pleural thickening and scar. There is bilateral centrilobular emphysema. There is bilateral predominantly basilar atelectatic versus fibrotic change. There is mild bilateral pleural fluid.     Cough    Please see acute hypoxemic respiratory failure     Chronic kidney disease, stage III (moderate)    Assessment as above, likely cardiorenal syndrome Type 1 vs Type 2     Essential hypertension    Patient is normotensive  -Holding home losartan 25 mg po qd  -Holding home hydralazine 25 mg po TID     Permanent atrial fibrillation    Patient on eliquis 2.5 mg BID  -Continue Dilt 360 mg po qd, maybe change to Lopressor and monitor for changes in Sx       Final Active Diagnoses:    Diagnosis Date Noted POA    PRINCIPAL PROBLEM:  Acute hypoxemic respiratory failure [J96.01] 11/16/2018 Yes    Primary hyperparathyroidism [E21.0] 11/17/2018 Yes    NYHA Class III Heart failure with preserved left ventricular function (HFpEF) [I50.30] 11/16/2018 Yes    Pulmonary hypertension [I27.22] 11/16/2018 Yes    Vascular dementia without behavioral disturbance [F01.50] 11/12/2018  "Yes    Current use of long term anticoagulation [Z79.01] 11/12/2018 Not Applicable    Dyslipidemia [E78.5] 08/01/2017 Yes    Centrilobular emphysema [J43.2] 06/02/2017 Yes    Cough [R05] 06/02/2017 Unknown    Chronic kidney disease, stage III (moderate) [N18.3] 08/21/2016 Yes    Essential hypertension [I10] 08/21/2016 Yes    Permanent atrial fibrillation [I48.2] 08/20/2016 Yes      Problems Resolved During this Admission:       Discharged Condition: good    Disposition: Home or Self Care    Follow Up:    Patient Instructions:      WALKER FOR HOME USE     Order Specific Question Answer Comments   Type of Walker: Adult (5'4"-6'6")    With wheels? No    Height: 5' 4" (1.626 m)    Weight: 62.5 kg (137 lb 12.6 oz)    Length of need (1-99 months): 3    Does patient have medical equipment at home? cane, straight    Please check all that apply: Patient is unable to safely ambulate without equipment.      [facility]   Referral Priority: Routine Referral Type: Consultation   Referral Reason: Specialty Services Required   Number of Visits Requested: 1     Diet Cardiac     Notify your health care provider if you experience any of the following:  temperature >100.4     Notify your health care provider if you experience any of the following:  severe uncontrolled pain     Notify your health care provider if you experience any of the following:  persistent dizziness, light-headedness, or visual disturbances     Notify your health care provider if you experience any of the following:  increased confusion or weakness     Activity as tolerated       Significant Diagnostic Studies: Labs:   BMP:   Recent Labs   Lab 11/18/18 0627 11/19/18 0412   *  114* 99     139 139   K 4.8  4.2 3.7     107 105   CO2 24  23 25   BUN 26*  26* 26*   CREATININE 1.6*  1.5* 1.4   CALCIUM 10.4  10.0 10.1   MG 2.0 1.9   , CMP   Recent Labs   Lab 11/18/18 0627 11/19/18 0412     139 139 "   K 4.8  4.2 3.7     107 105   CO2 24  23 25   *  114* 99   BUN 26*  26* 26*   CREATININE 1.6*  1.5* 1.4   CALCIUM 10.4  10.0 10.1   ALBUMIN 3.1* 3.2*   ANIONGAP 10  9 9   ESTGFRAFRICA 34.3*  37.1* 40.4*   EGFRNONAA 29.8*  32.2* 35.0*    and CBC   Recent Labs   Lab 11/18/18  0627 11/19/18  0412   WBC 6.92 6.85   HGB 12.1 11.7*   HCT 36.8* 36.3*    220       Pending Diagnostic Studies:     None         Medications:  Reconciled Home Medications:      Medication List      START taking these medications    calcium carbonate-vitamin D3 600 mg(1,500mg) -500 unit Cap  Commonly known as:  CALCIUM 600 WITH VITAMIN D3  Take 1 tablet by mouth 2 (two) times daily.     docusate sodium 100 MG capsule  Commonly known as:  COLACE  Take 1 capsule (100 mg total) by mouth 2 (two) times daily.     furosemide 20 MG tablet  Commonly known as:  LASIX  Take 1 tablet (20 mg total) by mouth 3 (three) times a week.        CHANGE how you take these medications    losartan 25 MG tablet  Commonly known as:  COZAAR  Take 1 tablet (25 mg total) by mouth once daily. HOLD THIS MEDICATION UNTIL FOLLOW-UP WITH YOUR CARDIOLOGIST.  What changed:  additional instructions        CONTINUE taking these medications    * albuterol 90 mcg/actuation inhaler  Commonly known as:  VENTOLIN HFA  Inhale 2 puffs into the lungs every 6 (six) hours as needed for Wheezing. Rescue     * albuterol 90 mcg/actuation inhaler  Commonly known as:  PROVENTIL/VENTOLIN HFA  Inhale 1-2 puffs into the lungs every 6 (six) hours as needed for Wheezing. Rescue     apixaban 2.5 mg Tab  Commonly known as:  ELIQUIS  Take 1 tablet (2.5 mg total) by mouth 2 (two) times daily.     aspirin 81 MG EC tablet  Commonly known as:  ECOTRIN  Take 81 mg by mouth once daily.     benzonatate 100 MG capsule  Commonly known as:  TESSALON  Take 1 capsule (100 mg total) by mouth 3 (three) times daily as needed for Cough.     diltiaZEM 360 MG Cs24  Commonly known as:   TIAZAC  Take 360 mg by mouth once daily.     donepezil 5 MG tablet  Commonly known as:  ARICEPT  Take 1 tablet (5 mg total) by mouth every evening.     rosuvastatin 10 MG tablet  Commonly known as:  CRESTOR  Take 1 tablet (10 mg total) by mouth once daily. HOLD FOR ONE WEEK AND RESTART ON 8/6/18         * This list has 2 medication(s) that are the same as other medications prescribed for you. Read the directions carefully, and ask your doctor or other care provider to review them with you.            STOP taking these medications    cholecalciferol (vitamin D3) 1,000 unit capsule  Commonly known as:  VITAMIN D3     hydrALAZINE 25 MG tablet  Commonly known as:  APRESOLINE            Indwelling Lines/Drains at time of discharge:   Lines/Drains/Airways     Drain            Female External Urinary Catheter 07/25/18 2145 116 days                Time spent on the discharge of patient: >40 minutes  Patient was seen and examined on the date of discharge and determined to be suitable for discharge.         Anthony Melendez MD  Department of Hospital Medicine  [facility]

## 2018-11-19 NOTE — PLAN OF CARE
PCP THIAGO BORJA  [facility] [place]   STAIRS 5     11/19/18 1102   Discharge Assessment   Assessment Type Discharge Planning Assessment   Confirmed/corrected address and phone number on facesheet? Yes   Assessment information obtained from? Patient   Expected Length of Stay (days) 4   Communicated expected length of stay with patient/caregiver no   Prior to hospitilization cognitive status: No Deficits   Prior to hospitalization functional status: Assistive Equipment   Current cognitive status: No Deficits   Current Functional Status: Assistive Equipment   Lives With child(courtney), adult   Able to Return to Prior Arrangements yes   Is patient able to care for self after discharge? Yes   Patient's perception of discharge disposition home or selfcare   Readmission Within The Last 30 Days no previous admission in last 30 days   Patient currently being followed by outpatient case management? No   Patient currently receives any other outside agency services? No   Equipment Currently Used at Home cane, straight   Do you have any problems affording any of your prescribed medications? No   Is the patient taking medications as prescribed? yes   Does the patient have transportation home? Yes   Transportation Available family or friend will provide   Does the patient receive services at the [facility]? No   Discharge Plan A Home;Home with family   Discharge Plan B Home;Home with family   Patient/Family In Agreement With Plan yes

## 2018-11-19 NOTE — ASSESSMENT & PLAN NOTE
81 y.o. F with a medical history of COPD (uses oxygen intermittently), HTN, HLD, Atrial fibrillation on Eliquis, ex smoker, s/p PPM (08/16)  for SSS (was suppose to get a watchman device but lost f/u), chronic cough presents to the ED with a 2 week history of progressive weakness along with occasional palpitations associated with HYLTON, orthopnea, cough and SOB.  Her TTE shows severe TVR but doubt this is the primary  for her HYLTON.  However, she has many reasons to have dyspnea including intrinsic pulmonary disease (94 pack year smoking history but no PFT's on file) vs TVR (this wouldn't explain her elevated PAP and pulmonary congestion however) vs HFpEF (3/2017 TTE w/ E/e' = 11 & MANDY = 59.9 but indeterminate diastolic function since then) vs Symptom of her uncontrolled hyper-PTH.  Would ideally like to diurese to euvolemia but she has worsening renal function when given Lasix 40 mg IV suggesting she is euvolemic (> 14 cm JVD but 5+ TVR).  Dilt can decrease cardiac output but less suspect that this is causing her dyspnea.  Suspect her worsening renal function is 2/2 to cardiorenal syndrome but her worsening renal function also may be 2/2 to over diuresis and she may need a RHC to DDx the source of her Sx.    Plan  1) Patient clinically doing much better. Appears euvolemic. Lungs are clear with no change in CXR.  She has moderate valvular issues, moderate PH, COPD, and cough with multi-factorial reasons for her shortness of breath. Excess fluid from heart failure may have played a role, but edema is not evident on serial CXR review, and currently she appears euvolemic.  2) Most likely will be discharged on lasix 20 mg MWF based on the fact that she has been able to urinate one hour after giving her the IV lasix    3) Counselling on avoiding salt and limiting fluid intake during the holiday season

## 2018-11-19 NOTE — NURSING
Pt DC'd via wheelchair to home. NAD. All IV's and tele removed. All belongings accounted for. DC instructions reviewed with pt and Rx provided.

## 2018-11-19 NOTE — SUBJECTIVE & OBJECTIVE
Past Medical History:   Diagnosis Date    Atrial flutter     CHF (congestive heart failure)     Hyperlipidemia     Hypertension     Non-STEMI (non-ST elevated myocardial infarction)     Pacemaker 08/2016    Single lead [employer] Pacemaker for sick sinus syndrome    Sick sinus syndrome     SSS (sick sinus syndrome) 8/24/2016    - pacemaker in place       Past Surgical History:   Procedure Laterality Date    CARDIAC PACEMAKER PLACEMENT      ESOPHAGOGASTRODUODENOSCOPY (EGD) N/A 3/13/2017    Performed by ROCKY Maxwell MD at [facility]    HYSTERECTOMY      KIDNEY SURGERY         Review of patient's allergies indicates:  No Known Allergies    No current facility-administered medications on file prior to encounter.      Current Outpatient Medications on File Prior to Encounter   Medication Sig    albuterol (PROVENTIL/VENTOLIN HFA) 90 mcg/actuation inhaler Inhale 1-2 puffs into the lungs every 6 (six) hours as needed for Wheezing. Rescue    albuterol (VENTOLIN HFA) 90 mcg/actuation inhaler Inhale 2 puffs into the lungs every 6 (six) hours as needed for Wheezing. Rescue    apixaban 2.5 mg Tab Take 1 tablet (2.5 mg total) by mouth 2 (two) times daily.    aspirin (ECOTRIN) 81 MG EC tablet Take 81 mg by mouth once daily.    benzonatate (TESSALON) 100 MG capsule Take 1 capsule (100 mg total) by mouth 3 (three) times daily as needed for Cough.    cholecalciferol, vitamin D3, 1,000 unit capsule Take 1 capsule (1,000 Units total) by mouth once daily.    diltiaZEM (TIAZAC) 360 MG Cs24 Take 360 mg by mouth once daily.    donepezil (ARICEPT) 5 MG tablet Take 1 tablet (5 mg total) by mouth every evening.    losartan (COZAAR) 25 MG tablet Take 25 mg by mouth once daily.    rosuvastatin (CRESTOR) 10 MG tablet Take 1 tablet (10 mg total) by mouth once daily. HOLD FOR ONE WEEK AND RESTART ON 8/6/18    hydrALAZINE (APRESOLINE) 25 MG tablet Take 25 mg by mouth 3 (three) times daily.     Family History     None         Tobacco Use    Smoking status: Former Smoker     Packs/day: 1.50     Years: 63.00     Pack years: 94.50     Types: Cigarettes     Last attempt to quit: 2016     Years since quittin.4    Smokeless tobacco: Never Used   Substance and Sexual Activity    Alcohol use: No    Drug use: No    Sexual activity: No     Review of Systems   Constitution: Positive for weakness. Negative for chills, fever and weight gain.   HENT: Negative for congestion.    Eyes: Negative for visual disturbance.   Cardiovascular: Negative for chest pain, claudication, dyspnea on exertion, leg swelling, orthopnea, palpitations and syncope.   Respiratory: Positive for cough (dry, chronic) and shortness of breath. Negative for snoring.    Hematologic/Lymphatic: Does not bruise/bleed easily.   Skin: Negative for rash.   Musculoskeletal: Negative for muscle cramps and myalgias.   Gastrointestinal: Negative for bloating, abdominal pain, constipation, diarrhea and melena.   Genitourinary: Negative for bladder incontinence.   Neurological: Negative for excessive daytime sleepiness and focal weakness.   Psychiatric/Behavioral: Negative for depression and suicidal ideas.     Objective:     Vital Signs (Most Recent):  Temp: 97.2 °F (36.2 °C) (18 1231)  Pulse: 68 (18 1231)  Resp: 18 (18 1231)  BP: (!) 150/77 (18 1231)  SpO2: 97 % (18 1231) Vital Signs (24h Range):  Temp:  [97.2 °F (36.2 °C)-98.4 °F (36.9 °C)] 97.2 °F (36.2 °C)  Pulse:  [60-71] 68  Resp:  [18] 18  SpO2:  [93 %-97 %] 97 %  BP: (127-154)/(59-77) 150/77     Weight: 62.5 kg (137 lb 12.6 oz)  Body mass index is 23.65 kg/m².    SpO2: 97 %  O2 Device (Oxygen Therapy): room air      Intake/Output Summary (Last 24 hours) at 2018 1306  Last data filed at 2018 0433  Gross per 24 hour   Intake 1075 ml   Output 150 ml   Net 925 ml       Lines/Drains/Airways     Drain            Female External Urinary Catheter 18 2145 116 days           Peripheral Intravenous Line                 Peripheral IV - Single Lumen 11/16/18 1426 Left Forearm 2 days                Physical Exam   Constitutional: She is oriented to person, place, and time. She appears well-developed. No distress.   Underweight   HENT:   Head: Normocephalic and atraumatic.   Mouth/Throat: Oropharynx is clear and moist.   Eyes: Conjunctivae and EOM are normal. Pupils are equal, round, and reactive to light. No scleral icterus.   Neck: Normal range of motion. Neck supple. No JVD (lower 2/3 of neck, suggestive of CVP 5-8) present.   Cardiovascular: Normal rate, regular rhythm and intact distal pulses.   Murmur heard.  Pulses:       Radial pulses are 2+ on the right side, and 2+ on the left side.   Pulmonary/Chest: Effort normal and breath sounds normal. No respiratory distress.   Symmetrical expansion   Abdominal: Soft. Bowel sounds are normal. There is no hepatosplenomegaly. There is no tenderness.   Musculoskeletal: Normal range of motion. She exhibits no edema.   Neurological: She is alert and oriented to person, place, and time. No cranial nerve deficit.   Skin: Skin is warm and dry. No rash noted. She is not diaphoretic.   Psychiatric: She has a normal mood and affect. Judgment and thought content normal.       Significant Labs:   CMP   Recent Labs   Lab 11/18/18  0627 11/19/18  0412     139 139   K 4.8  4.2 3.7     107 105   CO2 24  23 25   *  114* 99   BUN 26*  26* 26*   CREATININE 1.6*  1.5* 1.4   CALCIUM 10.4  10.0 10.1   ALBUMIN 3.1* 3.2*   ANIONGAP 10  9 9   ESTGFRAFRICA 34.3*  37.1* 40.4*   EGFRNONAA 29.8*  32.2* 35.0*   , CBC   Recent Labs   Lab 11/18/18  0627 11/19/18  0412   WBC 6.92 6.85   HGB 12.1 11.7*   HCT 36.8* 36.3*    220     Significant Imaging:   Echo 11/18:  · Low normal left ventricular systolic function. The estimated ejection fraction is 50%  · Global hypokinetic wall motion.  · Severe left atrial enlargement.  · Severe right  atrial enlargement.  · Mildly reduced right ventricular systolic function.  · The estimated PA systolic pressure is 46mm Hg  · Pulmonary hypertension present.  · Aortic valve area is 1.75 cm2; peak velocity is 1.32 m/s; mean gradient is 4.59 mmHg.  · Mild aortic valve stenosis.  · Moderate-to-severe mitral regurgitation.  · Severe tricuspid regurgitation.  · Trivial circumferential pericardial effusion.  · Intermediate central venous pressure (8 mm Hg).  · There is a left pleural effusion. There is a right pleural effusion.    EKG:  AF with PVC, non-specific ST-T changes.

## 2018-11-19 NOTE — ASSESSMENT & PLAN NOTE
This may be related to ARB use, would suggest switching her to an alternative medication (such as valsartan or telmisartan at an equivalent dose) to see if this can help alleviate her symptoms.

## 2018-11-19 NOTE — PROGRESS NOTES
[facility] Hospital Medicine  Progress Note    Patient Name: Christy Webber  MRN: [id number]  Patient Class: IP- Inpatient   Admission Date: 11/16/2018  Length of Stay: 2 days  Attending Physician: Sagar Luna IV, MD  Primary Care Provider: Jeanette Olmso [facility] Medicine Team: [facility] 5 Anthony Melendez MD    Subjective:     Principal Problem:Acute hypoxemic respiratory failure    HPI:  81 y.o.  F with a medical history of COPD (uses oxygen intermittently), HTN, HLD, Atrial fibrillation on Eliquis, ex smoker, s/p PPM (08/16)  for SSS (was suppose to get a watchman device but lost f/u), chronic cough presents to the ED with a 2 week history of progressive weakness along with occasional palpitations. Associated with HYLTON, orthopnea, cough and SOB. Denies recent weight gain or swelling of the legs. Patient complains of epigastric chest pain that does not radiate anywhere. Patient did not have similar symptoms before. Patient went to [facility] (11/16) because of these symptoms. As per patient, they discharged her on prednisone. Patient progressively felt weaker which caused her to come to [facility] today.     In the ED patient received one dose of lasix 20 mg. CXR shows cardiomegaly with increased left greater than right airspace opacities, likely developing pulmonary edema. BNP >1000. Troponin .025. On examination patient had a JVP between 10-14. 1+pitting edema. Crackles heard throughout lungs.     Hospital Course:  Patient received 40 mg of lasix upon admission. Patient will get 40 mg everyday. Patient had echo done which showed low normal left ventricular systolic function with an estimated ejection fraction of 50%. Patient has a aortic valve area is 1.75 cm2; peak velocity is 1.32 m/s; mean gradient is 4.59 mmHg.    Interval History: Patient's VSS overnight. She had no complaints. States that her SOB has become much better than admission. Cardiology consulted for final  recommendations of medication. Otherwise patient ready for outpatient discharge with home walker.     Review of Systems   Constitutional: Negative for chills and fever.   Respiratory: Negative for cough and shortness of breath.    Cardiovascular: Negative for chest pain and palpitations.   Gastrointestinal: Negative for constipation, diarrhea, nausea and vomiting.     Objective:     Vital Signs (Most Recent):  Temp: 97.2 °F (36.2 °C) (11/19/18 1231)  Pulse: 68 (11/19/18 1231)  Resp: 18 (11/19/18 1231)  BP: (!) 150/77 (11/19/18 1231)  SpO2: 97 % (11/19/18 1231) Vital Signs (24h Range):  Temp:  [97.2 °F (36.2 °C)-98.4 °F (36.9 °C)] 97.2 °F (36.2 °C)  Pulse:  [60-71] 68  Resp:  [18] 18  SpO2:  [93 %-97 %] 97 %  BP: (127-154)/(59-77) 150/77     Weight: 62.5 kg (137 lb 12.6 oz)  Body mass index is 23.65 kg/m².    Intake/Output Summary (Last 24 hours) at 11/19/2018 1333  Last data filed at 11/19/2018 0433  Gross per 24 hour   Intake 1075 ml   Output 150 ml   Net 925 ml      Physical Exam   Constitutional: She is oriented to person, place, and time. No distress.   Neck: JVD (> 14 cm) present.   Cardiovascular: Normal rate, regular rhythm and intact distal pulses. Exam reveals no gallop and no friction rub.   Murmur heard.   Systolic murmur is present with a grade of 3/6.   No diastolic murmur is present.  3/6 systolic murmur loudest at the apex   Pulmonary/Chest: No tachypnea. No respiratory distress. She has decreased breath sounds. She has no wheezes (Mild, persitant, diffuse). She has no rhonchi. She has rales (Significant improvement).   Abdominal: Soft. Bowel sounds are normal. She exhibits no distension. There is no tenderness.   Neurological: She is alert and oriented to person, place, and time.       Significant Labs:   BMP:   Recent Labs   Lab 11/19/18  0412   GLU 99      K 3.7      CO2 25   BUN 26*   CREATININE 1.4   CALCIUM 10.1   MG 1.9     CBC:   Recent Labs   Lab 11/18/18  0627 11/19/18  3482    WBC 6.92 6.85   HGB 12.1 11.7*   HCT 36.8* 36.3*    220     CMP:   Recent Labs   Lab 11/18/18  0627 11/19/18  0412     139 139   K 4.8  4.2 3.7     107 105   CO2 24  23 25   *  114* 99   BUN 26*  26* 26*   CREATININE 1.6*  1.5* 1.4   CALCIUM 10.4  10.0 10.1   ALBUMIN 3.1* 3.2*   ANIONGAP 10  9 9   EGFRNONAA 29.8*  32.2* 35.0*       Significant Imaging: I have reviewed all pertinent imaging results/findings within the past 24 hours.    Assessment/Plan:      * Acute hypoxemic respiratory failure    81 y.o. F with a medical history of COPD (uses oxygen intermittently), HTN, HLD, Atrial fibrillation on Eliquis, ex smoker, s/p PPM (08/16)  for SSS (was suppose to get a watchman device but lost f/u), chronic cough presents to the ED with a 2 week history of progressive weakness along with occasional palpitations associated with HYLTON, orthopnea, cough and SOB.  Her TTE shows severe TVR but doubt this is the primary  for her HYLTON.  However, she has many reasons to have dyspnea including intrinsic pulmonary disease (94 pack year smoking history but no PFT's on file) vs TVR (this wouldn't explain her elevated PAP and pulmonary congestion however) vs HFpEF (3/2017 TTE w/ E/e' = 11 & MANDY = 59.9 but indeterminate diastolic function since then) vs Symptom of her uncontrolled hyper-PTH.  Would ideally like to diurese to euvolemia but she has worsening renal function when given Lasix 40 mg IV suggesting she is euvolemic (> 14 cm JVD but 5+ TVR).  Dilt can decrease cardiac output but less suspect that this is causing her dyspnea.  Suspect her worsening renal function is 2/2 to cardiorenal syndrome but her worsening renal function also may be 2/2 to over diuresis and she may need a RHC to DDx the source of her Sx.    Plan  1) Patient clinically doing much better. Appears euvolemic. Lungs are clear with no change in CXR.  She has moderate valvular issues, moderate PH, COPD, and cough with  multi-factorial reasons for her shortness of breath. Excess fluid from heart failure may have played a role, but edema is not evident on serial CXR review, and currently she appears euvolemic.  2) Most likely will be discharged on lasix 20 mg MWF based on the fact that she has been able to urinate one hour after giving her the IV lasix    3) Counselling on avoiding salt and limiting fluid intake during the holiday season           Primary hyperparathyroidism    -Vitamin D still pending but depressed TSH w/ elevated fT4 indicative of primary hyper-PTH  -She is likely a poor surgical candidate for numerous reasons and be discharged on Vit D-Calcium      Pulmonary hypertension    Estimated PAP = 46 & likely 2/2 to COPD based on smoking history but no PFT's to collaborate with this assessment.     NYHA Class III Heart failure with preserved left ventricular function (HFpEF)    As above     Current use of long term anticoagulation    Patient on abixiban 2.5 mg BID       Vascular dementia without behavioral disturbance    Patient on donepezil        Dyslipidemia    -Continue Crestor 10 mg po qd  -Continue ASA 81 mg po qd     Centrilobular emphysema    CT done on March, 2017 shows the trachea is patent and free of any intraluminal filling defects. The lungs are well-expanded. There is bilateral apical pleural thickening and scar. There is bilateral centrilobular emphysema. There is bilateral predominantly basilar atelectatic versus fibrotic change. There is mild bilateral pleural fluid.     Cough           Chronic kidney disease, stage III (moderate)    Assessment as above, likely cardiorenal syndrome Type 1 vs Type 2     Essential hypertension    Patient is normotensive  -Holding home losartan 25 mg po qd  -Holding home hydralazine 25 mg po TID     Permanent atrial fibrillation    Patient on eliquis 2.5 mg BID  -Continue Dilt 360 mg po qd, maybe change to Lopressor and monitor for changes in Sx       VTE Risk Mitigation  (From admission, onward)        Ordered     apixaban tablet 2.5 mg  2 times daily      11/16/18 6396              Anthony Melendez MD  Department of Hospital Medicine   [facility]

## 2018-11-19 NOTE — ASSESSMENT & PLAN NOTE
Patient appears comfortable on exam, and euvolemic. Lungs are clear, serial CXR with no change, unclear as to the degree of diuresis. She has moderate valvular issues, moderate PH, COPD, and cough with multi-factorial reasons for her shortness of breath. Excess fluid from heart failure may have played a role, but edema is not evident on serial CXR review, and currently she appears euvolemic.    In summation her causes for shortness of breath are multi-factorial, and an imminent cardiac cause is not evident based on clinical exam and review of available data. Would defer final diagnosis to the primary team and recommend continuing her medical therapy in the interim.    - would recommend patient to stay euvolemic on discharge, with weight monitoring, and home health if she needs it for CHF care  - recommend checking the dose of oral diuretic that would allow her to diurese (bumetanide and furosemide have better absorption than furosemide if she has poor response to the latter)  - counselling on avoiding salt and limiting fluid intake during the holiday season

## 2018-11-19 NOTE — HPI
We are consulted for assessment of dyspnea in this 82yo lady with history of HFpEF (EF 50%), mild-moderate PH with last PASP 46mmHg, COPD (uses oxygen intermittently), HTN, HLD, AF on Eliquis (C2V = 4), ex smoker, s/p PPM (08/16)  for SSS (was supposed to get a watchman device but lost f/u and is now following in [facility]), as well as chronic cough. Who presented for weakness and dyspnea. Has been treated for exacerbation of heart failure however I/O show positive 2.5L and weight show a loss of 1kg (initial weight appears to have been reported).    XR has remained unchanged. Patient states she feels better, but has a persisting cough.

## 2018-11-19 NOTE — CONSULTS
[facility]  Cardiology  Consult Note    Patient Name: Christy Webber  MRN: [id number]  Admission Date: 11/16/2018  Hospital Length of Stay: 2 days  Code Status: Full Code   Attending Provider: Sagar Luna IV, MD   Consulting Provider: Fan Benton MD  Primary Care Physician: Jeanette Olmos DO  Principal Problem:Acute hypoxemic respiratory failure    Patient information was obtained from patient and past medical records.     Inpatient consult to Cardiology  Consult performed by: Fan Benton MD  Consult ordered by: Anthony Melendez MD        Subjective:     Chief Complaint:  Shortness of breath     HPI:   We are consulted for assessment of dyspnea in this 84yo lady with history of HFpEF (EF 50%), mild-moderate PH with last PASP 46mmHg, COPD (uses oxygen intermittently), HTN, HLD, AF on Eliquis (C2V = 4), ex smoker, s/p PPM (08/16)  for SSS (was supposed to get a watchman device but lost f/u and is now following in [facility]), as well as chronic cough. Who presented for weakness and dyspnea. Has been treated for exacerbation of heart failure however I/O show positive 2.5L and weight show a loss of 1kg (initial weight appears to have been reported).    XR has remained unchanged. Patient states she feels better, but has a persisting cough.        Past Medical History:   Diagnosis Date    Atrial flutter     CHF (congestive heart failure)     Hyperlipidemia     Hypertension     Non-STEMI (non-ST elevated myocardial infarction)     Pacemaker 08/2016    Single lead [employer] Pacemaker for sick sinus syndrome    Sick sinus syndrome     SSS (sick sinus syndrome) 8/24/2016    - pacemaker in place       Past Surgical History:   Procedure Laterality Date    CARDIAC PACEMAKER PLACEMENT      ESOPHAGOGASTRODUODENOSCOPY (EGD) N/A 3/13/2017    Performed by ROCKY Maxwell MD at [facility] END[facility]    HYSTERECTOMY      KIDNEY SURGERY         Review of patient's allergies indicates:  No Known  Allergies    No current facility-administered medications on file prior to encounter.      Current Outpatient Medications on File Prior to Encounter   Medication Sig    albuterol (PROVENTIL/VENTOLIN HFA) 90 mcg/actuation inhaler Inhale 1-2 puffs into the lungs every 6 (six) hours as needed for Wheezing. Rescue    albuterol (VENTOLIN HFA) 90 mcg/actuation inhaler Inhale 2 puffs into the lungs every 6 (six) hours as needed for Wheezing. Rescue    apixaban 2.5 mg Tab Take 1 tablet (2.5 mg total) by mouth 2 (two) times daily.    aspirin (ECOTRIN) 81 MG EC tablet Take 81 mg by mouth once daily.    benzonatate (TESSALON) 100 MG capsule Take 1 capsule (100 mg total) by mouth 3 (three) times daily as needed for Cough.    cholecalciferol, vitamin D3, 1,000 unit capsule Take 1 capsule (1,000 Units total) by mouth once daily.    diltiaZEM (TIAZAC) 360 MG Cs24 Take 360 mg by mouth once daily.    donepezil (ARICEPT) 5 MG tablet Take 1 tablet (5 mg total) by mouth every evening.    losartan (COZAAR) 25 MG tablet Take 25 mg by mouth once daily.    rosuvastatin (CRESTOR) 10 MG tablet Take 1 tablet (10 mg total) by mouth once daily. HOLD FOR ONE WEEK AND RESTART ON 18    hydrALAZINE (APRESOLINE) 25 MG tablet Take 25 mg by mouth 3 (three) times daily.     Family History     None        Tobacco Use    Smoking status: Former Smoker     Packs/day: 1.50     Years: 63.00     Pack years: 94.50     Types: Cigarettes     Last attempt to quit: 2016     Years since quittin.4    Smokeless tobacco: Never Used   Substance and Sexual Activity    Alcohol use: No    Drug use: No    Sexual activity: No     Review of Systems   Constitution: Positive for weakness. Negative for chills, fever and weight gain.   HENT: Negative for congestion.    Eyes: Negative for visual disturbance.   Cardiovascular: Negative for chest pain, claudication, dyspnea on exertion, leg swelling, orthopnea, palpitations and syncope.   Respiratory:  Positive for cough (dry, chronic) and shortness of breath. Negative for snoring.    Hematologic/Lymphatic: Does not bruise/bleed easily.   Skin: Negative for rash.   Musculoskeletal: Negative for muscle cramps and myalgias.   Gastrointestinal: Negative for bloating, abdominal pain, constipation, diarrhea and melena.   Genitourinary: Negative for bladder incontinence.   Neurological: Negative for excessive daytime sleepiness and focal weakness.   Psychiatric/Behavioral: Negative for depression and suicidal ideas.     Objective:     Vital Signs (Most Recent):  Temp: 97.2 °F (36.2 °C) (11/19/18 1231)  Pulse: 68 (11/19/18 1231)  Resp: 18 (11/19/18 1231)  BP: (!) 150/77 (11/19/18 1231)  SpO2: 97 % (11/19/18 1231) Vital Signs (24h Range):  Temp:  [97.2 °F (36.2 °C)-98.4 °F (36.9 °C)] 97.2 °F (36.2 °C)  Pulse:  [60-71] 68  Resp:  [18] 18  SpO2:  [93 %-97 %] 97 %  BP: (127-154)/(59-77) 150/77     Weight: 62.5 kg (137 lb 12.6 oz)  Body mass index is 23.65 kg/m².    SpO2: 97 %  O2 Device (Oxygen Therapy): room air      Intake/Output Summary (Last 24 hours) at 11/19/2018 1306  Last data filed at 11/19/2018 0433  Gross per 24 hour   Intake 1075 ml   Output 150 ml   Net 925 ml       Lines/Drains/Airways     Drain            Female External Urinary Catheter 07/25/18 2145 116 days          Peripheral Intravenous Line                 Peripheral IV - Single Lumen 11/16/18 1426 Left Forearm 2 days                Physical Exam   Constitutional: She is oriented to person, place, and time. She appears well-developed. No distress.   Underweight   HENT:   Head: Normocephalic and atraumatic.   Mouth/Throat: Oropharynx is clear and moist.   Eyes: Conjunctivae and EOM are normal. Pupils are equal, round, and reactive to light. No scleral icterus.   Neck: Normal range of motion. Neck supple. No JVD (lower 2/3 of neck, suggestive of CVP 5-8) present.   Cardiovascular: Normal rate, regular rhythm and intact distal pulses.   Murmur  heard.  Pulses:       Radial pulses are 2+ on the right side, and 2+ on the left side.   Pulmonary/Chest: Effort normal and breath sounds normal. No respiratory distress.   Symmetrical expansion   Abdominal: Soft. Bowel sounds are normal. There is no hepatosplenomegaly. There is no tenderness.   Musculoskeletal: Normal range of motion. She exhibits no edema.   Neurological: She is alert and oriented to person, place, and time. No cranial nerve deficit.   Skin: Skin is warm and dry. No rash noted. She is not diaphoretic.   Psychiatric: She has a normal mood and affect. Judgment and thought content normal.       Significant Labs:   CMP   Recent Labs   Lab 11/18/18  0627 11/19/18  0412     139 139   K 4.8  4.2 3.7     107 105   CO2 24  23 25   *  114* 99   BUN 26*  26* 26*   CREATININE 1.6*  1.5* 1.4   CALCIUM 10.4  10.0 10.1   ALBUMIN 3.1* 3.2*   ANIONGAP 10  9 9   ESTGFRAFRICA 34.3*  37.1* 40.4*   EGFRNONAA 29.8*  32.2* 35.0*   , CBC   Recent Labs   Lab 11/18/18  0627 11/19/18  0412   WBC 6.92 6.85   HGB 12.1 11.7*   HCT 36.8* 36.3*    220     Significant Imaging:   Echo 11/18:  · Low normal left ventricular systolic function. The estimated ejection fraction is 50%  · Global hypokinetic wall motion.  · Severe left atrial enlargement.  · Severe right atrial enlargement.  · Mildly reduced right ventricular systolic function.  · The estimated PA systolic pressure is 46mm Hg  · Pulmonary hypertension present.  · Aortic valve area is 1.75 cm2; peak velocity is 1.32 m/s; mean gradient is 4.59 mmHg.  · Mild aortic valve stenosis.  · Moderate-to-severe mitral regurgitation.  · Severe tricuspid regurgitation.  · Trivial circumferential pericardial effusion.  · Intermediate central venous pressure (8 mm Hg).  · There is a left pleural effusion. There is a right pleural effusion.    EKG:  AF with PVC, non-specific ST-T changes.        Assessment and Plan:     * Acute hypoxemic respiratory  failure    Patient appears comfortable on exam, and euvolemic. Lungs are clear, serial CXR with no change, unclear as to the degree of diuresis. She has moderate valvular issues, moderate PH, COPD, and cough with multi-factorial reasons for her shortness of breath. Excess fluid from heart failure may have played a role, but edema is not evident on serial CXR review, and currently she appears euvolemic.    In summation her causes for shortness of breath are multi-factorial, and an imminent cardiac cause is not evident based on clinical exam and review of available data. Would defer final diagnosis to the primary team and recommend continuing her medical therapy in the interim.    - would recommend patient to stay euvolemic on discharge, with weight monitoring, and home health if she needs it for CHF care  - recommend checking the dose of oral diuretic that would allow her to diurese (bumetanide and furosemide have better absorption than furosemide if she has poor response to the latter)  - counselling on avoiding salt and limiting fluid intake during the holiday season      Cough    This may be related to ARB use, would suggest switching her to an alternative medication (such as valsartan or telmisartan at an equivalent dose) to see if this can help alleviate her symptoms.         VTE Risk Mitigation (From admission, onward)        Ordered     apixaban tablet 2.5 mg  2 times daily      11/16/18 7583          Thank you for your consult. I will sign off. Please contact us if you have any additional questions.    Fan Benton MD  Cardiology   [facility]

## 2018-11-19 NOTE — PLAN OF CARE
Problem: Patient Care Overview  Goal: Plan of Care Review  Outcome: Ongoing (interventions implemented as appropriate)  Patient remains free from falls and injuries through out shift. Patient AAO and VSS. Patient denies chest pain and SOB. Patient HR has been paced with underlying rhythm of a-fib throughout shift. CXR from 11/18 results are pending. Cards consult placed. One time dose of lasix 20mg IVP given during day shift. Plan of care reviewed with patient. Patient verbalizes understanding of plan.  Will continue to monitor.

## 2018-11-19 NOTE — PLAN OF CARE
Problem: Patient Care Overview  Goal: Plan of Care Review  Outcome: Ongoing (interventions implemented as appropriate)  Pt is A, A, Ox4. Calm, cooperative. Free of falls, trauma, and injuries. Skin intact. Pt educated on treatment plan. Pt demonstrates and verbalizes understanding. VSS. Possibly DC today. Plan of care reviewed with pt.

## 2018-11-19 NOTE — ASSESSMENT & PLAN NOTE
-Vitamin D still pending but depressed TSH w/ elevated fT4 indicative of primary hyper-PTH  -She is likely a poor surgical candidate for numerous reasons and be discharged on Vit D-Calcium

## 2018-11-19 NOTE — SUBJECTIVE & OBJECTIVE
Interval History: Patient's VSS overnight. She had no complaints. States that her SOB has become much better than admission. Cardiology consulted for final recommendations of medication. Otherwise patient ready for outpatient discharge with home walker.     Review of Systems   Constitutional: Negative for chills and fever.   Respiratory: Negative for cough and shortness of breath.    Cardiovascular: Negative for chest pain and palpitations.   Gastrointestinal: Negative for constipation, diarrhea, nausea and vomiting.     Objective:     Vital Signs (Most Recent):  Temp: 97.2 °F (36.2 °C) (11/19/18 1231)  Pulse: 68 (11/19/18 1231)  Resp: 18 (11/19/18 1231)  BP: (!) 150/77 (11/19/18 1231)  SpO2: 97 % (11/19/18 1231) Vital Signs (24h Range):  Temp:  [97.2 °F (36.2 °C)-98.4 °F (36.9 °C)] 97.2 °F (36.2 °C)  Pulse:  [60-71] 68  Resp:  [18] 18  SpO2:  [93 %-97 %] 97 %  BP: (127-154)/(59-77) 150/77     Weight: 62.5 kg (137 lb 12.6 oz)  Body mass index is 23.65 kg/m².    Intake/Output Summary (Last 24 hours) at 11/19/2018 1333  Last data filed at 11/19/2018 0433  Gross per 24 hour   Intake 1075 ml   Output 150 ml   Net 925 ml      Physical Exam   Constitutional: She is oriented to person, place, and time. No distress.   Neck: JVD (> 14 cm) present.   Cardiovascular: Normal rate, regular rhythm and intact distal pulses. Exam reveals no gallop and no friction rub.   Murmur heard.   Systolic murmur is present with a grade of 3/6.   No diastolic murmur is present.  3/6 systolic murmur loudest at the apex   Pulmonary/Chest: No tachypnea. No respiratory distress. She has decreased breath sounds. She has no wheezes (Mild, persitant, diffuse). She has no rhonchi. She has rales (Significant improvement).   Abdominal: Soft. Bowel sounds are normal. She exhibits no distension. There is no tenderness.   Neurological: She is alert and oriented to person, place, and time.       Significant Labs:   BMP:   Recent Labs   Lab 11/19/18  1473    GLU 99      K 3.7      CO2 25   BUN 26*   CREATININE 1.4   CALCIUM 10.1   MG 1.9     CBC:   Recent Labs   Lab 11/18/18 0627 11/19/18 0412   WBC 6.92 6.85   HGB 12.1 11.7*   HCT 36.8* 36.3*    220     CMP:   Recent Labs   Lab 11/18/18 0627 11/19/18 0412     139 139   K 4.8  4.2 3.7     107 105   CO2 24  23 25   *  114* 99   BUN 26*  26* 26*   CREATININE 1.6*  1.5* 1.4   CALCIUM 10.4  10.0 10.1   ALBUMIN 3.1* 3.2*   ANIONGAP 10  9 9   EGFRNONAA 29.8*  32.2* 35.0*       Significant Imaging: I have reviewed all pertinent imaging results/findings within the past 24 hours.

## 2018-11-21 ENCOUNTER — PATIENT OUTREACH (OUTPATIENT)
Dept: ADMINISTRATIVE | Facility: CLINIC | Age: 83
End: 2018-11-21

## 2018-11-21 NOTE — PROGRESS NOTES
C3 nurse attempted to contact patient. No answer. The following message was left for the patient to return the call:  Good morning I am a nurse calling on behalf of [facility] from the [facility].  This is a Transitional Care Call for Christy. When you have a moment please contact us at [phone] or [phone] Monday through Friday, between the hours of 8 am to 4 pm. We look forward to speaking with you. On behalf of [facility] have a nice day.    The patient does not have a scheduled HOSFU appointment within 7-14 days post hospital discharge date 03/12/18. Message sent to Physician staff to assist with HOSFU appointment scheduling.

## 2018-11-29 NOTE — PHYSICIAN QUERY
PT Name: hCristy Webber  MR #: [id number]    Physician Query Form - Respiratory Condition Clarification      CDS/: Jeanette Soto     RN CCDS          Contact information:  [email or web address]    This form is a permanent document in the medical record.    Query Date: November 29, 2018    By submitting this query, we are merely seeking further clarification of documentation. Please utilize your independent clinical judgment when addressing the question(s) below.    The Medical record contains the following   Indicators   Supporting Clinical Findings Location in Medical Record   x   SOB, HYLTON, Wheezing, Productive Cough, Use of Accessory Muscles, etc. chronic cough presents to the ED with a 2 week history of progressive weakness along with occasional palpitations. Associated with HYLTON, orthopnea, cough and SOB.   H&P   x   Acute/Chronic Illness 81 y.o. F with a medical history of COPD (uses oxygen intermittently), HTN, HLD, Atrial fibrillation on Eliquis, ex smoker, s/p PPM (08/16)  for SSS DS   x   Radiology Findings Cardiomegaly with increased left greater than right airspace opacities, likely developing pulmonary edema. CXR 11/16   x   Respiratory Distress or Failure No stridor. She is in respiratory distress. She has no wheezes. She has no rales. She exhibits tenderness.   Crackles throughout lungs          Hypoxia or Hypercapnia        RR         ABGs         O2 sat RR=18-22  O2 sats 95-99 Admission VS      BiPAP/Intubation        Supplemental O2 Pt on room air 90% of time with only 1L/NC at times Nurses notes   x   Home O2, Oxygen Dependence        Treatment     x Other   Acute on chronic diastolic CHF  Acute respiratory failure with hypoxia  -EF 45 in 2017  -Clearly volume overloaded, JVP beyond angle of jaw at 90 degrees but minimal LE edema and has rales on exam  -Lasix 40mg IVP x1 and monitor response H&P     Respiratory failure can be acute, chronic or both. It is generally further specificed as  hypoxic, hypercapnic or both. Lastly, it is important to identify an etiology, if known or suspected.   References:: https://www.acphospitalist.org/archives/2013/10/coding; htm; http://[employer]/acute-respiratory-failure-know    The clinical guidelines noted below are only system guidelines, and do not replace the providers clinical judgment.    Please further clarify the respiratory diagnosis based on clinical picture above.  Thank you.        [    ] Acute Respiratory Failure with Hypoxia - ABG pO2 < 60 mmHg or O2 sat of 88% on RA and respiratory symptoms documented    [   x ] Acute and (on) Chronic Respiratory Failure with Hypoxia - pO2 >10 mmHg below baseline OR SpO2 < 91% on usual home O2 OR O2 ? 2L/min over baseline home O2     [    ] Chronic Respiratory Failure with Hypoxia -Continuous home oxygen use    [    ] Acute Respiratory Insufficiency - Generally describes less severe respiratory symptoms and measurements (pO2, SpO2, pH, and pCO2) NOT meeting criteria for respiratory failure      [    ] Acute Respiratory Distress - Generally describes less severe respiratory symptoms (tachypnea, in respiratory distress, increased work of breathing, unable to speak in complete sentences, labored breathing, use of accessory muscles, RR> 24, cyanosis, dyspnea, wheezing, stridor, lethargy) without sufficient measurements (pO2, SpO2, pH, and pCO2) to meet criteria for respiratory failure     [    ] Hypoxia Only    [  ] Clinically Undetermined    Please document in your progress notes daily for the duration of treatment until resolved and include in your discharge summary.

## 2019-03-11 PROBLEM — J96.01 ACUTE HYPOXEMIC RESPIRATORY FAILURE: Status: RESOLVED | Noted: 2018-11-16 | Resolved: 2019-03-11

## 2019-03-11 PROBLEM — R05.9 COUGH: Status: RESOLVED | Noted: 2017-06-02 | Resolved: 2019-03-11

## 2019-11-17 PROBLEM — F03.918 DEMENTIA WITH BEHAVIORAL DISTURBANCE: Status: ACTIVE | Noted: 2019-11-17

## 2019-11-17 PROBLEM — D64.9 SYMPTOMATIC ANEMIA: Status: ACTIVE | Noted: 2019-11-17

## 2019-11-17 PROBLEM — I21.4 NSTEMI (NON-ST ELEVATED MYOCARDIAL INFARCTION): Status: ACTIVE | Noted: 2019-11-17

## 2019-11-17 PROBLEM — J96.21 ACUTE ON CHRONIC RESPIRATORY FAILURE WITH HYPOXIA: Status: ACTIVE | Noted: 2019-11-17

## 2019-11-17 PROBLEM — J44.1 ACUTE EXACERBATION OF CHRONIC OBSTRUCTIVE PULMONARY DISEASE (COPD): Status: ACTIVE | Noted: 2019-11-17

## 2019-11-17 PROBLEM — R06.2 WHEEZING: Status: ACTIVE | Noted: 2019-11-17

## 2019-11-17 PROBLEM — G93.41 ACUTE METABOLIC ENCEPHALOPATHY: Status: ACTIVE | Noted: 2019-11-17

## 2019-11-19 PROBLEM — E87.20 METABOLIC ACIDOSIS: Status: ACTIVE | Noted: 2019-11-19

## 2019-11-21 PROBLEM — E87.5 HYPERKALEMIA: Status: RESOLVED | Noted: 2018-07-25 | Resolved: 2019-11-21

## 2019-11-21 PROBLEM — E87.20 METABOLIC ACIDOSIS: Status: RESOLVED | Noted: 2019-11-19 | Resolved: 2019-11-21

## 2019-11-22 PROBLEM — G93.41 ACUTE METABOLIC ENCEPHALOPATHY: Status: RESOLVED | Noted: 2019-11-17 | Resolved: 2019-11-22

## 2019-11-23 PROBLEM — R17 ELEVATED BILIRUBIN: Status: RESOLVED | Noted: 2018-11-12 | Resolved: 2019-11-23

## 2019-11-24 PROBLEM — J96.21 ACUTE ON CHRONIC RESPIRATORY FAILURE WITH HYPOXIA: Status: RESOLVED | Noted: 2019-11-17 | Resolved: 2019-11-24

## 2019-11-25 PROBLEM — K59.01 SLOW TRANSIT CONSTIPATION: Status: ACTIVE | Noted: 2019-11-25

## 2019-11-26 PROBLEM — I21.4 NSTEMI (NON-ST ELEVATED MYOCARDIAL INFARCTION): Status: RESOLVED | Noted: 2019-11-17 | Resolved: 2019-11-26

## 2019-11-27 ENCOUNTER — TELEPHONE (OUTPATIENT)
Dept: NEUROLOGY | Facility: CLINIC | Age: 84
End: 2019-11-27

## 2019-11-27 NOTE — TELEPHONE ENCOUNTER
----- Message from Cinthya Dueñas MA sent at 11/27/2019  8:08 AM CST -----  Contact: Lidia, [facility]  Good morning, can you help with scheduling with Dr. Rendon for a neuro consult at [facility].  ----- Message -----  From: Cinda Byrd  Sent: 11/27/2019   7:50 AM CST  To: [facility] Staff    Ms. Murillo is calling to reschedule the pt's appt because she was discharged from the hospital yesterday and does not feel up to going out today; however,  was unable to find availability due to an exhausted template.  She is requesting a returned call for scheduling.    She can be reached at [phone].    Thank you.

## 2019-12-06 ENCOUNTER — TELEPHONE (OUTPATIENT)
Dept: NEUROLOGY | Facility: CLINIC | Age: 84
End: 2019-12-06

## 2019-12-06 NOTE — TELEPHONE ENCOUNTER
I returned to Alex at [facility] in regards to getting an appointment scheduled for the patient after a hospital visit. I informed her that this is Dr. Portillo last day in clinic here. But the patient has a new patient appointment scheduled with Dr. Rendon for 01/03/2020 at 9:40 and she is on the wait list. Alex stated that was fine.

## 2019-12-06 NOTE — TELEPHONE ENCOUNTER
----- Message from Ami Vanegas sent at 12/6/2019 11:25 AM CST -----  Contact: [phone]/ Alex with [facility]  Alex lane [facility] would like a call back in regards to getting an appointment scheduled for the patient after a hospital visit. Please call.

## 2020-01-03 ENCOUNTER — OFFICE VISIT (OUTPATIENT)
Dept: NEUROLOGY | Facility: CLINIC | Age: 85
End: 2020-01-03
Payer: MEDICARE

## 2020-01-03 VITALS
DIASTOLIC BLOOD PRESSURE: 70 MMHG | HEIGHT: 63 IN | WEIGHT: 149 LBS | BODY MASS INDEX: 26.4 KG/M2 | SYSTOLIC BLOOD PRESSURE: 112 MMHG | HEART RATE: 62 BPM

## 2020-01-03 DIAGNOSIS — Z86.79 HISTORY OF SUBDURAL HEMATOMA: ICD-10-CM

## 2020-01-03 DIAGNOSIS — F01.50 VASCULAR DEMENTIA WITHOUT BEHAVIORAL DISTURBANCE: ICD-10-CM

## 2020-01-03 PROCEDURE — 3074F SYST BP LT 130 MM HG: CPT | Mod: HCNC,CPTII,S$GLB, | Performed by: PSYCHIATRY & NEUROLOGY

## 2020-01-03 PROCEDURE — 3078F PR MOST RECENT DIASTOLIC BLOOD PRESSURE < 80 MM HG: ICD-10-PCS | Mod: HCNC,CPTII,S$GLB, | Performed by: PSYCHIATRY & NEUROLOGY

## 2020-01-03 PROCEDURE — 99203 OFFICE O/P NEW LOW 30 MIN: CPT | Mod: HCNC,S$GLB,, | Performed by: PSYCHIATRY & NEUROLOGY

## 2020-01-03 PROCEDURE — 99499 RISK ADDL DX/OHS AUDIT: ICD-10-PCS | Mod: S$GLB,,, | Performed by: PSYCHIATRY & NEUROLOGY

## 2020-01-03 PROCEDURE — 1159F MED LIST DOCD IN RCRD: CPT | Mod: HCNC,S$GLB,, | Performed by: PSYCHIATRY & NEUROLOGY

## 2020-01-03 PROCEDURE — 1126F PR PAIN SEVERITY QUANTIFIED, NO PAIN PRESENT: ICD-10-PCS | Mod: HCNC,S$GLB,, | Performed by: PSYCHIATRY & NEUROLOGY

## 2020-01-03 PROCEDURE — 99999 PR PBB SHADOW E&M-EST. PATIENT-LVL IV: CPT | Mod: PBBFAC,HCNC,, | Performed by: PSYCHIATRY & NEUROLOGY

## 2020-01-03 PROCEDURE — 99203 PR OFFICE/OUTPT VISIT, NEW, LEVL III, 30-44 MIN: ICD-10-PCS | Mod: HCNC,S$GLB,, | Performed by: PSYCHIATRY & NEUROLOGY

## 2020-01-03 PROCEDURE — 3074F PR MOST RECENT SYSTOLIC BLOOD PRESSURE < 130 MM HG: ICD-10-PCS | Mod: HCNC,CPTII,S$GLB, | Performed by: PSYCHIATRY & NEUROLOGY

## 2020-01-03 PROCEDURE — 99999 PR PBB SHADOW E&M-EST. PATIENT-LVL IV: ICD-10-PCS | Mod: PBBFAC,HCNC,, | Performed by: PSYCHIATRY & NEUROLOGY

## 2020-01-03 PROCEDURE — 3078F DIAST BP <80 MM HG: CPT | Mod: HCNC,CPTII,S$GLB, | Performed by: PSYCHIATRY & NEUROLOGY

## 2020-01-03 PROCEDURE — 99499 UNLISTED E&M SERVICE: CPT | Mod: S$GLB,,, | Performed by: PSYCHIATRY & NEUROLOGY

## 2020-01-03 PROCEDURE — 1126F AMNT PAIN NOTED NONE PRSNT: CPT | Mod: HCNC,S$GLB,, | Performed by: PSYCHIATRY & NEUROLOGY

## 2020-01-03 PROCEDURE — 1159F PR MEDICATION LIST DOCUMENTED IN MEDICAL RECORD: ICD-10-PCS | Mod: HCNC,S$GLB,, | Performed by: PSYCHIATRY & NEUROLOGY

## 2020-01-03 RX ORDER — ALBUTEROL SULFATE 90 UG/1
2 AEROSOL, METERED RESPIRATORY (INHALATION) EVERY 6 HOURS PRN
COMMUNITY
Start: 2018-11-11 | End: 2020-04-13

## 2020-01-03 RX ORDER — CHOLECALCIFEROL (VITAMIN D3) 25 MCG
1000 TABLET ORAL
Status: ON HOLD | COMMUNITY
End: 2020-10-29 | Stop reason: SDUPTHER

## 2020-01-03 RX ORDER — CLOPIDOGREL BISULFATE 75 MG/1
75 TABLET ORAL
Status: ON HOLD | COMMUNITY
Start: 2019-05-12 | End: 2020-10-29 | Stop reason: SDUPTHER

## 2020-01-03 RX ORDER — CARVEDILOL 6.25 MG/1
TABLET ORAL
Status: ON HOLD | COMMUNITY
Start: 2019-10-16 | End: 2020-10-29 | Stop reason: SDUPTHER

## 2020-01-03 RX ORDER — HYDROCODONE BITARTRATE AND ACETAMINOPHEN 5; 325 MG/1; MG/1
TABLET ORAL
Status: ON HOLD | COMMUNITY
Start: 2019-12-17 | End: 2020-11-05 | Stop reason: HOSPADM

## 2020-01-03 RX ORDER — HYDRALAZINE HYDROCHLORIDE AND ISOSORBIDE DINITRATE 37.5; 2 MG/1; MG/1
TABLET, FILM COATED ORAL
Status: ON HOLD | COMMUNITY
Start: 2020-01-02 | End: 2020-10-29 | Stop reason: SDUPTHER

## 2020-01-03 RX ORDER — FAMOTIDINE 20 MG/1
20 TABLET, FILM COATED ORAL
COMMUNITY
Start: 2019-05-11 | End: 2021-09-15

## 2020-01-03 RX ORDER — CLONIDINE HYDROCHLORIDE 0.2 MG/1
0.2 TABLET ORAL
COMMUNITY
Start: 2019-05-11 | End: 2020-05-10

## 2020-01-03 NOTE — PROGRESS NOTES
[facility] - NEUROLOGY EPILEPSY  [facility], [place]    Date: 1/3/20  Patient Name: Christy Webber   MRN: [id number]   PCP: Jeanette Olmos  Referring Provider: Self, Aaareferral    Assessment:   Christy Webber is a 84 y.o. female with history of vascular dementia presenting for evaluation questionable seizure.  It does not appear the patient has history of seizure however appears that Keppra was started prophylactically following a subdural hematoma.  Will discontinue today.  Reviewed seizure precautions with patient and son and may resume should patient experience a clinical seizure.    Plan:     Problem List Items Addressed This Visit        Neuro    Vascular dementia without behavioral disturbance    Current Assessment & Plan     Nursing home resident         History of subdural hematoma    Current Assessment & Plan     Discontinue Keppra             Gio Rendon MD  [facility]   Department of Neurology    Patient note was created using MModal Dictation.  Any errors in syntax or even information may not have been identified and edited on initial review prior to signing this note.  Subjective:   HPI:   Ms. Christy Webber is a 84 y.o. female patient presents today with her son who contributes to the history.  Patient suffered a fall with a resultant subdural hematoma in the right posterior temporal lobe (CT personally reviewed) and was subsequently hospitalized.  It appears during her hospitalization she was started on prophylactic Keppra for management of her subdural.  The patient's son states that at no time prior to, during, or since her hospitalization has the patient had a seizure that she has been maintained on Keppra.  She denies side effects.  They are interested in discontinuing this medication as she has no documented history of seizure.    PAST MEDICAL HISTORY:  Past Medical History:   Diagnosis Date    Atrial flutter     CHF (congestive heart failure)      Hyperlipidemia     Hypertension     Non-STEMI (non-ST elevated myocardial infarction)     Pacemaker 08/2016    Single lead [employer] Pacemaker for sick sinus syndrome    Sick sinus syndrome     SSS (sick sinus syndrome) 8/24/2016    - pacemaker in place       PAST SURGICAL HISTORY:  Past Surgical History:   Procedure Laterality Date    CARDIAC PACEMAKER PLACEMENT      HYSTERECTOMY      KIDNEY SURGERY         CURRENT MEDS:  Current Outpatient Medications   Medication Sig Dispense Refill    albuterol (PROVENTIL/VENTOLIN HFA) 90 mcg/actuation inhaler Inhale 1-2 puffs into the lungs every 6 (six) hours as needed for Wheezing. Rescue 1 Inhaler 0    albuterol (PROVENTIL/VENTOLIN HFA) 90 mcg/actuation inhaler Inhale 2 puffs into the lungs every 6 (six) hours as needed.      albuterol (VENTOLIN HFA) 90 mcg/actuation inhaler Inhale 2 puffs into the lungs every 6 (six) hours as needed for Wheezing. Rescue 1 each 3    apixaban (ELIQUIS) 5 mg Tab Take 5 mg by mouth.      aspirin (ECOTRIN) 81 MG EC tablet Take 81 mg by mouth once daily.      calcium carbonate-vitamin D3 (CALCIUM 600 WITH VITAMIN D3) 600 mg(1,500mg) -500 unit Cap Take 1 tablet by mouth 2 (two) times daily. 60 capsule 2    carvedilol (COREG) 6.25 MG tablet       cloNIDine (CATAPRES) 0.2 MG tablet Take 0.2 mg by mouth.      diltiaZEM (TIAZAC) 360 MG Cs24 Take 360 mg by mouth once daily.      docusate sodium (COLACE) 100 MG capsule Take 1 capsule (100 mg total) by mouth 2 (two) times daily. 60 capsule 2    donepezil (ARICEPT) 5 MG tablet Take 1 tablet (5 mg total) by mouth every evening. 30 tablet 3    famotidine (PEPCID) 20 MG tablet Take 20 mg by mouth.      furosemide (LASIX) 40 MG tablet Take 0.5 tablets (20 mg total) by mouth once daily. 15 tablet 2    losartan (COZAAR) 25 MG tablet Take 1 tablet (25 mg total) by mouth once daily. HOLD THIS MEDICATION UNTIL FOLLOW-UP WITH YOUR CARDIOLOGIST.      rosuvastatin (CRESTOR) 10 MG tablet  "Take 1 tablet (10 mg total) by mouth once daily. HOLD FOR ONE WEEK AND RESTART ON 8/6/18      vitamin D (VITAMIN D3) 1000 units Tab Take 1,000 Units by mouth.      BIDIL 20-37.5 mg Tab       clopidogrel (PLAVIX) 75 mg tablet Take 75 mg by mouth.      HYDROcodone-acetaminophen (NORCO) 5-325 mg per tablet        No current facility-administered medications for this visit.        ALLERGIES:  Review of patient's allergies indicates:  No Known Allergies    FAMILY HISTORY:  No family history on file.    SOCIAL HISTORY:  Social History     Tobacco Use    Smoking status: Former Smoker     Packs/day: 1.50     Years: 63.00     Pack years: 94.50     Types: Cigarettes     Last attempt to quit: 6/14/2016     Years since quitting: 3.5    Smokeless tobacco: Never Used   Substance Use Topics    Alcohol use: No    Drug use: No       Review of Systems:  12 system review of systems is negative except for the symptoms mentioned in HPI.      Objective:     Vitals:    01/03/20 0954   BP: 112/70   Pulse: 62   Weight: 67.6 kg (149 lb)   Height: 5' 3" (1.6 m)     General: NAD, well nourished   Eyes: no tearing, discharge, no erythema   ENT: moist mucous membranes of the oral cavity, nares patent    Neck: Supple, full range of motion  Cardiovascular: Warm and well perfused, pulses equal and symmetrical  Lungs: Normal work of breathing, normal chest wall excursions  Skin: No rash, lesions, or breakdown on exposed skin  Psychiatry: Mood and affect are appropriate   Abdomen: soft, non tender, non distended  Extremeties: No cyanosis, clubbing or edema.    Neurological   MENTAL STATUS: Alert and oriented to person only. Attention and concentration fair. Speech without dysarthria, able to name and repeat without difficulty. Recent and remote memory fair to poor  CRANIAL NERVES: Visual fields intact. PERRL. EOMI. Facial sensation intact. Face symmetrical. Hearing grossly intact. Full shoulder shrug bilaterally. Tongue protrudes midline "   SENSORY: Sensation is intact to light touch throughout.    MOTOR: Normal bulk and tone. 5/5 deltoid, biceps, triceps, interosseous, hand  bilaterally. 4/5 iliopsoas, knee extension/flexion, foot dorsi/plantarflexion bilaterally.    REFLEXES: Symmetric and 1  throughout.  CEREBELLAR/COORDINATION/GAIT: Unsteady gait at baseline   Finger to nose intact.

## 2020-04-16 ENCOUNTER — LAB VISIT (OUTPATIENT)
Dept: LAB | Facility: HOSPITAL | Age: 85
End: 2020-04-16
Payer: MEDICARE

## 2020-04-16 DIAGNOSIS — Z20.822 SUSPECTED COVID-19 VIRUS INFECTION: ICD-10-CM

## 2020-04-16 PROCEDURE — U0002 COVID-19 LAB TEST NON-CDC: HCPCS | Mod: HCNC

## 2020-04-17 LAB — SARS-COV-2 RNA RESP QL NAA+PROBE: NOT DETECTED

## 2020-10-27 PROBLEM — I50.9 ACUTE CONGESTIVE HEART FAILURE: Status: ACTIVE | Noted: 2020-10-27

## 2020-11-01 ENCOUNTER — HOSPITAL ENCOUNTER (INPATIENT)
Facility: HOSPITAL | Age: 85
LOS: 6 days | Discharge: HOME | DRG: 291 | End: 2020-11-07
Attending: EMERGENCY MEDICINE | Admitting: HOSPITALIST
Payer: MEDICARE

## 2020-11-01 DIAGNOSIS — R06.02 SOB (SHORTNESS OF BREATH): ICD-10-CM

## 2020-11-01 DIAGNOSIS — J44.9 CHRONIC OBSTRUCTIVE PULMONARY DISEASE, UNSPECIFIED COPD TYPE: Chronic | ICD-10-CM

## 2020-11-01 DIAGNOSIS — R05.9 COUGH: ICD-10-CM

## 2020-11-01 DIAGNOSIS — I50.9 ACUTE ON CHRONIC CONGESTIVE HEART FAILURE, UNSPECIFIED HEART FAILURE TYPE: Primary | ICD-10-CM

## 2020-11-01 PROBLEM — I13.0 BENIGN HYPERTENSIVE HEART AND KIDNEY DISEASE WITH HF AND CKD: Status: ACTIVE | Noted: 2020-11-01

## 2020-11-01 PROBLEM — J18.9 LEFT LOWER LOBE PNEUMONIA: Status: ACTIVE | Noted: 2020-11-01

## 2020-11-01 LAB
ALBUMIN SERPL BCP-MCNC: 3.6 G/DL (ref 3.5–5.2)
ALP SERPL-CCNC: 201 U/L (ref 55–135)
ALT SERPL W/O P-5'-P-CCNC: 28 U/L (ref 10–44)
ANION GAP SERPL CALC-SCNC: 13 MMOL/L (ref 8–16)
AST SERPL-CCNC: 41 U/L (ref 10–40)
BASOPHILS # BLD AUTO: 0.01 K/UL (ref 0–0.2)
BASOPHILS NFR BLD: 0.1 % (ref 0–1.9)
BILIRUB SERPL-MCNC: 0.9 MG/DL (ref 0.1–1)
BNP SERPL-MCNC: 2416 PG/ML (ref 0–99)
BUN SERPL-MCNC: 39 MG/DL (ref 8–23)
CALCIUM SERPL-MCNC: 10.1 MG/DL (ref 8.7–10.5)
CHLORIDE SERPL-SCNC: 103 MMOL/L (ref 95–110)
CO2 SERPL-SCNC: 22 MMOL/L (ref 23–29)
CREAT SERPL-MCNC: 1.8 MG/DL (ref 0.5–1.4)
CTP QC/QA: YES
DIFFERENTIAL METHOD: ABNORMAL
EOSINOPHIL # BLD AUTO: 0 K/UL (ref 0–0.5)
EOSINOPHIL NFR BLD: 0 % (ref 0–8)
ERYTHROCYTE [DISTWIDTH] IN BLOOD BY AUTOMATED COUNT: 15.9 % (ref 11.5–14.5)
EST. GFR  (AFRICAN AMERICAN): 29.4 ML/MIN/1.73 M^2
EST. GFR  (NON AFRICAN AMERICAN): 25.5 ML/MIN/1.73 M^2
GLUCOSE SERPL-MCNC: 240 MG/DL (ref 70–110)
HCT VFR BLD AUTO: 42.2 % (ref 37–48.5)
HGB BLD-MCNC: 13.7 G/DL (ref 12–16)
IMM GRANULOCYTES # BLD AUTO: 0.05 K/UL (ref 0–0.04)
IMM GRANULOCYTES NFR BLD AUTO: 0.5 % (ref 0–0.5)
LYMPHOCYTES # BLD AUTO: 0.7 K/UL (ref 1–4.8)
LYMPHOCYTES NFR BLD: 6.4 % (ref 18–48)
MCH RBC QN AUTO: 30.3 PG (ref 27–31)
MCHC RBC AUTO-ENTMCNC: 32.5 G/DL (ref 32–36)
MCV RBC AUTO: 93 FL (ref 82–98)
MONOCYTES # BLD AUTO: 1.2 K/UL (ref 0.3–1)
MONOCYTES NFR BLD: 11.6 % (ref 4–15)
NEUTROPHILS # BLD AUTO: 8.2 K/UL (ref 1.8–7.7)
NEUTROPHILS NFR BLD: 81.4 % (ref 38–73)
NRBC BLD-RTO: 1 /100 WBC
PLATELET # BLD AUTO: 205 K/UL (ref 150–350)
PMV BLD AUTO: 11.3 FL (ref 9.2–12.9)
POTASSIUM SERPL-SCNC: 4.5 MMOL/L (ref 3.5–5.1)
PROT SERPL-MCNC: 7.5 G/DL (ref 6–8.4)
RBC # BLD AUTO: 4.52 M/UL (ref 4–5.4)
SARS-COV-2 RDRP RESP QL NAA+PROBE: NEGATIVE
SODIUM SERPL-SCNC: 138 MMOL/L (ref 136–145)
TROPONIN I SERPL DL<=0.01 NG/ML-MCNC: 0.04 NG/ML (ref 0–0.03)
WBC # BLD AUTO: 10.12 K/UL (ref 3.9–12.7)

## 2020-11-01 PROCEDURE — 25000242 PHARM REV CODE 250 ALT 637 W/ HCPCS: Performed by: HOSPITALIST

## 2020-11-01 PROCEDURE — 99900035 HC TECH TIME PER 15 MIN (STAT)

## 2020-11-01 PROCEDURE — 94640 AIRWAY INHALATION TREATMENT: CPT

## 2020-11-01 PROCEDURE — 25000003 PHARM REV CODE 250: Performed by: HOSPITALIST

## 2020-11-01 PROCEDURE — 96375 TX/PRO/DX INJ NEW DRUG ADDON: CPT

## 2020-11-01 PROCEDURE — 93005 ELECTROCARDIOGRAM TRACING: CPT

## 2020-11-01 PROCEDURE — 96374 THER/PROPH/DIAG INJ IV PUSH: CPT

## 2020-11-01 PROCEDURE — 99220 PR INITIAL OBSERVATION CARE,LEVL III: ICD-10-PCS | Mod: ,,, | Performed by: HOSPITALIST

## 2020-11-01 PROCEDURE — 99284 EMERGENCY DEPT VISIT MOD MDM: CPT | Mod: ,,, | Performed by: EMERGENCY MEDICINE

## 2020-11-01 PROCEDURE — 12000002 HC ACUTE/MED SURGE SEMI-PRIVATE ROOM

## 2020-11-01 PROCEDURE — 85025 COMPLETE CBC W/AUTO DIFF WBC: CPT

## 2020-11-01 PROCEDURE — 94761 N-INVAS EAR/PLS OXIMETRY MLT: CPT

## 2020-11-01 PROCEDURE — 99285 EMERGENCY DEPT VISIT HI MDM: CPT | Mod: 25

## 2020-11-01 PROCEDURE — 63600175 PHARM REV CODE 636 W HCPCS: Performed by: EMERGENCY MEDICINE

## 2020-11-01 PROCEDURE — 99220 PR INITIAL OBSERVATION CARE,LEVL III: CPT | Mod: ,,, | Performed by: HOSPITALIST

## 2020-11-01 PROCEDURE — 83880 ASSAY OF NATRIURETIC PEPTIDE: CPT

## 2020-11-01 PROCEDURE — 63600175 PHARM REV CODE 636 W HCPCS: Performed by: HOSPITALIST

## 2020-11-01 PROCEDURE — 80053 COMPREHEN METABOLIC PANEL: CPT

## 2020-11-01 PROCEDURE — 27000221 HC OXYGEN, UP TO 24 HOURS

## 2020-11-01 PROCEDURE — 25000242 PHARM REV CODE 250 ALT 637 W/ HCPCS: Performed by: EMERGENCY MEDICINE

## 2020-11-01 PROCEDURE — 84484 ASSAY OF TROPONIN QUANT: CPT

## 2020-11-01 PROCEDURE — U0002 COVID-19 LAB TEST NON-CDC: HCPCS | Performed by: EMERGENCY MEDICINE

## 2020-11-01 PROCEDURE — 99284 PR EMERGENCY DEPT VISIT,LEVEL IV: ICD-10-PCS | Mod: ,,, | Performed by: EMERGENCY MEDICINE

## 2020-11-01 RX ORDER — ISOSORBIDE DINITRATE AND HYDRALAZINE HYDROCHLORIDE 37.5; 2 MG/1; MG/1
1 TABLET ORAL 2 TIMES DAILY
Status: DISCONTINUED | OUTPATIENT
Start: 2020-11-01 | End: 2020-11-07 | Stop reason: HOSPADM

## 2020-11-01 RX ORDER — HYDROCODONE BITARTRATE AND ACETAMINOPHEN 5; 325 MG/1; MG/1
1 TABLET ORAL EVERY 4 HOURS PRN
Status: DISCONTINUED | OUTPATIENT
Start: 2020-11-01 | End: 2020-11-07 | Stop reason: HOSPADM

## 2020-11-01 RX ORDER — BENZONATATE 100 MG/1
200 CAPSULE ORAL 3 TIMES DAILY PRN
Status: DISCONTINUED | OUTPATIENT
Start: 2020-11-01 | End: 2020-11-07 | Stop reason: HOSPADM

## 2020-11-01 RX ORDER — AMOXICILLIN 250 MG
1 CAPSULE ORAL 2 TIMES DAILY PRN
Status: DISCONTINUED | OUTPATIENT
Start: 2020-11-01 | End: 2020-11-07 | Stop reason: HOSPADM

## 2020-11-01 RX ORDER — PROMETHAZINE HYDROCHLORIDE AND CODEINE PHOSPHATE 6.25; 1 MG/5ML; MG/5ML
5 SOLUTION ORAL NIGHTLY PRN
Status: DISCONTINUED | OUTPATIENT
Start: 2020-11-01 | End: 2020-11-03

## 2020-11-01 RX ORDER — CLOPIDOGREL BISULFATE 75 MG/1
75 TABLET ORAL DAILY
Status: DISCONTINUED | OUTPATIENT
Start: 2020-11-02 | End: 2020-11-07 | Stop reason: HOSPADM

## 2020-11-01 RX ORDER — ACETAMINOPHEN 325 MG/1
650 TABLET ORAL EVERY 4 HOURS PRN
Status: DISCONTINUED | OUTPATIENT
Start: 2020-11-01 | End: 2020-11-07 | Stop reason: HOSPADM

## 2020-11-01 RX ORDER — GUAIFENESIN 100 MG/5ML
200 SOLUTION ORAL EVERY 4 HOURS PRN
Status: DISCONTINUED | OUTPATIENT
Start: 2020-11-01 | End: 2020-11-03

## 2020-11-01 RX ORDER — HYDROCODONE BITARTRATE AND ACETAMINOPHEN 10; 325 MG/1; MG/1
1 TABLET ORAL EVERY 4 HOURS PRN
Status: DISCONTINUED | OUTPATIENT
Start: 2020-11-01 | End: 2020-11-07 | Stop reason: HOSPADM

## 2020-11-01 RX ORDER — TALC
6 POWDER (GRAM) TOPICAL NIGHTLY PRN
Status: DISCONTINUED | OUTPATIENT
Start: 2020-11-01 | End: 2020-11-07 | Stop reason: HOSPADM

## 2020-11-01 RX ORDER — SODIUM CHLORIDE 0.9 % (FLUSH) 0.9 %
10 SYRINGE (ML) INJECTION
Status: DISCONTINUED | OUTPATIENT
Start: 2020-11-01 | End: 2020-11-07 | Stop reason: HOSPADM

## 2020-11-01 RX ORDER — IPRATROPIUM BROMIDE AND ALBUTEROL SULFATE 2.5; .5 MG/3ML; MG/3ML
3 SOLUTION RESPIRATORY (INHALATION) EVERY 4 HOURS PRN
Status: DISCONTINUED | OUTPATIENT
Start: 2020-11-01 | End: 2020-11-07 | Stop reason: HOSPADM

## 2020-11-01 RX ORDER — SODIUM CHLORIDE 0.9 % (FLUSH) 0.9 %
5 SYRINGE (ML) INJECTION
Status: DISCONTINUED | OUTPATIENT
Start: 2020-11-01 | End: 2020-11-07 | Stop reason: HOSPADM

## 2020-11-01 RX ORDER — CEFTRIAXONE 1 G/1
1 INJECTION, POWDER, FOR SOLUTION INTRAMUSCULAR; INTRAVENOUS
Status: DISCONTINUED | OUTPATIENT
Start: 2020-11-01 | End: 2020-11-05

## 2020-11-01 RX ORDER — FUROSEMIDE 10 MG/ML
40 INJECTION INTRAMUSCULAR; INTRAVENOUS
Status: COMPLETED | OUTPATIENT
Start: 2020-11-01 | End: 2020-11-01

## 2020-11-01 RX ORDER — DILTIAZEM HYDROCHLORIDE 180 MG/1
360 CAPSULE, COATED, EXTENDED RELEASE ORAL DAILY
Status: DISCONTINUED | OUTPATIENT
Start: 2020-11-02 | End: 2020-11-07 | Stop reason: HOSPADM

## 2020-11-01 RX ORDER — DONEPEZIL HYDROCHLORIDE 5 MG/1
5 TABLET, FILM COATED ORAL NIGHTLY
Status: DISCONTINUED | OUTPATIENT
Start: 2020-11-01 | End: 2020-11-07 | Stop reason: HOSPADM

## 2020-11-01 RX ORDER — FLUTICASONE FUROATE AND VILANTEROL 100; 25 UG/1; UG/1
1 POWDER RESPIRATORY (INHALATION) DAILY
Status: DISCONTINUED | OUTPATIENT
Start: 2020-11-02 | End: 2020-11-07 | Stop reason: HOSPADM

## 2020-11-01 RX ORDER — ROSUVASTATIN CALCIUM 10 MG/1
10 TABLET, COATED ORAL DAILY
Status: DISCONTINUED | OUTPATIENT
Start: 2020-11-02 | End: 2020-11-07 | Stop reason: HOSPADM

## 2020-11-01 RX ORDER — AZITHROMYCIN 250 MG/1
250 TABLET, FILM COATED ORAL DAILY
Status: COMPLETED | OUTPATIENT
Start: 2020-11-02 | End: 2020-11-04

## 2020-11-01 RX ORDER — FAMOTIDINE 20 MG/1
20 TABLET, FILM COATED ORAL DAILY
Status: DISCONTINUED | OUTPATIENT
Start: 2020-11-02 | End: 2020-11-07 | Stop reason: HOSPADM

## 2020-11-01 RX ORDER — CARVEDILOL 6.25 MG/1
6.25 TABLET ORAL 2 TIMES DAILY WITH MEALS
Status: DISCONTINUED | OUTPATIENT
Start: 2020-11-01 | End: 2020-11-07 | Stop reason: HOSPADM

## 2020-11-01 RX ORDER — IPRATROPIUM BROMIDE AND ALBUTEROL SULFATE 2.5; .5 MG/3ML; MG/3ML
3 SOLUTION RESPIRATORY (INHALATION)
Status: COMPLETED | OUTPATIENT
Start: 2020-11-01 | End: 2020-11-01

## 2020-11-01 RX ORDER — ONDANSETRON 2 MG/ML
8 INJECTION INTRAMUSCULAR; INTRAVENOUS EVERY 8 HOURS PRN
Status: DISCONTINUED | OUTPATIENT
Start: 2020-11-01 | End: 2020-11-07 | Stop reason: HOSPADM

## 2020-11-01 RX ORDER — FUROSEMIDE 10 MG/ML
40 INJECTION INTRAMUSCULAR; INTRAVENOUS 2 TIMES DAILY
Status: DISCONTINUED | OUTPATIENT
Start: 2020-11-02 | End: 2020-11-05

## 2020-11-01 RX ADMIN — CEFTRIAXONE SODIUM 1 G: 1 INJECTION, POWDER, FOR SOLUTION INTRAMUSCULAR; INTRAVENOUS at 07:11

## 2020-11-01 RX ADMIN — HYDRALAZINE HYDROCHLORIDE AND ISOSORBIDE DINITRATE 1 TABLET: 37.5; 2 TABLET, FILM COATED ORAL at 07:11

## 2020-11-01 RX ADMIN — GUAIFENESIN 200 MG: 200 SOLUTION ORAL at 07:11

## 2020-11-01 RX ADMIN — IPRATROPIUM BROMIDE AND ALBUTEROL SULFATE 3 ML: .5; 2.5 SOLUTION RESPIRATORY (INHALATION) at 03:11

## 2020-11-01 RX ADMIN — FUROSEMIDE 40 MG: 10 INJECTION, SOLUTION INTRAMUSCULAR; INTRAVENOUS at 05:11

## 2020-11-01 RX ADMIN — CARVEDILOL 6.25 MG: 6.25 TABLET, FILM COATED ORAL at 07:11

## 2020-11-01 RX ADMIN — AZITHROMYCIN MONOHYDRATE 500 MG: 500 INJECTION, POWDER, LYOPHILIZED, FOR SOLUTION INTRAVENOUS at 07:11

## 2020-11-01 RX ADMIN — DONEPEZIL HYDROCHLORIDE 5 MG: 5 TABLET, FILM COATED ORAL at 07:11

## 2020-11-01 RX ADMIN — IPRATROPIUM BROMIDE AND ALBUTEROL SULFATE 3 ML: .5; 3 SOLUTION RESPIRATORY (INHALATION) at 08:11

## 2020-11-01 RX ADMIN — BENZONATATE 200 MG: 100 CAPSULE, LIQUID FILLED ORAL at 07:11

## 2020-11-01 RX ADMIN — APIXABAN 5 MG: 5 TABLET, FILM COATED ORAL at 07:11

## 2020-11-01 NOTE — ED PROVIDER NOTES
Encounter Date: 11/1/2020       History     Chief Complaint   Patient presents with    Shortness of Breath     persistant cough with SOB for the past 3 days at [facility]. Denies N/V/D/F.      84 yo f, h/o CHF, COPD on 2L NC, CAD, a flutter, dementia, CKD, SSS/pacemaker, BIBEMS from [facility] for cough and SOB.  Pt was just admitted to [facility] last week, d/c'd 3-4 days ago after management for CHF/COPD/a flutter w RVR.  EMS said they were called bc pt with persistent cough and still c/o SOB.  Pt with mild hypoxia on arrival, O2 sat 92% on 2L NC.  No treatments given en route.     Notes from discharge summary report that pt given lasix in the hospital and instructed to increase lasix dose for 3 days.      Discussed case with NH staff - they felt that pt was stable, looked well but report that family requested [place] [facility] [place] she had told them that she wasn't feeling better.  Daughter in law says pt has been saying that she's been feeling worse since discharge    The history is provided by the patient and the EMS personnel. The history is limited by the condition of the patient and the absence of a caregiver.     Review of patient's allergies indicates:  No Known Allergies  Past Medical History:   Diagnosis Date    Atrial flutter     Biatrial enlargement     CAD S/P percutaneous coronary angioplasty     CHF (congestive heart failure)     Chronic respiratory failure with hypoxia, on home oxygen therapy     Hyperlipidemia     Hypertension     Non-STEMI (non-ST elevated myocardial infarction)     Pacemaker 08/2016    Single lead [employer] Pacemaker for sick sinus syndrome    Sick sinus syndrome     SSS (sick sinus syndrome) 8/24/2016    - pacemaker in place     Past Surgical History:   Procedure Laterality Date    CARDIAC PACEMAKER PLACEMENT      HYSTERECTOMY      KIDNEY SURGERY       History reviewed. No pertinent family history.  Social History     Tobacco Use     Smoking status: Former Smoker     Packs/day: 1.50     Years: 63.00     Pack years: 94.50     Types: Cigarettes     Quit date: 2016     Years since quittin.3    Smokeless tobacco: Never Used   Substance Use Topics    Alcohol use: Yes     Comment: rarely    Drug use: No     Review of Systems   Unable to perform ROS: Dementia       Physical Exam     Initial Vitals [20 1517]   BP Pulse Resp Temp SpO2   (!) 157/78 85 20 98.3 °F (36.8 °C) (!) 92 %      MAP       --         Physical Exam    Nursing note and vitals reviewed.  Constitutional: She appears well-developed and well-nourished. She is not diaphoretic. No distress.   HENT:   Mouth/Throat: Oropharynx is clear and moist.   Eyes: Conjunctivae are normal.   Cardiovascular: Normal rate.   Pulmonary/Chest: Breath sounds normal. No respiratory distress. She has no wheezes. She has no rhonchi. She has no rales.   Abdominal: Soft. She exhibits no distension. There is no abdominal tenderness. There is no rebound and no guarding.   Musculoskeletal: Normal range of motion. No edema.   Neurological: She is alert.   Able to answer questions appropriately, no focal weakness   Skin: Skin is warm and dry. No pallor.   Psychiatric: She has a normal mood and affect.         ED Course   Procedures  Labs Reviewed   COMPREHENSIVE METABOLIC PANEL - Abnormal; Notable for the following components:       Result Value    CO2 22 (*)     Glucose 240 (*)     BUN 39 (*)     Creatinine 1.8 (*)     Alkaline Phosphatase 201 (*)     AST 41 (*)     eGFR if  29.4 (*)     eGFR if non  25.5 (*)     All other components within normal limits   CBC W/ AUTO DIFFERENTIAL - Abnormal; Notable for the following components:    RDW 15.9 (*)     Gran # (ANC) 8.2 (*)     Immature Grans (Abs) 0.05 (*)     Lymph # 0.7 (*)     Mono # 1.2 (*)     nRBC 1 (*)     Gran % 81.4 (*)     Lymph % 6.4 (*)     All other components within normal limits   B-TYPE NATRIURETIC  PEPTIDE - Abnormal; Notable for the following components:    BNP 2,416 (*)     All other components within normal limits   TROPONIN I - Abnormal; Notable for the following components:    Troponin I 0.039 (*)     All other components within normal limits   SARS-COV-2 RDRP GENE     EKG Readings: (Independently Interpreted)   Initial Reading: No STEMI. Rhythm: Paced Rhythm.   Read as STEMI but EKG paced, looks similar to old EKG     ECG Results          EKG 12-lead (Final result)  Result time 11/02/20 12:54:22    Final result by Interface, Lab In [facility] (11/02/20 12:54:22)                 Narrative:    Test Reason : R06.02,    Vent. Rate : 125 BPM     Atrial Rate : 150 BPM     P-R Int : 000 ms          QRS Dur : 086 ms      QT Int : 408 ms       P-R-T Axes : 000 150 -39 degrees     QTc Int : 588 ms    Ventricular-paced rhythm  Abnormal ECG  When compared with ECG of 27-OCT-2020 21:45,  No significant change was found  Confirmed by Doug Pena MD ([id number]) on 11/2/2020 12:54:10 PM    Referred By: [email or web address]   SELF           Confirmed By:Doug Pena MD                            Imaging Results          X-Ray Chest AP Portable (Final result)  Result time 11/01/20 16:44:20    Final result by Blu Nunes MD (11/01/20 16:44:20)                 Impression:      As above.      Electronically signed by: Blu Nunes MD  Date:    11/01/2020  Time:    16:44             Narrative:    EXAMINATION:  XR CHEST AP PORTABLE    CLINICAL HISTORY:  sob;    TECHNIQUE:  Single frontal view of the chest was performed.    FINDINGS:  There is a mild amount of left basilar opacity with a probable small amount of left-sided pleural fluid.  The right lung is clear.  There is no pneumothorax.  The cardiac silhouette is enlarged.  There is calcification of the aorta.  There is a left-sided cardiac defibrillator.  The osseous structures demonstrate degenerative change.                                 Medical Decision Making:   History:    Old Medical Records: I decided to obtain old medical records.  Old Records Summarized: records from clinic visits and records from previous admission(s).       <> Summary of Records: ECHO Oct 2020  · The left ventricle is mildly enlarged with mildly decreased systolic function. The estimated ejection fraction is 40%.  · Septal wall has abnormal motion consistent with right ventricular pacemaker.  · Grade II diastolic dysfunction.  · There is left ventricular eccentric hypertrophy.  · Severe left atrial enlargement.  · There is moderate left ventricular global hypokinesis.  · Moderate right ventricular enlargement.  · Moderately reduced right ventricular systolic function.  · Severe right atrial enlargement.  · Mild aortic regurgitation.  · Mild-to-moderate mitral regurgitation.  · Severe tricuspid regurgitation.  · Mild to moderate pulmonic regurgitation.  · Elevated central venous pressure (15 mmHg).  · The estimated PA systolic pressure is 42 mmHg.  · There is pulmonary hypertension.    Discharge summary 10/29  Hospital Course:   Patient admitted to telemetry and remained in rate-controlled atrial fibrillation throughout admission. Patient responded well to increased diuresis with -500cc net fluid balance overnight. Breathing remains stable, on 2L O2 via nasal cannula, which is patient's baseline. Cough slightly improved, but chronic in nature. Will continue tessalon. Continue increased dose of lasix for 3 more days and then return to normal lasix dose. Patient clinically improved and medically stable for discharge to nursing home with instructions to follow-up with PCP in 1-2 weeks       * Acute on chronic systolic congestive heart failure  Continue to monitor cardiac telemetry.  Continue cardioprotective medications.  Increased dose of lasix for next 3 days.  Echocardiogram essentially unchanged from last echo in 2019.     Centrilobular emphysema  Continue home O2 2L via nasal cannula.  Wheezing  resolved  Initial Assessment:   86 yo f, complex PMH as above, now returning with persistent cough/SOB    VSS on arrival, on 2L NC, pt with an O2 sat in mid-90s  Lungs CTA  Appears comfortable, does cough intermittently  Differential Diagnosis:   COPD exacerbation/viral syndrome  CHF  COVID  Aspiration pneumonitis  PE very unlikely as pt already anticoagulated  Clinical Tests:   Lab Tests: Ordered and Reviewed  Radiological Study: Ordered and Reviewed  Medical Tests: Reviewed and Ordered  ED Management:  BNP elevated, c/w CHF exacerbation  Plan for admission to medicine                             Clinical Impression:     ICD-10-CM ICD-9-CM   1. Acute on chronic congestive heart failure, unspecified heart failure type  I50.9 428.0   2. SOB (shortness of breath)  R06.02 786.05   3. Cough  R05 786.2                          ED Disposition Condition    Observation                             Thalia Ross MD  11/02/20 1581

## 2020-11-01 NOTE — ED TRIAGE NOTES
Christy Webber, a 85 y.o. female presents to the ED w/ complaint of coughing. Pt states that she has been coughing for a week and for the past 3 days it has gotten really bad. Pt states she feels more SOB than normal.     Triage note:  Chief Complaint   Patient presents with    Shortness of Breath     persistant cough with SOB for the past 3 days at [facility]. Denies N/V/D/F.      Review of patient's allergies indicates:  No Known Allergies  Past Medical History:   Diagnosis Date    Atrial flutter     CHF (congestive heart failure)     Hyperlipidemia     Hypertension     Non-STEMI (non-ST elevated myocardial infarction)     Pacemaker 08/2016    Single lead [employer] Pacemaker for sick sinus syndrome    Sick sinus syndrome     SSS (sick sinus syndrome) 8/24/2016    - pacemaker in place

## 2020-11-02 PROBLEM — I35.1 AORTIC REGURGITATION: Chronic | Status: ACTIVE | Noted: 2017-06-02

## 2020-11-02 PROBLEM — I35.0 AORTIC STENOSIS: Status: RESOLVED | Noted: 2017-08-01 | Resolved: 2020-11-02

## 2020-11-02 PROBLEM — N18.30 ACUTE RENAL FAILURE SUPERIMPOSED ON STAGE 3 CHRONIC KIDNEY DISEASE: Status: RESOLVED | Noted: 2017-06-02 | Resolved: 2020-11-02

## 2020-11-02 PROBLEM — I34.0 NON-RHEUMATIC MITRAL REGURGITATION: Chronic | Status: ACTIVE | Noted: 2017-06-26

## 2020-11-02 PROBLEM — E21.0 PRIMARY HYPERPARATHYROIDISM: Chronic | Status: ACTIVE | Noted: 2018-11-17

## 2020-11-02 PROBLEM — D64.9 SYMPTOMATIC ANEMIA: Status: RESOLVED | Noted: 2019-11-17 | Resolved: 2020-11-02

## 2020-11-02 PROBLEM — N17.9 ACUTE RENAL FAILURE SUPERIMPOSED ON STAGE 3 CHRONIC KIDNEY DISEASE: Status: RESOLVED | Noted: 2017-06-02 | Resolved: 2020-11-02

## 2020-11-02 PROBLEM — R74.01 TRANSAMINITIS: Status: RESOLVED | Noted: 2018-11-16 | Resolved: 2020-11-02

## 2020-11-02 PROBLEM — E78.5 DYSLIPIDEMIA: Chronic | Status: ACTIVE | Noted: 2017-08-01

## 2020-11-02 PROBLEM — F01.50 VASCULAR DEMENTIA WITHOUT BEHAVIORAL DISTURBANCE: Chronic | Status: ACTIVE | Noted: 2018-11-12

## 2020-11-02 PROBLEM — I13.0 BENIGN HYPERTENSIVE HEART AND KIDNEY DISEASE WITH HF AND CKD: Chronic | Status: ACTIVE | Noted: 2020-11-01

## 2020-11-02 PROBLEM — Z95.0 PRESENCE OF CARDIAC PACEMAKER: Chronic | Status: ACTIVE | Noted: 2018-11-12

## 2020-11-02 PROBLEM — Z66 DNR (DO NOT RESUSCITATE): Chronic | Status: ACTIVE | Noted: 2020-11-01

## 2020-11-02 PROBLEM — J44.9 COPD (CHRONIC OBSTRUCTIVE PULMONARY DISEASE): Chronic | Status: ACTIVE | Noted: 2019-11-17

## 2020-11-02 PROBLEM — Z79.01 CURRENT USE OF LONG TERM ANTICOAGULATION: Chronic | Status: ACTIVE | Noted: 2018-11-12

## 2020-11-02 PROBLEM — J43.2 CENTRILOBULAR EMPHYSEMA: Chronic | Status: ACTIVE | Noted: 2017-06-02

## 2020-11-02 PROBLEM — I36.1 NONRHEUMATIC TRICUSPID VALVE REGURGITATION: Chronic | Status: ACTIVE | Noted: 2017-06-02

## 2020-11-02 PROBLEM — Z78.9 NURSING HOME RESIDENT: Chronic | Status: ACTIVE | Noted: 2020-11-02

## 2020-11-02 PROBLEM — I27.20 PULMONARY HYPERTENSION: Chronic | Status: ACTIVE | Noted: 2017-06-02

## 2020-11-02 LAB
ALBUMIN SERPL BCP-MCNC: 3.1 G/DL (ref 3.5–5.2)
ALP SERPL-CCNC: 159 U/L (ref 55–135)
ALT SERPL W/O P-5'-P-CCNC: 25 U/L (ref 10–44)
ANION GAP SERPL CALC-SCNC: 10 MMOL/L (ref 8–16)
AST SERPL-CCNC: 24 U/L (ref 10–40)
BASOPHILS # BLD AUTO: 0.01 K/UL (ref 0–0.2)
BASOPHILS NFR BLD: 0.1 % (ref 0–1.9)
BILIRUB SERPL-MCNC: 0.8 MG/DL (ref 0.1–1)
BUN SERPL-MCNC: 37 MG/DL (ref 8–23)
CALCIUM SERPL-MCNC: 9.5 MG/DL (ref 8.7–10.5)
CHLORIDE SERPL-SCNC: 105 MMOL/L (ref 95–110)
CO2 SERPL-SCNC: 24 MMOL/L (ref 23–29)
CREAT SERPL-MCNC: 1.5 MG/DL (ref 0.5–1.4)
DIFFERENTIAL METHOD: ABNORMAL
EOSINOPHIL # BLD AUTO: 0 K/UL (ref 0–0.5)
EOSINOPHIL NFR BLD: 0.1 % (ref 0–8)
ERYTHROCYTE [DISTWIDTH] IN BLOOD BY AUTOMATED COUNT: 15.7 % (ref 11.5–14.5)
EST. GFR  (AFRICAN AMERICAN): 36.6 ML/MIN/1.73 M^2
EST. GFR  (NON AFRICAN AMERICAN): 31.8 ML/MIN/1.73 M^2
GLUCOSE SERPL-MCNC: 154 MG/DL (ref 70–110)
HCT VFR BLD AUTO: 38.4 % (ref 37–48.5)
HGB BLD-MCNC: 12.7 G/DL (ref 12–16)
IMM GRANULOCYTES # BLD AUTO: 0.03 K/UL (ref 0–0.04)
IMM GRANULOCYTES NFR BLD AUTO: 0.4 % (ref 0–0.5)
LYMPHOCYTES # BLD AUTO: 0.5 K/UL (ref 1–4.8)
LYMPHOCYTES NFR BLD: 6.4 % (ref 18–48)
MAGNESIUM SERPL-MCNC: 2.3 MG/DL (ref 1.6–2.6)
MCH RBC QN AUTO: 30.4 PG (ref 27–31)
MCHC RBC AUTO-ENTMCNC: 33.1 G/DL (ref 32–36)
MCV RBC AUTO: 92 FL (ref 82–98)
MONOCYTES # BLD AUTO: 0.7 K/UL (ref 0.3–1)
MONOCYTES NFR BLD: 9.5 % (ref 4–15)
NEUTROPHILS # BLD AUTO: 6.4 K/UL (ref 1.8–7.7)
NEUTROPHILS NFR BLD: 83.5 % (ref 38–73)
NRBC BLD-RTO: 1 /100 WBC
PHOSPHATE SERPL-MCNC: 2.6 MG/DL (ref 2.7–4.5)
PLATELET # BLD AUTO: 176 K/UL (ref 150–350)
PMV BLD AUTO: 11.3 FL (ref 9.2–12.9)
POTASSIUM SERPL-SCNC: 4.1 MMOL/L (ref 3.5–5.1)
PROCALCITONIN SERPL IA-MCNC: 0.33 NG/ML
PROT SERPL-MCNC: 6 G/DL (ref 6–8.4)
RBC # BLD AUTO: 4.18 M/UL (ref 4–5.4)
SODIUM SERPL-SCNC: 139 MMOL/L (ref 136–145)
WBC # BLD AUTO: 7.66 K/UL (ref 3.9–12.7)

## 2020-11-02 PROCEDURE — 80053 COMPREHEN METABOLIC PANEL: CPT

## 2020-11-02 PROCEDURE — 36415 COLL VENOUS BLD VENIPUNCTURE: CPT

## 2020-11-02 PROCEDURE — 94640 AIRWAY INHALATION TREATMENT: CPT

## 2020-11-02 PROCEDURE — 12000002 HC ACUTE/MED SURGE SEMI-PRIVATE ROOM

## 2020-11-02 PROCEDURE — 84145 PROCALCITONIN (PCT): CPT

## 2020-11-02 PROCEDURE — 94761 N-INVAS EAR/PLS OXIMETRY MLT: CPT

## 2020-11-02 PROCEDURE — 63600175 PHARM REV CODE 636 W HCPCS: Performed by: HOSPITALIST

## 2020-11-02 PROCEDURE — 85025 COMPLETE CBC W/AUTO DIFF WBC: CPT

## 2020-11-02 PROCEDURE — 27000221 HC OXYGEN, UP TO 24 HOURS

## 2020-11-02 PROCEDURE — 99900035 HC TECH TIME PER 15 MIN (STAT)

## 2020-11-02 PROCEDURE — 25000242 PHARM REV CODE 250 ALT 637 W/ HCPCS: Performed by: HOSPITALIST

## 2020-11-02 PROCEDURE — 83735 ASSAY OF MAGNESIUM: CPT

## 2020-11-02 PROCEDURE — 84100 ASSAY OF PHOSPHORUS: CPT

## 2020-11-02 PROCEDURE — 99232 SBSQ HOSP IP/OBS MODERATE 35: CPT | Mod: ,,, | Performed by: HOSPITALIST

## 2020-11-02 PROCEDURE — 99232 PR SUBSEQUENT HOSPITAL CARE,LEVL II: ICD-10-PCS | Mod: ,,, | Performed by: HOSPITALIST

## 2020-11-02 PROCEDURE — 63700000 PHARM REV CODE 250 ALT 637 W/O HCPCS: Performed by: HOSPITALIST

## 2020-11-02 PROCEDURE — 25000003 PHARM REV CODE 250: Performed by: HOSPITALIST

## 2020-11-02 RX ORDER — IPRATROPIUM BROMIDE AND ALBUTEROL SULFATE 2.5; .5 MG/3ML; MG/3ML
3 SOLUTION RESPIRATORY (INHALATION)
Status: DISCONTINUED | OUTPATIENT
Start: 2020-11-02 | End: 2020-11-07 | Stop reason: HOSPADM

## 2020-11-02 RX ORDER — GUAIFENESIN 600 MG/1
1200 TABLET, EXTENDED RELEASE ORAL 2 TIMES DAILY
Status: DISCONTINUED | OUTPATIENT
Start: 2020-11-02 | End: 2020-11-07 | Stop reason: HOSPADM

## 2020-11-02 RX ADMIN — ROSUVASTATIN CALCIUM 10 MG: 10 TABLET, FILM COATED ORAL at 08:11

## 2020-11-02 RX ADMIN — DILTIAZEM HYDROCHLORIDE 360 MG: 180 CAPSULE, COATED, EXTENDED RELEASE ORAL at 08:11

## 2020-11-02 RX ADMIN — FUROSEMIDE 40 MG: 10 INJECTION, SOLUTION INTRAMUSCULAR; INTRAVENOUS at 05:11

## 2020-11-02 RX ADMIN — CLOPIDOGREL 75 MG: 75 TABLET, FILM COATED ORAL at 08:11

## 2020-11-02 RX ADMIN — FUROSEMIDE 40 MG: 10 INJECTION, SOLUTION INTRAMUSCULAR; INTRAVENOUS at 08:11

## 2020-11-02 RX ADMIN — BENZONATATE 200 MG: 100 CAPSULE, LIQUID FILLED ORAL at 08:11

## 2020-11-02 RX ADMIN — IPRATROPIUM BROMIDE AND ALBUTEROL SULFATE 3 ML: .5; 2.5 SOLUTION RESPIRATORY (INHALATION) at 08:11

## 2020-11-02 RX ADMIN — CARVEDILOL 6.25 MG: 6.25 TABLET, FILM COATED ORAL at 08:11

## 2020-11-02 RX ADMIN — IPRATROPIUM BROMIDE AND ALBUTEROL SULFATE 3 ML: .5; 2.5 SOLUTION RESPIRATORY (INHALATION) at 04:11

## 2020-11-02 RX ADMIN — FAMOTIDINE 20 MG: 20 TABLET, FILM COATED ORAL at 08:11

## 2020-11-02 RX ADMIN — IPRATROPIUM BROMIDE AND ALBUTEROL SULFATE 3 ML: .5; 3 SOLUTION RESPIRATORY (INHALATION) at 02:11

## 2020-11-02 RX ADMIN — HYDRALAZINE HYDROCHLORIDE AND ISOSORBIDE DINITRATE 1 TABLET: 37.5; 2 TABLET, FILM COATED ORAL at 08:11

## 2020-11-02 RX ADMIN — CEFTRIAXONE SODIUM 1 G: 1 INJECTION, POWDER, FOR SOLUTION INTRAMUSCULAR; INTRAVENOUS at 08:11

## 2020-11-02 RX ADMIN — FLUTICASONE FUROATE AND VILANTEROL TRIFENATATE 1 PUFF: 100; 25 POWDER RESPIRATORY (INHALATION) at 10:11

## 2020-11-02 RX ADMIN — GUAIFENESIN 200 MG: 200 SOLUTION ORAL at 08:11

## 2020-11-02 RX ADMIN — APIXABAN 5 MG: 5 TABLET, FILM COATED ORAL at 08:11

## 2020-11-02 RX ADMIN — CARVEDILOL 6.25 MG: 6.25 TABLET, FILM COATED ORAL at 04:11

## 2020-11-02 RX ADMIN — GUAIFENESIN 1200 MG: 600 TABLET, EXTENDED RELEASE ORAL at 08:11

## 2020-11-02 RX ADMIN — GUAIFENESIN 200 MG: 200 SOLUTION ORAL at 05:11

## 2020-11-02 RX ADMIN — IPRATROPIUM BROMIDE AND ALBUTEROL SULFATE 3 ML: .5; 2.5 SOLUTION RESPIRATORY (INHALATION) at 01:11

## 2020-11-02 RX ADMIN — DONEPEZIL HYDROCHLORIDE 5 MG: 5 TABLET, FILM COATED ORAL at 08:11

## 2020-11-02 RX ADMIN — GUAIFENESIN 1200 MG: 600 TABLET, EXTENDED RELEASE ORAL at 01:11

## 2020-11-02 RX ADMIN — AZITHROMYCIN MONOHYDRATE 250 MG: 250 TABLET ORAL at 08:11

## 2020-11-02 RX ADMIN — APIXABAN 2.5 MG: 2.5 TABLET, FILM COATED ORAL at 08:11

## 2020-11-02 RX ADMIN — PROMETHAZINE HYDROCHLORIDE AND CODEINE PHOSPHATE 5 ML: 10; 6.25 SOLUTION ORAL at 01:11

## 2020-11-02 NOTE — ASSESSMENT & PLAN NOTE
Centrilobular emphysema  Chronic respiratory failure with hypoxia, on home oxygen therapy  Albuterol-ipratropium nebulizer treatments.

## 2020-11-02 NOTE — ASSESSMENT & PLAN NOTE
Aortic regurgitation  Biatrial enlargement  Non-rheumatic mitral regurgitation  Nonrheumatic tricuspid valve regurgitation  Pulmonary hypertension  She takes furosemide 20 mg daily. Giving IV 40 mg twice daily. Continue home isosorbide-hydralazine.

## 2020-11-02 NOTE — PROGRESS NOTES
[facility] Hospital Medicine  Progress Note    Patient Name: Christy Webber  MRN: [id number]  Patient Class: IP- Inpatient   Admission Date: 11/1/2020  Length of Stay: 1 days  Attending Physician: Jose Nelson MD  Primary Care Provider: Roland Cavanaugh MD    [facility] Medicine Team: [facility] HOSP MED B Jose Nelson MD    Subjective:     Principal Problem:Acute on chronic combined systolic and diastolic congestive heart failure        HPI:  Christy Webber is an 85 year old Black woman with hypertension, left ventricular eccentric hypertrophy, chronic systolic and diastolic congestive heart failure, biatrial enlargement, aortic regurgitation, mitral regurgitation, tricuspid regurgitation, pulmonary hypertension, permanent atrial fibrillation (anticoagulated on apixaban), 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 She lives at [facility]. Her primary care physician is Dr. Roland Cavanaugh. She is DNR.   [place] from 10/27/2020 to 10/29/2020 for nonproductive cough, shortness of breath, and wheezing. Chest X-ray showed bilateral patchy consolidation with vascular congestion. NT-proBNP was elevated at 36295 pg/mL (normal 5-1800). She was given nebulizer treatments, steroids, and intravenous furosemide. She was prescribed benzonatate for her cough. WBC count increased from 4640/uL to 40116/uL, which can be caused by steroids.   Symptoms did not improve, and her  family felt that she looked worse. Family called emergency medical services on 11/1/2020. She was found to have an oxygen saturation of 92% on 3 liters/minute. Family requested that she go to [facility] instead. BNP was elevated at 2416 pg/mL (normal 0-99). WBC count was 54129/uL. Chest X-ray showed a mild amount of left basilar opacity with probably small left pleural effusion. She was given 40 mg of intravenous furosemide. She was admitted to Hospital Medicine Team B.    Overview/Hospital Course:  She was put on intravenous furosemide 40 mg twice daily, ceftriaxone, and azithromycin. Procalcitonin was elevated at 0.33 ng/mL.     Interval History: Coughing a lot.    Review of Systems   Constitutional: Negative for chills and fever.   Gastrointestinal: Negative for nausea and vomiting.     Objective:     Vital Signs (Most Recent):  Temp: 97.9 °F (36.6 °C) (11/02/20 1136)  Pulse: 85 (11/02/20 1301)  Resp: 14 (11/02/20 1301)  BP: 131/72 (11/02/20 1136)  SpO2: 98 % (11/02/20 1301) Vital Signs (24h Range):  Temp:  [96.4 °F (35.8 °C)-98.4 °F (36.9 °C)] 97.9 °F (36.6 °C)  Pulse:  [82-88] 85  Resp:  [14-24] 14  SpO2:  [92 %-100 %] 98 %  BP: (128-164)/(72-79) 131/72     Weight: 71 kg (156 lb 8.4 oz)  Body mass index is 26.87 kg/m².    Intake/Output Summary (Last 24 hours) at 11/2/2020 1427  Last data filed at 11/2/2020 0400  Gross per 24 hour   Intake 520 ml   Output 850 ml   Net -330 ml      Physical Exam  Vitals signs and nursing note reviewed.   Constitutional:       General: She is not in acute distress.     Interventions: Nasal cannula in place.   Pulmonary:      Effort: Pulmonary effort is normal. No respiratory distress.      Comments: coughing  Neurological:      Mental Status: She is alert. Mental status is at baseline.   Psychiatric:         Mood and Affect: Mood and affect normal.         Significant Labs: All pertinent labs within the past 24 hours have been reviewed.    Significant  Imaging: I have reviewed all pertinent imaging results/findings within the past 24 hours.   X-Ray Chest AP Portable 11/01/20: FINDINGS: There is a mild amount of left basilar opacity with a probable small amount of left-sided pleural fluid.  The right lung is clear.  There is no pneumothorax.  The cardiac silhouette is enlarged.  There is calcification of the aorta.  There is a left-sided cardiac defibrillator.  The osseous structures demonstrate degenerative change.       Assessment/Plan:      * Acute on chronic combined systolic and diastolic congestive heart failure  Aortic regurgitation  Biatrial enlargement  Non-rheumatic mitral regurgitation  Nonrheumatic tricuspid valve regurgitation  Pulmonary hypertension  She takes furosemide 20 mg daily. Giving IV 40 mg twice daily. Continue home isosorbide-hydralazine.    CAD S/P percutaneous coronary angioplasty  Continue home aspirin, clopidrogel, rosuvastatin.    DNR (do not resuscitate)  Continue.    Nursing home resident  Return to [facility] at discharge.    Left lower lobe pneumonia  Procalcitonin elevated. Giving ceftriaxone, azithromycin. Add Mucinex for cough.    COPD (chronic obstructive pulmonary disease)  Centrilobular emphysema  Chronic respiratory failure with hypoxia, on home oxygen therapy  Albuterol-ipratropium nebulizer treatments.    Vascular dementia without behavioral disturbance  Continue home donepezil.    Dyslipidemia  Continue home rosuvastatin.    Chronic kidney disease, stage 4 (severe)  Monitor.    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 Continue home carvedilol.    Permanent atrial fibrillation  Current use of long term anticoagulation  Presence of cardiac pacemaker  Continue home diltiazem, apixaban.      VTE Risk Mitigation (From admission, onward)         Ordered     apixaban tablet 2.5 mg  2 times daily      11/02/20 1324     IP VTE HIGH RISK PATIENT  Once       11/01/20 1742     Place sequential compression device  Until discontinued      11/01/20 1742                Discharge Planning   KAREN: 11/4/2020     Code Status: DNR   Is the patient medically ready for discharge?:     Reason for patient still in hospital (select all that apply): Patient unstable  Discharge Plan A: Home                  Jose Nelson MD  Department of Hospital Medicine   [facility]

## 2020-11-02 NOTE — HOSPITAL COURSE
She was put on intravenous furosemide 40 mg twice daily, ceftriaxone, and azithromycin. Procalcitonin was elevated at 0.33 ng/mL. Her severe cough was her primary complaint, and it was treated with Mucinex, benzonatate, promethazine-codeine, albuterol-ipratropium nebulizer treatments, and prednisone.

## 2020-11-02 NOTE — ASSESSMENT & PLAN NOTE
Current use of long term anticoagulation  Presence of cardiac pacemaker  Continue home diltiazem, apixaban.

## 2020-11-02 NOTE — PLAN OF CARE
Pt admitted and care plan initiated.remains on telemetry with a Paced Rhythm.o2 in progress at 2l nc with o2 sats of 95%.woody breath sounds course with exp wheezing audible.continue resp treatments.continue iv antibiotics.monitor strict I&o and daily weights.safety precautions implemented.bed in low position.rails up x2.call bell in reach.fall bracelet and yellow safety socks applied.continue plan of care.

## 2020-11-02 NOTE — NURSING
Report given to IVETTE Palomares. Patient arrived to OBS floor via transport during shift change. IV antibiotics sent up with patient's chart but not infusing.

## 2020-11-02 NOTE — HPI
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 She lives at [facility]. Her primary care physician is Dr. Roland Cavanaugh. She is DNR.   She was hospitalized at [facility] from 10/27/2020 to 10/29/2020 for nonproductive cough, shortness of breath, and wheezing. Chest X-ray showed bilateral patchy consolidation with vascular congestion. NT-proBNP was elevated at 41457 pg/mL (normal 5-1800). She was given nebulizer treatments, steroids, and intravenous furosemide. She was prescribed benzonatate for her cough. WBC count increased from 4640/uL to 86425/uL, which can be caused by steroids. She reportedly did not feel better when she was discharged.   Symptoms did not improve, and her family felt that she looked worse. Family called emergency medical services on 11/1/2020. She was found to have an oxygen saturation of 92% on 3 liters/minute. Family requested that she go to [facility] instead. BNP was elevated at 2416 pg/mL (normal 0-99). WBC count was 73108/uL. Chest X-ray showed a mild amount of left basilar opacity with probably small left pleural effusion.  She was given 40 mg of intravenous furosemide. She was admitted to Hospital Medicine Team B.

## 2020-11-02 NOTE — NURSING
Pt experiencing stress incontinence when coughing.assisted pt up to bathroom.pt cleaned,linen and gown changed.Resp notified to administer another breathing treatment.cough med administered.

## 2020-11-02 NOTE — CONSULTS
Food & Nutrition  Education    Diet Education: low sodium  Time Spent: 15 minutes  Learners: patient      Nutrition Education provided with handouts:   Low Sodium Nutrition Therapy    Comments:  Encouraged pt to cook more healthy meals, increasing fresh vegetable intake. Pt states she does not add salt at meals.     All questions and concerns answered. Dietitian's contact information provided.       Follow-Up:  11/10/20  Please Re-consult as needed      Thanks!  Portillo Cobb RDN

## 2020-11-02 NOTE — SUBJECTIVE & OBJECTIVE
Interval History: Coughing a lot.    Review of Systems   Constitutional: Negative for chills and fever.   Gastrointestinal: Negative for nausea and vomiting.     Objective:     Vital Signs (Most Recent):  Temp: 97.9 °F (36.6 °C) (11/02/20 1136)  Pulse: 85 (11/02/20 1301)  Resp: 14 (11/02/20 1301)  BP: 131/72 (11/02/20 1136)  SpO2: 98 % (11/02/20 1301) Vital Signs (24h Range):  Temp:  [96.4 °F (35.8 °C)-98.4 °F (36.9 °C)] 97.9 °F (36.6 °C)  Pulse:  [82-88] 85  Resp:  [14-24] 14  SpO2:  [92 %-100 %] 98 %  BP: (128-164)/(72-79) 131/72     Weight: 71 kg (156 lb 8.4 oz)  Body mass index is 26.87 kg/m².    Intake/Output Summary (Last 24 hours) at 11/2/2020 1427  Last data filed at 11/2/2020 0400  Gross per 24 hour   Intake 520 ml   Output 850 ml   Net -330 ml      Physical Exam  Vitals signs and nursing note reviewed.   Constitutional:       General: She is not in acute distress.     Interventions: Nasal cannula in place.   Pulmonary:      Effort: Pulmonary effort is normal. No respiratory distress.      Comments: coughing  Neurological:      Mental Status: She is alert. Mental status is at baseline.   Psychiatric:         Mood and Affect: Mood and affect normal.         Significant Labs: All pertinent labs within the past 24 hours have been reviewed.    Significant Imaging: I have reviewed all pertinent imaging results/findings within the past 24 hours.   X-Ray Chest AP Portable 11/01/20: FINDINGS: There is a mild amount of left basilar opacity with a probable small amount of left-sided pleural fluid.  The right lung is clear.  There is no pneumothorax.  The cardiac silhouette is enlarged.  There is calcification of the aorta.  There is a left-sided cardiac defibrillator.  The osseous structures demonstrate degenerative change.

## 2020-11-02 NOTE — H&P
"Hospital Medicine  History and Physical  [facility]      Patient Name: Christy Webber  MRN:  [id number]  Hospital Medicine Team: [facility] HOSP MED E Cait Ryan MD  Date of Admission:  11/1/2020     Principal Problem:  Acute on chronic combined systolic and diastolic congestive heart failure   Primary Care Physician: Roland Cavanaugh MD       History of Present Illness:    Ms. Christy Webber is a 85 y.o. female with chronic combined systolic and diastolic heart failure (EF 40% Oct 2020), and COPD/emphysema on 2L NC who presents to the ER from [facility] for evaluation of cough and shortness of breath.  She was just admitted to [facility] from 10/27-10/29 for what was thought to be a COPD and CHF exacerbation.  She was started on IV Lasix for diuresis and Prednisone.  She was discharged home, and reports not feeling any better.  Since her discharge, she has continued to have cough and shortness of breath - and "looks worse" per family.  Family called EMS, who found her to be satting 92% on 3L NC.  They specifically requested that she does not return to [facility], and requested [facility] instead.  She endorses a dry cough, but no fever or chills.  She claims to be compliant with her Lasix 20mg daily and has been urinating.  Despite this, she continues to feel short of breath and have a cough.  She denies any lower extremity swelling or chest pain.    Upon arrival to the ER, vitals were temp 98.3F, HR 85, /78 satting 92% on 3L NC.  CXR showed cardiomegaly, as well as an opacity on the left that was present on her last CXR on 10/27.  Labs showed BNP 2,000.  She was given Lasix 40mg IV and was admitted to Hospital Medicine.        Review of Systems:  Constitutional: Negative for chills, fever, fatigue, weakness  HENT: Negative for sore throat, trouble swallowing.    Eyes: Negative for photophobia, visual disturbance.   Respiratory: + cough, shortness of breath.    Cardiovascular: " Negative for chest pain, palpitations, leg swelling.   Gastrointestinal: Negative for abdominal pain, nausea, vomiting, diarrhea, constipation  Endocrine: Negative for cold intolerance, heat intolerance.   Genitourinary: Negative for dysuria, frequency.   Musculoskeletal: Negative for arthralgias, myalgias.   Skin: Negative for rash, wound, erythema   Neurological: Negative for numbness, paresthesias  Psychiatric/Behavioral: Negative for confusion, hallucinations, anxiety  All other systems reviewed and are negative.      Past Medical History: Patient has a past medical history of Atrial flutter, CHF (congestive heart failure), Hyperlipidemia, Hypertension, Non-STEMI (non-ST elevated myocardial infarction), Pacemaker (08/2016), Sick sinus syndrome, and SSS (sick sinus syndrome) (8/24/2016).      Past Surgical History: Patient has a past surgical history that includes Kidney surgery; Hysterectomy; and Cardiac pacemaker placement.      Social History: Patient reports that she quit smoking about 4 years ago. Her smoking use included cigarettes. She has a 94.50 pack-year smoking history. She has never used smokeless tobacco. She reports that she does not drink alcohol or use drugs.      Family History: Patient's family history is not on file.      Medications: Scheduled Meds:   apixaban  5 mg Oral BID    azithromycin  500 mg Intravenous Once    [START ON 11/2/2020] azithromycin  250 mg Oral Daily    carvediloL  6.25 mg Oral BID WM    cefTRIAXone (ROCEPHIN) IVPB  1 g Intravenous Q24H    [START ON 11/2/2020] clopidogreL  75 mg Oral Daily    [START ON 11/2/2020] diltiaZEM  360 mg Oral Daily    donepeziL  5 mg Oral QHS    [START ON 11/2/2020] famotidine  20 mg Oral Daily    [START ON 11/2/2020] fluticasone furoate-vilanteroL  1 puff Inhalation Daily    [START ON 11/2/2020] furosemide (LASIX) IV  40 mg Intravenous BID    isosorbide-hydrALAZINE 20-37.5 mg  1 tablet Oral BID    [START ON 11/2/2020] rosuvastatin   10 mg Oral Daily     Continuous Infusions:  PRN Meds:.acetaminophen, albuterol-ipratropium, benzonatate, guaifenesin 100 mg/5 ml, HYDROcodone-acetaminophen, HYDROcodone-acetaminophen, melatonin, ondansetron, promethazine (PHENERGAN) IVPB, promethazine-codeine 6.25-10 mg/5 ml, senna-docusate 8.6-50 mg, sodium chloride 0.9%, sodium chloride 0.9%, sodium chloride 0.9%      Allergies: Patient has No Known Allergies.      Physical Exam:    Temp:  [98 °F (36.7 °C)-98.3 °F (36.8 °C)]   Pulse:  [84-86]   Resp:  [14-24]   BP: (136-164)/(77-79)   SpO2:  [92 %-100 %]     Constitutional: Appears well developed and well nourished  Head: Normocephalic and atraumatic.   Mouth/Throat: Oropharynx is clear and moist.   Eyes: EOM are normal. Pupils are equal, round, and reactive to light. No scleral icterus.   Neck: Normal range of motion. Neck supple.   Cardiovascular: Normal heart rate.  Regular heart rhythm.  No murmur heard.  Pulmonary/Chest: Effort normal. No respiratory distress. Occasional fine wheezes  Abdominal: Soft. Bowel sounds are normal.  No distension.  No tenderness  Musculoskeletal: Normal range of motion. No edema.   Neurological: Alert and oriented to person, place, and time.   Skin: Skin is warm and dry.   Psychiatric: Normal mood and affect. Behavior is normal.       No intake or output data in the 24 hours ending 11/01/20 1933  Recent Labs   Lab 10/28/20  0636 10/29/20  0627 11/01/20  1627   WBC 4.64 10.90 10.12   HGB 13.9 13.0 13.7   HCT 42.7 39.4 42.2    169 205     Recent Labs   Lab 10/28/20  0636 10/29/20  0627 11/01/20  1627    137 138   K 4.3 4.1 4.5    104 103   CO2 22* 23 22*   BUN 22* 33* 39*   CREATININE 1.25 1.47* 1.8*   * 160* 240*   CALCIUM 10.2 10.4 10.1     Recent Labs   Lab 10/28/20  0636 10/29/20  0627 11/01/20  1627   ALKPHOS 183* 165* 201*   ALT 18 18 28   AST 30 30 41*   ALBUMIN 3.9 3.7 3.6   PROT 7.1 6.9 7.5   BILITOT 1.5* 1.1* 0.9      No results for input(s):  LACTATE in the last 72 hours.   Recent Labs     11/01/20  1627   TROPONINI 0.039*         Assessment and Plan:    Ms. Christy Webber is a 85 y.o. female who presented to [facility] on 11/1/2020 with CHF and PNA    Acute on Chronic Combined Systolic and Diastolic Heart Failure  Pulmonary Hypertension  · CHF Pathway initiated  · Last 2D echo Oct 2020 with EF 40% and DD  · BNP 2,000 and CXR with pulmonary edema  · IV diuresis with Lasix 40mg IV BID and monitor response.  Goal diuresis 2-3L/day.  Home diuretic dose:  Lasix 20mg daily  · Strict I&Os with daily weights.  · Continue Coreg 6.25mg PO BID  · Losartan on hold with renal function    Acute on Chronic Respiratory Failure with Hypoxia  LLL Pneumonia due to Infectious Organisms, NOS  · CXR with an opacity on the left  · Afebrile and without leukocytosis but with a left shift and persistent cough despite IV diuresis for 2 days a [place]  · Satting 92% on 3L NC  · Ceftriaxone and Azithro (start date 11/1)  · Duonebs q4h prn with IS  · Codeine cough syrup, and Tessalon Perles prn cough    COPD/Emphysema  · On 2L NC at home  · Completed a course of Prednisone at [place] so will not repeat  · Start Breo to optimize lung function    Afib  Long Term Use of Anticoagulant  · Chronic and stable  · Continue Coreg 6.25mg PO BID  · Continue Eliquis 5mg PO BID    Essential HTN  · Chronic and stable  · Continue Coreg 6.25mg PO BID  · Continue Bidil BID  · Continue Dilt 360mg PO daily  · Lasix as above    Dislipidemia  · Chronic and stable  · Continue Crestor 10mg PO daily    CKD Stage 4  · Chronic and stable  · Monitor with diuresis    Benign Hypertensive Heart and Kidney Disease with HF and CKD  · As above    CAD s/p PCI  · Chronic and stable  · Continue Coreg 6.25mg PO BID  · Continue Bidil BID  · Continue Eliquis 5mg PO BID  · Continue Crestor 10mg PO daily  · Continue Plavix 75mg PO daily  · Losartan on hold with CKD    Vascular Dementia  · Chronic and  stable  · Continue Eliquis 5mg PO BID  · Continue Plavix 75mg PO daily  · Continue Crestor 10mg PO daily  · Continue Aricept 5mg PO qHS     Diet:  Low Sodium, 1.5L fluid restriction  VTE PPx:  Eliquis  Goals of Care:  Full      Disposition:  Pending symptoms   Discharge Needs:  TBD      Cait Ryan MD  [facility] Medicine  Medical Director, [facility]  Spectra:  [phone]

## 2020-11-02 NOTE — NURSING TRANSFER
Nursing Transfer Note      Pt arrived from the er via stretcher accompanied by transporter.arrived on telemetry,Paced Rhythm.pt coughing excessively upon arrival.dry tight cough.woody breath sounds course and exp wheezing audible.o2 applied at 2l nc.pt is pursed lip breathing upon arrival.cough med administered.iv antibiotics initiated.Resp notified for a prn resp treatment.safety precautions implemented.

## 2020-11-02 NOTE — ASSESSMENT & PLAN NOTE
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  Holding home losartan. Continue home carvedilol.

## 2020-11-02 NOTE — PLAN OF CARE
11/02/20 1200   Discharge Assessment   Assessment Type Discharge Planning Assessment   Confirmed/corrected address and phone number on facesheet? Yes   Assessment information obtained from? Patient;Medical Record   Expected Length of Stay (days) 1   Communicated expected length of stay with patient/caregiver yes   Prior to hospitilization cognitive status: Alert/Oriented   Prior to hospitalization functional status: Independent   Current cognitive status: Alert/Oriented   Current Functional Status: Independent   Lives With other (see comments)  ([facility])   Able to Return to Prior Arrangements yes   Is patient able to care for self after discharge? Yes   Patient's perception of discharge disposition home or selfcare   Patient currently being followed by outpatient case management? No   Patient currently receives any other outside agency services? No   Equipment Currently Used at Home none   Do you have any problems affording any of your prescribed medications? No   Is the patient taking medications as prescribed? yes   Does the patient have transportation home? Yes   Transportation Anticipated family or friend will provide   Does the patient receive services at the [facility]madin Clinic? No   Discharge Plan A Home   Discharge Plan B Home   DME Needed Upon Discharge  none   Patient/Family in Agreement with Plan yes     Roland Cavanaugh MD    [id number] [place] - [address] AT [address] [facility]  [address]  [address]  [place] [address]  Phone: [phone] Fax: [phone]    Laurel Pandya LMSW  [facility]  [phone]

## 2020-11-02 NOTE — NURSING
Received report from IVETTE Kimball of patient transfer. Awaiting patient following tele box received.

## 2020-11-03 LAB
ALBUMIN SERPL BCP-MCNC: 3 G/DL (ref 3.5–5.2)
ALP SERPL-CCNC: 189 U/L (ref 55–135)
ALT SERPL W/O P-5'-P-CCNC: 28 U/L (ref 10–44)
ANION GAP SERPL CALC-SCNC: 12 MMOL/L (ref 8–16)
AST SERPL-CCNC: 30 U/L (ref 10–40)
BASOPHILS # BLD AUTO: 0.01 K/UL (ref 0–0.2)
BASOPHILS NFR BLD: 0.1 % (ref 0–1.9)
BILIRUB SERPL-MCNC: 0.6 MG/DL (ref 0.1–1)
BUN SERPL-MCNC: 39 MG/DL (ref 8–23)
CALCIUM SERPL-MCNC: 8 MG/DL (ref 8.7–10.5)
CHLORIDE SERPL-SCNC: 103 MMOL/L (ref 95–110)
CO2 SERPL-SCNC: 23 MMOL/L (ref 23–29)
CREAT SERPL-MCNC: 1.6 MG/DL (ref 0.5–1.4)
DIFFERENTIAL METHOD: ABNORMAL
EOSINOPHIL # BLD AUTO: 0.5 K/UL (ref 0–0.5)
EOSINOPHIL NFR BLD: 5.9 % (ref 0–8)
ERYTHROCYTE [DISTWIDTH] IN BLOOD BY AUTOMATED COUNT: 15.7 % (ref 11.5–14.5)
EST. GFR  (AFRICAN AMERICAN): 33.9 ML/MIN/1.73 M^2
EST. GFR  (NON AFRICAN AMERICAN): 29.4 ML/MIN/1.73 M^2
GLUCOSE SERPL-MCNC: 121 MG/DL (ref 70–110)
HCT VFR BLD AUTO: 40.3 % (ref 37–48.5)
HGB BLD-MCNC: 13.2 G/DL (ref 12–16)
IMM GRANULOCYTES # BLD AUTO: 0.04 K/UL (ref 0–0.04)
IMM GRANULOCYTES NFR BLD AUTO: 0.5 % (ref 0–0.5)
LYMPHOCYTES # BLD AUTO: 0.9 K/UL (ref 1–4.8)
LYMPHOCYTES NFR BLD: 11.1 % (ref 18–48)
MAGNESIUM SERPL-MCNC: 2.1 MG/DL (ref 1.6–2.6)
MCH RBC QN AUTO: 30.4 PG (ref 27–31)
MCHC RBC AUTO-ENTMCNC: 32.8 G/DL (ref 32–36)
MCV RBC AUTO: 93 FL (ref 82–98)
MONOCYTES # BLD AUTO: 0.9 K/UL (ref 0.3–1)
MONOCYTES NFR BLD: 10.7 % (ref 4–15)
NEUTROPHILS # BLD AUTO: 6 K/UL (ref 1.8–7.7)
NEUTROPHILS NFR BLD: 71.7 % (ref 38–73)
NRBC BLD-RTO: 0 /100 WBC
PHOSPHATE SERPL-MCNC: 2.7 MG/DL (ref 2.7–4.5)
PLATELET # BLD AUTO: 186 K/UL (ref 150–350)
PMV BLD AUTO: 10.8 FL (ref 9.2–12.9)
POTASSIUM SERPL-SCNC: 3.6 MMOL/L (ref 3.5–5.1)
PROT SERPL-MCNC: 5.9 G/DL (ref 6–8.4)
RBC # BLD AUTO: 4.34 M/UL (ref 4–5.4)
SODIUM SERPL-SCNC: 138 MMOL/L (ref 136–145)
WBC # BLD AUTO: 8.41 K/UL (ref 3.9–12.7)

## 2020-11-03 PROCEDURE — 63600175 PHARM REV CODE 636 W HCPCS: Performed by: INTERNAL MEDICINE

## 2020-11-03 PROCEDURE — 99900035 HC TECH TIME PER 15 MIN (STAT)

## 2020-11-03 PROCEDURE — 63600175 PHARM REV CODE 636 W HCPCS: Performed by: HOSPITALIST

## 2020-11-03 PROCEDURE — 90694 VACC AIIV4 NO PRSRV 0.5ML IM: CPT | Performed by: INTERNAL MEDICINE

## 2020-11-03 PROCEDURE — 94640 AIRWAY INHALATION TREATMENT: CPT

## 2020-11-03 PROCEDURE — 85025 COMPLETE CBC W/AUTO DIFF WBC: CPT

## 2020-11-03 PROCEDURE — 27000221 HC OXYGEN, UP TO 24 HOURS

## 2020-11-03 PROCEDURE — 99232 PR SUBSEQUENT HOSPITAL CARE,LEVL II: ICD-10-PCS | Mod: ,,, | Performed by: HOSPITALIST

## 2020-11-03 PROCEDURE — 80053 COMPREHEN METABOLIC PANEL: CPT

## 2020-11-03 PROCEDURE — 63700000 PHARM REV CODE 250 ALT 637 W/O HCPCS: Performed by: HOSPITALIST

## 2020-11-03 PROCEDURE — 99232 SBSQ HOSP IP/OBS MODERATE 35: CPT | Mod: ,,, | Performed by: HOSPITALIST

## 2020-11-03 PROCEDURE — 25000003 PHARM REV CODE 250: Performed by: HOSPITALIST

## 2020-11-03 PROCEDURE — 94761 N-INVAS EAR/PLS OXIMETRY MLT: CPT

## 2020-11-03 PROCEDURE — 12000002 HC ACUTE/MED SURGE SEMI-PRIVATE ROOM

## 2020-11-03 PROCEDURE — G0008 ADMIN INFLUENZA VIRUS VAC: HCPCS | Performed by: INTERNAL MEDICINE

## 2020-11-03 PROCEDURE — 83735 ASSAY OF MAGNESIUM: CPT

## 2020-11-03 PROCEDURE — 84100 ASSAY OF PHOSPHORUS: CPT

## 2020-11-03 PROCEDURE — 90471 IMMUNIZATION ADMIN: CPT | Performed by: INTERNAL MEDICINE

## 2020-11-03 PROCEDURE — 36415 COLL VENOUS BLD VENIPUNCTURE: CPT

## 2020-11-03 PROCEDURE — 94799 UNLISTED PULMONARY SVC/PX: CPT

## 2020-11-03 PROCEDURE — 90472 IMMUNIZATION ADMIN EACH ADD: CPT | Performed by: INTERNAL MEDICINE

## 2020-11-03 PROCEDURE — 25000242 PHARM REV CODE 250 ALT 637 W/ HCPCS: Performed by: HOSPITALIST

## 2020-11-03 RX ORDER — PREDNISONE 20 MG/1
40 TABLET ORAL DAILY
Status: COMPLETED | OUTPATIENT
Start: 2020-11-03 | End: 2020-11-06

## 2020-11-03 RX ORDER — PROMETHAZINE HYDROCHLORIDE AND CODEINE PHOSPHATE 6.25; 1 MG/5ML; MG/5ML
5 SOLUTION ORAL EVERY 6 HOURS PRN
Status: DISCONTINUED | OUTPATIENT
Start: 2020-11-03 | End: 2020-11-07 | Stop reason: HOSPADM

## 2020-11-03 RX ADMIN — HYDROCODONE BITARTRATE AND ACETAMINOPHEN 1 TABLET: 10; 325 TABLET ORAL at 11:11

## 2020-11-03 RX ADMIN — PROMETHAZINE HYDROCHLORIDE AND CODEINE PHOSPHATE 5 ML: 10; 6.25 SOLUTION ORAL at 01:11

## 2020-11-03 RX ADMIN — CARVEDILOL 6.25 MG: 6.25 TABLET, FILM COATED ORAL at 07:11

## 2020-11-03 RX ADMIN — CLOPIDOGREL 75 MG: 75 TABLET, FILM COATED ORAL at 09:11

## 2020-11-03 RX ADMIN — PROMETHAZINE HYDROCHLORIDE AND CODEINE PHOSPHATE 5 ML: 10; 6.25 SOLUTION ORAL at 09:11

## 2020-11-03 RX ADMIN — CEFTRIAXONE SODIUM 1 G: 1 INJECTION, POWDER, FOR SOLUTION INTRAMUSCULAR; INTRAVENOUS at 06:11

## 2020-11-03 RX ADMIN — GUAIFENESIN 1200 MG: 600 TABLET, EXTENDED RELEASE ORAL at 09:11

## 2020-11-03 RX ADMIN — GUAIFENESIN 200 MG: 200 SOLUTION ORAL at 11:11

## 2020-11-03 RX ADMIN — DILTIAZEM HYDROCHLORIDE 360 MG: 180 CAPSULE, COATED, EXTENDED RELEASE ORAL at 09:11

## 2020-11-03 RX ADMIN — FUROSEMIDE 40 MG: 10 INJECTION, SOLUTION INTRAMUSCULAR; INTRAVENOUS at 09:11

## 2020-11-03 RX ADMIN — IPRATROPIUM BROMIDE AND ALBUTEROL SULFATE 3 ML: .5; 2.5 SOLUTION RESPIRATORY (INHALATION) at 08:11

## 2020-11-03 RX ADMIN — IPRATROPIUM BROMIDE AND ALBUTEROL SULFATE 3 ML: .5; 2.5 SOLUTION RESPIRATORY (INHALATION) at 04:11

## 2020-11-03 RX ADMIN — MELATONIN TAB 3 MG 6 MG: 3 TAB at 09:11

## 2020-11-03 RX ADMIN — FAMOTIDINE 20 MG: 20 TABLET, FILM COATED ORAL at 09:11

## 2020-11-03 RX ADMIN — IPRATROPIUM BROMIDE AND ALBUTEROL SULFATE 3 ML: .5; 2.5 SOLUTION RESPIRATORY (INHALATION) at 12:11

## 2020-11-03 RX ADMIN — HYDROCODONE BITARTRATE AND ACETAMINOPHEN 1 TABLET: 10; 325 TABLET ORAL at 04:11

## 2020-11-03 RX ADMIN — HYDRALAZINE HYDROCHLORIDE AND ISOSORBIDE DINITRATE 1 TABLET: 37.5; 2 TABLET, FILM COATED ORAL at 09:11

## 2020-11-03 RX ADMIN — APIXABAN 2.5 MG: 2.5 TABLET, FILM COATED ORAL at 09:11

## 2020-11-03 RX ADMIN — FUROSEMIDE 40 MG: 10 INJECTION, SOLUTION INTRAMUSCULAR; INTRAVENOUS at 05:11

## 2020-11-03 RX ADMIN — HYDROCODONE BITARTRATE AND ACETAMINOPHEN 1 TABLET: 5; 325 TABLET ORAL at 07:11

## 2020-11-03 RX ADMIN — BENZONATATE 200 MG: 100 CAPSULE, LIQUID FILLED ORAL at 07:11

## 2020-11-03 RX ADMIN — IPRATROPIUM BROMIDE AND ALBUTEROL SULFATE 3 ML: .5; 2.5 SOLUTION RESPIRATORY (INHALATION) at 09:11

## 2020-11-03 RX ADMIN — ROSUVASTATIN CALCIUM 10 MG: 10 TABLET, FILM COATED ORAL at 09:11

## 2020-11-03 RX ADMIN — CARVEDILOL 6.25 MG: 6.25 TABLET, FILM COATED ORAL at 04:11

## 2020-11-03 RX ADMIN — DONEPEZIL HYDROCHLORIDE 5 MG: 5 TABLET, FILM COATED ORAL at 09:11

## 2020-11-03 RX ADMIN — PREDNISONE 40 MG: 20 TABLET ORAL at 12:11

## 2020-11-03 RX ADMIN — AZITHROMYCIN MONOHYDRATE 250 MG: 250 TABLET ORAL at 09:11

## 2020-11-03 RX ADMIN — A/SINGAPORE/GP1908/2015 IVR-180 (AN A/MICHIGAN/45/2015 (H1N1)PDM09-LIKE VIRUS, A/HONG KONG/4801/2014, NYMC X-263B (H3N2) (AN A/HONG KONG/4801/2014-LIKE VIRUS), AND B/BRISBANE/60/2008, WILD TYPE (A B/BRISBANE/60/2008-LIKE VIRUS) 0.5 ML: 15; 15; 15 INJECTION, SUSPENSION INTRAMUSCULAR at 07:11

## 2020-11-03 RX ADMIN — FLUTICASONE FUROATE AND VILANTEROL TRIFENATATE 1 PUFF: 100; 25 POWDER RESPIRATORY (INHALATION) at 08:11

## 2020-11-03 RX ADMIN — BENZONATATE 200 MG: 100 CAPSULE, LIQUID FILLED ORAL at 04:11

## 2020-11-03 NOTE — PLAN OF CARE
POC reviewed with pt who verbalized understanding. AAOx4. VSS on 1L NC. Remains free of falls and injury. Nonproductive cough noted. No complaints of pain or nausea. Up to bathroom with assist; intermittent episodes of incontinence. No BM this shift. Tele monitored (paced in the 80s). SCDs in place. Daily standing wt: 67.35kg.  No acute events. Rounds made for safety. Will continue to monitor.

## 2020-11-03 NOTE — NURSING
"Patient pain managed with medications, comfort measures and symptom management. Increased discomfort with coughing. Patient coughing managed with medications and scheduled breathing treatment. Urine output good today with purewick in place for accurate measurement. Patient has put out 700 this shift. No new signs of infection. Patient reports feeling "better" this evening.  "

## 2020-11-03 NOTE — PROGRESS NOTES
[facility] Hospital Medicine  Progress Note    Patient Name: Christy Webber  MRN: [id number]  Patient Class: IP- Inpatient   Admission Date: 11/1/2020  Length of Stay: 2 days  Attending Physician: Jose Nelson MD  Primary Care Provider: Roland Cavanaugh MD    [facility] Hospital Medicine Team: [facility] HOSP MED B Jose Nelson MD    Subjective:     Principal Problem:Acute on chronic combined systolic and diastolic congestive heart failure        HPI:  Christy Webber is an 85 year old Black woman with hypertension, left ventricular eccentric hypertrophy, chronic systolic and diastolic congestive heart failure, biatrial enlargement, aortic regurgitation, mitral regurgitation, tricuspid regurgitation, pulmonary hypertension, permanent atrial fibrillation (anticoagulated on apixaban), 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 She lives at [facility]. Her primary care physician is Dr. Roland Cavanaugh. She is DNR.   She was hospitalized at [facility] from 10/27/2020 to 10/29/2020 for nonproductive cough, shortness of breath, and wheezing. Chest X-ray showed bilateral patchy consolidation with vascular congestion. NT-proBNP was elevated at 93160 pg/mL (normal 5-1800). She was given nebulizer treatments, steroids, and intravenous furosemide. She was prescribed benzonatate for her cough. WBC count increased from 4640/uL to 36392/uL, which can be caused by steroids. She reportedly did not feel better  when she was discharged.   Symptoms did not improve, and her family felt that she looked worse. Family called emergency medical services on 11/1/2020. She was found to have an oxygen saturation of 92% on 3 liters/minute. Family requested that she go to [facility] instead. BNP was elevated at 2416 pg/mL (normal 0-99). WBC count was 12229/uL. Chest X-ray showed a mild amount of left basilar opacity with probably small left pleural effusion. She was given 40 mg of intravenous furosemide. She was admitted to Hospital Medicine Team B.    Overview/Hospital Course:  She was put on intravenous furosemide 40 mg twice daily, ceftriaxone, and azithromycin. Procalcitonin was elevated at 0.33 ng/mL.     Interval History: Nonproductive cough. Discussed findings that showed pneumonia. She said no one had mentioned pneumonia to her. Discussed trying a steroid.    Review of Systems   Constitutional: Negative for chills and fever.   Respiratory: Positive for cough.    Gastrointestinal: Negative for nausea and vomiting.     Objective:     Vital Signs (Most Recent):  Temp: 98.3 °F (36.8 °C) (11/03/20 0755)  Pulse: 93 (11/03/20 1127)  Resp: 18 (11/03/20 1149)  BP: 116/64 (11/03/20 0755)  SpO2: 97 % (11/03/20 0829) Vital Signs (24h Range):  Temp:  [96.1 °F (35.6 °C)-98.3 °F (36.8 °C)] 98.3 °F (36.8 °C)  Pulse:  [62-93] 93  Resp:  [14-20] 18  SpO2:  [94 %-99 %] 97 %  BP: (116-133)/(59-69) 116/64     Weight: 67.4 kg (148 lb 8 oz)  Body mass index is 25.49 kg/m².    Intake/Output Summary (Last 24 hours) at 11/3/2020 1155  Last data filed at 11/3/2020 1100  Gross per 24 hour   Intake 860 ml   Output 1100 ml   Net -240 ml      Physical Exam  Vitals signs and nursing note reviewed.   Constitutional:       General: She is not in acute distress.     Interventions: Nasal cannula in place.   Pulmonary:      Effort: Pulmonary effort is normal. No respiratory distress.   Neurological:      Mental Status: She is alert and  oriented to person, place, and time.   Psychiatric:         Mood and Affect: Mood and affect normal.         Significant Labs: All pertinent labs within the past 24 hours have been reviewed.    Significant Imaging: I have reviewed all pertinent imaging results/findings within the past 24 hours.       Assessment/Plan:      * Acute on chronic combined systolic and diastolic congestive heart failure  Aortic regurgitation  Biatrial enlargement  Non-rheumatic mitral regurgitation  Nonrheumatic tricuspid valve regurgitation  Pulmonary hypertension  She takes furosemide 20 mg daily. Giving IV 40 mg twice daily. Continue home isosorbide-hydralazine.    CAD S/P percutaneous coronary angioplasty  Continue home aspirin, clopidrogel, rosuvastatin.    DNR (do not resuscitate)  Continue.    Nursing home resident  Return to [facility] at discharge.    Left lower lobe pneumonia  Procalcitonin elevated. Giving ceftriaxone, azithromycin. Mucinex for cough.    COPD (chronic obstructive pulmonary disease)  Centrilobular emphysema  Chronic respiratory failure with hypoxia, on home oxygen therapy  Albuterol-ipratropium nebulizer treatments. Prednisone 40 mg daily.    Vascular dementia without behavioral disturbance  Continue home donepezil.    Dyslipidemia  Continue home rosuvastatin.    Chronic kidney disease, stage 4 (severe)  Monitor.    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 Continue home carvedilol.    Permanent atrial fibrillation  Current use of long term anticoagulation  Presence of cardiac pacemaker  Continue home diltiazem, apixaban.      VTE Risk Mitigation (From admission, onward)         Ordered     apixaban tablet 2.5 mg  2 times daily      11/02/20 1324     IP VTE HIGH RISK PATIENT  Once      11/01/20 1742     Place sequential compression device  Until discontinued      11/01/20 1742                Discharge Planning   KAREN: 11/4/2020     Code  Status: DNR   Is the patient medically ready for discharge?:     Reason for patient still in hospital (select all that apply): Patient trending condition and Treatment  Discharge Plan A: Home                  Jose Nelson MD  Department of Hospital Medicine   [facility]

## 2020-11-03 NOTE — ASSESSMENT & PLAN NOTE
Centrilobular emphysema  Chronic respiratory failure with hypoxia, on home oxygen therapy  Albuterol-ipratropium nebulizer treatments. Prednisone 40 mg daily.

## 2020-11-03 NOTE — SUBJECTIVE & OBJECTIVE
Interval History: Nonproductive cough. Discussed findings that showed pneumonia. She said no one had mentioned pneumonia to her. Discussed trying a steroid.    Review of Systems   Constitutional: Negative for chills and fever.   Respiratory: Positive for cough.    Gastrointestinal: Negative for nausea and vomiting.     Objective:     Vital Signs (Most Recent):  Temp: 98.3 °F (36.8 °C) (11/03/20 0755)  Pulse: 93 (11/03/20 1127)  Resp: 18 (11/03/20 1149)  BP: 116/64 (11/03/20 0755)  SpO2: 97 % (11/03/20 0829) Vital Signs (24h Range):  Temp:  [96.1 °F (35.6 °C)-98.3 °F (36.8 °C)] 98.3 °F (36.8 °C)  Pulse:  [62-93] 93  Resp:  [14-20] 18  SpO2:  [94 %-99 %] 97 %  BP: (116-133)/(59-69) 116/64     Weight: 67.4 kg (148 lb 8 oz)  Body mass index is 25.49 kg/m².    Intake/Output Summary (Last 24 hours) at 11/3/2020 1155  Last data filed at 11/3/2020 1100  Gross per 24 hour   Intake 860 ml   Output 1100 ml   Net -240 ml      Physical Exam  Vitals signs and nursing note reviewed.   Constitutional:       General: She is not in acute distress.     Interventions: Nasal cannula in place.   Pulmonary:      Effort: Pulmonary effort is normal. No respiratory distress.   Neurological:      Mental Status: She is alert and oriented to person, place, and time.   Psychiatric:         Mood and Affect: Mood and affect normal.         Significant Labs: All pertinent labs within the past 24 hours have been reviewed.    Significant Imaging: I have reviewed all pertinent imaging results/findings within the past 24 hours.

## 2020-11-04 LAB
ALBUMIN SERPL BCP-MCNC: 2.8 G/DL (ref 3.5–5.2)
ALP SERPL-CCNC: 188 U/L (ref 55–135)
ALT SERPL W/O P-5'-P-CCNC: 25 U/L (ref 10–44)
ANION GAP SERPL CALC-SCNC: 13 MMOL/L (ref 8–16)
AST SERPL-CCNC: 19 U/L (ref 10–40)
BASOPHILS # BLD AUTO: 0.01 K/UL (ref 0–0.2)
BASOPHILS NFR BLD: 0.1 % (ref 0–1.9)
BILIRUB SERPL-MCNC: 0.6 MG/DL (ref 0.1–1)
BUN SERPL-MCNC: 35 MG/DL (ref 8–23)
CALCIUM SERPL-MCNC: 9.2 MG/DL (ref 8.7–10.5)
CHLORIDE SERPL-SCNC: 100 MMOL/L (ref 95–110)
CO2 SERPL-SCNC: 23 MMOL/L (ref 23–29)
CREAT SERPL-MCNC: 1.8 MG/DL (ref 0.5–1.4)
DIFFERENTIAL METHOD: ABNORMAL
EOSINOPHIL # BLD AUTO: 0 K/UL (ref 0–0.5)
EOSINOPHIL NFR BLD: 0 % (ref 0–8)
ERYTHROCYTE [DISTWIDTH] IN BLOOD BY AUTOMATED COUNT: 15.8 % (ref 11.5–14.5)
EST. GFR  (AFRICAN AMERICAN): 29.4 ML/MIN/1.73 M^2
EST. GFR  (NON AFRICAN AMERICAN): 25.5 ML/MIN/1.73 M^2
GLUCOSE SERPL-MCNC: 303 MG/DL (ref 70–110)
HCT VFR BLD AUTO: 42.3 % (ref 37–48.5)
HGB BLD-MCNC: 13.5 G/DL (ref 12–16)
IMM GRANULOCYTES # BLD AUTO: 0.03 K/UL (ref 0–0.04)
IMM GRANULOCYTES NFR BLD AUTO: 0.4 % (ref 0–0.5)
LYMPHOCYTES # BLD AUTO: 0.3 K/UL (ref 1–4.8)
LYMPHOCYTES NFR BLD: 4.3 % (ref 18–48)
MAGNESIUM SERPL-MCNC: 2.2 MG/DL (ref 1.6–2.6)
MCH RBC QN AUTO: 29.2 PG (ref 27–31)
MCHC RBC AUTO-ENTMCNC: 31.9 G/DL (ref 32–36)
MCV RBC AUTO: 91 FL (ref 82–98)
MONOCYTES # BLD AUTO: 0.3 K/UL (ref 0.3–1)
MONOCYTES NFR BLD: 3.8 % (ref 4–15)
NEUTROPHILS # BLD AUTO: 6.7 K/UL (ref 1.8–7.7)
NEUTROPHILS NFR BLD: 91.4 % (ref 38–73)
NRBC BLD-RTO: 0 /100 WBC
PHOSPHATE SERPL-MCNC: 3.5 MG/DL (ref 2.7–4.5)
PLATELET # BLD AUTO: 219 K/UL (ref 150–350)
PMV BLD AUTO: 10.5 FL (ref 9.2–12.9)
POTASSIUM SERPL-SCNC: 3.9 MMOL/L (ref 3.5–5.1)
PROT SERPL-MCNC: 6.1 G/DL (ref 6–8.4)
RBC # BLD AUTO: 4.63 M/UL (ref 4–5.4)
SODIUM SERPL-SCNC: 136 MMOL/L (ref 136–145)
WBC # BLD AUTO: 7.36 K/UL (ref 3.9–12.7)

## 2020-11-04 PROCEDURE — 99900035 HC TECH TIME PER 15 MIN (STAT)

## 2020-11-04 PROCEDURE — 94640 AIRWAY INHALATION TREATMENT: CPT

## 2020-11-04 PROCEDURE — 25000003 PHARM REV CODE 250: Performed by: HOSPITALIST

## 2020-11-04 PROCEDURE — 84100 ASSAY OF PHOSPHORUS: CPT

## 2020-11-04 PROCEDURE — 99232 PR SUBSEQUENT HOSPITAL CARE,LEVL II: ICD-10-PCS | Mod: ,,, | Performed by: HOSPITALIST

## 2020-11-04 PROCEDURE — 25000242 PHARM REV CODE 250 ALT 637 W/ HCPCS: Performed by: HOSPITALIST

## 2020-11-04 PROCEDURE — 36415 COLL VENOUS BLD VENIPUNCTURE: CPT

## 2020-11-04 PROCEDURE — 94761 N-INVAS EAR/PLS OXIMETRY MLT: CPT

## 2020-11-04 PROCEDURE — 83735 ASSAY OF MAGNESIUM: CPT

## 2020-11-04 PROCEDURE — 63700000 PHARM REV CODE 250 ALT 637 W/O HCPCS: Performed by: HOSPITALIST

## 2020-11-04 PROCEDURE — 12000002 HC ACUTE/MED SURGE SEMI-PRIVATE ROOM

## 2020-11-04 PROCEDURE — 99232 SBSQ HOSP IP/OBS MODERATE 35: CPT | Mod: ,,, | Performed by: HOSPITALIST

## 2020-11-04 PROCEDURE — 63600175 PHARM REV CODE 636 W HCPCS: Performed by: HOSPITALIST

## 2020-11-04 PROCEDURE — 27000221 HC OXYGEN, UP TO 24 HOURS

## 2020-11-04 PROCEDURE — 80053 COMPREHEN METABOLIC PANEL: CPT

## 2020-11-04 PROCEDURE — 85025 COMPLETE CBC W/AUTO DIFF WBC: CPT

## 2020-11-04 PROCEDURE — 94799 UNLISTED PULMONARY SVC/PX: CPT

## 2020-11-04 RX ADMIN — IPRATROPIUM BROMIDE AND ALBUTEROL SULFATE 3 ML: .5; 2.5 SOLUTION RESPIRATORY (INHALATION) at 09:11

## 2020-11-04 RX ADMIN — APIXABAN 2.5 MG: 2.5 TABLET, FILM COATED ORAL at 08:11

## 2020-11-04 RX ADMIN — FLUTICASONE FUROATE AND VILANTEROL TRIFENATATE 1 PUFF: 100; 25 POWDER RESPIRATORY (INHALATION) at 09:11

## 2020-11-04 RX ADMIN — HYDROCODONE BITARTRATE AND ACETAMINOPHEN 1 TABLET: 5; 325 TABLET ORAL at 08:11

## 2020-11-04 RX ADMIN — FUROSEMIDE 40 MG: 10 INJECTION, SOLUTION INTRAMUSCULAR; INTRAVENOUS at 08:11

## 2020-11-04 RX ADMIN — FUROSEMIDE 40 MG: 10 INJECTION, SOLUTION INTRAMUSCULAR; INTRAVENOUS at 05:11

## 2020-11-04 RX ADMIN — IPRATROPIUM BROMIDE AND ALBUTEROL SULFATE 3 ML: .5; 2.5 SOLUTION RESPIRATORY (INHALATION) at 04:11

## 2020-11-04 RX ADMIN — HYDRALAZINE HYDROCHLORIDE AND ISOSORBIDE DINITRATE 1 TABLET: 37.5; 2 TABLET, FILM COATED ORAL at 08:11

## 2020-11-04 RX ADMIN — APIXABAN 2.5 MG: 2.5 TABLET, FILM COATED ORAL at 09:11

## 2020-11-04 RX ADMIN — CARVEDILOL 6.25 MG: 6.25 TABLET, FILM COATED ORAL at 05:11

## 2020-11-04 RX ADMIN — DONEPEZIL HYDROCHLORIDE 5 MG: 5 TABLET, FILM COATED ORAL at 09:11

## 2020-11-04 RX ADMIN — CLOPIDOGREL 75 MG: 75 TABLET, FILM COATED ORAL at 08:11

## 2020-11-04 RX ADMIN — DOCUSATE SODIUM 50MG AND SENNOSIDES 8.6MG 1 TABLET: 8.6; 5 TABLET, FILM COATED ORAL at 08:11

## 2020-11-04 RX ADMIN — ROSUVASTATIN CALCIUM 10 MG: 10 TABLET, FILM COATED ORAL at 08:11

## 2020-11-04 RX ADMIN — CARVEDILOL 6.25 MG: 6.25 TABLET, FILM COATED ORAL at 06:11

## 2020-11-04 RX ADMIN — AZITHROMYCIN MONOHYDRATE 250 MG: 250 TABLET ORAL at 08:11

## 2020-11-04 RX ADMIN — IPRATROPIUM BROMIDE AND ALBUTEROL SULFATE 3 ML: .5; 2.5 SOLUTION RESPIRATORY (INHALATION) at 07:11

## 2020-11-04 RX ADMIN — PROMETHAZINE HYDROCHLORIDE AND CODEINE PHOSPHATE 5 ML: 10; 6.25 SOLUTION ORAL at 05:11

## 2020-11-04 RX ADMIN — FAMOTIDINE 20 MG: 20 TABLET, FILM COATED ORAL at 08:11

## 2020-11-04 RX ADMIN — PREDNISONE 40 MG: 20 TABLET ORAL at 08:11

## 2020-11-04 RX ADMIN — CEFTRIAXONE SODIUM 1 G: 1 INJECTION, POWDER, FOR SOLUTION INTRAMUSCULAR; INTRAVENOUS at 06:11

## 2020-11-04 RX ADMIN — DOCUSATE SODIUM 50MG AND SENNOSIDES 8.6MG 1 TABLET: 8.6; 5 TABLET, FILM COATED ORAL at 06:11

## 2020-11-04 RX ADMIN — DILTIAZEM HYDROCHLORIDE 360 MG: 180 CAPSULE, COATED, EXTENDED RELEASE ORAL at 08:11

## 2020-11-04 RX ADMIN — GUAIFENESIN 1200 MG: 600 TABLET, EXTENDED RELEASE ORAL at 08:11

## 2020-11-04 RX ADMIN — HYDROCODONE BITARTRATE AND ACETAMINOPHEN 1 TABLET: 10; 325 TABLET ORAL at 02:11

## 2020-11-04 RX ADMIN — BENZONATATE 200 MG: 100 CAPSULE, LIQUID FILLED ORAL at 08:11

## 2020-11-04 RX ADMIN — HYDRALAZINE HYDROCHLORIDE AND ISOSORBIDE DINITRATE 1 TABLET: 37.5; 2 TABLET, FILM COATED ORAL at 09:11

## 2020-11-04 RX ADMIN — HYDROCODONE BITARTRATE AND ACETAMINOPHEN 1 TABLET: 10; 325 TABLET ORAL at 06:11

## 2020-11-04 RX ADMIN — PROMETHAZINE HYDROCHLORIDE AND CODEINE PHOSPHATE 5 ML: 10; 6.25 SOLUTION ORAL at 10:11

## 2020-11-04 RX ADMIN — IPRATROPIUM BROMIDE AND ALBUTEROL SULFATE 3 ML: .5; 2.5 SOLUTION RESPIRATORY (INHALATION) at 12:11

## 2020-11-04 RX ADMIN — GUAIFENESIN 1200 MG: 600 TABLET, EXTENDED RELEASE ORAL at 09:11

## 2020-11-04 NOTE — ASSESSMENT & PLAN NOTE
Centrilobular emphysema  Chronic respiratory failure with hypoxia, on home oxygen therapy  Albuterol-ipratropium nebulizer treatments, prednisone 40 mg daily.

## 2020-11-04 NOTE — PLAN OF CARE
Problem: Fall Injury Risk  Goal: Absence of Fall and Fall-Related Injury  Outcome: Ongoing, Progressing     Problem: Adult Inpatient Plan of Care  Goal: Plan of Care Review  Outcome: Ongoing, Progressing  Goal: Absence of Hospital-Acquired Illness or Injury  Outcome: Ongoing, Progressing  Goal: Optimal Comfort and Wellbeing  Outcome: Ongoing, Progressing  Goal: Readiness for Transition of Care  Outcome: Ongoing, Progressing     Problem: Fluid Imbalance (Acute Kidney Injury/Impairment)  Goal: Optimal Fluid Balance  Outcome: Ongoing, Progressing     Problem: Respiratory Compromise (Pneumonia)  Goal: Effective Oxygenation and Ventilation  Outcome: Ongoing, Progressing

## 2020-11-04 NOTE — ASSESSMENT & PLAN NOTE
Procalcitonin elevated. Giving ceftriaxone, azithromycin. Mucinex, benzonatate, promethazine-codeine for cough.

## 2020-11-04 NOTE — PROGRESS NOTES
[facility] Hospital Medicine  Progress Note    Patient Name: Christy Webber  MRN: [id number]  Patient Class: IP- Inpatient   Admission Date: 11/1/2020  Length of Stay: 3 days  Attending Physician: Jose Nelson MD  Primary Care Provider: Roland Cavanaugh MD    [facility] Medicine Team: [facility] HOSP MED B Jose Nelson MD    Subjective:     Principal Problem:Acute on chronic combined systolic and diastolic congestive heart failure        HPI:  Christy Webber is an 85 year old Black woman with hypertension, left ventricular eccentric hypertrophy, chronic systolic and diastolic congestive heart failure, biatrial enlargement, aortic regurgitation, mitral regurgitation, tricuspid regurgitation, pulmonary hypertension, permanent atrial fibrillation (anticoagulated on apixaban), 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 She lives at [facility]. Her primary care physician is Dr. Roland Cavanaugh. She is DNR.   She was hospitalized at [facility] from 10/27/2020 to 10/29/2020 for nonproductive cough, shortness of breath, and wheezing. Chest X-ray showed bilateral patchy consolidation with vascular congestion. NT-proBNP was elevated at 36121 pg/mL (normal 5-1800). She was given nebulizer treatments, steroids, and intravenous furosemide. She was prescribed benzonatate for her cough. WBC count increased from 4640/uL to 58386/uL, which can be caused by steroids. She reportedly did not feel better  when she was discharged.   Symptoms did not improve, and her family felt that she looked worse. Family called emergency medical services on 11/1/2020. She was found to have an oxygen saturation of 92% on 3 liters/minute. Family requested that she go to [facility] instead. BNP was elevated at 2416 pg/mL (normal 0-99). WBC count was 58570/uL. Chest X-ray showed a mild amount of left basilar opacity with probably small left pleural effusion. She was given 40 mg of intravenous furosemide. She was admitted to Hospital Medicine Team B.    Overview/Hospital Course:  She was put on intravenous furosemide 40 mg twice daily, ceftriaxone, and azithromycin. Procalcitonin was elevated at 0.33 ng/mL. Her severe cough was her primary complaint, and it was treated with Mucinex, benzonatate, promethazine-codeine, albuterol-ipratropium nebulizer treatments, and prednisone.     Interval History: Cough noticeably improving significantly. She feels another day in the hospital will be enough.     Review of Systems   Constitutional: Negative for chills and fever.   Gastrointestinal: Negative for nausea and vomiting.     Objective:     Vital Signs (Most Recent):  Temp: 98.7 °F (37.1 °C) (11/04/20 0748)  Pulse: 85 (11/04/20 1143)  Resp: 20 (11/04/20 1047)  BP: 116/65 (11/04/20 0748)  SpO2: 98 % (11/04/20 0917) Vital Signs (24h Range):  Temp:  [96.4 °F (35.8 °C)-98.7 °F (37.1 °C)] 98.7 °F (37.1 °C)  Pulse:  [85-97] 85  Resp:  [15-20] 20  SpO2:  [90 %-99 %] 98 %  BP: ()/(51-70) 116/65     Weight: 67.5 kg (148 lb 13 oz)  Body mass index is 25.54 kg/m².    Intake/Output Summary (Last 24 hours) at 11/4/2020 1208  Last data filed at 11/4/2020 0453  Gross per 24 hour   Intake 300 ml   Output 100 ml   Net 200 ml      Physical Exam  Vitals signs and nursing note reviewed.   Constitutional:       General: She is not in acute distress.     Interventions: Nasal cannula in place.   Pulmonary:      Effort: Pulmonary effort  is normal. No respiratory distress.   Neurological:      Mental Status: She is alert and oriented to person, place, and time.   Psychiatric:         Mood and Affect: Mood and affect normal.         Significant Labs: All pertinent labs within the past 24 hours have been reviewed.    Significant Imaging: I have reviewed all pertinent imaging results/findings within the past 24 hours.       Assessment/Plan:      * Acute on chronic combined systolic and diastolic congestive heart failure  Aortic regurgitation  Biatrial enlargement  Non-rheumatic mitral regurgitation  Nonrheumatic tricuspid valve regurgitation  Pulmonary hypertension  She takes furosemide 20 mg daily. Giving IV 40 mg twice daily. Continue home isosorbide-hydralazine.    CAD S/P percutaneous coronary angioplasty  Continue home aspirin, clopidrogel, rosuvastatin.    DNR (do not resuscitate)  Continue.    Nursing home resident  Return to [facility] at discharge.    Left lower lobe pneumonia  Procalcitonin elevated. Giving ceftriaxone, azithromycin. Mucinex, benzonatate, promethazine-codeine for cough.    COPD (chronic obstructive pulmonary disease)  Centrilobular emphysema  Chronic respiratory failure with hypoxia, on home oxygen therapy  Albuterol-ipratropium nebulizer treatments, prednisone 40 mg daily.    Vascular dementia without behavioral disturbance  Continue home donepezil.    Dyslipidemia  Continue home rosuvastatin.    Chronic kidney disease, stage 4 (severe)  Monitor.    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 Continue home carvedilol.    Permanent atrial fibrillation  Current use of long term anticoagulation  Presence of cardiac pacemaker  Continue home diltiazem, apixaban.    VTE Risk Mitigation (From admission, onward)         Ordered     apixaban tablet 2.5 mg  2 times daily      11/02/20 1324     IP VTE HIGH RISK PATIENT  Once      11/01/20 1742     [facility]  sequential compression device  Until discontinued      11/01/20 1742                Discharge Planning   KAREN: 11/5/2020     Code Status: DNR   Is the patient medically ready for discharge?:     Reason for patient still in hospital (select all that apply): Other (specify) Return to [facility] tomorrow morning. Prescribe antibiotic, couple days of prednisone, and cough medicine.  Discharge Plan A: Home                  Jose Nelson MD  Department of Hospital Medicine   [facility]

## 2020-11-04 NOTE — SUBJECTIVE & OBJECTIVE
Interval History: Cough noticeably improving significantly. She feels another day in the hospital will be enough.     Review of Systems   Constitutional: Negative for chills and fever.   Gastrointestinal: Negative for nausea and vomiting.     Objective:     Vital Signs (Most Recent):  Temp: 98.7 °F (37.1 °C) (11/04/20 0748)  Pulse: 85 (11/04/20 1143)  Resp: 20 (11/04/20 1047)  BP: 116/65 (11/04/20 0748)  SpO2: 98 % (11/04/20 0917) Vital Signs (24h Range):  Temp:  [96.4 °F (35.8 °C)-98.7 °F (37.1 °C)] 98.7 °F (37.1 °C)  Pulse:  [85-97] 85  Resp:  [15-20] 20  SpO2:  [90 %-99 %] 98 %  BP: ()/(51-70) 116/65     Weight: 67.5 kg (148 lb 13 oz)  Body mass index is 25.54 kg/m².    Intake/Output Summary (Last 24 hours) at 11/4/2020 1208  Last data filed at 11/4/2020 0453  Gross per 24 hour   Intake 300 ml   Output 100 ml   Net 200 ml      Physical Exam  Vitals signs and nursing note reviewed.   Constitutional:       General: She is not in acute distress.     Interventions: Nasal cannula in place.   Pulmonary:      Effort: Pulmonary effort is normal. No respiratory distress.   Neurological:      Mental Status: She is alert and oriented to person, place, and time.   Psychiatric:         Mood and Affect: Mood and affect normal.         Significant Labs: All pertinent labs within the past 24 hours have been reviewed.    Significant Imaging: I have reviewed all pertinent imaging results/findings within the past 24 hours.

## 2020-11-04 NOTE — NURSING
POC updated & reviewed with patient, verbalized understanding. Questions regarding POC were encouraged & addressed with patient. VSS, see flow-sheets. Patient is AAO x 4 at this time. PRN Promethazine/codene helped control and relieve pain from coughing.  Fall/safety precautions maintained, no signs of injury noted during shift. Patient was repositioned for comfort, bed locked in low position, side rails up x 2, bed alarm set, and call light within reach. Patient was instructed to call staff for mobility, verbalized understanding. WCTM.

## 2020-11-05 LAB
ALBUMIN SERPL BCP-MCNC: 2.9 G/DL (ref 3.5–5.2)
ALP SERPL-CCNC: 197 U/L (ref 55–135)
ALT SERPL W/O P-5'-P-CCNC: 22 U/L (ref 10–44)
ANION GAP SERPL CALC-SCNC: 8 MMOL/L (ref 8–16)
AST SERPL-CCNC: 14 U/L (ref 10–40)
BASOPHILS # BLD AUTO: 0.01 K/UL (ref 0–0.2)
BASOPHILS NFR BLD: 0.1 % (ref 0–1.9)
BILIRUB SERPL-MCNC: 0.6 MG/DL (ref 0.1–1)
BUN SERPL-MCNC: 39 MG/DL (ref 8–23)
CALCIUM SERPL-MCNC: 9.3 MG/DL (ref 8.7–10.5)
CHLORIDE SERPL-SCNC: 98 MMOL/L (ref 95–110)
CO2 SERPL-SCNC: 27 MMOL/L (ref 23–29)
CREAT SERPL-MCNC: 2.1 MG/DL (ref 0.5–1.4)
DIFFERENTIAL METHOD: ABNORMAL
EOSINOPHIL # BLD AUTO: 0 K/UL (ref 0–0.5)
EOSINOPHIL NFR BLD: 0 % (ref 0–8)
ERYTHROCYTE [DISTWIDTH] IN BLOOD BY AUTOMATED COUNT: 15.6 % (ref 11.5–14.5)
EST. GFR  (AFRICAN AMERICAN): 24.4 ML/MIN/1.73 M^2
EST. GFR  (NON AFRICAN AMERICAN): 21.1 ML/MIN/1.73 M^2
GLUCOSE SERPL-MCNC: 387 MG/DL (ref 70–110)
HCT VFR BLD AUTO: 39.9 % (ref 37–48.5)
HGB BLD-MCNC: 12.9 G/DL (ref 12–16)
IMM GRANULOCYTES # BLD AUTO: 0.1 K/UL (ref 0–0.04)
IMM GRANULOCYTES NFR BLD AUTO: 0.7 % (ref 0–0.5)
LYMPHOCYTES # BLD AUTO: 0.4 K/UL (ref 1–4.8)
LYMPHOCYTES NFR BLD: 2.9 % (ref 18–48)
MAGNESIUM SERPL-MCNC: 2.4 MG/DL (ref 1.6–2.6)
MCH RBC QN AUTO: 29.6 PG (ref 27–31)
MCHC RBC AUTO-ENTMCNC: 32.3 G/DL (ref 32–36)
MCV RBC AUTO: 92 FL (ref 82–98)
MONOCYTES # BLD AUTO: 0.8 K/UL (ref 0.3–1)
MONOCYTES NFR BLD: 5.9 % (ref 4–15)
NEUTROPHILS # BLD AUTO: 12.4 K/UL (ref 1.8–7.7)
NEUTROPHILS NFR BLD: 90.4 % (ref 38–73)
NRBC BLD-RTO: 0 /100 WBC
PHOSPHATE SERPL-MCNC: 3.6 MG/DL (ref 2.7–4.5)
PLATELET # BLD AUTO: 221 K/UL (ref 150–350)
PMV BLD AUTO: 10.5 FL (ref 9.2–12.9)
POCT GLUCOSE: 300 MG/DL (ref 70–110)
POCT GLUCOSE: 307 MG/DL (ref 70–110)
POTASSIUM SERPL-SCNC: 4.2 MMOL/L (ref 3.5–5.1)
PROT SERPL-MCNC: 6.1 G/DL (ref 6–8.4)
RBC # BLD AUTO: 4.36 M/UL (ref 4–5.4)
SODIUM SERPL-SCNC: 133 MMOL/L (ref 136–145)
WBC # BLD AUTO: 13.7 K/UL (ref 3.9–12.7)

## 2020-11-05 PROCEDURE — 63600175 PHARM REV CODE 636 W HCPCS: Performed by: INTERNAL MEDICINE

## 2020-11-05 PROCEDURE — 25000242 PHARM REV CODE 250 ALT 637 W/ HCPCS: Performed by: HOSPITALIST

## 2020-11-05 PROCEDURE — 94640 AIRWAY INHALATION TREATMENT: CPT

## 2020-11-05 PROCEDURE — 83735 ASSAY OF MAGNESIUM: CPT

## 2020-11-05 PROCEDURE — 80053 COMPREHEN METABOLIC PANEL: CPT

## 2020-11-05 PROCEDURE — 94761 N-INVAS EAR/PLS OXIMETRY MLT: CPT

## 2020-11-05 PROCEDURE — 12000002 HC ACUTE/MED SURGE SEMI-PRIVATE ROOM

## 2020-11-05 PROCEDURE — 27000221 HC OXYGEN, UP TO 24 HOURS

## 2020-11-05 PROCEDURE — 99232 SBSQ HOSP IP/OBS MODERATE 35: CPT | Mod: ,,, | Performed by: INTERNAL MEDICINE

## 2020-11-05 PROCEDURE — 25000003 PHARM REV CODE 250: Performed by: HOSPITALIST

## 2020-11-05 PROCEDURE — 63600175 PHARM REV CODE 636 W HCPCS: Performed by: HOSPITALIST

## 2020-11-05 PROCEDURE — 25000003 PHARM REV CODE 250: Performed by: INTERNAL MEDICINE

## 2020-11-05 PROCEDURE — 85025 COMPLETE CBC W/AUTO DIFF WBC: CPT

## 2020-11-05 PROCEDURE — 36415 COLL VENOUS BLD VENIPUNCTURE: CPT

## 2020-11-05 PROCEDURE — 84100 ASSAY OF PHOSPHORUS: CPT

## 2020-11-05 PROCEDURE — C9399 UNCLASSIFIED DRUGS OR BIOLOG: HCPCS | Performed by: INTERNAL MEDICINE

## 2020-11-05 PROCEDURE — 99232 PR SUBSEQUENT HOSPITAL CARE,LEVL II: ICD-10-PCS | Mod: ,,, | Performed by: INTERNAL MEDICINE

## 2020-11-05 PROCEDURE — 99900035 HC TECH TIME PER 15 MIN (STAT)

## 2020-11-05 RX ORDER — GLUCAGON 1 MG
1 KIT INJECTION
Status: DISCONTINUED | OUTPATIENT
Start: 2020-11-05 | End: 2020-11-07 | Stop reason: HOSPADM

## 2020-11-05 RX ORDER — PREDNISONE 20 MG/1
40 TABLET ORAL DAILY
Qty: 10 TABLET | Refills: 0
Start: 2020-11-06 | End: 2020-11-11

## 2020-11-05 RX ORDER — INSULIN ASPART 100 [IU]/ML
0-5 INJECTION, SOLUTION INTRAVENOUS; SUBCUTANEOUS
Refills: 0
Start: 2020-11-05 | End: 2021-09-15

## 2020-11-05 RX ORDER — DOXYCYCLINE HYCLATE 100 MG
100 TABLET ORAL EVERY 12 HOURS
Qty: 2 TABLET | Refills: 0
Start: 2020-11-05 | End: 2020-11-06

## 2020-11-05 RX ORDER — HYDROCODONE BITARTRATE AND ACETAMINOPHEN 5; 325 MG/1; MG/1
1-2 TABLET ORAL EVERY 4 HOURS PRN
Qty: 10 TABLET | Refills: 0 | Status: SHIPPED | OUTPATIENT
Start: 2020-11-05 | End: 2021-09-23

## 2020-11-05 RX ORDER — INSULIN ASPART 100 [IU]/ML
0-5 INJECTION, SOLUTION INTRAVENOUS; SUBCUTANEOUS
Status: DISCONTINUED | OUTPATIENT
Start: 2020-11-05 | End: 2020-11-07 | Stop reason: HOSPADM

## 2020-11-05 RX ORDER — CEFTRIAXONE 1 G/1
1 INJECTION, POWDER, FOR SOLUTION INTRAMUSCULAR; INTRAVENOUS
Status: DISCONTINUED | OUTPATIENT
Start: 2020-11-05 | End: 2020-11-07 | Stop reason: HOSPADM

## 2020-11-05 RX ORDER — IBUPROFEN 200 MG
24 TABLET ORAL
Status: DISCONTINUED | OUTPATIENT
Start: 2020-11-05 | End: 2020-11-07 | Stop reason: HOSPADM

## 2020-11-05 RX ORDER — FLUTICASONE FUROATE AND VILANTEROL 100; 25 UG/1; UG/1
1 POWDER RESPIRATORY (INHALATION) DAILY
Start: 2020-11-05 | End: 2022-02-12

## 2020-11-05 RX ORDER — IBUPROFEN 200 MG
16 TABLET ORAL
Status: DISCONTINUED | OUTPATIENT
Start: 2020-11-05 | End: 2020-11-07 | Stop reason: HOSPADM

## 2020-11-05 RX ORDER — HYDROCODONE BITARTRATE AND ACETAMINOPHEN 5; 325 MG/1; MG/1
1-2 TABLET ORAL EVERY 4 HOURS PRN
Qty: 10 TABLET | Refills: 0 | Status: SHIPPED | OUTPATIENT
Start: 2020-11-05 | End: 2020-11-06 | Stop reason: SDUPTHER

## 2020-11-05 RX ORDER — DOXYCYCLINE HYCLATE 100 MG
100 TABLET ORAL EVERY 12 HOURS
Status: COMPLETED | OUTPATIENT
Start: 2020-11-05 | End: 2020-11-05

## 2020-11-05 RX ADMIN — PROMETHAZINE HYDROCHLORIDE AND CODEINE PHOSPHATE 5 ML: 10; 6.25 SOLUTION ORAL at 05:11

## 2020-11-05 RX ADMIN — CARVEDILOL 6.25 MG: 6.25 TABLET, FILM COATED ORAL at 08:11

## 2020-11-05 RX ADMIN — CARVEDILOL 6.25 MG: 6.25 TABLET, FILM COATED ORAL at 05:11

## 2020-11-05 RX ADMIN — CLOPIDOGREL 75 MG: 75 TABLET, FILM COATED ORAL at 08:11

## 2020-11-05 RX ADMIN — BENZONATATE 200 MG: 100 CAPSULE, LIQUID FILLED ORAL at 08:11

## 2020-11-05 RX ADMIN — IPRATROPIUM BROMIDE AND ALBUTEROL SULFATE 3 ML: .5; 2.5 SOLUTION RESPIRATORY (INHALATION) at 03:11

## 2020-11-05 RX ADMIN — HYDRALAZINE HYDROCHLORIDE AND ISOSORBIDE DINITRATE 1 TABLET: 37.5; 2 TABLET, FILM COATED ORAL at 08:11

## 2020-11-05 RX ADMIN — CEFTRIAXONE SODIUM 1 G: 1 INJECTION, POWDER, FOR SOLUTION INTRAMUSCULAR; INTRAVENOUS at 09:11

## 2020-11-05 RX ADMIN — FLUTICASONE FUROATE AND VILANTEROL TRIFENATATE 1 PUFF: 100; 25 POWDER RESPIRATORY (INHALATION) at 09:11

## 2020-11-05 RX ADMIN — IPRATROPIUM BROMIDE AND ALBUTEROL SULFATE 3 ML: .5; 2.5 SOLUTION RESPIRATORY (INHALATION) at 10:11

## 2020-11-05 RX ADMIN — IPRATROPIUM BROMIDE AND ALBUTEROL SULFATE 3 ML: .5; 2.5 SOLUTION RESPIRATORY (INHALATION) at 01:11

## 2020-11-05 RX ADMIN — INSULIN ASPART 5 UNITS: 100 INJECTION, SOLUTION INTRAVENOUS; SUBCUTANEOUS at 10:11

## 2020-11-05 RX ADMIN — IPRATROPIUM BROMIDE AND ALBUTEROL SULFATE 3 ML: .5; 3 SOLUTION RESPIRATORY (INHALATION) at 05:11

## 2020-11-05 RX ADMIN — INSULIN DETEMIR 5 UNITS: 100 INJECTION, SOLUTION SUBCUTANEOUS at 09:11

## 2020-11-05 RX ADMIN — GUAIFENESIN 1200 MG: 600 TABLET, EXTENDED RELEASE ORAL at 09:11

## 2020-11-05 RX ADMIN — FAMOTIDINE 20 MG: 20 TABLET, FILM COATED ORAL at 08:11

## 2020-11-05 RX ADMIN — IPRATROPIUM BROMIDE AND ALBUTEROL SULFATE 3 ML: .5; 2.5 SOLUTION RESPIRATORY (INHALATION) at 09:11

## 2020-11-05 RX ADMIN — PROMETHAZINE HYDROCHLORIDE AND CODEINE PHOSPHATE 5 ML: 10; 6.25 SOLUTION ORAL at 08:11

## 2020-11-05 RX ADMIN — ROSUVASTATIN CALCIUM 10 MG: 10 TABLET, FILM COATED ORAL at 08:11

## 2020-11-05 RX ADMIN — APIXABAN 2.5 MG: 2.5 TABLET, FILM COATED ORAL at 08:11

## 2020-11-05 RX ADMIN — DONEPEZIL HYDROCHLORIDE 5 MG: 5 TABLET, FILM COATED ORAL at 09:11

## 2020-11-05 RX ADMIN — INSULIN ASPART 5 UNITS: 100 INJECTION, SOLUTION INTRAVENOUS; SUBCUTANEOUS at 05:11

## 2020-11-05 RX ADMIN — BENZONATATE 200 MG: 100 CAPSULE, LIQUID FILLED ORAL at 05:11

## 2020-11-05 RX ADMIN — DILTIAZEM HYDROCHLORIDE 360 MG: 180 CAPSULE, COATED, EXTENDED RELEASE ORAL at 08:11

## 2020-11-05 RX ADMIN — APIXABAN 2.5 MG: 2.5 TABLET, FILM COATED ORAL at 09:11

## 2020-11-05 RX ADMIN — DOXYCYCLINE HYCLATE 100 MG: 100 TABLET, COATED ORAL at 10:11

## 2020-11-05 RX ADMIN — PREDNISONE 40 MG: 20 TABLET ORAL at 08:11

## 2020-11-05 RX ADMIN — DOXYCYCLINE HYCLATE 100 MG: 100 TABLET, COATED ORAL at 09:11

## 2020-11-05 RX ADMIN — GUAIFENESIN 1200 MG: 600 TABLET, EXTENDED RELEASE ORAL at 08:11

## 2020-11-05 RX ADMIN — HYDRALAZINE HYDROCHLORIDE AND ISOSORBIDE DINITRATE 1 TABLET: 37.5; 2 TABLET, FILM COATED ORAL at 09:11

## 2020-11-05 RX ADMIN — ACETAMINOPHEN 650 MG: 325 TABLET ORAL at 08:11

## 2020-11-05 NOTE — PLAN OF CARE
Plan of care reviewed with patient. Verbalized understanding.  Tele in progress with NSR.Nonproductive cough noted. Nebulizer treatment and tessalon perles  order PRN. Take meds whole in pudding with thin liquids. Purewick in use. No fall or injury noted this shift. All safety measures maintained

## 2020-11-05 NOTE — PHYSICIAN QUERY
PT Name: Christy Webber  MR #: [id number]     Documentation Clarification      CDS/: Kenyetta Santamaria RN              Contact information:[email or web address]    This form is a permanent document in the medical record.     Query Date: November 5, 2020    By submitting this query, we are merely seeking further clarification of documentation. Please utilize your independent clinical judgment when addressing the question(s) below.    The Medical Record reflects the following:    Clinical Findings Location in Medical Records   Shortness of Breath   persistant cough with SOB for the past 3 days at [facility]. Denies N/V/D/F    Pt with mild hypoxia on arrival, O2 sat 92% on 2L NC. No treatments given en route.   Pulmonary/Chest: Breath sounds normal. No respiratory distress. She has no wheezes. She has no rhonchi. She has no rales.           Pulmonary/Chest: Effort normal. No respiratory distress. Occasional fine wheezes       Acute on Chronic Respiratory Failure with Hypoxia   LLL Pneumonia due to Infectious Organisms, NOS    CXR with an opacity on the left    Afebrile and without leukocytosis but with a left shift and persistent cough despite IV diuresis for 2 days a [place]    Satting 92% on 3L NC    Ceftriaxone and Azithro (start date 11/1)    Duonebs q4h prn with IS    Codeine cough syrup, and Tessalon Perles prn cough       COPD/Emphysema    On 2L NC at home    Completed a course of Prednisone at [place] so will not repeat    Start Breo to optimize lung function         Temp: [98 °F (36.7 °C)-98.3 °F (36.8 °C)]   Pulse: [84-86]   Resp: [14-24]   BP: (136-164)/(77-79)   SpO2: [92 %-100 %]    ED Provider note 11/1        ED Provider note 11/1    ED Provider note 11/1            H&P 11/1       Hospital Medicine       H&P 11/1       Hospital Medicine                     H&P 11/1       Hospital Medicine             H&P 11/1       Hospital Medicine                                                                                             Provider, please clarify the diagnosis of ___Acute Respiratory Failure____ with additional clinical indicators   to support the Acute diagnosis.      [   ] Acute on (Chronic) Respiratory Failure with Hypoxia is confirmed and additional clinical support/decision-making indicators for the diagnosis include  (please specify):________________     [x   ] Acute Respiratory Failure is not confirmed and/or it has been ruled out,   Chronic Respiratory Failure with hypoxia ruled in.     [   ] Acute Respiratory Failure is not confirmed and/or it has been ruled out, other diagnosis ruled in (please specify):_______________     [   ] Other clarification (please specify): ___________________     [  ] Clinically undetermined         Present on admission (POA) status:   [   ] Yes (Y)                          [  ] Clinically Undetermined (W)  [   ] No (N)                            [   ] Documentation insufficient to determine if condition is POA (U)

## 2020-11-05 NOTE — PLAN OF CARE
[facility]     Department of Hospital Medicine     [address] [place] [address]     [phone] after hours  [phone] fax       NURSING HOME ORDERS    11/07/2020    Admit to Nursing Home:  Regular Bed         Diagnoses:  Active Hospital Problems    Diagnosis  POA    *Acute on chronic combined systolic and diastolic congestive heart failure [I50.43]  Yes    Nursing home resident [Z59.3]  Not Applicable     [facility] ([address], [place] [address])      Biatrial enlargement [I51.7]  Yes     Chronic    Chronic respiratory failure with hypoxia, on home oxygen therapy [J96.11, Z99.81]  Not Applicable     Chronic    CAD S/P percutaneous coronary angioplasty [I25.10, Z98.61]  Not Applicable     Chronic    Left lower lobe pneumonia [J18.9]  Yes    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 [I13.0]  Yes     Chronic    DNR (do not resuscitate) [Z66]  Yes     Chronic    COPD (chronic obstructive pulmonary disease) [J44.9]  Yes     Chronic    Primary hyperparathyroidism [E21.0]  Yes     Chronic    Vascular dementia without behavioral disturbance [F01.50]  Yes     Chronic    Current use of long term anticoagulation [Z79.01]  Not Applicable     Chronic     apixaban      Presence of cardiac pacemaker [Z95.0]  Yes     Chronic    Dyslipidemia [E78.5]  Yes     Chronic    Non-rheumatic mitral regurgitation [I34.0]  Yes     Chronic    Centrilobular emphysema [J43.2]  Yes     Chronic    Pulmonary hypertension [I27.20]  Yes     Chronic    Aortic regurgitation [I35.1]  Yes     Chronic    Nonrheumatic tricuspid valve regurgitation [I36.1]  Yes     Chronic    Essential hypertension [I10]  Yes     Chronic    Chronic kidney disease, stage 4 (severe) [N18.4]  Yes     Chronic     History of partial left nephrectomy.      Permanent atrial fibrillation [I48.21]  Yes     Chronic      Resolved Hospital Problems   No  resolved problems to display.       Patient is homebound due to:  Acute on chronic combined systolic and diastolic congestive heart failure    Allergies:Review of patient's allergies indicates:  No Known Allergies    Vitals:       Per facility protocol, at least once weekly      Diet:     Cardiac  Diabetic  1500 mL fluid restriction    Activities:        - Up in a chair each morning as tolerated   - Ambulate with assistance to bathroom   - Scheduled walks once each shift (every 8 hours)   - May use walker, cane, or self-propelled wheelchair   - Weight bearing: as tolerated     LABS:      At least one follow-up CMP one week after discharge    Otherwise per facility protocol including at least     CMP, CBC each month for 3 months   PT-INR each week for 1 month then monthly   TSH every year    Nursing Precautions:     - Aspiration precautions:             - Total assistance with meals            -  Upright 90 degrees befor during and after meals             -  Suction at bedside          - Fall precautions per nursing home protocol   - Decubitus precautions:        -  for positioning   - Pressure reducing foam mattress   - Turn patient every two hours. Use wedge pillows to anchor patient    CONSULTS:        Physical Therapy to evaluate and treat     Occupational Therapy to evaluate and treat     Speech Therapy  to evaluate and treat     Nutrition to evaluate and recommend diet    MISCELLANEOUS CARE:     Continue home oxygen, 2-3L    Heart Failure Home Health Instructions:     SN to instruct on the following:    Instruct on the definition of CHF.   Instruct on the signs/sympoms of CHF to be reported.   Instruct on and monitor daily weights.   Instruct on factors that cause exacerbation.   Instruct on action, dose, schedule, and side effects of medications.   Instruct on diet as prescribed.   Instruct on activity allowed.   Instruct on life-style modifications for life long management of CHF   SN to assess  compliance with daily weights, diet, medications, fluid retention,    safety precautions, activities permitted and life-style modifications.   Additional 1-2 SN visits per week as needed for signs and symptoms     of CHF exacerbation.    For Weight Gain > 2-3 lbs in 1 day or 4-6 lbs over 1 week:       Increase lasix (oral diuretic) dose to BID for 5 days temporarily     Obtain BMP lab test in 3 days      If weight does not decrease by 3 lbs after 5 days of increased diuretic usage:   Notify PCP            DIABETES CARE:        Check blood sugar:        Fingerstick blood sugar AC and HS   Fingerstick blood sugar every 6 hours if unable to eat      Report CBG < 60 or > 400 to physician.                                          Insulin Sliding Scale          Glucose  Novolog Insulin Subcutaneous        0 - 60   Orange juice or glucose tablet, hold insulin      No insulin   201-250  2 units   251-300  4 units   301-350  6 units   351-400  8 units   >400   10 units then call physician    Medications: Discontinue all previous medication orders, if any. See new list below.     Christy Webber   Home Medication Instructions CHEMA:[id number]    Printed on:11/07/20 0825   Medication Information                      albuterol (PROVENTIL/VENTOLIN HFA) 90 mcg/actuation inhaler  Inhale 1-2 puffs into the lungs every 6 (six) hours as needed for Wheezing. Rescue             apixaban (ELIQUIS) 2.5 mg Tab  Take 1 tablet (2.5 mg total) by mouth 2 (two) times daily.  Note decreased dose             benzonatate (TESSALON) 200 MG capsule  Take 1 capsule (200 mg total) by mouth 3 (three) times daily as needed for Cough.             BIDIL 20-37.5 mg Tab  Take 1 tablet by mouth 2 (two) times daily.             calcium carbonate-vitamin D3 (CALCIUM 600 WITH VITAMIN D3) 600 mg(1,500mg) -500 unit Cap  Take 1 tablet by mouth 2 (two) times daily.             carvediloL (COREG) 6.25 MG tablet  Take 1 tablet (6.25 mg total) by mouth 2 (two)  times daily with meals.             cefpodoxime (VANTIN) 200 MG tablet  Take 1 tablet (200 mg total) by mouth once daily. for 4 days             clopidogreL (PLAVIX) 75 mg tablet  Take 1 tablet (75 mg total) by mouth once daily.             diltiaZEM (TIAZAC) 360 MG Cs24  Take 360 mg by mouth once daily.             docusate sodium (COLACE) 100 MG capsule  Take 1 capsule (100 mg total) by mouth 2 (two) times daily.             donepezil (ARICEPT) 5 MG tablet  Take 1 tablet (5 mg total) by mouth every evening.             doxycycline (VIBRA-TABS) 100 MG tablet  Take 1 tablet (100 mg total) by mouth every 12 (twelve) hours. for 4 days             famotidine (PEPCID) 20 MG tablet  Take 20 mg by mouth.             fluticasone furoate-vilanteroL (BREO) 100-25 mcg/dose diskus inhaler  Inhale 1 puff into the lungs once daily. Controller             furosemide (LASIX) 40 MG tablet  Take 0.5 tablets (20 mg total) by mouth once daily.             guaiFENesin (MUCINEX) 600 mg 12 hr tablet  Take 2 tablets (1,200 mg total) by mouth 2 (two) times daily.             HYDROcodone-acetaminophen (NORCO) 5-325 mg per tablet  Take 1-2 tablets by mouth every 4 (four) hours as needed for Pain.             insulin aspart U-100 (NOVOLOG) 100 unit/mL (3 mL) InPn pen  Inject 0-5 Units into the skin before meals and at bedtime as needed (Hyperglycemia).             insulin aspart U-100 (NOVOLOG) 100 unit/mL (3 mL) InPn pen  Inject 5 Units into the skin 3 (three) times daily. While on prednisone only             insulin detemir U-100 (LEVEMIR FLEXTOUCH) 100 unit/mL (3 mL) SubQ InPn pen  Inject 15 Units into the skin every evening  While on prednisone only             predniSONE (DELTASONE) 20 MG tablet  Take 2 tablets (40 mg total) by mouth once daily. Ending 11/10 for 5 days             promethazine-codeine 6.25-10 mg/5 ml (PHENERGAN WITH CODEINE) 6.25-10 mg/5 mL syrup  Take 5 mLs by mouth every 6 (six) hours as needed for Cough.              rosuvastatin (CRESTOR) 10 MG tablet  Take 1 tablet (10 mg total) by mouth once daily.                  _________________________________  Lalo Nazario MD  11/06/2020

## 2020-11-05 NOTE — NURSING
Plan of care reviewed with patient and family. CHF and SOB reviewed with patient and family present at bedside. Patient pain managed with medications and improved breathing. No falls or new signs of infection. Cough productive with decreased pain when controlled effectively. Urine output okay. Monitor for output after evening dose.

## 2020-11-06 LAB
ALBUMIN SERPL BCP-MCNC: 2.9 G/DL (ref 3.5–5.2)
ALP SERPL-CCNC: 202 U/L (ref 55–135)
ALT SERPL W/O P-5'-P-CCNC: 30 U/L (ref 10–44)
ANION GAP SERPL CALC-SCNC: 13 MMOL/L (ref 8–16)
AST SERPL-CCNC: 26 U/L (ref 10–40)
BASOPHILS # BLD AUTO: 0.02 K/UL (ref 0–0.2)
BASOPHILS NFR BLD: 0.1 % (ref 0–1.9)
BILIRUB SERPL-MCNC: 0.6 MG/DL (ref 0.1–1)
BUN SERPL-MCNC: 40 MG/DL (ref 8–23)
CALCIUM SERPL-MCNC: 8.9 MG/DL (ref 8.7–10.5)
CHLORIDE SERPL-SCNC: 100 MMOL/L (ref 95–110)
CO2 SERPL-SCNC: 20 MMOL/L (ref 23–29)
CREAT SERPL-MCNC: 1.8 MG/DL (ref 0.5–1.4)
DIFFERENTIAL METHOD: ABNORMAL
EOSINOPHIL # BLD AUTO: 0 K/UL (ref 0–0.5)
EOSINOPHIL NFR BLD: 0 % (ref 0–8)
ERYTHROCYTE [DISTWIDTH] IN BLOOD BY AUTOMATED COUNT: 15.8 % (ref 11.5–14.5)
EST. GFR  (AFRICAN AMERICAN): 29.4 ML/MIN/1.73 M^2
EST. GFR  (NON AFRICAN AMERICAN): 25.5 ML/MIN/1.73 M^2
GLUCOSE SERPL-MCNC: 351 MG/DL (ref 70–110)
HCT VFR BLD AUTO: 41.7 % (ref 37–48.5)
HGB BLD-MCNC: 13.4 G/DL (ref 12–16)
IMM GRANULOCYTES # BLD AUTO: 0.28 K/UL (ref 0–0.04)
IMM GRANULOCYTES NFR BLD AUTO: 2 % (ref 0–0.5)
LYMPHOCYTES # BLD AUTO: 0.4 K/UL (ref 1–4.8)
LYMPHOCYTES NFR BLD: 2.5 % (ref 18–48)
MAGNESIUM SERPL-MCNC: 2.5 MG/DL (ref 1.6–2.6)
MCH RBC QN AUTO: 30.2 PG (ref 27–31)
MCHC RBC AUTO-ENTMCNC: 32.1 G/DL (ref 32–36)
MCV RBC AUTO: 94 FL (ref 82–98)
MONOCYTES # BLD AUTO: 0.6 K/UL (ref 0.3–1)
MONOCYTES NFR BLD: 4 % (ref 4–15)
NEUTROPHILS # BLD AUTO: 12.7 K/UL (ref 1.8–7.7)
NEUTROPHILS NFR BLD: 91.4 % (ref 38–73)
NRBC BLD-RTO: 0 /100 WBC
PHOSPHATE SERPL-MCNC: 3.6 MG/DL (ref 2.7–4.5)
PLATELET # BLD AUTO: 228 K/UL (ref 150–350)
PMV BLD AUTO: 10.8 FL (ref 9.2–12.9)
POCT GLUCOSE: 209 MG/DL (ref 70–110)
POCT GLUCOSE: 302 MG/DL (ref 70–110)
POCT GLUCOSE: 304 MG/DL (ref 70–110)
POCT GLUCOSE: 306 MG/DL (ref 70–110)
POCT GLUCOSE: 442 MG/DL (ref 70–110)
POTASSIUM SERPL-SCNC: 4.1 MMOL/L (ref 3.5–5.1)
PROT SERPL-MCNC: 6 G/DL (ref 6–8.4)
RBC # BLD AUTO: 4.43 M/UL (ref 4–5.4)
SODIUM SERPL-SCNC: 133 MMOL/L (ref 136–145)
WBC # BLD AUTO: 13.88 K/UL (ref 3.9–12.7)

## 2020-11-06 PROCEDURE — 63600175 PHARM REV CODE 636 W HCPCS: Performed by: INTERNAL MEDICINE

## 2020-11-06 PROCEDURE — 94640 AIRWAY INHALATION TREATMENT: CPT

## 2020-11-06 PROCEDURE — 94664 DEMO&/EVAL PT USE INHALER: CPT

## 2020-11-06 PROCEDURE — 84100 ASSAY OF PHOSPHORUS: CPT

## 2020-11-06 PROCEDURE — 25000003 PHARM REV CODE 250: Performed by: HOSPITALIST

## 2020-11-06 PROCEDURE — 83735 ASSAY OF MAGNESIUM: CPT

## 2020-11-06 PROCEDURE — 25000242 PHARM REV CODE 250 ALT 637 W/ HCPCS: Performed by: HOSPITALIST

## 2020-11-06 PROCEDURE — 99232 SBSQ HOSP IP/OBS MODERATE 35: CPT | Mod: ,,, | Performed by: INTERNAL MEDICINE

## 2020-11-06 PROCEDURE — 94761 N-INVAS EAR/PLS OXIMETRY MLT: CPT

## 2020-11-06 PROCEDURE — 25000003 PHARM REV CODE 250: Performed by: INTERNAL MEDICINE

## 2020-11-06 PROCEDURE — 36415 COLL VENOUS BLD VENIPUNCTURE: CPT

## 2020-11-06 PROCEDURE — 85025 COMPLETE CBC W/AUTO DIFF WBC: CPT

## 2020-11-06 PROCEDURE — 99232 PR SUBSEQUENT HOSPITAL CARE,LEVL II: ICD-10-PCS | Mod: ,,, | Performed by: INTERNAL MEDICINE

## 2020-11-06 PROCEDURE — 99900035 HC TECH TIME PER 15 MIN (STAT)

## 2020-11-06 PROCEDURE — 80053 COMPREHEN METABOLIC PANEL: CPT

## 2020-11-06 PROCEDURE — 27000221 HC OXYGEN, UP TO 24 HOURS

## 2020-11-06 PROCEDURE — C9399 UNCLASSIFIED DRUGS OR BIOLOG: HCPCS | Performed by: INTERNAL MEDICINE

## 2020-11-06 PROCEDURE — 27000646 HC AEROBIKA DEVICE

## 2020-11-06 PROCEDURE — 12000002 HC ACUTE/MED SURGE SEMI-PRIVATE ROOM

## 2020-11-06 PROCEDURE — 63600175 PHARM REV CODE 636 W HCPCS: Performed by: HOSPITALIST

## 2020-11-06 RX ORDER — GUAIFENESIN 600 MG/1
1200 TABLET, EXTENDED RELEASE ORAL 2 TIMES DAILY
Qty: 40 TABLET | Refills: 0
Start: 2020-11-06 | End: 2021-09-23

## 2020-11-06 RX ORDER — PSEUDOEPHEDRINE/ACETAMINOPHEN 30MG-500MG
100 TABLET ORAL
Status: DISCONTINUED | OUTPATIENT
Start: 2020-11-06 | End: 2020-11-06

## 2020-11-06 RX ORDER — SYRING-NEEDL,DISP,INSUL,0.3 ML 29 G X1/2"
296 SYRINGE, EMPTY DISPOSABLE MISCELLANEOUS
Status: DISCONTINUED | OUTPATIENT
Start: 2020-11-06 | End: 2020-11-06

## 2020-11-06 RX ORDER — HYDROCODONE BITARTRATE AND ACETAMINOPHEN 5; 325 MG/1; MG/1
1-2 TABLET ORAL EVERY 4 HOURS PRN
Qty: 10 TABLET | Refills: 0 | Status: SHIPPED | OUTPATIENT
Start: 2020-11-06 | End: 2021-09-23

## 2020-11-06 RX ORDER — PROMETHAZINE HYDROCHLORIDE AND CODEINE PHOSPHATE 6.25; 1 MG/5ML; MG/5ML
5 SOLUTION ORAL EVERY 6 HOURS PRN
Qty: 118 ML | Refills: 0 | Status: SHIPPED | OUTPATIENT
Start: 2020-11-06 | End: 2020-11-16

## 2020-11-06 RX ORDER — PREDNISONE 1 MG/1
4 TABLET ORAL DAILY
Status: DISCONTINUED | OUTPATIENT
Start: 2020-11-07 | End: 2020-11-07 | Stop reason: HOSPADM

## 2020-11-06 RX ORDER — INSULIN ASPART 100 [IU]/ML
3 INJECTION, SOLUTION INTRAVENOUS; SUBCUTANEOUS
Status: DISCONTINUED | OUTPATIENT
Start: 2020-11-06 | End: 2020-11-07

## 2020-11-06 RX ORDER — DOXYCYCLINE HYCLATE 100 MG
100 TABLET ORAL EVERY 12 HOURS
Qty: 8 TABLET | Refills: 0
Start: 2020-11-06 | End: 2020-11-10

## 2020-11-06 RX ORDER — CEFPODOXIME PROXETIL 200 MG/1
200 TABLET, FILM COATED ORAL DAILY
Qty: 4 TABLET | Refills: 0
Start: 2020-11-06 | End: 2020-11-10

## 2020-11-06 RX ORDER — INSULIN ASPART 100 [IU]/ML
3 INJECTION, SOLUTION INTRAVENOUS; SUBCUTANEOUS 3 TIMES DAILY
Refills: 0
Start: 2020-11-06 | End: 2020-11-07

## 2020-11-06 RX ORDER — LACTULOSE 10 G/15ML
20 SOLUTION ORAL DAILY
Status: DISCONTINUED | OUTPATIENT
Start: 2020-11-06 | End: 2020-11-07 | Stop reason: HOSPADM

## 2020-11-06 RX ADMIN — GUAIFENESIN 1200 MG: 600 TABLET, EXTENDED RELEASE ORAL at 11:11

## 2020-11-06 RX ADMIN — IPRATROPIUM BROMIDE AND ALBUTEROL SULFATE 3 ML: .5; 2.5 SOLUTION RESPIRATORY (INHALATION) at 01:11

## 2020-11-06 RX ADMIN — PROMETHAZINE HYDROCHLORIDE AND CODEINE PHOSPHATE 5 ML: 10; 6.25 SOLUTION ORAL at 05:11

## 2020-11-06 RX ADMIN — IPRATROPIUM BROMIDE AND ALBUTEROL SULFATE 3 ML: .5; 2.5 SOLUTION RESPIRATORY (INHALATION) at 04:11

## 2020-11-06 RX ADMIN — CARVEDILOL 6.25 MG: 6.25 TABLET, FILM COATED ORAL at 10:11

## 2020-11-06 RX ADMIN — APIXABAN 2.5 MG: 2.5 TABLET, FILM COATED ORAL at 11:11

## 2020-11-06 RX ADMIN — INSULIN ASPART 4 UNITS: 100 INJECTION, SOLUTION INTRAVENOUS; SUBCUTANEOUS at 10:11

## 2020-11-06 RX ADMIN — IPRATROPIUM BROMIDE AND ALBUTEROL SULFATE 3 ML: .5; 2.5 SOLUTION RESPIRATORY (INHALATION) at 07:11

## 2020-11-06 RX ADMIN — INSULIN ASPART 4 UNITS: 100 INJECTION, SOLUTION INTRAVENOUS; SUBCUTANEOUS at 12:11

## 2020-11-06 RX ADMIN — PREDNISONE 40 MG: 20 TABLET ORAL at 10:11

## 2020-11-06 RX ADMIN — INSULIN ASPART 3 UNITS: 100 INJECTION, SOLUTION INTRAVENOUS; SUBCUTANEOUS at 05:11

## 2020-11-06 RX ADMIN — DONEPEZIL HYDROCHLORIDE 5 MG: 5 TABLET, FILM COATED ORAL at 11:11

## 2020-11-06 RX ADMIN — INSULIN DETEMIR 10 UNITS: 100 INJECTION, SOLUTION SUBCUTANEOUS at 11:11

## 2020-11-06 RX ADMIN — APIXABAN 2.5 MG: 2.5 TABLET, FILM COATED ORAL at 10:11

## 2020-11-06 RX ADMIN — INSULIN ASPART 3 UNITS: 100 INJECTION, SOLUTION INTRAVENOUS; SUBCUTANEOUS at 12:11

## 2020-11-06 RX ADMIN — ROSUVASTATIN CALCIUM 10 MG: 10 TABLET, FILM COATED ORAL at 10:11

## 2020-11-06 RX ADMIN — PROMETHAZINE HYDROCHLORIDE AND CODEINE PHOSPHATE 5 ML: 10; 6.25 SOLUTION ORAL at 11:11

## 2020-11-06 RX ADMIN — BENZONATATE 200 MG: 100 CAPSULE, LIQUID FILLED ORAL at 11:11

## 2020-11-06 RX ADMIN — INSULIN ASPART 3 UNITS: 100 INJECTION, SOLUTION INTRAVENOUS; SUBCUTANEOUS at 11:11

## 2020-11-06 RX ADMIN — CARVEDILOL 6.25 MG: 6.25 TABLET, FILM COATED ORAL at 05:11

## 2020-11-06 RX ADMIN — GUAIFENESIN 1200 MG: 600 TABLET, EXTENDED RELEASE ORAL at 10:11

## 2020-11-06 RX ADMIN — LACTULOSE 20 G: 20 SOLUTION ORAL at 02:11

## 2020-11-06 RX ADMIN — BENZONATATE 200 MG: 100 CAPSULE, LIQUID FILLED ORAL at 05:11

## 2020-11-06 RX ADMIN — HYDRALAZINE HYDROCHLORIDE AND ISOSORBIDE DINITRATE 1 TABLET: 37.5; 2 TABLET, FILM COATED ORAL at 11:11

## 2020-11-06 RX ADMIN — PROMETHAZINE HYDROCHLORIDE AND CODEINE PHOSPHATE 5 ML: 10; 6.25 SOLUTION ORAL at 02:11

## 2020-11-06 RX ADMIN — INSULIN ASPART 2 UNITS: 100 INJECTION, SOLUTION INTRAVENOUS; SUBCUTANEOUS at 02:11

## 2020-11-06 RX ADMIN — CEFTRIAXONE SODIUM 1 G: 1 INJECTION, POWDER, FOR SOLUTION INTRAMUSCULAR; INTRAVENOUS at 06:11

## 2020-11-06 RX ADMIN — HYDRALAZINE HYDROCHLORIDE AND ISOSORBIDE DINITRATE 1 TABLET: 37.5; 2 TABLET, FILM COATED ORAL at 10:11

## 2020-11-06 RX ADMIN — INSULIN ASPART 2 UNITS: 100 INJECTION, SOLUTION INTRAVENOUS; SUBCUTANEOUS at 05:11

## 2020-11-06 RX ADMIN — DILTIAZEM HYDROCHLORIDE 360 MG: 180 CAPSULE, COATED, EXTENDED RELEASE ORAL at 10:11

## 2020-11-06 RX ADMIN — FAMOTIDINE 20 MG: 20 TABLET, FILM COATED ORAL at 10:11

## 2020-11-06 RX ADMIN — CLOPIDOGREL 75 MG: 75 TABLET, FILM COATED ORAL at 10:11

## 2020-11-06 NOTE — PLAN OF CARE
SW informed by floor nurseRoyer that facility reported they cannot accept patient back until she has a bowel movement. Last BM noted on 11/1. Provider informed.       11/06/20 1118   Post-Acute Status   Post-Acute Authorization Placement   Discharge Delays (!) Other     Laurel Pandya LMSW  [facility]  [id number]

## 2020-11-06 NOTE — PLAN OF CARE
JOHANNA faxed updated orders to Abigail at [facility].    Nurse can call report to [phone]. Ask for [facility] nurse.    [facility] W/[facility] transportation requested for 11:00 AM. Requested time is not a guarantee of arrival time. Nurse can assess scheduled time by accessing Chart Review--> Intake tab . This allows Nurse to review accurate information of scheduled transportation time. If transportation is delayed outside of that window, on call CM staff should be contacted to assess the transportation delay. On Call Pager: [phone]       11/06/20 0959   Final Note   Assessment Type Final Discharge Note   Anticipated Discharge Disposition halfway Nu   Right Care Referral Info   Post Acute Recommendation Other   Referral Type halfway NH   Facility Name [place], [place]   Post-Acute Status   Post-Acute Authorization Placement   Post-Acute Placement Status Set-up Complete     Laurel Pandya LMSW  [facility]  [id number]

## 2020-11-06 NOTE — NURSING
Pt remains alert and oriented. Complaints of continuous cough. All PRN cough meds administered with relief. Increased O2 via NC to 3L for POX 94%. Unable to wean pt O2 throughout shift. Will continue with plan of care.

## 2020-11-06 NOTE — PROGRESS NOTES
Hospital Medicine  Progress Note      Patient Name: Christy Webber  MRN: [id number]  Date of Admission: 11/1/2020     Principal Problem: Acute on chronic combined systolic and diastolic congestive heart failure     Subjective     Ms. Webber was feeling better overnight, but this morning began coughing again and had several extended spells during my evaluation where she would go several minutes just coughing.    Her labs are stable, planning on monitoring for an additional day in an abundance of caution. Holding IV lasix.    Review of Systems    Constitutional: Negative for chills, fatigue, fever.   Respiratory: +cough, Negative for shortness of breath.    Cardiovascular: Negative for chest pain, palpitations, leg swelling.   Gastrointestinal: Negative for abdominal pain, constipation, diarrhea, nausea, vomiting.   Neurological: Negative for dizziness, syncope, weakness, light-headedness.     Medications  Scheduled Meds:   albuterol-ipratropium  3 mL Nebulization Q4H WAKE    apixaban  2.5 mg Oral BID    carvediloL  6.25 mg Oral BID WM    cefTRIAXone (ROCEPHIN) IVPB  1 g Intravenous Q24H    clopidogreL  75 mg Oral Daily    diltiaZEM  360 mg Oral Daily    donepeziL  5 mg Oral QHS    doxycycline  100 mg Oral Q12H    famotidine  20 mg Oral Daily    fluticasone furoate-vilanteroL  1 puff Inhalation Daily    guaiFENesin  1,200 mg Oral BID    insulin detemir U-100  5 Units Subcutaneous Daily    isosorbide-hydrALAZINE 20-37.5 mg  1 tablet Oral BID    predniSONE  40 mg Oral Daily    rosuvastatin  10 mg Oral Daily     Continuous Infusions:  PRN Meds:.acetaminophen, albuterol-ipratropium, benzonatate, dextrose 50%, dextrose 50%, glucagon (human recombinant), glucose, glucose, HYDROcodone-acetaminophen, HYDROcodone-acetaminophen, insulin aspart U-100, melatonin, ondansetron, promethazine (PHENERGAN) IVPB, promethazine-codeine 6.25-10 mg/5 ml, senna-docusate 8.6-50 mg, sodium chloride 0.9%, sodium chloride 0.9%,  sodium chloride 0.9%    Objective    Physical Examination    Temp:  [96.1 °F (35.6 °C)-97.7 °F (36.5 °C)]   Pulse:  []   Resp:  [15-28]   BP: (112-139)/(58-68)   SpO2:  [90 %-99 %]     Gen: NAD, conversant  CV: RRR, no peripheral edema,  Resp: CTAB, no increased work of breathing on room air, coughed for several minutes during the exam  GI: Soft, NT, ND, +BS  Neuro: AAOx3, CN grossly intact, no focal neurologic deficits    CBC  Recent Labs   Lab 11/03/20  0356 11/04/20  0425 11/05/20  0512   WBC 8.41 7.36 13.70*   HGB 13.2 13.5 12.9   HCT 40.3 42.3 39.9    219 221     CMP  Recent Labs   Lab 11/03/20  0356 11/04/20  0425 11/05/20  0512    136 133*   K 3.6 3.9 4.2    100 98   CO2 23 23 27   BUN 39* 35* 39*   CREATININE 1.6* 1.8* 2.1*   * 303* 387*   CALCIUM 8.0* 9.2 9.3   MG 2.1 2.2 2.4   PHOS 2.7 3.5 3.6   ALKPHOS 189* 188* 197*   ALT 28 25 22   AST 30 19 14   ALBUMIN 3.0* 2.8* 2.9*   PROT 5.9* 6.1 6.1   BILITOT 0.6 0.6 0.6       Hospital Course:    Ms. Webber presented to [facility] on 11/1 with cough and was admitted with COPD and CHF exacerbations. She was started on routine treatment for both, with a particular focus on her antitussive regimen with improvement. Her respiratory status is much improved overall, though she remains with intermittent bouts of severe cough for which she is being monitored for an additional day.    Assessment and Plan:    Acute on chronic combined systolic and diastolic congestive heart failure  Aortic regurgitation  Biatrial enlargement  Non-rheumatic mitral regurgitation  Nonrheumatic tricuspid valve regurgitation  Pulmonary hypertension    Acute portions resolving  She takes furosemide 20 mg daily at home, given lasix IV 40 mg IV BID, now holding. Anticipate discharging tomorrow on home regimen  Continue home isosorbide-hydralazine.     CAD S/P percutaneous coronary angioplasty    Continue home aspirin, clopidrogel, rosuvastatin.     DNR (do not  resuscitate)    Continue.     Nursing home resident    Return to [facility] at discharge.     Left lower lobe pneumonia    Procalcitonin indeterminate. Giving ceftriaxone, doxy (azithro -> doxy given QTc). Mucinex, benzonatate, promethazine-codeine for cough.     COPD (chronic obstructive pulmonary disease)  Centrilobular emphysema  Chronic respiratory failure with hypoxia, on home oxygen therapy    Stable  Albuterol-ipratropium nebulizer treatments, prednisone 40 mg daily.     Vascular dementia without behavioral disturbance    Continue home donepezil.     Dyslipidemia    Continue home rosuvastatin.     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 Continue home carvedilol and diltiazem     Permanent atrial fibrillation  Current use of long term anticoagulation  Presence of cardiac pacemaker    Stable  Continue home diltiazem, apixaban.    Diet: Na 2g restriction, 1500 mL  VTE PPX: apixaban   Goals of care: Curative, though DNR    Lalo Nazario M.D.  Department of Hospital Medicine  [facility]  [phone] (pager)

## 2020-11-06 NOTE — PLAN OF CARE
ETHAN received call from Dr. Nazario stating patient has now had a bowel movement and is ready to return to nursing facility.   ETHAN contacted Mary (ANJALI at [facility] [phone]) for possible return to facility this evening.    Mary requested that patient return tomorrow morning and that she be called tomorrow at 8 am or 9 am before arranging transportation.  Dr. Salomon informed and in agreement with discharge plan for tomorrow.

## 2020-11-06 NOTE — PROGRESS NOTES
Hospital Medicine  Progress Note      Patient Name: Christy Webber  MRN: [id number]  Date of Admission: 11/1/2020     Principal Problem: Acute on chronic combined systolic and diastolic congestive heart failure     Subjective     Ms. Webber was feeling better again this morning. She is medically ready for discharge, delayed only due to her facility refusing until Ms. Webber had a BM which happened this afternoon.    Review of Systems    Constitutional: Negative for chills, fatigue, fever.   Respiratory: +cough, Negative for shortness of breath.    Cardiovascular: Negative for chest pain, palpitations, leg swelling.   Gastrointestinal: +constipation (refused) Negative for abdominal pain, diarrhea, nausea, vomiting.   Neurological: Negative for dizziness, syncope, weakness, light-headedness.     Medications  Scheduled Meds:   albuterol-ipratropium  3 mL Nebulization Q4H WAKE    apixaban  2.5 mg Oral BID    carvediloL  6.25 mg Oral BID WM    cefTRIAXone (ROCEPHIN) IVPB  1 g Intravenous Q24H    clopidogreL  75 mg Oral Daily    diltiaZEM  360 mg Oral Daily    donepeziL  5 mg Oral QHS    famotidine  20 mg Oral Daily    fluticasone furoate-vilanteroL  1 puff Inhalation Daily    guaiFENesin  1,200 mg Oral BID    insulin aspart U-100  3 Units Subcutaneous TIDWM    insulin detemir U-100  10 Units Subcutaneous QHS    isosorbide-hydrALAZINE 20-37.5 mg  1 tablet Oral BID    lactulose  20 g Oral Daily    [START ON 11/7/2020] predniSONE  4 mg Oral Daily    rosuvastatin  10 mg Oral Daily     Continuous Infusions:  PRN Meds:.acetaminophen, albuterol-ipratropium, benzonatate, dextrose 50%, dextrose 50%, glucagon (human recombinant), glucose, glucose, HYDROcodone-acetaminophen, HYDROcodone-acetaminophen, insulin aspart U-100, melatonin, ondansetron, promethazine (PHENERGAN) IVPB, promethazine-codeine 6.25-10 mg/5 ml, senna-docusate 8.6-50 mg, sodium chloride 0.9%, sodium chloride 0.9%, sodium chloride  0.9%    Objective    Physical Examination    Temp:  [96.2 °F (35.7 °C)-98 °F (36.7 °C)]   Pulse:  [85-89]   Resp:  [16-18]   BP: (111-128)/(58-69)   SpO2:  [91 %-97 %]     Gen: NAD, conversant  CV: RRR, no peripheral edema,  Resp: CTAB, no increased work of breathing on room air, no coughing today  GI: Soft, NT, ND, +BS  Neuro: AAOx3, CN grossly intact, no focal neurologic deficits    CBC  Recent Labs   Lab 11/04/20  0425 11/05/20  0512 11/06/20  0503   WBC 7.36 13.70* 13.88*   HGB 13.5 12.9 13.4   HCT 42.3 39.9 41.7    221 228     CMP  Recent Labs   Lab 11/04/20  0425 11/05/20  0512 11/06/20  0503    133* 133*   K 3.9 4.2 4.1    98 100   CO2 23 27 20*   BUN 35* 39* 40*   CREATININE 1.8* 2.1* 1.8*   * 387* 351*   CALCIUM 9.2 9.3 8.9   MG 2.2 2.4 2.5   PHOS 3.5 3.6 3.6   ALKPHOS 188* 197* 202*   ALT 25 22 30   AST 19 14 26   ALBUMIN 2.8* 2.9* 2.9*   PROT 6.1 6.1 6.0   BILITOT 0.6 0.6 0.6       Hospital Course:    Ms. Webber presented to [facility] on 11/1 with cough and was admitted with COPD and CHF exacerbations. She was started on routine treatment for both, with a particular focus on her antitussive regimen with improvement. Her respiratory status is much improved overall, though she remains with intermittent bouts of severe cough for which she was monitored for an additional day with improvement. She is medically ready for discharge, planning on 11/7 AM    Assessment and Plan:    Acute on chronic combined systolic and diastolic congestive heart failure  Aortic regurgitation  Biatrial enlargement  Non-rheumatic mitral regurgitation  Nonrheumatic tricuspid valve regurgitation  Pulmonary hypertension    Acute portions resolving  Anticipate discharging tomorrow on home diuretic regimen  Continue home isosorbide-hydralazine.     CAD S/P percutaneous coronary angioplasty    Continue home aspirin, clopidrogel, rosuvastatin.    Hyperglycemia    Does not carry a formal diagnosis of DMII, but markedly  hyperglycemic with steroids  Covering with insulin, actively titrating, now up to detemir 10 QHS and 3 units TIDWM  SSI  HbA1c for AM draw     DNR (do not resuscitate)    Continue.     Nursing home resident    Return to [facility] at discharge.     Left lower lobe pneumonia    Procalcitonin indeterminate. Giving ceftriaxone, doxy (azithro -> doxy given QTc). Mucinex, benzonatate, promethazine-codeine for cough.     COPD (chronic obstructive pulmonary disease)  Centrilobular emphysema  Chronic respiratory failure with hypoxia, on home oxygen therapy    Stable  Albuterol-ipratropium nebulizer treatments, prednisone 40 mg daily.     Vascular dementia without behavioral disturbance    Continue home donepezil.     Dyslipidemia    Continue home rosuvastatin.     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 Continue home carvedilol and diltiazem     Permanent atrial fibrillation  Current use of long term anticoagulation  Presence of cardiac pacemaker    Stable  Continue home diltiazem, apixaban.    Diet: Na 2g restriction, 1500 mL  VTE PPX: apixaban   Goals of care: Curative, though DNR    Lalo Nazario M.D.  Department of Hospital Medicine  [facility]  [phone] (pager)

## 2020-11-06 NOTE — PLAN OF CARE
Patient rested quietly throughout night. O2 in progress @3l/nc. Tele with NSR noted. Purewick in use. PRN cough meds given  for cough. See MAR. Relief noted.Respiration even and unlabored. No falls or injury noted.   Call light in reach. All safety measures maintained.Will continue to monitor.

## 2020-11-07 VITALS
TEMPERATURE: 97 F | HEART RATE: 86 BPM | BODY MASS INDEX: 29.5 KG/M2 | SYSTOLIC BLOOD PRESSURE: 124 MMHG | DIASTOLIC BLOOD PRESSURE: 71 MMHG | HEIGHT: 64 IN | WEIGHT: 172.81 LBS | OXYGEN SATURATION: 99 % | RESPIRATION RATE: 20 BRPM

## 2020-11-07 LAB
ALBUMIN SERPL BCP-MCNC: 3 G/DL (ref 3.5–5.2)
ALP SERPL-CCNC: 206 U/L (ref 55–135)
ALT SERPL W/O P-5'-P-CCNC: 45 U/L (ref 10–44)
ANION GAP SERPL CALC-SCNC: 12 MMOL/L (ref 8–16)
AST SERPL-CCNC: 43 U/L (ref 10–40)
BASOPHILS # BLD AUTO: 0.02 K/UL (ref 0–0.2)
BASOPHILS NFR BLD: 0.2 % (ref 0–1.9)
BILIRUB SERPL-MCNC: 0.5 MG/DL (ref 0.1–1)
BUN SERPL-MCNC: 50 MG/DL (ref 8–23)
CALCIUM SERPL-MCNC: 9.3 MG/DL (ref 8.7–10.5)
CHLORIDE SERPL-SCNC: 99 MMOL/L (ref 95–110)
CO2 SERPL-SCNC: 21 MMOL/L (ref 23–29)
CREAT SERPL-MCNC: 1.9 MG/DL (ref 0.5–1.4)
DIFFERENTIAL METHOD: ABNORMAL
EOSINOPHIL # BLD AUTO: 0 K/UL (ref 0–0.5)
EOSINOPHIL NFR BLD: 0 % (ref 0–8)
ERYTHROCYTE [DISTWIDTH] IN BLOOD BY AUTOMATED COUNT: 16 % (ref 11.5–14.5)
EST. GFR  (AFRICAN AMERICAN): 27.5 ML/MIN/1.73 M^2
EST. GFR  (NON AFRICAN AMERICAN): 23.9 ML/MIN/1.73 M^2
ESTIMATED AVG GLUCOSE: 169 MG/DL (ref 68–131)
GLUCOSE SERPL-MCNC: 374 MG/DL (ref 70–110)
HBA1C MFR BLD HPLC: 7.5 % (ref 4–5.6)
HCT VFR BLD AUTO: 42 % (ref 37–48.5)
HGB BLD-MCNC: 13.3 G/DL (ref 12–16)
IMM GRANULOCYTES # BLD AUTO: 0.17 K/UL (ref 0–0.04)
IMM GRANULOCYTES NFR BLD AUTO: 1.5 % (ref 0–0.5)
LYMPHOCYTES # BLD AUTO: 0.4 K/UL (ref 1–4.8)
LYMPHOCYTES NFR BLD: 3.3 % (ref 18–48)
MAGNESIUM SERPL-MCNC: 2.5 MG/DL (ref 1.6–2.6)
MCH RBC QN AUTO: 30 PG (ref 27–31)
MCHC RBC AUTO-ENTMCNC: 31.7 G/DL (ref 32–36)
MCV RBC AUTO: 95 FL (ref 82–98)
MONOCYTES # BLD AUTO: 0.7 K/UL (ref 0.3–1)
MONOCYTES NFR BLD: 5.9 % (ref 4–15)
NEUTROPHILS # BLD AUTO: 10.2 K/UL (ref 1.8–7.7)
NEUTROPHILS NFR BLD: 89.1 % (ref 38–73)
NRBC BLD-RTO: 0 /100 WBC
PHOSPHATE SERPL-MCNC: 3.2 MG/DL (ref 2.7–4.5)
PLATELET # BLD AUTO: 224 K/UL (ref 150–350)
PMV BLD AUTO: 10.2 FL (ref 9.2–12.9)
POCT GLUCOSE: 265 MG/DL (ref 70–110)
POCT GLUCOSE: 282 MG/DL (ref 70–110)
POTASSIUM SERPL-SCNC: 4.3 MMOL/L (ref 3.5–5.1)
PROT SERPL-MCNC: 6.2 G/DL (ref 6–8.4)
RBC # BLD AUTO: 4.43 M/UL (ref 4–5.4)
SODIUM SERPL-SCNC: 132 MMOL/L (ref 136–145)
WBC # BLD AUTO: 11.39 K/UL (ref 3.9–12.7)

## 2020-11-07 PROCEDURE — 25000242 PHARM REV CODE 250 ALT 637 W/ HCPCS: Performed by: HOSPITALIST

## 2020-11-07 PROCEDURE — 83036 HEMOGLOBIN GLYCOSYLATED A1C: CPT

## 2020-11-07 PROCEDURE — 84100 ASSAY OF PHOSPHORUS: CPT

## 2020-11-07 PROCEDURE — 25000003 PHARM REV CODE 250: Performed by: HOSPITALIST

## 2020-11-07 PROCEDURE — 83735 ASSAY OF MAGNESIUM: CPT

## 2020-11-07 PROCEDURE — 94640 AIRWAY INHALATION TREATMENT: CPT

## 2020-11-07 PROCEDURE — 80053 COMPREHEN METABOLIC PANEL: CPT

## 2020-11-07 PROCEDURE — 27000221 HC OXYGEN, UP TO 24 HOURS

## 2020-11-07 PROCEDURE — 99238 HOSP IP/OBS DSCHRG MGMT 30/<: CPT | Mod: ,,, | Performed by: INTERNAL MEDICINE

## 2020-11-07 PROCEDURE — 27000646 HC AEROBIKA DEVICE

## 2020-11-07 PROCEDURE — 63600175 PHARM REV CODE 636 W HCPCS: Performed by: INTERNAL MEDICINE

## 2020-11-07 PROCEDURE — 94799 UNLISTED PULMONARY SVC/PX: CPT

## 2020-11-07 PROCEDURE — 99900035 HC TECH TIME PER 15 MIN (STAT)

## 2020-11-07 PROCEDURE — C9399 UNCLASSIFIED DRUGS OR BIOLOG: HCPCS | Performed by: INTERNAL MEDICINE

## 2020-11-07 PROCEDURE — 85025 COMPLETE CBC W/AUTO DIFF WBC: CPT

## 2020-11-07 PROCEDURE — 94761 N-INVAS EAR/PLS OXIMETRY MLT: CPT

## 2020-11-07 PROCEDURE — 94664 DEMO&/EVAL PT USE INHALER: CPT

## 2020-11-07 PROCEDURE — 36415 COLL VENOUS BLD VENIPUNCTURE: CPT

## 2020-11-07 PROCEDURE — 25000003 PHARM REV CODE 250: Performed by: INTERNAL MEDICINE

## 2020-11-07 PROCEDURE — 99238 PR HOSPITAL DISCHARGE DAY,<30 MIN: ICD-10-PCS | Mod: ,,, | Performed by: INTERNAL MEDICINE

## 2020-11-07 RX ORDER — INSULIN ASPART 100 [IU]/ML
5 INJECTION, SOLUTION INTRAVENOUS; SUBCUTANEOUS
Status: DISCONTINUED | OUTPATIENT
Start: 2020-11-07 | End: 2020-11-07 | Stop reason: HOSPADM

## 2020-11-07 RX ORDER — INSULIN ASPART 100 [IU]/ML
5 INJECTION, SOLUTION INTRAVENOUS; SUBCUTANEOUS 3 TIMES DAILY
Refills: 0
Start: 2020-11-07 | End: 2021-09-15

## 2020-11-07 RX ADMIN — APIXABAN 2.5 MG: 2.5 TABLET, FILM COATED ORAL at 08:11

## 2020-11-07 RX ADMIN — PREDNISONE 4 MG: 1 TABLET ORAL at 08:11

## 2020-11-07 RX ADMIN — DILTIAZEM HYDROCHLORIDE 360 MG: 180 CAPSULE, COATED, EXTENDED RELEASE ORAL at 08:11

## 2020-11-07 RX ADMIN — INSULIN DETEMIR 5 UNITS: 100 INJECTION, SOLUTION SUBCUTANEOUS at 10:11

## 2020-11-07 RX ADMIN — CLOPIDOGREL 75 MG: 75 TABLET, FILM COATED ORAL at 08:11

## 2020-11-07 RX ADMIN — GUAIFENESIN 1200 MG: 600 TABLET, EXTENDED RELEASE ORAL at 08:11

## 2020-11-07 RX ADMIN — FAMOTIDINE 20 MG: 20 TABLET, FILM COATED ORAL at 08:11

## 2020-11-07 RX ADMIN — FLUTICASONE FUROATE AND VILANTEROL TRIFENATATE 1 PUFF: 100; 25 POWDER RESPIRATORY (INHALATION) at 08:11

## 2020-11-07 RX ADMIN — INSULIN ASPART 3 UNITS: 100 INJECTION, SOLUTION INTRAVENOUS; SUBCUTANEOUS at 08:11

## 2020-11-07 RX ADMIN — CARVEDILOL 6.25 MG: 6.25 TABLET, FILM COATED ORAL at 08:11

## 2020-11-07 RX ADMIN — LACTULOSE 20 G: 20 SOLUTION ORAL at 08:11

## 2020-11-07 RX ADMIN — IPRATROPIUM BROMIDE AND ALBUTEROL SULFATE 3 ML: .5; 2.5 SOLUTION RESPIRATORY (INHALATION) at 08:11

## 2020-11-07 RX ADMIN — ROSUVASTATIN CALCIUM 10 MG: 10 TABLET, FILM COATED ORAL at 08:11

## 2020-11-07 RX ADMIN — PROMETHAZINE HYDROCHLORIDE AND CODEINE PHOSPHATE 5 ML: 10; 6.25 SOLUTION ORAL at 08:11

## 2020-11-07 RX ADMIN — HYDRALAZINE HYDROCHLORIDE AND ISOSORBIDE DINITRATE 1 TABLET: 37.5; 2 TABLET, FILM COATED ORAL at 08:11

## 2020-11-07 NOTE — NURSING
Report given to nurse at [facility]  Pt had no falls or injuries noted  NADN. VSS  IV removed  Safety and fall precautions maintained throughout  Pt awaiting transportation service  Will continue to monitor

## 2020-11-07 NOTE — PLAN OF CARE
JOHANNA faxed updated orders to [facility]. JOHANNA spoke with Mary CARRANZA) to inform. Rn can call report to [place] at [phone].    Transportation arranged for wheelchair van via [facility] ([phone]. [phone]). Requested pickup time is 10:10 AM. Requested pickup time is not a guarantee of time of arrival.     RN aware. Family aware.     Katie Jj LMSW  [facility]

## 2020-11-07 NOTE — PLAN OF CARE
POC reviewed with pt, verbalized understanding. NADN. VSS; pt denies complaint at this time. No acute event/fall this shift. Call light in reach, bed in lowest position. Safety precautions maintained.

## 2020-11-30 NOTE — DISCHARGE SUMMARY
[facility] Hospital Medicine  Discharge Summary      Patient Name: Christy Webber  MRN: [id number]  Admission Date: 11/1/2020  Hospital Length of Stay: 6 days  Discharge Date and Time: 11/7/2020 11:34 AM  Attending Physician: No att. 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 She lives at [facility]. Her primary care physician is Dr. Roland Cavanaugh. She is DNR.   She was hospitalized at [facility] from 10/27/2020 to 10/29/2020 for nonproductive cough, shortness of breath, and wheezing. Chest X-ray showed bilateral patchy consolidation with vascular congestion. NT-proBNP was elevated at 12623 pg/mL (normal 5-1800). She was given nebulizer treatments, steroids, and intravenous furosemide. She was prescribed benzonatate for her cough. WBC count increased from 4640/uL to 98255/uL, which can be caused by steroids. She reportedly did not feel better when she was discharged.   Symptoms did  not improve, and her family felt that she looked worse. Family called emergency medical services on 11/1/2020. She was found to have an oxygen saturation of 92% on 3 liters/minute. Family requested that she go to [facility] instead. BNP was elevated at 2416 pg/mL (normal 0-99). WBC count was 61081/uL. Chest X-ray showed a mild amount of left basilar opacity with probably small left pleural effusion. She was given 40 mg of intravenous furosemide. She was admitted to Hospital Medicine Team B.    * No surgery found *      Hospital Course:     Ms. Webber presented to [facility] on 11/1 with cough and was admitted with COPD and CHF exacerbations. She was started on routine treatment for both, with a particular focus on her antitussive regimen with improvement. She was diagnosed with DMII this admission and started on insulin for the same. Her respiratory status is much improved overall, though she remains with intermittent bouts of severe cough for which she was monitored for an additional day with improvement on antitussives.    On the day of discharge she was hemodynamically stable and breathing comfortably on RA with some coughing episodes which had lessened in intensity.    Consults:   Consults (From admission, onward)        Status Ordering Provider     Inpatient consult to Registered Dietitian/Nutritionist  Once     Provider:  (Not yet assigned)    Completed ALEX MIRZA     Inpatient consult to Social Work/Case Management  Once     Provider:  (Not yet assigned)    Completed ALEX MIRZA          Final Active Diagnoses:    Diagnosis Date Noted POA    PRINCIPAL PROBLEM:  Acute on chronic combined systolic and diastolic congestive heart failure [I50.43]  Yes    Nursing home resident [Z59.3] 11/02/2020 Not Applicable     Chronic    Biatrial enlargement [I51.7]  Yes     Chronic    Chronic respiratory failure with hypoxia, on home oxygen therapy [J96.11, Z99.81]  Not Applicable     Chronic     CAD S/P percutaneous coronary angioplasty [I25.10, Z98.61]  Not Applicable     Chronic    Left lower lobe pneumonia [J18.9] 11/01/2020 Yes    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 [I13.0] 11/01/2020 Yes     Chronic    DNR (do not resuscitate) [Z66] 11/01/2020 Yes     Chronic    COPD (chronic obstructive pulmonary disease) [J44.9] 11/17/2019 Yes     Chronic    Primary hyperparathyroidism [E21.0] 11/17/2018 Yes     Chronic    Vascular dementia without behavioral disturbance [F01.50] 11/12/2018 Yes     Chronic    Current use of long term anticoagulation [Z79.01] 11/12/2018 Not Applicable     Chronic    Presence of cardiac pacemaker [Z95.0] 11/12/2018 Yes     Chronic    Dyslipidemia [E78.5] 08/01/2017 Yes     Chronic    Non-rheumatic mitral regurgitation [I34.0] 06/26/2017 Yes     Chronic    Centrilobular emphysema [J43.2] 06/02/2017 Yes     Chronic    Pulmonary hypertension [I27.20] 06/02/2017 Yes     Chronic    Aortic regurgitation [I35.1] 06/02/2017 Yes     Chronic    Nonrheumatic tricuspid valve regurgitation [I36.1] 06/02/2017 Yes     Chronic    Essential hypertension [I10] 08/21/2016 Yes     Chronic    Chronic kidney disease, stage 4 (severe) [N18.4] 08/21/2016 Yes     Chronic    Permanent atrial fibrillation [I48.21] 08/20/2016 Yes     Chronic      Problems Resolved During this Admission:       Discharged Condition: good    Disposition: Long Term Care    Follow Up:  Follow-up Information     Schedule an appointment as soon as possible for a visit  with Roland Cavanaugh MD.    Specialty: Internal Medicine  Contact information:  [address] [place] [address]  [place] [id number] [phone]                 Patient Instructions:      Call MD for:  temperature >100.4     Call MD for:  persistent nausea and vomiting or diarrhea     Call MD for:  severe uncontrolled pain     Call MD for:  redness, tenderness, or signs of infection (pain, swelling, redness, odor or green/yellow  discharge around incision site)     Call MD for:  difficulty breathing or increased cough     Call MD for:  severe persistent headache     Call MD for:  worsening rash     Call MD for:  persistent dizziness, light-headedness, or visual disturbances     Call MD for:  increased confusion or weakness         Medications:  Reconciled Home Medications:       Printed on:11/07/20 5833   Medication Information                                       albuterol (PROVENTIL/VENTOLIN HFA) 90 mcg/actuation inhaler  Inhale 1-2 puffs into the lungs every 6 (six) hours as needed for Wheezing. Rescue                      apixaban (ELIQUIS) 2.5 mg Tab  Take 1 tablet (2.5 mg total) by mouth 2 (two) times daily.  Note decreased dose                      benzonatate (TESSALON) 200 MG capsule  Take 1 capsule (200 mg total) by mouth 3 (three) times daily as needed for Cough.                      BIDIL 20-37.5 mg Tab  Take 1 tablet by mouth 2 (two) times daily.                      calcium carbonate-vitamin D3 (CALCIUM 600 WITH VITAMIN D3) 600 mg(1,500mg) -500 unit Cap  Take 1 tablet by mouth 2 (two) times daily.                      carvediloL (COREG) 6.25 MG tablet  Take 1 tablet (6.25 mg total) by mouth 2 (two) times daily with meals.                      cefpodoxime (VANTIN) 200 MG tablet  Take 1 tablet (200 mg total) by mouth once daily. for 4 days                      clopidogreL (PLAVIX) 75 mg tablet  Take 1 tablet (75 mg total) by mouth once daily.                      diltiaZEM (TIAZAC) 360 MG Cs24  Take 360 mg by mouth once daily.                      docusate sodium (COLACE) 100 MG capsule  Take 1 capsule (100 mg total) by mouth 2 (two) times daily.                      donepezil (ARICEPT) 5 MG tablet  Take 1 tablet (5 mg total) by mouth every evening.                      doxycycline (VIBRA-TABS) 100 MG tablet  Take 1 tablet (100 mg total) by mouth every 12 (twelve) hours. for 4 days                      famotidine (PEPCID) 20 MG  tablet  Take 20 mg by mouth.                      fluticasone furoate-vilanteroL (BREO) 100-25 mcg/dose diskus inhaler  Inhale 1 puff into the lungs once daily. Controller                      furosemide (LASIX) 40 MG tablet  Take 0.5 tablets (20 mg total) by mouth once daily.                      guaiFENesin (MUCINEX) 600 mg 12 hr tablet  Take 2 tablets (1,200 mg total) by mouth 2 (two) times daily.                      HYDROcodone-acetaminophen (NORCO) 5-325 mg per tablet  Take 1-2 tablets by mouth every 4 (four) hours as needed for Pain.                      insulin aspart U-100 (NOVOLOG) 100 unit/mL (3 mL) InPn pen  Inject 0-5 Units into the skin before meals and at bedtime as needed (Hyperglycemia).                      insulin aspart U-100 (NOVOLOG) 100 unit/mL (3 mL) InPn pen  Inject 5 Units into the skin 3 (three) times daily. While on prednisone only                      insulin detemir U-100 (LEVEMIR FLEXTOUCH) 100 unit/mL (3 mL) SubQ InPn pen  Inject 15 Units into the skin every evening  While on prednisone only                      predniSONE (DELTASONE) 20 MG tablet  Take 2 tablets (40 mg total) by mouth once daily. Ending 11/10 for 5 days                      promethazine-codeine 6.25-10 mg/5 ml (PHENERGAN WITH CODEINE) 6.25-10 mg/5 mL syrup  Take 5 mLs by mouth every 6 (six) hours as needed for Cough.                      rosuvastatin (CRESTOR) 10 MG tablet  Take 1 tablet (10 mg total) by mouth once daily.                              Time spent on the discharge of patient: < 30 minutes  Patient was seen and examined on the date of discharge and determined to be suitable for discharge.      Lalo Nazario MD  Department of Hospital Medicine  [facility]

## 2020-12-02 ENCOUNTER — HOSPITAL ENCOUNTER (EMERGENCY)
Facility: HOSPITAL | Age: 85
Discharge: HOME | End: 2020-12-02
Attending: EMERGENCY MEDICINE
Payer: MEDICARE

## 2020-12-02 VITALS
WEIGHT: 172 LBS | HEART RATE: 95 BPM | DIASTOLIC BLOOD PRESSURE: 68 MMHG | OXYGEN SATURATION: 95 % | SYSTOLIC BLOOD PRESSURE: 112 MMHG | BODY MASS INDEX: 29.37 KG/M2 | HEIGHT: 64 IN | RESPIRATION RATE: 18 BRPM | TEMPERATURE: 97 F

## 2020-12-02 DIAGNOSIS — J10.1 INFLUENZA B: Primary | ICD-10-CM

## 2020-12-02 DIAGNOSIS — R06.02 SHORTNESS OF BREATH: ICD-10-CM

## 2020-12-02 LAB
ALBUMIN SERPL BCP-MCNC: 2.9 G/DL (ref 3.5–5.2)
ALP SERPL-CCNC: 231 U/L (ref 55–135)
ALT SERPL W/O P-5'-P-CCNC: 21 U/L (ref 10–44)
ANION GAP SERPL CALC-SCNC: 7 MMOL/L (ref 8–16)
AST SERPL-CCNC: 31 U/L (ref 10–40)
BASOPHILS # BLD AUTO: 0.04 K/UL (ref 0–0.2)
BASOPHILS NFR BLD: 1 % (ref 0–1.9)
BILIRUB SERPL-MCNC: 1.1 MG/DL (ref 0.1–1)
BNP SERPL-MCNC: 1474 PG/ML (ref 0–99)
BUN SERPL-MCNC: 31 MG/DL (ref 8–23)
CALCIUM SERPL-MCNC: 9.5 MG/DL (ref 8.7–10.5)
CHLORIDE SERPL-SCNC: 107 MMOL/L (ref 95–110)
CO2 SERPL-SCNC: 23 MMOL/L (ref 23–29)
CREAT SERPL-MCNC: 1.6 MG/DL (ref 0.5–1.4)
CTP QC/QA: YES
CTP QC/QA: YES
DIFFERENTIAL METHOD: ABNORMAL
EOSINOPHIL # BLD AUTO: 0.1 K/UL (ref 0–0.5)
EOSINOPHIL NFR BLD: 2.9 % (ref 0–8)
ERYTHROCYTE [DISTWIDTH] IN BLOOD BY AUTOMATED COUNT: 18.6 % (ref 11.5–14.5)
EST. GFR  (AFRICAN AMERICAN): 33.9 ML/MIN/1.73 M^2
EST. GFR  (NON AFRICAN AMERICAN): 29.4 ML/MIN/1.73 M^2
GLUCOSE SERPL-MCNC: 217 MG/DL (ref 70–110)
HCT VFR BLD AUTO: 42.7 % (ref 37–48.5)
HGB BLD-MCNC: 13.1 G/DL (ref 12–16)
IMM GRANULOCYTES # BLD AUTO: 0.07 K/UL (ref 0–0.04)
IMM GRANULOCYTES NFR BLD AUTO: 1.7 % (ref 0–0.5)
INR PPP: 1.1 (ref 0.8–1.2)
LYMPHOCYTES # BLD AUTO: 0.7 K/UL (ref 1–4.8)
LYMPHOCYTES NFR BLD: 17.7 % (ref 18–48)
MAGNESIUM SERPL-MCNC: 2.1 MG/DL (ref 1.6–2.6)
MCH RBC QN AUTO: 30.1 PG (ref 27–31)
MCHC RBC AUTO-ENTMCNC: 30.7 G/DL (ref 32–36)
MCV RBC AUTO: 98 FL (ref 82–98)
MONOCYTES # BLD AUTO: 0.6 K/UL (ref 0.3–1)
MONOCYTES NFR BLD: 14.8 % (ref 4–15)
NEUTROPHILS # BLD AUTO: 2.6 K/UL (ref 1.8–7.7)
NEUTROPHILS NFR BLD: 61.9 % (ref 38–73)
NRBC BLD-RTO: 0 /100 WBC
PLATELET # BLD AUTO: 125 K/UL (ref 150–350)
PMV BLD AUTO: 11.5 FL (ref 9.2–12.9)
POC MOLECULAR INFLUENZA A AGN: NEGATIVE
POC MOLECULAR INFLUENZA B AGN: POSITIVE
POTASSIUM SERPL-SCNC: 5.3 MMOL/L (ref 3.5–5.1)
PROT SERPL-MCNC: 6.3 G/DL (ref 6–8.4)
PROTHROMBIN TIME: 12.4 SEC (ref 9–12.5)
RBC # BLD AUTO: 4.35 M/UL (ref 4–5.4)
SARS-COV-2 RDRP RESP QL NAA+PROBE: NEGATIVE
SODIUM SERPL-SCNC: 137 MMOL/L (ref 136–145)
TROPONIN I SERPL DL<=0.01 NG/ML-MCNC: 0.02 NG/ML (ref 0–0.03)
WBC # BLD AUTO: 4.18 K/UL (ref 3.9–12.7)

## 2020-12-02 PROCEDURE — 80053 COMPREHEN METABOLIC PANEL: CPT

## 2020-12-02 PROCEDURE — 93005 ELECTROCARDIOGRAM TRACING: CPT

## 2020-12-02 PROCEDURE — U0002 COVID-19 LAB TEST NON-CDC: HCPCS | Performed by: EMERGENCY MEDICINE

## 2020-12-02 PROCEDURE — 93010 ELECTROCARDIOGRAM REPORT: CPT | Mod: ,,, | Performed by: STUDENT IN AN ORGANIZED HEALTH CARE EDUCATION/TRAINING PROGRAM

## 2020-12-02 PROCEDURE — 83735 ASSAY OF MAGNESIUM: CPT

## 2020-12-02 PROCEDURE — 94761 N-INVAS EAR/PLS OXIMETRY MLT: CPT

## 2020-12-02 PROCEDURE — 99285 PR EMERGENCY DEPT VISIT,LEVEL V: ICD-10-PCS | Mod: ,,, | Performed by: PHYSICIAN ASSISTANT

## 2020-12-02 PROCEDURE — 25000242 PHARM REV CODE 250 ALT 637 W/ HCPCS: Performed by: PHYSICIAN ASSISTANT

## 2020-12-02 PROCEDURE — 27000221 HC OXYGEN, UP TO 24 HOURS

## 2020-12-02 PROCEDURE — 85025 COMPLETE CBC W/AUTO DIFF WBC: CPT

## 2020-12-02 PROCEDURE — 83880 ASSAY OF NATRIURETIC PEPTIDE: CPT

## 2020-12-02 PROCEDURE — 93010 EKG 12-LEAD: ICD-10-PCS | Mod: ,,, | Performed by: STUDENT IN AN ORGANIZED HEALTH CARE EDUCATION/TRAINING PROGRAM

## 2020-12-02 PROCEDURE — 99285 EMERGENCY DEPT VISIT HI MDM: CPT | Mod: ,,, | Performed by: PHYSICIAN ASSISTANT

## 2020-12-02 PROCEDURE — 85610 PROTHROMBIN TIME: CPT

## 2020-12-02 PROCEDURE — 25000003 PHARM REV CODE 250: Performed by: PHYSICIAN ASSISTANT

## 2020-12-02 PROCEDURE — 84484 ASSAY OF TROPONIN QUANT: CPT

## 2020-12-02 PROCEDURE — 87502 INFLUENZA DNA AMP PROBE: CPT

## 2020-12-02 PROCEDURE — 99285 EMERGENCY DEPT VISIT HI MDM: CPT | Mod: 25

## 2020-12-02 PROCEDURE — 94640 AIRWAY INHALATION TREATMENT: CPT

## 2020-12-02 RX ORDER — OSELTAMIVIR PHOSPHATE 30 MG/1
30 CAPSULE ORAL
Status: COMPLETED | OUTPATIENT
Start: 2020-12-02 | End: 2020-12-02

## 2020-12-02 RX ORDER — OSELTAMIVIR PHOSPHATE 30 MG/1
30 CAPSULE ORAL 2 TIMES DAILY
Qty: 10 CAPSULE | Refills: 0 | Status: SHIPPED | OUTPATIENT
Start: 2020-12-02 | End: 2020-12-07

## 2020-12-02 RX ORDER — IPRATROPIUM BROMIDE AND ALBUTEROL SULFATE 2.5; .5 MG/3ML; MG/3ML
3 SOLUTION RESPIRATORY (INHALATION)
Status: COMPLETED | OUTPATIENT
Start: 2020-12-02 | End: 2020-12-02

## 2020-12-02 RX ADMIN — IPRATROPIUM BROMIDE AND ALBUTEROL SULFATE 3 ML: .5; 2.5 SOLUTION RESPIRATORY (INHALATION) at 02:12

## 2020-12-02 RX ADMIN — OSELTAMIVIR PHOSPHATE 30 MG: 30 CAPSULE ORAL at 02:12

## 2020-12-02 NOTE — DISCHARGE INSTRUCTIONS
You have tested positive for influenza B. Your COVID test is negative. Please continue breathing treatments at nursing facility. Please continue taking lasix daily. Follow up with your PCP or return to the ER for any new or worsening symptoms.

## 2020-12-02 NOTE — ED TRIAGE NOTES
Christy Webber, a 85 y.o. female presents to the ED via EMS from [facility] w/ complaint of increased SOB, persistent cough, and increased lower b/l edema     Triage note:  Chief Complaint   Patient presents with    Cough     Patient presents from [facility] for further evaluation of worsening cough and shortness of breath. Patient recently treated at [facility] for pneumonia. Patient is afebrile.      Review of patient's allergies indicates:  No Known Allergies  Past Medical History:   Diagnosis Date    Atrial flutter     Biatrial enlargement     CAD S/P percutaneous coronary angioplasty     CHF (congestive heart failure)     Chronic respiratory failure with hypoxia, on home oxygen therapy     Hyperlipidemia     Hypertension     Non-STEMI (non-ST elevated myocardial infarction)     Pacemaker 08/2016    Single lead [employer] Pacemaker for sick sinus syndrome    Sick sinus syndrome     SSS (sick sinus syndrome) 8/24/2016    - pacemaker in place

## 2020-12-02 NOTE — ED PROVIDER NOTES
Encounter Date: 2020       History     Chief Complaint   Patient presents with    Cough     Patient presents from [facility] for further evaluation of worsening cough and shortness of breath. Patient recently treated at [facility] for pneumonia. Patient is afebrile.      85-year-old female with past medical history CAD, CHF, hypertension, hyperlipidemia presents to the emergency department with complaints cough and shortness of breath.  She was admitted to Hospital Medicine and discharged on  for CHF exacerbation.  She reports cough and shortness breath have persisted since.  Denies headache, nasal congestion, sore, chest pain abdominal pain, nausea, vomiting, diarrhea, dysuria.  Denies other worsening or alleviating factors.  Chronically on 2 L oxygen at nursing facility.         Review of patient's allergies indicates:  No Known Allergies  Past Medical History:   Diagnosis Date    Atrial flutter     Biatrial enlargement     CAD S/P percutaneous coronary angioplasty     CHF (congestive heart failure)     Chronic respiratory failure with hypoxia, on home oxygen therapy     Hyperlipidemia     Hypertension     Non-STEMI (non-ST elevated myocardial infarction)     Pacemaker 2016    Single lead [employer] Pacemaker for sick sinus syndrome    Sick sinus syndrome     SSS (sick sinus syndrome) 2016    - pacemaker in place     Past Surgical History:   Procedure Laterality Date    CARDIAC PACEMAKER PLACEMENT      HYSTERECTOMY      KIDNEY SURGERY       No family history on file.  Social History     Tobacco Use    Smoking status: Former Smoker     Packs/day: 1.50     Years: 63.00     Pack years: 94.50     Types: Cigarettes     Quit date: 2016     Years since quittin.4    Smokeless tobacco: Never Used   Substance Use Topics    Alcohol use: Yes     Comment: rarely    Drug use: No     Review of Systems   Constitutional: Negative for fever.   HENT: Negative for sore throat.     Respiratory: Positive for cough and shortness of breath.    Cardiovascular: Negative for chest pain.   Gastrointestinal: Negative for nausea.   Genitourinary: Negative for dysuria.   Musculoskeletal: Negative for back pain.   Skin: Negative for rash.   Neurological: Negative for weakness.   Hematological: Does not bruise/bleed easily.       Physical Exam     Initial Vitals [12/02/20 1258]   BP Pulse Resp Temp SpO2   123/63 85 (!) 27 97.3 °F (36.3 °C) 100 %      MAP       --         Physical Exam    Nursing note and vitals reviewed.  Constitutional: She appears well-developed and well-nourished. She is not diaphoretic. No distress.   HENT:   Head: Normocephalic and atraumatic.   Mouth/Throat: Oropharynx is clear and moist.   Eyes: Conjunctivae and EOM are normal. Pupils are equal, round, and reactive to light.   Neck: Normal range of motion. Neck supple.   Cardiovascular: Normal rate, regular rhythm, normal heart sounds and intact distal pulses. Exam reveals no gallop and no friction rub.    No murmur heard.  Pulmonary/Chest: She has wheezes (slight). She has no rhonchi. She has no rales.   Abdominal: Soft. Bowel sounds are normal. There is no abdominal tenderness.   Musculoskeletal: Normal range of motion. Edema present.   Neurological: She is alert and oriented to person, place, and time. She has normal strength. No cranial nerve deficit or sensory deficit. GCS score is 15. GCS eye subscore is 4. GCS verbal subscore is 5. GCS motor subscore is 6.   Skin: Skin is warm and dry. Capillary refill takes less than 2 seconds.   Psychiatric: She has a normal mood and affect. Her behavior is normal. Judgment and thought content normal.         ED Course   Procedures  Labs Reviewed   CBC W/ AUTO DIFFERENTIAL - Abnormal; Notable for the following components:       Result Value    MCHC 30.7 (*)     RDW 18.6 (*)     Platelets 125 (*)     Immature Granulocytes 1.7 (*)     Immature Grans (Abs) 0.07 (*)     Lymph # 0.7 (*)      Lymph % 17.7 (*)     All other components within normal limits   COMPREHENSIVE METABOLIC PANEL - Abnormal; Notable for the following components:    Potassium 5.3 (*)     Glucose 217 (*)     BUN 31 (*)     Creatinine 1.6 (*)     Albumin 2.9 (*)     Total Bilirubin 1.1 (*)     Alkaline Phosphatase 231 (*)     Anion Gap 7 (*)     eGFR if  33.9 (*)     eGFR if non  29.4 (*)     All other components within normal limits   B-TYPE NATRIURETIC PEPTIDE - Abnormal; Notable for the following components:    BNP 1,474 (*)     All other components within normal limits   POCT INFLUENZA A/B MOLECULAR - Abnormal; Notable for the following components:    POC Molecular Influenza B Ag Positive (*)     All other components within normal limits   TROPONIN I   PROTIME-INR   MAGNESIUM   SARS-COV-2 RDRP GENE    Narrative:     This test utilizes isothermal nucleic acid amplification   technology to detect the SARS-CoV-2 RdRp nucleic acid segment.   The analytical sensitivity (limit of detection) is 125 genome   equivalents/mL.   A POSITIVE result implies infection with the SARS-CoV-2 virus;   the patient is presumed to be contagious.     A NEGATIVE result means that SARS-CoV-2 nucleic acids are not   present above the limit of detection. A NEGATIVE result should be   treated as presumptive. It does not rule out the possibility of   COVID-19 and should not be the sole basis for treatment decisions.   If COVID-19 is strongly suspected based on clinical and exposure   history, re-testing using an alternate molecular assay should be   considered.   This test is only for use under the Food and Drug   Administration s Emergency Use Authorization (EUA).   Commercial kits are provided by [employer].   Performance characteristics of the EUA have been independently   verified by [facility] Department of   Pathology and Laboratory Medicine.    _________________________________________________________________   The authorized Fact Sheet for Healthcare Providers and the authorized Fact   Sheet for Patients of the ID NOW COVID-19 are available on the FDA   website:     https://www.fda.gov/media/577191/download  https://www.fda.gov/media/683848/download           EKG Readings: (Independently Interpreted)   Previous EKG Date: 11/1/20. Heart Rate: 86.   Ventricular paced rhythm.      ECG Results          EKG 12-lead (Final result)  Result time 12/02/20 15:12:06    Final result by Interface, Lab In [facility] (12/02/20 15:12:06)                 Narrative:    Test Reason : R06.02,    Vent. Rate : 086 BPM     Atrial Rate : 057 BPM     P-R Int : 000 ms          QRS Dur : 152 ms      QT Int : 430 ms       P-R-T Axes : 000 154 -04 degrees     QTc Int : 514 ms    Ventricular-paced rhythm  Wide QRS rhythm  Nonspecific intraventricular block  Possible Lateral infarct ,age undetermined  Abnormal ECG  Since previous tracing - no significant change    Confirmed by Rangel Alcantara MD ([id number]) on 12/2/2020 3:11:56 PM    Referred By: System System           Confirmed By:Rangel Alcantara MD                            Imaging Results          X-Ray Chest AP Portable (Final result)  Result time 12/02/20 13:53:25    Final result by Johanna Oakes MD (12/02/20 13:53:25)                 Impression:      No demetrius pulmonary edema or other acute disease identified in this patient with enlarged cardiac silhouette and calcific atherosclerosis at the aortic arch.      Electronically signed by: Johanna Oakes MD  Date:    12/02/2020  Time:    13:53             Narrative:    EXAMINATION:  XR CHEST AP PORTABLE    CLINICAL HISTORY:  CHF;    TECHNIQUE:  Single frontal view of the chest was performed.    COMPARISON:  11/01/2020.  07/25/2018    FINDINGS:  Cardiac silhouette is moderately enlarged as seen on the earlier study.  Pacer in the left anterior chest wall has single transvenous lead  terminating in the right ventricle, similar to earlier studies dating back to 07/25/2018.    Calcific plaque noted at the aortic arch.    Lung volumes are normal and symmetric.  I detect no pulmonary disease, pleural fluid, cardiac decompensation, lymph node enlargement, pneumothorax, pneumomediastinum, pneumoperitoneum or significant osseous abnormality.                              X-Rays:   Independently Interpreted Readings:   Chest X-Ray: No infiltrates.  No acute abnormalities. Cardiomegaly present.     Medical Decision Making:   History:   Old Medical Records: I decided to obtain old medical records.  Initial Assessment:   Emergent evaluation of an 85-year-old female who presents to the emergency department with complaints of shortness of breath and cough that has persisted since her discharge on 11/30 for acute CHF exacerbation.  Chronically on 2 L of oxygen.  Patient is afebrile, hemodynamically stable, nontoxic appearing.  Will order labs, EKG, chest x-ray, influenza and COVID swab.  Differential Diagnosis:   Differential diagnosis includes but is not limited to COVID-19, influenza, CHF exacerbation, pneumonia.  Independently Interpreted Test(s):   I have ordered and independently interpreted X-rays - see prior notes.  I have ordered and independently interpreted EKG Reading(s) - see prior notes  Clinical Tests:   Lab Tests: Ordered and Reviewed  Radiological Study: Ordered and Reviewed  Medical Tests: Ordered and Reviewed  ED Management:  COVID negative.  Influenza B positive.  No severe hematologic derangements. Slight hyperkalemia of 5.3. Glucose 217. Creatinine 1.6 - baseline. Troponin negative. BNP 1474 - decreased from previous admission. No infiltrates on CXR.    Slight wheezing on auscultation however O2 saturation remains stable.   Given first dose of tamiflu in the ER. Stable for discharge back to nursing facility. Will give prescription for tamiflu. Discussed plan with patient who is agreeable.  All questions answered.   The patient was instructed to follow up with a primary care provider in 2 days or to return to the emergency department for worsening symptoms. The treatment plan was discussed with the patient who demonstrated understanding and comfort with plan. The patient's history, physical exam, and plan of care was discussed with and agreed upon with my supervising physician.                      ED Course as of Dec 02 1552   Wed Dec 02, 2020   1401 WBC: 4.18 [JM]   1402 Hemoglobin: 13.1 [JM]   1402 Hematocrit: 42.7 [JM]   1402 Platelets(!): 125 [JM]   1402 POC Molecular Influenza B Ag(!): Positive [JM]   1402 SARS-CoV-2 RNA, Amplification, Qual: Negative [JM]   1402 Protime: 12.4 [JM]   1402 INR: 1.1 []      ED Course User Index  [] Rosaline Richardson PA-C            Clinical Impression:       ICD-10-CM ICD-9-CM   1. Influenza B  J10.1 487.1   2. Shortness of breath  R06.02 786.05                          ED Disposition Condition    Discharge Stable        ED Prescriptions     Medication Sig Dispense Start Date End Date Auth. Provider    oseltamivir (TAMIFLU) 30 MG capsule Take 1 capsule (30 mg total) by mouth 2 (two) times daily. for 5 days 10 capsule 12/2/2020 12/7/2020 Rosaline Richardson PA-C        Follow-up Information     Follow up With Specialties Details Why Contact Info    Roland Cavanaugh MD Internal Medicine Schedule an appointment as soon as possible for a visit   [address]  [address]  [place] [address]  [phone]      [facility] Emergency Medicine Go to  If symptoms worsen [address] [id number] [phone]                                       Rosaline Richardson PA-C  12/02/20 8688

## 2021-01-08 ENCOUNTER — HOSPITAL ENCOUNTER (EMERGENCY)
Facility: HOSPITAL | Age: 86
Discharge: HOME | End: 2021-01-08
Attending: EMERGENCY MEDICINE
Payer: MEDICARE

## 2021-01-08 VITALS
BODY MASS INDEX: 29.7 KG/M2 | DIASTOLIC BLOOD PRESSURE: 67 MMHG | HEART RATE: 81 BPM | WEIGHT: 173 LBS | SYSTOLIC BLOOD PRESSURE: 129 MMHG | OXYGEN SATURATION: 94 % | TEMPERATURE: 98 F | RESPIRATION RATE: 18 BRPM

## 2021-01-08 DIAGNOSIS — R06.02 SHORTNESS OF BREATH: ICD-10-CM

## 2021-01-08 DIAGNOSIS — M79.89 LEG SWELLING: Primary | ICD-10-CM

## 2021-01-08 LAB
BASOPHILS # BLD AUTO: 0.1 K/UL (ref 0–0.2)
BASOPHILS NFR BLD: 1.6 % (ref 0–1.9)
BNP SERPL-MCNC: 2087 PG/ML (ref 0–99)
BUN SERPL-MCNC: 36 MG/DL (ref 6–30)
CHLORIDE SERPL-SCNC: 101 MMOL/L (ref 95–110)
CREAT SERPL-MCNC: 1.7 MG/DL (ref 0.5–1.4)
CTP QC/QA: YES
DIFFERENTIAL METHOD: ABNORMAL
EOSINOPHIL # BLD AUTO: 0.2 K/UL (ref 0–0.5)
EOSINOPHIL NFR BLD: 2.7 % (ref 0–8)
ERYTHROCYTE [DISTWIDTH] IN BLOOD BY AUTOMATED COUNT: 17.8 % (ref 11.5–14.5)
GLUCOSE SERPL-MCNC: 140 MG/DL (ref 70–110)
HCT VFR BLD AUTO: 40.5 % (ref 37–48.5)
HCT VFR BLD CALC: 40 %PCV (ref 36–54)
HGB BLD-MCNC: 12.4 G/DL (ref 12–16)
IMM GRANULOCYTES # BLD AUTO: 0.06 K/UL (ref 0–0.04)
IMM GRANULOCYTES NFR BLD AUTO: 0.9 % (ref 0–0.5)
INR PPP: 1.2 (ref 0.8–1.2)
LYMPHOCYTES # BLD AUTO: 0.8 K/UL (ref 1–4.8)
LYMPHOCYTES NFR BLD: 13 % (ref 18–48)
MCH RBC QN AUTO: 29.7 PG (ref 27–31)
MCHC RBC AUTO-ENTMCNC: 30.6 G/DL (ref 32–36)
MCV RBC AUTO: 97 FL (ref 82–98)
MONOCYTES # BLD AUTO: 1 K/UL (ref 0.3–1)
MONOCYTES NFR BLD: 14.9 % (ref 4–15)
NEUTROPHILS # BLD AUTO: 4.3 K/UL (ref 1.8–7.7)
NEUTROPHILS NFR BLD: 66.9 % (ref 38–73)
NRBC BLD-RTO: 0 /100 WBC
PLATELET # BLD AUTO: 199 K/UL (ref 150–350)
PMV BLD AUTO: 11.7 FL (ref 9.2–12.9)
POC IONIZED CALCIUM: 1.31 MMOL/L (ref 1.06–1.42)
POC TCO2 (MEASURED): 27 MMOL/L (ref 23–29)
POTASSIUM BLD-SCNC: 4.6 MMOL/L (ref 3.5–5.1)
PROTHROMBIN TIME: 12.7 SEC (ref 9–12.5)
RBC # BLD AUTO: 4.17 M/UL (ref 4–5.4)
SAMPLE: ABNORMAL
SARS-COV-2 RDRP RESP QL NAA+PROBE: NEGATIVE
SODIUM BLD-SCNC: 136 MMOL/L (ref 136–145)
TROPONIN I SERPL DL<=0.01 NG/ML-MCNC: 0.04 NG/ML (ref 0–0.03)
WBC # BLD AUTO: 6.38 K/UL (ref 3.9–12.7)

## 2021-01-08 PROCEDURE — 93010 EKG 12-LEAD: ICD-10-PCS | Mod: ,,, | Performed by: INTERNAL MEDICINE

## 2021-01-08 PROCEDURE — 99285 EMERGENCY DEPT VISIT HI MDM: CPT | Mod: 25

## 2021-01-08 PROCEDURE — 99285 EMERGENCY DEPT VISIT HI MDM: CPT | Mod: CS,,, | Performed by: EMERGENCY MEDICINE

## 2021-01-08 PROCEDURE — 93010 ELECTROCARDIOGRAM REPORT: CPT | Mod: ,,, | Performed by: INTERNAL MEDICINE

## 2021-01-08 PROCEDURE — 83880 ASSAY OF NATRIURETIC PEPTIDE: CPT

## 2021-01-08 PROCEDURE — 85610 PROTHROMBIN TIME: CPT

## 2021-01-08 PROCEDURE — 94761 N-INVAS EAR/PLS OXIMETRY MLT: CPT

## 2021-01-08 PROCEDURE — 93005 ELECTROCARDIOGRAM TRACING: CPT

## 2021-01-08 PROCEDURE — 85025 COMPLETE CBC W/AUTO DIFF WBC: CPT

## 2021-01-08 PROCEDURE — 80047 BASIC METABLC PNL IONIZED CA: CPT

## 2021-01-08 PROCEDURE — U0002 COVID-19 LAB TEST NON-CDC: HCPCS | Performed by: STUDENT IN AN ORGANIZED HEALTH CARE EDUCATION/TRAINING PROGRAM

## 2021-01-08 PROCEDURE — 84484 ASSAY OF TROPONIN QUANT: CPT

## 2021-01-08 PROCEDURE — 25000003 PHARM REV CODE 250: Performed by: STUDENT IN AN ORGANIZED HEALTH CARE EDUCATION/TRAINING PROGRAM

## 2021-01-08 PROCEDURE — 99285 PR EMERGENCY DEPT VISIT,LEVEL V: ICD-10-PCS | Mod: CS,,, | Performed by: EMERGENCY MEDICINE

## 2021-01-08 RX ORDER — BENZONATATE 100 MG/1
100 CAPSULE ORAL
Status: COMPLETED | OUTPATIENT
Start: 2021-01-08 | End: 2021-01-08

## 2021-01-08 RX ORDER — FUROSEMIDE 40 MG/1
40 TABLET ORAL
Status: COMPLETED | OUTPATIENT
Start: 2021-01-08 | End: 2021-01-08

## 2021-01-08 RX ORDER — FUROSEMIDE 40 MG/1
40 TABLET ORAL DAILY
Qty: 10 TABLET | Refills: 0 | Status: SHIPPED | OUTPATIENT
Start: 2021-01-08 | End: 2021-01-26 | Stop reason: SDUPTHER

## 2021-01-08 RX ADMIN — FUROSEMIDE 40 MG: 40 TABLET ORAL at 05:01

## 2021-01-08 RX ADMIN — BENZONATATE 100 MG: 100 CAPSULE ORAL at 03:01

## 2021-01-26 ENCOUNTER — OFFICE VISIT (OUTPATIENT)
Dept: CARDIOLOGY | Facility: CLINIC | Age: 86
End: 2021-01-26
Payer: MEDICARE

## 2021-01-26 VITALS
OXYGEN SATURATION: 95 % | BODY MASS INDEX: 31.5 KG/M2 | DIASTOLIC BLOOD PRESSURE: 60 MMHG | WEIGHT: 177.81 LBS | SYSTOLIC BLOOD PRESSURE: 104 MMHG | HEIGHT: 63 IN

## 2021-01-26 DIAGNOSIS — I36.1 NONRHEUMATIC TRICUSPID VALVE REGURGITATION: Chronic | ICD-10-CM

## 2021-01-26 DIAGNOSIS — I10 ESSENTIAL HYPERTENSION: Chronic | ICD-10-CM

## 2021-01-26 DIAGNOSIS — I10 HTN (HYPERTENSION), BENIGN: Primary | ICD-10-CM

## 2021-01-26 DIAGNOSIS — I35.1 NONRHEUMATIC AORTIC VALVE INSUFFICIENCY: Chronic | ICD-10-CM

## 2021-01-26 DIAGNOSIS — I25.10 CAD S/P PERCUTANEOUS CORONARY ANGIOPLASTY: Chronic | ICD-10-CM

## 2021-01-26 DIAGNOSIS — N18.4 CHRONIC KIDNEY DISEASE, STAGE 4 (SEVERE): Chronic | ICD-10-CM

## 2021-01-26 DIAGNOSIS — I50.43 ACUTE ON CHRONIC COMBINED SYSTOLIC AND DIASTOLIC CONGESTIVE HEART FAILURE: ICD-10-CM

## 2021-01-26 DIAGNOSIS — Z98.61 CAD S/P PERCUTANEOUS CORONARY ANGIOPLASTY: Chronic | ICD-10-CM

## 2021-01-26 DIAGNOSIS — Z79.01 CURRENT USE OF LONG TERM ANTICOAGULATION: Chronic | ICD-10-CM

## 2021-01-26 DIAGNOSIS — Z95.0 PRESENCE OF CARDIAC PACEMAKER: Chronic | ICD-10-CM

## 2021-01-26 DIAGNOSIS — I48.21 PERMANENT ATRIAL FIBRILLATION: Chronic | ICD-10-CM

## 2021-01-26 PROCEDURE — 3074F PR MOST RECENT SYSTOLIC BLOOD PRESSURE < 130 MM HG: ICD-10-PCS | Mod: CPTII,S$GLB,, | Performed by: INTERNAL MEDICINE

## 2021-01-26 PROCEDURE — 1159F MED LIST DOCD IN RCRD: CPT | Mod: S$GLB,,, | Performed by: INTERNAL MEDICINE

## 2021-01-26 PROCEDURE — 3074F SYST BP LT 130 MM HG: CPT | Mod: CPTII,S$GLB,, | Performed by: INTERNAL MEDICINE

## 2021-01-26 PROCEDURE — 1126F PR PAIN SEVERITY QUANTIFIED, NO PAIN PRESENT: ICD-10-PCS | Mod: S$GLB,,, | Performed by: INTERNAL MEDICINE

## 2021-01-26 PROCEDURE — 99214 OFFICE O/P EST MOD 30 MIN: CPT | Mod: S$GLB,,, | Performed by: INTERNAL MEDICINE

## 2021-01-26 PROCEDURE — 3288F PR FALLS RISK ASSESSMENT DOCUMENTED: ICD-10-PCS | Mod: CPTII,S$GLB,, | Performed by: INTERNAL MEDICINE

## 2021-01-26 PROCEDURE — 3078F DIAST BP <80 MM HG: CPT | Mod: CPTII,S$GLB,, | Performed by: INTERNAL MEDICINE

## 2021-01-26 PROCEDURE — 99214 PR OFFICE/OUTPT VISIT, EST, LEVL IV, 30-39 MIN: ICD-10-PCS | Mod: S$GLB,,, | Performed by: INTERNAL MEDICINE

## 2021-01-26 PROCEDURE — 99999 PR PBB SHADOW E&M-EST. PATIENT-LVL IV: ICD-10-PCS | Mod: PBBFAC,,, | Performed by: INTERNAL MEDICINE

## 2021-01-26 PROCEDURE — 1126F AMNT PAIN NOTED NONE PRSNT: CPT | Mod: S$GLB,,, | Performed by: INTERNAL MEDICINE

## 2021-01-26 PROCEDURE — 1101F PT FALLS ASSESS-DOCD LE1/YR: CPT | Mod: CPTII,S$GLB,, | Performed by: INTERNAL MEDICINE

## 2021-01-26 PROCEDURE — 1159F PR MEDICATION LIST DOCUMENTED IN MEDICAL RECORD: ICD-10-PCS | Mod: S$GLB,,, | Performed by: INTERNAL MEDICINE

## 2021-01-26 PROCEDURE — 1101F PR PT FALLS ASSESS DOC 0-1 FALLS W/OUT INJ PAST YR: ICD-10-PCS | Mod: CPTII,S$GLB,, | Performed by: INTERNAL MEDICINE

## 2021-01-26 PROCEDURE — 99999 PR PBB SHADOW E&M-EST. PATIENT-LVL IV: CPT | Mod: PBBFAC,,, | Performed by: INTERNAL MEDICINE

## 2021-01-26 PROCEDURE — 3288F FALL RISK ASSESSMENT DOCD: CPT | Mod: CPTII,S$GLB,, | Performed by: INTERNAL MEDICINE

## 2021-01-26 PROCEDURE — 3078F PR MOST RECENT DIASTOLIC BLOOD PRESSURE < 80 MM HG: ICD-10-PCS | Mod: CPTII,S$GLB,, | Performed by: INTERNAL MEDICINE

## 2021-01-26 RX ORDER — CARVEDILOL 25 MG/1
25 TABLET ORAL 2 TIMES DAILY WITH MEALS
Qty: 180 TABLET | Refills: 4
Start: 2021-01-26 | End: 2021-09-23

## 2021-01-26 RX ORDER — FUROSEMIDE 40 MG/1
40 TABLET ORAL DAILY
Qty: 10 TABLET | Refills: 0 | Status: SHIPPED | OUTPATIENT
Start: 2021-01-26 | End: 2021-09-15

## 2021-02-23 ENCOUNTER — OFFICE VISIT (OUTPATIENT)
Dept: CARDIOLOGY | Facility: CLINIC | Age: 86
End: 2021-02-23
Payer: MEDICARE

## 2021-02-23 VITALS
SYSTOLIC BLOOD PRESSURE: 98 MMHG | OXYGEN SATURATION: 95 % | HEIGHT: 63 IN | HEART RATE: 85 BPM | DIASTOLIC BLOOD PRESSURE: 61 MMHG | BODY MASS INDEX: 25.84 KG/M2 | WEIGHT: 145.81 LBS

## 2021-02-23 DIAGNOSIS — Z95.0 PRESENCE OF CARDIAC PACEMAKER: Chronic | ICD-10-CM

## 2021-02-23 DIAGNOSIS — I34.0 NON-RHEUMATIC MITRAL REGURGITATION: Chronic | ICD-10-CM

## 2021-02-23 DIAGNOSIS — Z98.61 CAD S/P PERCUTANEOUS CORONARY ANGIOPLASTY: Chronic | ICD-10-CM

## 2021-02-23 DIAGNOSIS — I48.21 PERMANENT ATRIAL FIBRILLATION: Chronic | ICD-10-CM

## 2021-02-23 DIAGNOSIS — I50.43 ACUTE ON CHRONIC COMBINED SYSTOLIC AND DIASTOLIC CONGESTIVE HEART FAILURE: ICD-10-CM

## 2021-02-23 DIAGNOSIS — N18.4 CHRONIC KIDNEY DISEASE, STAGE 4 (SEVERE): Chronic | ICD-10-CM

## 2021-02-23 DIAGNOSIS — I10 ESSENTIAL HYPERTENSION: Chronic | ICD-10-CM

## 2021-02-23 DIAGNOSIS — I65.23 BILATERAL CAROTID ARTERY STENOSIS: ICD-10-CM

## 2021-02-23 DIAGNOSIS — I35.1 NONRHEUMATIC AORTIC VALVE INSUFFICIENCY: Primary | Chronic | ICD-10-CM

## 2021-02-23 DIAGNOSIS — E78.5 DYSLIPIDEMIA: Chronic | ICD-10-CM

## 2021-02-23 DIAGNOSIS — I25.10 CAD S/P PERCUTANEOUS CORONARY ANGIOPLASTY: Chronic | ICD-10-CM

## 2021-02-23 DIAGNOSIS — I36.1 NONRHEUMATIC TRICUSPID VALVE REGURGITATION: Chronic | ICD-10-CM

## 2021-02-23 PROCEDURE — 1159F MED LIST DOCD IN RCRD: CPT | Mod: S$GLB,,, | Performed by: INTERNAL MEDICINE

## 2021-02-23 PROCEDURE — 99214 PR OFFICE/OUTPT VISIT, EST, LEVL IV, 30-39 MIN: ICD-10-PCS | Mod: S$GLB,,, | Performed by: INTERNAL MEDICINE

## 2021-02-23 PROCEDURE — 3078F PR MOST RECENT DIASTOLIC BLOOD PRESSURE < 80 MM HG: ICD-10-PCS | Mod: CPTII,S$GLB,, | Performed by: INTERNAL MEDICINE

## 2021-02-23 PROCEDURE — 1101F PT FALLS ASSESS-DOCD LE1/YR: CPT | Mod: CPTII,S$GLB,, | Performed by: INTERNAL MEDICINE

## 2021-02-23 PROCEDURE — 99999 PR PBB SHADOW E&M-EST. PATIENT-LVL III: CPT | Mod: PBBFAC,,, | Performed by: INTERNAL MEDICINE

## 2021-02-23 PROCEDURE — 3078F DIAST BP <80 MM HG: CPT | Mod: CPTII,S$GLB,, | Performed by: INTERNAL MEDICINE

## 2021-02-23 PROCEDURE — 1101F PR PT FALLS ASSESS DOC 0-1 FALLS W/OUT INJ PAST YR: ICD-10-PCS | Mod: CPTII,S$GLB,, | Performed by: INTERNAL MEDICINE

## 2021-02-23 PROCEDURE — 99999 PR PBB SHADOW E&M-EST. PATIENT-LVL III: ICD-10-PCS | Mod: PBBFAC,,, | Performed by: INTERNAL MEDICINE

## 2021-02-23 PROCEDURE — 3288F PR FALLS RISK ASSESSMENT DOCUMENTED: ICD-10-PCS | Mod: CPTII,S$GLB,, | Performed by: INTERNAL MEDICINE

## 2021-02-23 PROCEDURE — 3074F PR MOST RECENT SYSTOLIC BLOOD PRESSURE < 130 MM HG: ICD-10-PCS | Mod: CPTII,S$GLB,, | Performed by: INTERNAL MEDICINE

## 2021-02-23 PROCEDURE — 99214 OFFICE O/P EST MOD 30 MIN: CPT | Mod: S$GLB,,, | Performed by: INTERNAL MEDICINE

## 2021-02-23 PROCEDURE — 1159F PR MEDICATION LIST DOCUMENTED IN MEDICAL RECORD: ICD-10-PCS | Mod: S$GLB,,, | Performed by: INTERNAL MEDICINE

## 2021-02-23 PROCEDURE — 3288F FALL RISK ASSESSMENT DOCD: CPT | Mod: CPTII,S$GLB,, | Performed by: INTERNAL MEDICINE

## 2021-02-23 PROCEDURE — 3074F SYST BP LT 130 MM HG: CPT | Mod: CPTII,S$GLB,, | Performed by: INTERNAL MEDICINE

## 2021-06-29 ENCOUNTER — OFFICE VISIT (OUTPATIENT)
Dept: CARDIOLOGY | Facility: CLINIC | Age: 86
End: 2021-06-29
Payer: MEDICARE

## 2021-06-29 VITALS
WEIGHT: 148.81 LBS | DIASTOLIC BLOOD PRESSURE: 55 MMHG | OXYGEN SATURATION: 100 % | BODY MASS INDEX: 26.37 KG/M2 | SYSTOLIC BLOOD PRESSURE: 102 MMHG | HEIGHT: 63 IN | HEART RATE: 77 BPM

## 2021-06-29 DIAGNOSIS — I48.0 PAROXYSMAL ATRIAL FIBRILLATION: Primary | ICD-10-CM

## 2021-06-29 DIAGNOSIS — I48.21 PERMANENT ATRIAL FIBRILLATION: Chronic | ICD-10-CM

## 2021-06-29 DIAGNOSIS — I50.43 ACUTE ON CHRONIC COMBINED SYSTOLIC AND DIASTOLIC CONGESTIVE HEART FAILURE: ICD-10-CM

## 2021-06-29 DIAGNOSIS — Z66 DNR (DO NOT RESUSCITATE): Chronic | ICD-10-CM

## 2021-06-29 DIAGNOSIS — I35.1 NONRHEUMATIC AORTIC VALVE INSUFFICIENCY: Chronic | ICD-10-CM

## 2021-06-29 DIAGNOSIS — I34.0 NON-RHEUMATIC MITRAL REGURGITATION: Chronic | ICD-10-CM

## 2021-06-29 DIAGNOSIS — I10 ESSENTIAL HYPERTENSION: Chronic | ICD-10-CM

## 2021-06-29 DIAGNOSIS — Z95.0 PRESENCE OF CARDIAC PACEMAKER: Chronic | ICD-10-CM

## 2021-06-29 PROCEDURE — 99214 PR OFFICE/OUTPT VISIT, EST, LEVL IV, 30-39 MIN: ICD-10-PCS | Mod: S$GLB,,, | Performed by: INTERNAL MEDICINE

## 2021-06-29 PROCEDURE — 99214 OFFICE O/P EST MOD 30 MIN: CPT | Mod: S$GLB,,, | Performed by: INTERNAL MEDICINE

## 2021-06-29 PROCEDURE — 99999 PR PBB SHADOW E&M-EST. PATIENT-LVL IV: ICD-10-PCS | Mod: PBBFAC,,, | Performed by: INTERNAL MEDICINE

## 2021-06-29 PROCEDURE — 1159F PR MEDICATION LIST DOCUMENTED IN MEDICAL RECORD: ICD-10-PCS | Mod: S$GLB,,, | Performed by: INTERNAL MEDICINE

## 2021-06-29 PROCEDURE — 1126F PR PAIN SEVERITY QUANTIFIED, NO PAIN PRESENT: ICD-10-PCS | Mod: S$GLB,,, | Performed by: INTERNAL MEDICINE

## 2021-06-29 PROCEDURE — 99999 PR PBB SHADOW E&M-EST. PATIENT-LVL IV: CPT | Mod: PBBFAC,,, | Performed by: INTERNAL MEDICINE

## 2021-06-29 PROCEDURE — 1159F MED LIST DOCD IN RCRD: CPT | Mod: S$GLB,,, | Performed by: INTERNAL MEDICINE

## 2021-06-29 PROCEDURE — 1126F AMNT PAIN NOTED NONE PRSNT: CPT | Mod: S$GLB,,, | Performed by: INTERNAL MEDICINE

## 2021-09-15 ENCOUNTER — OFFICE VISIT (OUTPATIENT)
Dept: FAMILY MEDICINE | Facility: CLINIC | Age: 86
End: 2021-09-15
Payer: MEDICARE

## 2021-09-15 ENCOUNTER — TELEPHONE (OUTPATIENT)
Dept: FAMILY MEDICINE | Facility: CLINIC | Age: 86
End: 2021-09-15

## 2021-09-15 DIAGNOSIS — I10 ESSENTIAL HYPERTENSION: Chronic | ICD-10-CM

## 2021-09-15 DIAGNOSIS — N18.4 CHRONIC KIDNEY DISEASE, STAGE 4 (SEVERE): Chronic | ICD-10-CM

## 2021-09-15 DIAGNOSIS — J96.11 CHRONIC RESPIRATORY FAILURE WITH HYPOXIA, ON HOME OXYGEN THERAPY: Chronic | ICD-10-CM

## 2021-09-15 DIAGNOSIS — I48.21 PERMANENT ATRIAL FIBRILLATION: Chronic | ICD-10-CM

## 2021-09-15 DIAGNOSIS — I50.43 ACUTE ON CHRONIC COMBINED SYSTOLIC AND DIASTOLIC CONGESTIVE HEART FAILURE: Primary | ICD-10-CM

## 2021-09-15 DIAGNOSIS — Z99.81 CHRONIC RESPIRATORY FAILURE WITH HYPOXIA, ON HOME OXYGEN THERAPY: Chronic | ICD-10-CM

## 2021-09-15 DIAGNOSIS — Z86.79 HISTORY OF SUBDURAL HEMATOMA: ICD-10-CM

## 2021-09-15 DIAGNOSIS — F01.50 VASCULAR DEMENTIA WITHOUT BEHAVIORAL DISTURBANCE: Chronic | ICD-10-CM

## 2021-09-15 DIAGNOSIS — J44.9 CHRONIC OBSTRUCTIVE PULMONARY DISEASE, UNSPECIFIED COPD TYPE: Chronic | ICD-10-CM

## 2021-09-15 DIAGNOSIS — Z98.61 CAD S/P PERCUTANEOUS CORONARY ANGIOPLASTY: Chronic | ICD-10-CM

## 2021-09-15 DIAGNOSIS — Z79.01 CURRENT USE OF LONG TERM ANTICOAGULATION: Chronic | ICD-10-CM

## 2021-09-15 DIAGNOSIS — E78.5 DYSLIPIDEMIA: Chronic | ICD-10-CM

## 2021-09-15 DIAGNOSIS — I25.10 CAD S/P PERCUTANEOUS CORONARY ANGIOPLASTY: Chronic | ICD-10-CM

## 2021-09-15 PROCEDURE — 99442 PR PHYSICIAN TELEPHONE EVALUATION 11-20 MIN: ICD-10-PCS | Mod: 95,,, | Performed by: INTERNAL MEDICINE

## 2021-09-15 PROCEDURE — 99442 PR PHYSICIAN TELEPHONE EVALUATION 11-20 MIN: CPT | Mod: 95,,, | Performed by: INTERNAL MEDICINE

## 2021-09-15 RX ORDER — ISOSORBIDE DINITRATE AND HYDRALAZINE HYDROCHLORIDE 37.5; 2 MG/1; MG/1
1 TABLET ORAL 2 TIMES DAILY
Qty: 60 TABLET | Refills: 2 | Status: SHIPPED | OUTPATIENT
Start: 2021-09-15 | End: 2021-09-30

## 2021-09-15 RX ORDER — DOXYCYCLINE HYCLATE 100 MG
100 TABLET ORAL 2 TIMES DAILY
Qty: 14 TABLET | Refills: 0 | Status: SHIPPED | OUTPATIENT
Start: 2021-09-15 | End: 2021-09-22

## 2021-09-15 RX ORDER — DOCUSATE SODIUM 100 MG/1
100 CAPSULE, LIQUID FILLED ORAL DAILY
Qty: 90 CAPSULE | Refills: 0 | Status: SHIPPED | OUTPATIENT
Start: 2021-09-15 | End: 2021-09-23 | Stop reason: SDUPTHER

## 2021-09-15 RX ORDER — DONEPEZIL HYDROCHLORIDE 5 MG/1
5 TABLET, FILM COATED ORAL NIGHTLY
Qty: 90 TABLET | Refills: 1 | Status: SHIPPED | OUTPATIENT
Start: 2021-09-15 | End: 2021-10-20 | Stop reason: SDUPTHER

## 2021-09-15 RX ORDER — FAMOTIDINE 20 MG/1
20 TABLET, FILM COATED ORAL DAILY PRN
Qty: 90 TABLET | Refills: 0 | Status: SHIPPED | OUTPATIENT
Start: 2021-09-15 | End: 2021-09-23 | Stop reason: SDUPTHER

## 2021-09-15 RX ORDER — ROSUVASTATIN CALCIUM 10 MG/1
10 TABLET, COATED ORAL DAILY
Qty: 90 TABLET | Refills: 0 | Status: SHIPPED | OUTPATIENT
Start: 2021-09-15 | End: 2021-10-20 | Stop reason: SDUPTHER

## 2021-09-15 RX ORDER — CALCIUM CARBONATE 500(1250)
1 TABLET ORAL 2 TIMES DAILY
Qty: 60 TABLET | Refills: 3 | Status: SHIPPED | OUTPATIENT
Start: 2021-09-15 | End: 2021-09-23 | Stop reason: SDUPTHER

## 2021-09-15 RX ORDER — FUROSEMIDE 20 MG/1
20 TABLET ORAL 2 TIMES DAILY
Qty: 60 TABLET | Refills: 4 | Status: SHIPPED | OUTPATIENT
Start: 2021-09-15 | End: 2021-10-20 | Stop reason: SDUPTHER

## 2021-09-23 ENCOUNTER — OFFICE VISIT (OUTPATIENT)
Dept: FAMILY MEDICINE | Facility: CLINIC | Age: 86
End: 2021-09-23
Payer: MEDICARE

## 2021-09-23 ENCOUNTER — HOSPITAL ENCOUNTER (OUTPATIENT)
Dept: RADIOLOGY | Facility: HOSPITAL | Age: 86
Discharge: HOME | End: 2021-09-23
Attending: INTERNAL MEDICINE
Payer: MEDICARE

## 2021-09-23 VITALS
SYSTOLIC BLOOD PRESSURE: 118 MMHG | WEIGHT: 147.69 LBS | HEIGHT: 63 IN | TEMPERATURE: 97 F | HEART RATE: 100 BPM | DIASTOLIC BLOOD PRESSURE: 58 MMHG | BODY MASS INDEX: 26.17 KG/M2

## 2021-09-23 DIAGNOSIS — I50.9 CONGESTIVE HEART FAILURE, UNSPECIFIED HF CHRONICITY, UNSPECIFIED HEART FAILURE TYPE: ICD-10-CM

## 2021-09-23 DIAGNOSIS — I25.10 CAD S/P PERCUTANEOUS CORONARY ANGIOPLASTY: Primary | Chronic | ICD-10-CM

## 2021-09-23 DIAGNOSIS — J44.9 CHRONIC OBSTRUCTIVE PULMONARY DISEASE, UNSPECIFIED COPD TYPE: Chronic | ICD-10-CM

## 2021-09-23 DIAGNOSIS — R73.9 HYPERGLYCEMIA: ICD-10-CM

## 2021-09-23 DIAGNOSIS — I48.21 PERMANENT ATRIAL FIBRILLATION: Chronic | ICD-10-CM

## 2021-09-23 DIAGNOSIS — J44.9 CHRONIC OBSTRUCTIVE PULMONARY DISEASE, UNSPECIFIED COPD TYPE: ICD-10-CM

## 2021-09-23 DIAGNOSIS — E78.5 DYSLIPIDEMIA: Chronic | ICD-10-CM

## 2021-09-23 DIAGNOSIS — I10 ESSENTIAL HYPERTENSION: Chronic | ICD-10-CM

## 2021-09-23 DIAGNOSIS — Z98.61 CAD S/P PERCUTANEOUS CORONARY ANGIOPLASTY: Primary | Chronic | ICD-10-CM

## 2021-09-23 DIAGNOSIS — F01.50 VASCULAR DEMENTIA WITHOUT BEHAVIORAL DISTURBANCE: Chronic | ICD-10-CM

## 2021-09-23 PROCEDURE — 3288F FALL RISK ASSESSMENT DOCD: CPT | Mod: CPTII,S$GLB,, | Performed by: INTERNAL MEDICINE

## 2021-09-23 PROCEDURE — 3288F PR FALLS RISK ASSESSMENT DOCUMENTED: ICD-10-PCS | Mod: CPTII,S$GLB,, | Performed by: INTERNAL MEDICINE

## 2021-09-23 PROCEDURE — 3078F PR MOST RECENT DIASTOLIC BLOOD PRESSURE < 80 MM HG: ICD-10-PCS | Mod: CPTII,S$GLB,, | Performed by: INTERNAL MEDICINE

## 2021-09-23 PROCEDURE — 3074F SYST BP LT 130 MM HG: CPT | Mod: CPTII,S$GLB,, | Performed by: INTERNAL MEDICINE

## 2021-09-23 PROCEDURE — 71046 X-RAY EXAM CHEST 2 VIEWS: CPT | Mod: TC,FY,PO

## 2021-09-23 PROCEDURE — 3074F PR MOST RECENT SYSTOLIC BLOOD PRESSURE < 130 MM HG: ICD-10-PCS | Mod: CPTII,S$GLB,, | Performed by: INTERNAL MEDICINE

## 2021-09-23 PROCEDURE — 1126F AMNT PAIN NOTED NONE PRSNT: CPT | Mod: CPTII,S$GLB,, | Performed by: INTERNAL MEDICINE

## 2021-09-23 PROCEDURE — 71046 X-RAY EXAM CHEST 2 VIEWS: CPT | Mod: 26,,, | Performed by: RADIOLOGY

## 2021-09-23 PROCEDURE — 71046 XR CHEST PA AND LATERAL: ICD-10-PCS | Mod: 26,,, | Performed by: RADIOLOGY

## 2021-09-23 PROCEDURE — 99215 OFFICE O/P EST HI 40 MIN: CPT | Mod: S$GLB,,, | Performed by: INTERNAL MEDICINE

## 2021-09-23 PROCEDURE — 99999 PR PBB SHADOW E&M-EST. PATIENT-LVL V: ICD-10-PCS | Mod: PBBFAC,,, | Performed by: INTERNAL MEDICINE

## 2021-09-23 PROCEDURE — 1126F PR PAIN SEVERITY QUANTIFIED, NO PAIN PRESENT: ICD-10-PCS | Mod: CPTII,S$GLB,, | Performed by: INTERNAL MEDICINE

## 2021-09-23 PROCEDURE — 3078F DIAST BP <80 MM HG: CPT | Mod: CPTII,S$GLB,, | Performed by: INTERNAL MEDICINE

## 2021-09-23 PROCEDURE — 1159F PR MEDICATION LIST DOCUMENTED IN MEDICAL RECORD: ICD-10-PCS | Mod: CPTII,S$GLB,, | Performed by: INTERNAL MEDICINE

## 2021-09-23 PROCEDURE — 99999 PR PBB SHADOW E&M-EST. PATIENT-LVL V: CPT | Mod: PBBFAC,,, | Performed by: INTERNAL MEDICINE

## 2021-09-23 PROCEDURE — 99215 PR OFFICE/OUTPT VISIT, EST, LEVL V, 40-54 MIN: ICD-10-PCS | Mod: S$GLB,,, | Performed by: INTERNAL MEDICINE

## 2021-09-23 PROCEDURE — 1101F PT FALLS ASSESS-DOCD LE1/YR: CPT | Mod: CPTII,S$GLB,, | Performed by: INTERNAL MEDICINE

## 2021-09-23 PROCEDURE — 1101F PR PT FALLS ASSESS DOC 0-1 FALLS W/OUT INJ PAST YR: ICD-10-PCS | Mod: CPTII,S$GLB,, | Performed by: INTERNAL MEDICINE

## 2021-09-23 PROCEDURE — 1159F MED LIST DOCD IN RCRD: CPT | Mod: CPTII,S$GLB,, | Performed by: INTERNAL MEDICINE

## 2021-09-23 RX ORDER — FAMOTIDINE 20 MG/1
20 TABLET, FILM COATED ORAL DAILY PRN
Qty: 90 TABLET | Refills: 0 | Status: SHIPPED | OUTPATIENT
Start: 2021-09-23 | End: 2022-12-29

## 2021-09-23 RX ORDER — DOCUSATE SODIUM 100 MG/1
100 CAPSULE, LIQUID FILLED ORAL DAILY
Qty: 90 CAPSULE | Refills: 0 | Status: SHIPPED | OUTPATIENT
Start: 2021-09-23 | End: 2021-09-23 | Stop reason: SDUPTHER

## 2021-09-23 RX ORDER — FAMOTIDINE 20 MG/1
20 TABLET, FILM COATED ORAL DAILY PRN
Qty: 90 TABLET | Refills: 0 | Status: SHIPPED | OUTPATIENT
Start: 2021-09-23 | End: 2021-09-23 | Stop reason: SDUPTHER

## 2021-09-23 RX ORDER — DOCUSATE SODIUM 100 MG/1
100 CAPSULE, LIQUID FILLED ORAL DAILY
Qty: 90 CAPSULE | Refills: 0 | Status: SHIPPED | OUTPATIENT
Start: 2021-09-23 | End: 2022-02-12 | Stop reason: CLARIF

## 2021-09-23 RX ORDER — CALCIUM CARBONATE 500(1250)
1 TABLET ORAL 2 TIMES DAILY
Qty: 60 TABLET | Refills: 3 | Status: SHIPPED | OUTPATIENT
Start: 2021-09-23 | End: 2021-09-23 | Stop reason: SDUPTHER

## 2021-09-23 RX ORDER — CALCIUM CARBONATE 500(1250)
1 TABLET ORAL 2 TIMES DAILY
Qty: 60 TABLET | Refills: 3 | Status: SHIPPED | OUTPATIENT
Start: 2021-09-23 | End: 2021-09-24

## 2021-09-23 RX ORDER — ALBUTEROL SULFATE 90 UG/1
1-2 AEROSOL, METERED RESPIRATORY (INHALATION) EVERY 6 HOURS PRN
Qty: 18 G | Refills: 1 | Status: SHIPPED | OUTPATIENT
Start: 2021-09-23 | End: 2022-12-20 | Stop reason: SDUPTHER

## 2021-09-24 ENCOUNTER — TELEPHONE (OUTPATIENT)
Dept: FAMILY MEDICINE | Facility: CLINIC | Age: 86
End: 2021-09-24

## 2021-09-24 DIAGNOSIS — N28.9 RENAL INSUFFICIENCY: Primary | ICD-10-CM

## 2021-09-27 DIAGNOSIS — I48.0 PAROXYSMAL ATRIAL FIBRILLATION: Primary | ICD-10-CM

## 2021-09-27 DIAGNOSIS — I10 ESSENTIAL HYPERTENSION: ICD-10-CM

## 2021-09-28 ENCOUNTER — OFFICE VISIT (OUTPATIENT)
Dept: NEPHROLOGY | Facility: CLINIC | Age: 86
End: 2021-09-28
Payer: MEDICARE

## 2021-09-28 VITALS
DIASTOLIC BLOOD PRESSURE: 58 MMHG | HEART RATE: 64 BPM | HEIGHT: 63 IN | SYSTOLIC BLOOD PRESSURE: 112 MMHG | WEIGHT: 146.63 LBS | BODY MASS INDEX: 25.98 KG/M2

## 2021-09-28 DIAGNOSIS — R80.9 PROTEINURIA, UNSPECIFIED TYPE: ICD-10-CM

## 2021-09-28 DIAGNOSIS — N28.9 RENAL INSUFFICIENCY: ICD-10-CM

## 2021-09-28 DIAGNOSIS — E83.52 HYPERCALCEMIA: ICD-10-CM

## 2021-09-28 DIAGNOSIS — I12.9 PARENCHYMAL RENAL HYPERTENSION, STAGE 1 THROUGH STAGE 4 OR UNSPECIFIED CHRONIC KIDNEY DISEASE: ICD-10-CM

## 2021-09-28 DIAGNOSIS — N18.32 STAGE 3B CHRONIC KIDNEY DISEASE: Primary | ICD-10-CM

## 2021-09-28 PROCEDURE — 3288F FALL RISK ASSESSMENT DOCD: CPT | Mod: CPTII,S$GLB,, | Performed by: INTERNAL MEDICINE

## 2021-09-28 PROCEDURE — 3074F SYST BP LT 130 MM HG: CPT | Mod: CPTII,S$GLB,, | Performed by: INTERNAL MEDICINE

## 2021-09-28 PROCEDURE — 1159F MED LIST DOCD IN RCRD: CPT | Mod: CPTII,S$GLB,, | Performed by: INTERNAL MEDICINE

## 2021-09-28 PROCEDURE — 1101F PT FALLS ASSESS-DOCD LE1/YR: CPT | Mod: CPTII,S$GLB,, | Performed by: INTERNAL MEDICINE

## 2021-09-28 PROCEDURE — 3288F PR FALLS RISK ASSESSMENT DOCUMENTED: ICD-10-PCS | Mod: CPTII,S$GLB,, | Performed by: INTERNAL MEDICINE

## 2021-09-28 PROCEDURE — 99204 PR OFFICE/OUTPT VISIT, NEW, LEVL IV, 45-59 MIN: ICD-10-PCS | Mod: S$GLB,,, | Performed by: INTERNAL MEDICINE

## 2021-09-28 PROCEDURE — 3078F DIAST BP <80 MM HG: CPT | Mod: CPTII,S$GLB,, | Performed by: INTERNAL MEDICINE

## 2021-09-28 PROCEDURE — 1160F RVW MEDS BY RX/DR IN RCRD: CPT | Mod: CPTII,S$GLB,, | Performed by: INTERNAL MEDICINE

## 2021-09-28 PROCEDURE — 1159F PR MEDICATION LIST DOCUMENTED IN MEDICAL RECORD: ICD-10-PCS | Mod: CPTII,S$GLB,, | Performed by: INTERNAL MEDICINE

## 2021-09-28 PROCEDURE — 1101F PR PT FALLS ASSESS DOC 0-1 FALLS W/OUT INJ PAST YR: ICD-10-PCS | Mod: CPTII,S$GLB,, | Performed by: INTERNAL MEDICINE

## 2021-09-28 PROCEDURE — 3078F PR MOST RECENT DIASTOLIC BLOOD PRESSURE < 80 MM HG: ICD-10-PCS | Mod: CPTII,S$GLB,, | Performed by: INTERNAL MEDICINE

## 2021-09-28 PROCEDURE — 99999 PR PBB SHADOW E&M-EST. PATIENT-LVL III: ICD-10-PCS | Mod: PBBFAC,,, | Performed by: INTERNAL MEDICINE

## 2021-09-28 PROCEDURE — 1160F PR REVIEW ALL MEDS BY PRESCRIBER/CLIN PHARMACIST DOCUMENTED: ICD-10-PCS | Mod: CPTII,S$GLB,, | Performed by: INTERNAL MEDICINE

## 2021-09-28 PROCEDURE — 3074F PR MOST RECENT SYSTOLIC BLOOD PRESSURE < 130 MM HG: ICD-10-PCS | Mod: CPTII,S$GLB,, | Performed by: INTERNAL MEDICINE

## 2021-09-28 PROCEDURE — 99204 OFFICE O/P NEW MOD 45 MIN: CPT | Mod: S$GLB,,, | Performed by: INTERNAL MEDICINE

## 2021-09-28 PROCEDURE — 99999 PR PBB SHADOW E&M-EST. PATIENT-LVL III: CPT | Mod: PBBFAC,,, | Performed by: INTERNAL MEDICINE

## 2021-09-30 ENCOUNTER — OFFICE VISIT (OUTPATIENT)
Dept: CARDIOLOGY | Facility: CLINIC | Age: 86
End: 2021-09-30
Payer: MEDICARE

## 2021-09-30 ENCOUNTER — HOSPITAL ENCOUNTER (OUTPATIENT)
Dept: CARDIOLOGY | Facility: HOSPITAL | Age: 86
Discharge: HOME | End: 2021-09-30
Attending: INTERNAL MEDICINE
Payer: MEDICARE

## 2021-09-30 VITALS
OXYGEN SATURATION: 98 % | DIASTOLIC BLOOD PRESSURE: 62 MMHG | HEART RATE: 57 BPM | WEIGHT: 146 LBS | SYSTOLIC BLOOD PRESSURE: 110 MMHG | BODY MASS INDEX: 25.86 KG/M2

## 2021-09-30 DIAGNOSIS — Z98.61 CAD S/P PERCUTANEOUS CORONARY ANGIOPLASTY: Chronic | ICD-10-CM

## 2021-09-30 DIAGNOSIS — I36.1 NONRHEUMATIC TRICUSPID VALVE REGURGITATION: Chronic | ICD-10-CM

## 2021-09-30 DIAGNOSIS — I34.0 NON-RHEUMATIC MITRAL REGURGITATION: Chronic | ICD-10-CM

## 2021-09-30 DIAGNOSIS — N18.4 CHRONIC KIDNEY DISEASE, STAGE 4 (SEVERE): Chronic | ICD-10-CM

## 2021-09-30 DIAGNOSIS — I50.9 CONGESTIVE HEART FAILURE, UNSPECIFIED HF CHRONICITY, UNSPECIFIED HEART FAILURE TYPE: ICD-10-CM

## 2021-09-30 DIAGNOSIS — I13.0 BENIGN HYPERTENSIVE HEART AND KIDNEY DISEASE WITH HF AND CKD: Chronic | ICD-10-CM

## 2021-09-30 DIAGNOSIS — I48.0 PAROXYSMAL ATRIAL FIBRILLATION: ICD-10-CM

## 2021-09-30 DIAGNOSIS — I25.10 CAD S/P PERCUTANEOUS CORONARY ANGIOPLASTY: Chronic | ICD-10-CM

## 2021-09-30 DIAGNOSIS — Z95.0 PRESENCE OF CARDIAC PACEMAKER: Chronic | ICD-10-CM

## 2021-09-30 DIAGNOSIS — E78.5 DYSLIPIDEMIA: Chronic | ICD-10-CM

## 2021-09-30 DIAGNOSIS — I35.1 NONRHEUMATIC AORTIC VALVE INSUFFICIENCY: Chronic | ICD-10-CM

## 2021-09-30 DIAGNOSIS — I10 ESSENTIAL HYPERTENSION: Chronic | ICD-10-CM

## 2021-09-30 DIAGNOSIS — I48.21 PERMANENT ATRIAL FIBRILLATION: Primary | Chronic | ICD-10-CM

## 2021-09-30 DIAGNOSIS — R07.89 OTHER CHEST PAIN: ICD-10-CM

## 2021-09-30 PROCEDURE — 99999 PR PBB SHADOW E&M-EST. PATIENT-LVL IV: ICD-10-PCS | Mod: PBBFAC,,, | Performed by: INTERNAL MEDICINE

## 2021-09-30 PROCEDURE — 93010 EKG 12-LEAD: ICD-10-PCS | Mod: ,,, | Performed by: INTERNAL MEDICINE

## 2021-09-30 PROCEDURE — 3078F PR MOST RECENT DIASTOLIC BLOOD PRESSURE < 80 MM HG: ICD-10-PCS | Mod: CPTII,S$GLB,, | Performed by: INTERNAL MEDICINE

## 2021-09-30 PROCEDURE — 1160F RVW MEDS BY RX/DR IN RCRD: CPT | Mod: CPTII,S$GLB,, | Performed by: INTERNAL MEDICINE

## 2021-09-30 PROCEDURE — 99215 PR OFFICE/OUTPT VISIT, EST, LEVL V, 40-54 MIN: ICD-10-PCS | Mod: 25,S$GLB,, | Performed by: INTERNAL MEDICINE

## 2021-09-30 PROCEDURE — 1160F PR REVIEW ALL MEDS BY PRESCRIBER/CLIN PHARMACIST DOCUMENTED: ICD-10-PCS | Mod: CPTII,S$GLB,, | Performed by: INTERNAL MEDICINE

## 2021-09-30 PROCEDURE — 99999 PR PBB SHADOW E&M-EST. PATIENT-LVL IV: CPT | Mod: PBBFAC,,, | Performed by: INTERNAL MEDICINE

## 2021-09-30 PROCEDURE — 3074F SYST BP LT 130 MM HG: CPT | Mod: CPTII,S$GLB,, | Performed by: INTERNAL MEDICINE

## 2021-09-30 PROCEDURE — 3078F DIAST BP <80 MM HG: CPT | Mod: CPTII,S$GLB,, | Performed by: INTERNAL MEDICINE

## 2021-09-30 PROCEDURE — 3074F PR MOST RECENT SYSTOLIC BLOOD PRESSURE < 130 MM HG: ICD-10-PCS | Mod: CPTII,S$GLB,, | Performed by: INTERNAL MEDICINE

## 2021-09-30 PROCEDURE — 1126F PR PAIN SEVERITY QUANTIFIED, NO PAIN PRESENT: ICD-10-PCS | Mod: CPTII,S$GLB,, | Performed by: INTERNAL MEDICINE

## 2021-09-30 PROCEDURE — 93005 ELECTROCARDIOGRAM TRACING: CPT

## 2021-09-30 PROCEDURE — 1126F AMNT PAIN NOTED NONE PRSNT: CPT | Mod: CPTII,S$GLB,, | Performed by: INTERNAL MEDICINE

## 2021-09-30 PROCEDURE — 1159F PR MEDICATION LIST DOCUMENTED IN MEDICAL RECORD: ICD-10-PCS | Mod: CPTII,S$GLB,, | Performed by: INTERNAL MEDICINE

## 2021-09-30 PROCEDURE — 99215 OFFICE O/P EST HI 40 MIN: CPT | Mod: 25,S$GLB,, | Performed by: INTERNAL MEDICINE

## 2021-09-30 PROCEDURE — 93010 ELECTROCARDIOGRAM REPORT: CPT | Mod: ,,, | Performed by: INTERNAL MEDICINE

## 2021-09-30 PROCEDURE — 1159F MED LIST DOCD IN RCRD: CPT | Mod: CPTII,S$GLB,, | Performed by: INTERNAL MEDICINE

## 2021-09-30 RX ORDER — NITROGLYCERIN 0.4 MG/1
0.4 TABLET SUBLINGUAL EVERY 5 MIN PRN
Qty: 25 TABLET | Refills: 5 | Status: SHIPPED | OUTPATIENT
Start: 2021-09-30 | End: 2021-10-01 | Stop reason: SDUPTHER

## 2021-09-30 RX ORDER — METOPROLOL SUCCINATE 25 MG/1
25 TABLET, EXTENDED RELEASE ORAL DAILY
Qty: 30 TABLET | Refills: 11 | Status: SHIPPED | OUTPATIENT
Start: 2021-09-30 | End: 2021-10-01 | Stop reason: SDUPTHER

## 2021-10-04 RX ORDER — METOPROLOL SUCCINATE 25 MG/1
25 TABLET, EXTENDED RELEASE ORAL DAILY
Qty: 30 TABLET | Refills: 11 | Status: SHIPPED | OUTPATIENT
Start: 2021-10-04 | End: 2021-10-20 | Stop reason: SDUPTHER

## 2021-10-04 RX ORDER — NITROGLYCERIN 0.4 MG/1
0.4 TABLET SUBLINGUAL EVERY 5 MIN PRN
Qty: 25 TABLET | Refills: 5 | Status: SHIPPED | OUTPATIENT
Start: 2021-10-04 | End: 2022-12-29

## 2021-10-20 ENCOUNTER — TELEPHONE (OUTPATIENT)
Dept: FAMILY MEDICINE | Facility: CLINIC | Age: 86
End: 2021-10-20

## 2021-10-20 RX ORDER — METOPROLOL SUCCINATE 25 MG/1
25 TABLET, EXTENDED RELEASE ORAL DAILY
Qty: 90 TABLET | Refills: 1 | Status: SHIPPED | OUTPATIENT
Start: 2021-10-20 | End: 2022-06-13

## 2021-10-20 RX ORDER — FUROSEMIDE 20 MG/1
20 TABLET ORAL 2 TIMES DAILY
Qty: 60 TABLET | Refills: 4 | Status: SHIPPED | OUTPATIENT
Start: 2021-10-20 | End: 2022-01-17

## 2021-10-20 RX ORDER — DONEPEZIL HYDROCHLORIDE 5 MG/1
5 TABLET, FILM COATED ORAL NIGHTLY
Qty: 90 TABLET | Refills: 1 | Status: SHIPPED | OUTPATIENT
Start: 2021-10-20 | End: 2022-01-25 | Stop reason: SDUPTHER

## 2021-10-20 RX ORDER — ROSUVASTATIN CALCIUM 10 MG/1
10 TABLET, COATED ORAL DAILY
Qty: 90 TABLET | Refills: 1 | Status: SHIPPED | OUTPATIENT
Start: 2021-10-20 | End: 2022-03-18

## 2021-10-21 ENCOUNTER — OFFICE VISIT (OUTPATIENT)
Dept: FAMILY MEDICINE | Facility: CLINIC | Age: 86
End: 2021-10-21
Payer: MEDICARE

## 2021-10-21 VITALS
TEMPERATURE: 98 F | DIASTOLIC BLOOD PRESSURE: 68 MMHG | WEIGHT: 150.81 LBS | SYSTOLIC BLOOD PRESSURE: 110 MMHG | OXYGEN SATURATION: 97 % | RESPIRATION RATE: 16 BRPM | BODY MASS INDEX: 26.71 KG/M2 | HEART RATE: 81 BPM

## 2021-10-21 DIAGNOSIS — I10 ESSENTIAL HYPERTENSION: Chronic | ICD-10-CM

## 2021-10-21 DIAGNOSIS — E78.5 DYSLIPIDEMIA: Chronic | ICD-10-CM

## 2021-10-21 DIAGNOSIS — Z79.01 CURRENT USE OF LONG TERM ANTICOAGULATION: Chronic | ICD-10-CM

## 2021-10-21 DIAGNOSIS — N39.0 URINARY TRACT INFECTION WITHOUT HEMATURIA, SITE UNSPECIFIED: ICD-10-CM

## 2021-10-21 DIAGNOSIS — N18.4 CHRONIC KIDNEY DISEASE, STAGE 4 (SEVERE): Chronic | ICD-10-CM

## 2021-10-21 DIAGNOSIS — F01.50 VASCULAR DEMENTIA WITHOUT BEHAVIORAL DISTURBANCE: Chronic | ICD-10-CM

## 2021-10-21 DIAGNOSIS — I25.10 CAD S/P PERCUTANEOUS CORONARY ANGIOPLASTY: Chronic | ICD-10-CM

## 2021-10-21 DIAGNOSIS — Z95.0 PRESENCE OF CARDIAC PACEMAKER: Chronic | ICD-10-CM

## 2021-10-21 DIAGNOSIS — Z98.61 CAD S/P PERCUTANEOUS CORONARY ANGIOPLASTY: Chronic | ICD-10-CM

## 2021-10-21 DIAGNOSIS — I48.21 PERMANENT ATRIAL FIBRILLATION: Chronic | ICD-10-CM

## 2021-10-21 DIAGNOSIS — J44.9 CHRONIC OBSTRUCTIVE PULMONARY DISEASE, UNSPECIFIED COPD TYPE: Chronic | ICD-10-CM

## 2021-10-21 DIAGNOSIS — I13.0 BENIGN HYPERTENSIVE HEART AND KIDNEY DISEASE WITH HF AND CKD: Primary | Chronic | ICD-10-CM

## 2021-10-21 DIAGNOSIS — I50.9 CONGESTIVE HEART FAILURE, UNSPECIFIED HF CHRONICITY, UNSPECIFIED HEART FAILURE TYPE: ICD-10-CM

## 2021-10-21 PROCEDURE — 90694 FLU VACCINE - QUADRIVALENT - ADJUVANTED: ICD-10-PCS | Mod: S$GLB,,, | Performed by: INTERNAL MEDICINE

## 2021-10-21 PROCEDURE — 1126F AMNT PAIN NOTED NONE PRSNT: CPT | Mod: CPTII,S$GLB,, | Performed by: INTERNAL MEDICINE

## 2021-10-21 PROCEDURE — 1159F PR MEDICATION LIST DOCUMENTED IN MEDICAL RECORD: ICD-10-PCS | Mod: CPTII,S$GLB,, | Performed by: INTERNAL MEDICINE

## 2021-10-21 PROCEDURE — 3078F DIAST BP <80 MM HG: CPT | Mod: CPTII,S$GLB,, | Performed by: INTERNAL MEDICINE

## 2021-10-21 PROCEDURE — 1159F MED LIST DOCD IN RCRD: CPT | Mod: CPTII,S$GLB,, | Performed by: INTERNAL MEDICINE

## 2021-10-21 PROCEDURE — 3288F PR FALLS RISK ASSESSMENT DOCUMENTED: ICD-10-PCS | Mod: CPTII,S$GLB,, | Performed by: INTERNAL MEDICINE

## 2021-10-21 PROCEDURE — 3074F SYST BP LT 130 MM HG: CPT | Mod: CPTII,S$GLB,, | Performed by: INTERNAL MEDICINE

## 2021-10-21 PROCEDURE — G0008 ADMIN INFLUENZA VIRUS VAC: HCPCS | Mod: S$GLB,,, | Performed by: INTERNAL MEDICINE

## 2021-10-21 PROCEDURE — 99214 PR OFFICE/OUTPT VISIT, EST, LEVL IV, 30-39 MIN: ICD-10-PCS | Mod: S$GLB,,, | Performed by: INTERNAL MEDICINE

## 2021-10-21 PROCEDURE — 1101F PR PT FALLS ASSESS DOC 0-1 FALLS W/OUT INJ PAST YR: ICD-10-PCS | Mod: CPTII,S$GLB,, | Performed by: INTERNAL MEDICINE

## 2021-10-21 PROCEDURE — 3288F FALL RISK ASSESSMENT DOCD: CPT | Mod: CPTII,S$GLB,, | Performed by: INTERNAL MEDICINE

## 2021-10-21 PROCEDURE — 3074F PR MOST RECENT SYSTOLIC BLOOD PRESSURE < 130 MM HG: ICD-10-PCS | Mod: CPTII,S$GLB,, | Performed by: INTERNAL MEDICINE

## 2021-10-21 PROCEDURE — 99214 OFFICE O/P EST MOD 30 MIN: CPT | Mod: S$GLB,,, | Performed by: INTERNAL MEDICINE

## 2021-10-21 PROCEDURE — 99999 PR PBB SHADOW E&M-EST. PATIENT-LVL IV: CPT | Mod: PBBFAC,,, | Performed by: INTERNAL MEDICINE

## 2021-10-21 PROCEDURE — G0008 FLU VACCINE - QUADRIVALENT - ADJUVANTED: ICD-10-PCS | Mod: S$GLB,,, | Performed by: INTERNAL MEDICINE

## 2021-10-21 PROCEDURE — 1126F PR PAIN SEVERITY QUANTIFIED, NO PAIN PRESENT: ICD-10-PCS | Mod: CPTII,S$GLB,, | Performed by: INTERNAL MEDICINE

## 2021-10-21 PROCEDURE — 90694 VACC AIIV4 NO PRSRV 0.5ML IM: CPT | Mod: S$GLB,,, | Performed by: INTERNAL MEDICINE

## 2021-10-21 PROCEDURE — 99999 PR PBB SHADOW E&M-EST. PATIENT-LVL IV: ICD-10-PCS | Mod: PBBFAC,,, | Performed by: INTERNAL MEDICINE

## 2021-10-21 PROCEDURE — 3078F PR MOST RECENT DIASTOLIC BLOOD PRESSURE < 80 MM HG: ICD-10-PCS | Mod: CPTII,S$GLB,, | Performed by: INTERNAL MEDICINE

## 2021-10-21 PROCEDURE — 1101F PT FALLS ASSESS-DOCD LE1/YR: CPT | Mod: CPTII,S$GLB,, | Performed by: INTERNAL MEDICINE

## 2021-10-21 RX ORDER — DOXYCYCLINE HYCLATE 100 MG
100 TABLET ORAL 2 TIMES DAILY
Qty: 14 TABLET | Refills: 0 | Status: SHIPPED | OUTPATIENT
Start: 2021-10-21 | End: 2021-10-24

## 2021-10-24 ENCOUNTER — TELEPHONE (OUTPATIENT)
Dept: FAMILY MEDICINE | Facility: CLINIC | Age: 86
End: 2021-10-24
Payer: MEDICARE

## 2021-10-24 RX ORDER — SULFAMETHOXAZOLE AND TRIMETHOPRIM 800; 160 MG/1; MG/1
1 TABLET ORAL 2 TIMES DAILY
Qty: 20 TABLET | Refills: 0 | Status: SHIPPED | OUTPATIENT
Start: 2021-10-24 | End: 2021-10-24

## 2021-10-24 RX ORDER — SULFAMETHOXAZOLE AND TRIMETHOPRIM 800; 160 MG/1; MG/1
1 TABLET ORAL 2 TIMES DAILY
Qty: 10 TABLET | Refills: 0 | Status: SHIPPED | OUTPATIENT
Start: 2021-10-24 | End: 2021-10-25 | Stop reason: SDUPTHER

## 2021-10-25 RX ORDER — SULFAMETHOXAZOLE AND TRIMETHOPRIM 800; 160 MG/1; MG/1
1 TABLET ORAL 2 TIMES DAILY
Qty: 10 TABLET | Refills: 0 | Status: SHIPPED | OUTPATIENT
Start: 2021-10-25 | End: 2021-10-30

## 2021-11-30 ENCOUNTER — LAB VISIT (OUTPATIENT)
Dept: LAB | Facility: HOSPITAL | Age: 86
End: 2021-11-30
Attending: INTERNAL MEDICINE
Payer: MEDICARE

## 2021-11-30 DIAGNOSIS — N18.32 STAGE 3B CHRONIC KIDNEY DISEASE: ICD-10-CM

## 2021-11-30 DIAGNOSIS — E83.52 HYPERCALCEMIA: ICD-10-CM

## 2021-11-30 DIAGNOSIS — I12.9 PARENCHYMAL RENAL HYPERTENSION, STAGE 1 THROUGH STAGE 4 OR UNSPECIFIED CHRONIC KIDNEY DISEASE: ICD-10-CM

## 2021-11-30 LAB
ALBUMIN SERPL BCP-MCNC: 3.5 G/DL (ref 3.5–5.2)
ALP SERPL-CCNC: 131 U/L (ref 55–135)
ALT SERPL W/O P-5'-P-CCNC: 8 U/L (ref 10–44)
ANION GAP SERPL CALC-SCNC: 12 MMOL/L (ref 8–16)
AST SERPL-CCNC: 19 U/L (ref 10–40)
BILIRUB SERPL-MCNC: 1.7 MG/DL (ref 0.1–1)
BUN SERPL-MCNC: 20 MG/DL (ref 8–23)
CALCIUM SERPL-MCNC: 10.6 MG/DL (ref 8.7–10.5)
CHLORIDE SERPL-SCNC: 107 MMOL/L (ref 95–110)
CO2 SERPL-SCNC: 25 MMOL/L (ref 23–29)
CREAT SERPL-MCNC: 1.7 MG/DL (ref 0.5–1.4)
EST. GFR  (AFRICAN AMERICAN): 31 ML/MIN/1.73 M^2
EST. GFR  (NON AFRICAN AMERICAN): 27 ML/MIN/1.73 M^2
GLUCOSE SERPL-MCNC: 168 MG/DL (ref 70–110)
PHOSPHATE SERPL-MCNC: 1.5 MG/DL (ref 2.7–4.5)
POTASSIUM SERPL-SCNC: 3 MMOL/L (ref 3.5–5.1)
PROT SERPL-MCNC: 7 G/DL (ref 6–8.4)
PTH-INTACT SERPL-MCNC: 341.7 PG/ML (ref 9–77)
PTH-INTACT SERPL-MCNC: 341.7 PG/ML (ref 9–77)
SODIUM SERPL-SCNC: 144 MMOL/L (ref 136–145)

## 2021-11-30 PROCEDURE — 80053 COMPREHEN METABOLIC PANEL: CPT | Performed by: INTERNAL MEDICINE

## 2021-11-30 PROCEDURE — 82306 VITAMIN D 25 HYDROXY: CPT | Performed by: INTERNAL MEDICINE

## 2021-11-30 PROCEDURE — 36415 COLL VENOUS BLD VENIPUNCTURE: CPT | Performed by: INTERNAL MEDICINE

## 2021-11-30 PROCEDURE — 84100 ASSAY OF PHOSPHORUS: CPT | Performed by: INTERNAL MEDICINE

## 2021-11-30 PROCEDURE — 82397 CHEMILUMINESCENT ASSAY: CPT | Performed by: INTERNAL MEDICINE

## 2021-11-30 PROCEDURE — 83970 ASSAY OF PARATHORMONE: CPT | Performed by: INTERNAL MEDICINE

## 2021-12-01 LAB — 25(OH)D3+25(OH)D2 SERPL-MCNC: 26 NG/ML (ref 30–96)

## 2021-12-02 ENCOUNTER — OFFICE VISIT (OUTPATIENT)
Dept: NEPHROLOGY | Facility: CLINIC | Age: 86
End: 2021-12-02
Payer: MEDICARE

## 2021-12-02 VITALS
DIASTOLIC BLOOD PRESSURE: 60 MMHG | HEART RATE: 70 BPM | SYSTOLIC BLOOD PRESSURE: 122 MMHG | BODY MASS INDEX: 26.11 KG/M2 | HEIGHT: 63 IN | WEIGHT: 147.38 LBS | RESPIRATION RATE: 12 BRPM

## 2021-12-02 DIAGNOSIS — N18.32 STAGE 3B CHRONIC KIDNEY DISEASE: Primary | ICD-10-CM

## 2021-12-02 DIAGNOSIS — R80.9 PROTEINURIA, UNSPECIFIED TYPE: ICD-10-CM

## 2021-12-02 DIAGNOSIS — I12.9 PARENCHYMAL RENAL HYPERTENSION, STAGE 1 THROUGH STAGE 4 OR UNSPECIFIED CHRONIC KIDNEY DISEASE: ICD-10-CM

## 2021-12-02 DIAGNOSIS — E83.52 HYPERCALCEMIA: ICD-10-CM

## 2021-12-02 PROCEDURE — 99214 OFFICE O/P EST MOD 30 MIN: CPT | Mod: S$GLB,,, | Performed by: INTERNAL MEDICINE

## 2021-12-02 PROCEDURE — 99214 PR OFFICE/OUTPT VISIT, EST, LEVL IV, 30-39 MIN: ICD-10-PCS | Mod: S$GLB,,, | Performed by: INTERNAL MEDICINE

## 2021-12-02 PROCEDURE — 99999 PR PBB SHADOW E&M-EST. PATIENT-LVL III: ICD-10-PCS | Mod: PBBFAC,,, | Performed by: INTERNAL MEDICINE

## 2021-12-02 PROCEDURE — 99999 PR PBB SHADOW E&M-EST. PATIENT-LVL III: CPT | Mod: PBBFAC,,, | Performed by: INTERNAL MEDICINE

## 2021-12-07 LAB — PTH RELATED PROT SERPL-SCNC: 1 PMOL/L

## 2021-12-14 ENCOUNTER — TELEPHONE (OUTPATIENT)
Dept: FAMILY MEDICINE | Facility: CLINIC | Age: 86
End: 2021-12-14
Payer: MEDICARE

## 2021-12-14 RX ORDER — PROMETHAZINE HYDROCHLORIDE AND DEXTROMETHORPHAN HYDROBROMIDE 6.25; 15 MG/5ML; MG/5ML
5 SYRUP ORAL 2 TIMES DAILY PRN
Qty: 180 ML | Refills: 0 | Status: SHIPPED | OUTPATIENT
Start: 2021-12-14 | End: 2021-12-24

## 2022-01-10 ENCOUNTER — TELEPHONE (OUTPATIENT)
Dept: PULMONOLOGY | Facility: CLINIC | Age: 87
End: 2022-01-10
Payer: MEDICARE

## 2022-01-10 DIAGNOSIS — J44.9 CHRONIC OBSTRUCTIVE PULMONARY DISEASE, UNSPECIFIED COPD TYPE: Primary | ICD-10-CM

## 2022-01-17 RX ORDER — FUROSEMIDE 20 MG/1
TABLET ORAL
Qty: 180 TABLET | Refills: 1 | Status: SHIPPED | OUTPATIENT
Start: 2022-01-17 | End: 2022-03-11

## 2022-01-25 ENCOUNTER — OFFICE VISIT (OUTPATIENT)
Dept: FAMILY MEDICINE | Facility: CLINIC | Age: 87
End: 2022-01-25
Payer: MEDICARE

## 2022-01-25 ENCOUNTER — LAB VISIT (OUTPATIENT)
Dept: LAB | Facility: HOSPITAL | Age: 87
End: 2022-01-25
Attending: INTERNAL MEDICINE
Payer: MEDICARE

## 2022-01-25 VITALS
OXYGEN SATURATION: 96 % | SYSTOLIC BLOOD PRESSURE: 110 MMHG | DIASTOLIC BLOOD PRESSURE: 62 MMHG | WEIGHT: 141.13 LBS | RESPIRATION RATE: 16 BRPM | HEART RATE: 96 BPM | TEMPERATURE: 98 F | BODY MASS INDEX: 24.99 KG/M2

## 2022-01-25 DIAGNOSIS — N18.4 CHRONIC KIDNEY DISEASE, STAGE 4 (SEVERE): Chronic | ICD-10-CM

## 2022-01-25 DIAGNOSIS — I50.9 CONGESTIVE HEART FAILURE, UNSPECIFIED HF CHRONICITY, UNSPECIFIED HEART FAILURE TYPE: ICD-10-CM

## 2022-01-25 DIAGNOSIS — F01.50 VASCULAR DEMENTIA WITHOUT BEHAVIORAL DISTURBANCE: Chronic | ICD-10-CM

## 2022-01-25 DIAGNOSIS — Z79.01 CURRENT USE OF LONG TERM ANTICOAGULATION: Chronic | ICD-10-CM

## 2022-01-25 DIAGNOSIS — I48.21 PERMANENT ATRIAL FIBRILLATION: Chronic | ICD-10-CM

## 2022-01-25 DIAGNOSIS — R73.9 HYPERGLYCEMIA: ICD-10-CM

## 2022-01-25 DIAGNOSIS — J96.11 CHRONIC RESPIRATORY FAILURE WITH HYPOXIA, ON HOME OXYGEN THERAPY: Chronic | ICD-10-CM

## 2022-01-25 DIAGNOSIS — I10 ESSENTIAL HYPERTENSION: Chronic | ICD-10-CM

## 2022-01-25 DIAGNOSIS — I13.0 BENIGN HYPERTENSIVE HEART AND KIDNEY DISEASE WITH HF AND CKD: Primary | Chronic | ICD-10-CM

## 2022-01-25 DIAGNOSIS — Z99.81 CHRONIC RESPIRATORY FAILURE WITH HYPOXIA, ON HOME OXYGEN THERAPY: Chronic | ICD-10-CM

## 2022-01-25 DIAGNOSIS — I13.0 BENIGN HYPERTENSIVE HEART AND KIDNEY DISEASE WITH HF AND CKD: Chronic | ICD-10-CM

## 2022-01-25 DIAGNOSIS — E78.5 DYSLIPIDEMIA: Chronic | ICD-10-CM

## 2022-01-25 LAB
ANION GAP SERPL CALC-SCNC: 12 MMOL/L (ref 8–16)
BUN SERPL-MCNC: 20 MG/DL (ref 8–23)
CALCIUM SERPL-MCNC: 11.2 MG/DL (ref 8.7–10.5)
CHLORIDE SERPL-SCNC: 106 MMOL/L (ref 95–110)
CO2 SERPL-SCNC: 22 MMOL/L (ref 23–29)
CREAT SERPL-MCNC: 2.1 MG/DL (ref 0.5–1.4)
EST. GFR  (AFRICAN AMERICAN): 24.2 ML/MIN/1.73 M^2
EST. GFR  (NON AFRICAN AMERICAN): 21 ML/MIN/1.73 M^2
ESTIMATED AVG GLUCOSE: 134 MG/DL (ref 68–131)
GLUCOSE SERPL-MCNC: 102 MG/DL (ref 70–110)
HBA1C MFR BLD: 6.3 % (ref 4–5.6)
POTASSIUM SERPL-SCNC: 3.1 MMOL/L (ref 3.5–5.1)
SODIUM SERPL-SCNC: 140 MMOL/L (ref 136–145)

## 2022-01-25 PROCEDURE — 99999 PR PBB SHADOW E&M-EST. PATIENT-LVL IV: CPT | Mod: PBBFAC,,, | Performed by: INTERNAL MEDICINE

## 2022-01-25 PROCEDURE — 3074F SYST BP LT 130 MM HG: CPT | Mod: CPTII,S$GLB,, | Performed by: INTERNAL MEDICINE

## 2022-01-25 PROCEDURE — 3078F DIAST BP <80 MM HG: CPT | Mod: CPTII,S$GLB,, | Performed by: INTERNAL MEDICINE

## 2022-01-25 PROCEDURE — 83036 HEMOGLOBIN GLYCOSYLATED A1C: CPT | Performed by: INTERNAL MEDICINE

## 2022-01-25 PROCEDURE — 1101F PT FALLS ASSESS-DOCD LE1/YR: CPT | Mod: CPTII,S$GLB,, | Performed by: INTERNAL MEDICINE

## 2022-01-25 PROCEDURE — 80048 BASIC METABOLIC PNL TOTAL CA: CPT | Performed by: INTERNAL MEDICINE

## 2022-01-25 PROCEDURE — 3074F PR MOST RECENT SYSTOLIC BLOOD PRESSURE < 130 MM HG: ICD-10-PCS | Mod: CPTII,S$GLB,, | Performed by: INTERNAL MEDICINE

## 2022-01-25 PROCEDURE — 1126F AMNT PAIN NOTED NONE PRSNT: CPT | Mod: CPTII,S$GLB,, | Performed by: INTERNAL MEDICINE

## 2022-01-25 PROCEDURE — 1159F PR MEDICATION LIST DOCUMENTED IN MEDICAL RECORD: ICD-10-PCS | Mod: CPTII,S$GLB,, | Performed by: INTERNAL MEDICINE

## 2022-01-25 PROCEDURE — 99214 OFFICE O/P EST MOD 30 MIN: CPT | Mod: S$GLB,,, | Performed by: INTERNAL MEDICINE

## 2022-01-25 PROCEDURE — 3078F PR MOST RECENT DIASTOLIC BLOOD PRESSURE < 80 MM HG: ICD-10-PCS | Mod: CPTII,S$GLB,, | Performed by: INTERNAL MEDICINE

## 2022-01-25 PROCEDURE — 1101F PR PT FALLS ASSESS DOC 0-1 FALLS W/OUT INJ PAST YR: ICD-10-PCS | Mod: CPTII,S$GLB,, | Performed by: INTERNAL MEDICINE

## 2022-01-25 PROCEDURE — 1126F PR PAIN SEVERITY QUANTIFIED, NO PAIN PRESENT: ICD-10-PCS | Mod: CPTII,S$GLB,, | Performed by: INTERNAL MEDICINE

## 2022-01-25 PROCEDURE — 1159F MED LIST DOCD IN RCRD: CPT | Mod: CPTII,S$GLB,, | Performed by: INTERNAL MEDICINE

## 2022-01-25 PROCEDURE — 3288F FALL RISK ASSESSMENT DOCD: CPT | Mod: CPTII,S$GLB,, | Performed by: INTERNAL MEDICINE

## 2022-01-25 PROCEDURE — 36415 COLL VENOUS BLD VENIPUNCTURE: CPT | Mod: PO | Performed by: INTERNAL MEDICINE

## 2022-01-25 PROCEDURE — 3288F PR FALLS RISK ASSESSMENT DOCUMENTED: ICD-10-PCS | Mod: CPTII,S$GLB,, | Performed by: INTERNAL MEDICINE

## 2022-01-25 PROCEDURE — 99214 PR OFFICE/OUTPT VISIT, EST, LEVL IV, 30-39 MIN: ICD-10-PCS | Mod: S$GLB,,, | Performed by: INTERNAL MEDICINE

## 2022-01-25 PROCEDURE — 99999 PR PBB SHADOW E&M-EST. PATIENT-LVL IV: ICD-10-PCS | Mod: PBBFAC,,, | Performed by: INTERNAL MEDICINE

## 2022-01-25 RX ORDER — DONEPEZIL HYDROCHLORIDE 5 MG/1
5 TABLET, FILM COATED ORAL NIGHTLY
Qty: 90 TABLET | Refills: 3 | Status: SHIPPED | OUTPATIENT
Start: 2022-01-25 | End: 2023-02-23

## 2022-01-25 NOTE — PROGRESS NOTES
Subjective:       Patient ID: Christy Webber is a 86 y.o. female.    Chief Complaint: Follow-up (3m), Hypertension, Hyperlipidemia, Congestive Heart Failure, Atrial Fibrillation, and Anticoagulation    Follow-up  Associated symptoms include arthralgias, fatigue, myalgias and weakness. Pertinent negatives include no abdominal pain, chest pain, chills, congestion, coughing, diaphoresis, fever, headaches, joint swelling, nausea, neck pain, numbness, rash, sore throat or vomiting.   Hypertension  Pertinent negatives include no chest pain, headaches, neck pain, palpitations or shortness of breath.   Hyperlipidemia  Associated symptoms include myalgias. Pertinent negatives include no chest pain or shortness of breath.   Congestive Heart Failure  Associated symptoms include fatigue. Pertinent negatives include no abdominal pain, chest pain, palpitations, shortness of breath or unexpected weight change.   Atrial Fibrillation  Symptoms include weakness. Symptoms are negative for chest pain, dizziness, palpitations and shortness of breath. Past medical history includes atrial fibrillation, CHF and hyperlipidemia.     Past Medical History:   Diagnosis Date    Atrial flutter     Biatrial enlargement     CAD S/P percutaneous coronary angioplasty     CHF (congestive heart failure)     Chronic respiratory failure with hypoxia, on home oxygen therapy     Hyperlipidemia     Hypertension     Non-STEMI (non-ST elevated myocardial infarction)     Pacemaker 08/2016    Single lead [employer] Pacemaker for sick sinus syndrome    Sick sinus syndrome     SSS (sick sinus syndrome) 8/24/2016    - pacemaker in place     Past Surgical History:   Procedure Laterality Date    CARDIAC PACEMAKER PLACEMENT      HYSTERECTOMY      KIDNEY SURGERY       No family history on file.  Social History     Socioeconomic History    Marital status:    Tobacco Use    Smoking status: Former Smoker     Packs/day: 1.50     Years: 63.00     Pack  years: 94.50     Types: Cigarettes     Quit date: 2016     Years since quittin.6    Smokeless tobacco: Never Used   Substance and Sexual Activity    Alcohol use: Yes     Comment: rarely    Drug use: No    Sexual activity: Not Currently     Review of Systems   Constitutional: Positive for appetite change and fatigue. Negative for activity change, chills, diaphoresis, fever and unexpected weight change.   HENT: Negative for congestion, drooling, ear discharge, ear pain, facial swelling, hearing loss, mouth sores, nosebleeds, postnasal drip, rhinorrhea, sinus pressure, sneezing, sore throat, tinnitus, trouble swallowing and voice change.    Eyes: Negative for photophobia, redness and visual disturbance.   Respiratory: Negative for apnea, cough, choking, chest tightness, shortness of breath and wheezing.    Cardiovascular: Negative for chest pain, palpitations and leg swelling.   Gastrointestinal: Negative for abdominal distention, abdominal pain, blood in stool, constipation, diarrhea, nausea and vomiting.   Endocrine: Negative for cold intolerance, heat intolerance, polydipsia, polyphagia and polyuria.   Genitourinary: Negative for decreased urine volume, difficulty urinating, dysuria, flank pain, frequency, genital sores, hematuria, pelvic pain and urgency.   Musculoskeletal: Positive for arthralgias, gait problem and myalgias. Negative for back pain, joint swelling, neck pain and neck stiffness.   Skin: Negative for color change, pallor, rash and wound.   Allergic/Immunologic: Negative for food allergies and immunocompromised state.   Neurological: Positive for weakness. Negative for dizziness, tremors, seizures, syncope, speech difficulty, light-headedness, numbness and headaches.   Hematological: Negative for adenopathy. Does not bruise/bleed easily.   Psychiatric/Behavioral: Positive for confusion. Negative for agitation, behavioral problems, decreased concentration, dysphoric mood, hallucinations,  self-injury, sleep disturbance and suicidal ideas. The patient is not nervous/anxious and is not hyperactive.    All other systems reviewed and are negative.      Objective:      Physical Exam  Vitals and nursing note reviewed.   Constitutional:       General: She is not in acute distress.     Appearance: Normal appearance. She is well-developed and well-nourished. She is not diaphoretic.   HENT:      Head: Normocephalic and atraumatic.      Mouth/Throat:      Pharynx: No oropharyngeal exudate.   Eyes:      General: No scleral icterus.  Neck:      Thyroid: No thyromegaly.      Vascular: No carotid bruit or JVD.      Trachea: No tracheal deviation.   Cardiovascular:      Rate and Rhythm: Normal rate and regular rhythm.      Pulses: Intact distal pulses.      Heart sounds: Normal heart sounds.   Pulmonary:      Effort: Pulmonary effort is normal. No respiratory distress.      Breath sounds: Normal breath sounds. No wheezing or rales.   Chest:      Chest wall: No tenderness.   Abdominal:      General: Bowel sounds are normal. There is no distension.      Palpations: Abdomen is soft.      Tenderness: There is no abdominal tenderness. There is no guarding or rebound.   Musculoskeletal:         General: No tenderness or edema. Normal range of motion.      Cervical back: Normal range of motion and neck supple.   Lymphadenopathy:      Cervical: No cervical adenopathy.   Skin:     General: Skin is warm and dry.      Coloration: Skin is not pale.      Findings: No erythema or rash.   Neurological:      Mental Status: She is alert.   Psychiatric:         Mood and Affect: Mood and affect normal.         CMP  Sodium   Date Value Ref Range Status   11/30/2021 144 136 - 145 mmol/L Final   03/06/2020 135 135 - 148 Final     Potassium   Date Value Ref Range Status   11/30/2021 3.0 (L) 3.5 - 5.1 mmol/L Final   03/06/2020 4.2 3.5 - 5.5 Final     Chloride   Date Value Ref Range Status   11/30/2021 107 95 - 110 mmol/L Final   03/06/2020  103 96 - 109 Final     CO2   Date Value Ref Range Status   11/30/2021 25 23 - 29 mmol/L Final   03/06/2020 25 20 - 32 Final     Glucose   Date Value Ref Range Status   11/30/2021 168 (H) 70 - 110 mg/dL Final     BUN   Date Value Ref Range Status   11/30/2021 20 8 - 23 mg/dL Final   03/06/2020 28 (H) 5 - 26 Final     Creatinine   Date Value Ref Range Status   11/30/2021 1.7 (H) 0.5 - 1.4 mg/dL Final   03/06/2020 1.15 0.50 - 1.50 Final     Calcium   Date Value Ref Range Status   11/30/2021 10.6 (H) 8.7 - 10.5 mg/dL Final   03/06/2020 10.1 8.5 - 10.6 Final     Total Protein   Date Value Ref Range Status   11/30/2021 7.0 6.0 - 8.4 g/dL Final     Albumin   Date Value Ref Range Status   11/30/2021 3.5 3.5 - 5.2 g/dL Final   03/06/2020 3.2 3.2 - 5.6 Final     Total Bilirubin   Date Value Ref Range Status   11/30/2021 1.7 (H) 0.1 - 1.0 mg/dL Final     Comment:     For infants and newborns, interpretation of results should be based  on gestational age, weight and in agreement with clinical  observations.    Premature Infant recommended reference ranges:  Up to 24 hours.............<8.0 mg/dL  Up to 48 hours............<12.0 mg/dL  3-5 days..................<15.0 mg/dL  6-29 days.................<15.0 mg/dL       Alkaline Phosphatase   Date Value Ref Range Status   11/30/2021 131 55 - 135 U/L Final     AST   Date Value Ref Range Status   11/30/2021 19 10 - 40 U/L Final   03/06/2020 19 0 - 40 Final     ALT   Date Value Ref Range Status   11/30/2021 8 (L) 10 - 44 U/L Final   03/06/2020 15 0 - 40 Final     Anion Gap   Date Value Ref Range Status   11/30/2021 12 8 - 16 mmol/L Final   03/06/2020 7 0 - 25 Final     eGFR if    Date Value Ref Range Status   11/30/2021 31 (A) >60 mL/min/1.73 m^2 Final     eGFR if non    Date Value Ref Range Status   11/30/2021 27 (A) >60 mL/min/1.73 m^2 Final     Comment:     Calculation used to obtain the estimated glomerular filtration  rate (eGFR) is the CKD-EPI  equation.        Lab Results   Component Value Date    WBC 6.34 09/23/2021    HGB 15.1 09/23/2021    HCT 46.2 09/23/2021    MCV 98 09/23/2021     09/23/2021     Lab Results   Component Value Date    CHOL 166 09/23/2021     Lab Results   Component Value Date    HDL 42 09/23/2021     Lab Results   Component Value Date    LDLCALC 102.6 09/23/2021     Lab Results   Component Value Date    TRIG 107 09/23/2021     Lab Results   Component Value Date    CHOLHDL 25.3 09/23/2021     Lab Results   Component Value Date    TSH 2.278 09/23/2021     Lab Results   Component Value Date    HGBA1C 5.6 09/23/2021     Assessment:       1. 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 Congestive heart failure, unspecified HF chronicity, unspecified heart failure type    3. Chronic kidney disease, stage 4 (severe)    4. Chronic respiratory failure with hypoxia, on home oxygen therapy    5. Essential hypertension    6. Dyslipidemia    7. Vascular dementia without behavioral disturbance    8. Permanent atrial fibrillation    9. Current use of long term anticoagulation    10. 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  -     Basic Metabolic Panel; Future; Expected date: 01/25/2022    Congestive heart failure, unspecified HF chronicity, unspecified heart failure type    Chronic kidney disease, stage 4 (severe)    Chronic respiratory failure with hypoxia, on home oxygen therapy    Essential hypertension    Dyslipidemia    Vascular dementia without behavioral disturbance    Permanent atrial fibrillation    Current use of long term anticoagulation    Hyperglycemia  -     Hemoglobin A1C; Future; Expected date: 01/25/2022    Other orders  -     donepeziL (ARICEPT) 5 MG tablet; Take 1 tablet (5 mg total) by mouth every evening.  Dispense: 90 tablet; Refill: 3    Continue meds--------as above---------f/u 4 months------

## 2022-02-12 ENCOUNTER — HOSPITAL ENCOUNTER (EMERGENCY)
Facility: HOSPITAL | Age: 87
Discharge: HOME | End: 2022-02-12
Attending: EMERGENCY MEDICINE
Payer: MEDICARE

## 2022-02-12 VITALS
BODY MASS INDEX: 24.99 KG/M2 | HEART RATE: 87 BPM | RESPIRATION RATE: 20 BRPM | TEMPERATURE: 99 F | SYSTOLIC BLOOD PRESSURE: 159 MMHG | OXYGEN SATURATION: 96 % | HEIGHT: 63 IN | DIASTOLIC BLOOD PRESSURE: 81 MMHG

## 2022-02-12 DIAGNOSIS — I50.9 ACUTE ON CHRONIC CONGESTIVE HEART FAILURE, UNSPECIFIED HEART FAILURE TYPE: Primary | ICD-10-CM

## 2022-02-12 DIAGNOSIS — R05.9 COUGH: ICD-10-CM

## 2022-02-12 LAB
ALBUMIN SERPL BCP-MCNC: 3.6 G/DL (ref 3.5–5.2)
ALP SERPL-CCNC: 121 U/L (ref 55–135)
ALT SERPL W/O P-5'-P-CCNC: 9 U/L (ref 10–44)
ANION GAP SERPL CALC-SCNC: 12 MMOL/L (ref 8–16)
AST SERPL-CCNC: 16 U/L (ref 10–40)
BACTERIA #/AREA URNS HPF: NORMAL /HPF
BASOPHILS # BLD AUTO: 0.09 K/UL (ref 0–0.2)
BASOPHILS NFR BLD: 1.7 % (ref 0–1.9)
BILIRUB SERPL-MCNC: 3.1 MG/DL (ref 0.1–1)
BILIRUB UR QL STRIP: NEGATIVE
BNP SERPL-MCNC: 4123 PG/ML (ref 0–99)
BUN SERPL-MCNC: 16 MG/DL (ref 8–23)
CALCIUM SERPL-MCNC: 10.3 MG/DL (ref 8.7–10.5)
CHLORIDE SERPL-SCNC: 107 MMOL/L (ref 95–110)
CLARITY UR: CLEAR
CO2 SERPL-SCNC: 24 MMOL/L (ref 23–29)
COLOR UR: YELLOW
CREAT SERPL-MCNC: 1.6 MG/DL (ref 0.5–1.4)
CTP QC/QA: YES
CTP QC/QA: YES
DIFFERENTIAL METHOD: ABNORMAL
EOSINOPHIL # BLD AUTO: 0.2 K/UL (ref 0–0.5)
EOSINOPHIL NFR BLD: 3.6 % (ref 0–8)
ERYTHROCYTE [DISTWIDTH] IN BLOOD BY AUTOMATED COUNT: 21.1 % (ref 11.5–14.5)
EST. GFR  (AFRICAN AMERICAN): 34 ML/MIN/1.73 M^2
EST. GFR  (NON AFRICAN AMERICAN): 29 ML/MIN/1.73 M^2
GLUCOSE SERPL-MCNC: 102 MG/DL (ref 70–110)
GLUCOSE UR QL STRIP: NEGATIVE
HCT VFR BLD AUTO: 41.5 % (ref 37–48.5)
HGB BLD-MCNC: 14 G/DL (ref 12–16)
HGB UR QL STRIP: ABNORMAL
HYALINE CASTS #/AREA URNS LPF: 0 /LPF
IMM GRANULOCYTES # BLD AUTO: 0.04 K/UL (ref 0–0.04)
IMM GRANULOCYTES NFR BLD AUTO: 0.8 % (ref 0–0.5)
KETONES UR QL STRIP: NEGATIVE
LEUKOCYTE ESTERASE UR QL STRIP: NEGATIVE
LIPASE SERPL-CCNC: 5 U/L (ref 4–60)
LYMPHOCYTES # BLD AUTO: 1.2 K/UL (ref 1–4.8)
LYMPHOCYTES NFR BLD: 21.8 % (ref 18–48)
MCH RBC QN AUTO: 30.8 PG (ref 27–31)
MCHC RBC AUTO-ENTMCNC: 33.7 G/DL (ref 32–36)
MCV RBC AUTO: 91 FL (ref 82–98)
MICROSCOPIC COMMENT: NORMAL
MONOCYTES # BLD AUTO: 0.7 K/UL (ref 0.3–1)
MONOCYTES NFR BLD: 12.9 % (ref 4–15)
NEUTROPHILS # BLD AUTO: 3.2 K/UL (ref 1.8–7.7)
NEUTROPHILS NFR BLD: 59.2 % (ref 38–73)
NITRITE UR QL STRIP: NEGATIVE
NRBC BLD-RTO: 0 /100 WBC
PH UR STRIP: 6 [PH] (ref 5–8)
PLATELET # BLD AUTO: 194 K/UL (ref 150–450)
PMV BLD AUTO: 11.6 FL (ref 9.2–12.9)
POC MOLECULAR INFLUENZA A AGN: NEGATIVE
POC MOLECULAR INFLUENZA B AGN: NEGATIVE
POTASSIUM SERPL-SCNC: 3.5 MMOL/L (ref 3.5–5.1)
PROT SERPL-MCNC: 7 G/DL (ref 6–8.4)
PROT UR QL STRIP: ABNORMAL
RBC # BLD AUTO: 4.54 M/UL (ref 4–5.4)
RBC #/AREA URNS HPF: 1 /HPF (ref 0–4)
SARS-COV-2 RDRP RESP QL NAA+PROBE: NEGATIVE
SODIUM SERPL-SCNC: 143 MMOL/L (ref 136–145)
SP GR UR STRIP: 1.01 (ref 1–1.03)
TROPONIN I SERPL DL<=0.01 NG/ML-MCNC: 0.07 NG/ML (ref 0–0.03)
TROPONIN I SERPL DL<=0.01 NG/ML-MCNC: 0.07 NG/ML (ref 0–0.03)
URN SPEC COLLECT METH UR: ABNORMAL
UROBILINOGEN UR STRIP-ACNC: NEGATIVE EU/DL
WBC # BLD AUTO: 5.33 K/UL (ref 3.9–12.7)
WBC #/AREA URNS HPF: 1 /HPF (ref 0–5)

## 2022-02-12 PROCEDURE — 81000 URINALYSIS NONAUTO W/SCOPE: CPT | Performed by: NURSE PRACTITIONER

## 2022-02-12 PROCEDURE — 96374 THER/PROPH/DIAG INJ IV PUSH: CPT

## 2022-02-12 PROCEDURE — U0002 COVID-19 LAB TEST NON-CDC: HCPCS | Performed by: NURSE PRACTITIONER

## 2022-02-12 PROCEDURE — 99284 EMERGENCY DEPT VISIT MOD MDM: CPT | Mod: 25

## 2022-02-12 PROCEDURE — 83880 ASSAY OF NATRIURETIC PEPTIDE: CPT | Performed by: EMERGENCY MEDICINE

## 2022-02-12 PROCEDURE — 63600175 PHARM REV CODE 636 W HCPCS: Performed by: EMERGENCY MEDICINE

## 2022-02-12 PROCEDURE — 84484 ASSAY OF TROPONIN QUANT: CPT | Mod: 91 | Performed by: EMERGENCY MEDICINE

## 2022-02-12 PROCEDURE — 85025 COMPLETE CBC W/AUTO DIFF WBC: CPT | Performed by: NURSE PRACTITIONER

## 2022-02-12 PROCEDURE — 80053 COMPREHEN METABOLIC PANEL: CPT | Performed by: NURSE PRACTITIONER

## 2022-02-12 PROCEDURE — 83690 ASSAY OF LIPASE: CPT | Performed by: NURSE PRACTITIONER

## 2022-02-12 RX ORDER — FUROSEMIDE 10 MG/ML
40 INJECTION INTRAMUSCULAR; INTRAVENOUS
Status: COMPLETED | OUTPATIENT
Start: 2022-02-12 | End: 2022-02-12

## 2022-02-12 RX ADMIN — FUROSEMIDE 40 MG: 10 INJECTION, SOLUTION INTRAMUSCULAR; INTRAVENOUS at 01:02

## 2022-02-12 NOTE — PHARMACY MED REC
"Admission Medication History     The home medication history was taken by Juan Perkins.    You may go to "Admission" then "Reconcile Home Medications" tabs to review and/or act upon these items.      The home medication list has been updated by the Pharmacy department.    Please read ALL comments highlighted in yellow.    Please address this information as you see fit.     Feel free to contact us if you have any questions or require assistance.      The medications listed below were removed from the home medication list. Please reorder if appropriate:  Patient reports no longer taking the following medication(s):   FLUTICASONE FUROATE-VILANTEROL (BREO) 100-25 MCG/DOSE DISKUS INHALER    Medications listed below were obtained from: Patient/family, Medications brought from home, Analytic software- [employer] and Medical records  (Not in a hospital admission)      Potential issues to be addressed PRIOR TO DISCHARGE: NONE      Juan Perkins, CPhT  [facility] [phone]      Current Outpatient Medications on File Prior to Encounter   Medication Sig Dispense Refill Last Dose    albuterol (PROVENTIL/VENTOLIN HFA) 90 mcg/actuation inhaler Inhale 1-2 puffs into the lungs every 6 (six) hours as needed for Wheezing. Rescue 18 g 1 2/12/2022 at Unknown time    apixaban (ELIQUIS) 2.5 mg Tab Take 1 tablet (2.5 mg total) by mouth 2 (two) times daily. 60 tablet 6 2/12/2022 at Unknown time    donepeziL (ARICEPT) 5 MG tablet Take 1 tablet (5 mg total) by mouth every evening. 90 tablet 3 2/12/2022 at Unknown time    famotidine (PEPCID) 20 MG tablet Take 1 tablet (20 mg total) by mouth daily as needed for Heartburn. 90 tablet 0 2/12/2022 at Unknown time    furosemide (LASIX) 20 MG tablet TAKE 1 TABLET BY MOUTH TWICE A  tablet 1 2/12/2022 at Unknown time    metoprolol succinate (TOPROL-XL) 25 MG 24 hr tablet Take 1 tablet (25 mg total) by mouth once daily. 90 tablet 1 2/12/2022 at Unknown time    rosuvastatin (CRESTOR) " 10 MG tablet Take 1 tablet (10 mg total) by mouth once daily. 90 tablet 1 2/12/2022 at Unknown time    [DISCONTINUED] docusate sodium (COLACE) 100 MG capsule Take 1 capsule (100 mg total) by mouth Daily. 90 capsule 0 2/12/2022 at Unknown time    [DISCONTINUED] fluticasone furoate-vilanteroL (BREO) 100-25 mcg/dose diskus inhaler Inhale 1 puff into the lungs once daily. Controller   2/12/2022 at Unknown time    nitroGLYCERIN (NITROSTAT) 0.4 MG SL tablet Place 1 tablet (0.4 mg total) under the tongue every 5 (five) minutes as needed for Chest pain. 25 tablet 5                              .

## 2022-02-12 NOTE — FIRST PROVIDER EVALUATION
"Medical screening exam completed.  I have conducted a focused provider triage encounter, findings are as follows:    Brief history of present illness:  Pt presents with generalized weakness and cough. Also reports lower abdominal pain    Vitals:    02/12/22 1144   BP: (!) 159/94   BP Location: Right arm   Patient Position: Sitting   Pulse: 93   Resp: 20   Temp: 98.9 °F (37.2 °C)   TempSrc: Oral   SpO2: 97%   Weight: (S)   Comment: unable to stand   Height: 5' 3" (1.6 m)     Preliminary workup initiated; this workup will be continued and followed by the physician or advanced practice provider that is assigned to the patient when roomed.  "

## 2022-02-12 NOTE — ED PROVIDER NOTES
SCRIBE #1 NOTE: I, Lorraine Alanis, am scribing for, and in the presence of, Alexsander Cruz MD. I have scribed the HPI, ROS, and PEx.     SCRIBE #2 NOTE: I, Deng Grant, am scribing for, and in the presence of,  Kate Chisholm MD. I have scribed the remaining portions of the note not scribed by Scribe #1.      History     Chief Complaint   Patient presents with    Cough     Cough, keeping her up at night, no fever.      Review of patient's allergies indicates:  No Known Allergies      History of Present Illness     HPI    2/12/2022, 1:50 PM  History obtained from the patient      History of Present Illness: Christy Webber is a 86 y.o. female patient with a PMHx of atrial flutter, CHF, HTN, and Non-STEMI (non-ST elevated myocardial infarction) who presents to the Emergency Department for evaluation of cough which onset several days pta. Pt is on lasix, but reports to have run out of some medication.  Symptoms are intermittent and moderate in severity. No mitigating or exacerbating factors reported. Associated sxs include weakness, dizziness, and SOB. Patient denies any fever, chills, n/v/d, HA, rhinorrhea, and all other sxs at this time. No prior Tx reported. No further complaints or concerns at this time.       Arrival mode: Personal vehicle     PCP: Christian Douglass MD        Past Medical History:  Past Medical History:   Diagnosis Date    Atrial flutter     Biatrial enlargement     CAD S/P percutaneous coronary angioplasty     CHF (congestive heart failure)     Chronic respiratory failure with hypoxia, on home oxygen therapy     Hyperlipidemia     Hypertension     Non-STEMI (non-ST elevated myocardial infarction)     Pacemaker 08/2016    Single lead [employer] Pacemaker for sick sinus syndrome    Sick sinus syndrome     SSS (sick sinus syndrome) 8/24/2016    - pacemaker in place       Past Surgical History:  Past Surgical History:   Procedure Laterality Date    CARDIAC PACEMAKER  PLACEMENT      HYSTERECTOMY      KIDNEY SURGERY           Family History:  No family history on file.    Social History:  Social History     Tobacco Use    Smoking status: Former Smoker     Packs/day: 1.50     Years: 63.00     Pack years: 94.50     Types: Cigarettes     Quit date: 2016     Years since quittin.6    Smokeless tobacco: Never Used   Substance and Sexual Activity    Alcohol use: Yes     Comment: rarely    Drug use: No    Sexual activity: Not Currently        Review of Systems     Review of Systems   Constitutional: Negative for chills and fever.   HENT: Negative for rhinorrhea and sore throat.    Respiratory: Positive for cough and shortness of breath.    Cardiovascular: Negative for chest pain.   Gastrointestinal: Negative for diarrhea, nausea and vomiting.   Genitourinary: Negative for dysuria.   Musculoskeletal: Negative for back pain.   Skin: Negative for rash.   Neurological: Positive for dizziness and weakness. Negative for headaches.   Hematological: Does not bruise/bleed easily.   All other systems reviewed and are negative.       Physical Exam     Initial Vitals [22 1144]   BP Pulse Resp Temp SpO2   (!) 159/94 93 20 98.9 °F (37.2 °C) 97 %      MAP       --          Physical Exam  Nursing Notes and Vital Signs Reviewed.  Constitutional: Patient is in no acute distress. Well-developed and well-nourished.  Head: Atraumatic. Normocephalic.  Eyes: PERRL. EOM intact. Conjunctivae are not pale. No scleral icterus.  ENT: Mucous membranes are moist. Oropharynx is clear and symmetric.    Neck: Supple. Full ROM. No lymphadenopathy.  Cardiovascular: Regular rate. Regular rhythm. No murmurs, rubs, or gallops. Distal pulses are 2+ and symmetric.  Pulmonary/Chest: No respiratory distress. Crackles bilaterally. No wheezing or rales.  Abdominal: Soft and non-distended.  There is no tenderness.  No rebound, guarding, or rigidity. Good bowel sounds.  Genitourinary: No CVA  "tenderness  Musculoskeletal: Moves all extremities. No obvious deformities. Trace edema in tibial region. No calf tenderness.  Skin: Warm and dry.  Neurological:  Alert, awake, and appropriate.  Normal speech.  No acute focal neurological deficits are appreciated.  Psychiatric: Normal affect. Good eye contact. Appropriate in content.     ED Course   Procedures  ED Vital Signs:  Vitals:    02/12/22 1144 02/12/22 1551 02/12/22 1702   BP: (!) 159/94 (!) 157/83 (!) 159/81   Pulse: 93 89 87   Resp: 20 20    Temp: 98.9 °F (37.2 °C)     TempSrc: Oral     SpO2: 97% 97% 96%   Height: 5' 3" (1.6 m)         Abnormal Lab Results:  Labs Reviewed   CBC W/ AUTO DIFFERENTIAL - Abnormal; Notable for the following components:       Result Value    RDW 21.1 (*)     Immature Granulocytes 0.8 (*)     All other components within normal limits   COMPREHENSIVE METABOLIC PANEL - Abnormal; Notable for the following components:    Creatinine 1.6 (*)     Total Bilirubin 3.1 (*)     ALT 9 (*)     eGFR if  34 (*)     eGFR if non  29 (*)     All other components within normal limits   URINALYSIS, REFLEX TO URINE CULTURE - Abnormal; Notable for the following components:    Protein, UA 1+ (*)     Occult Blood UA Trace (*)     All other components within normal limits    Narrative:     Specimen Source->Urine   B-TYPE NATRIURETIC PEPTIDE - Abnormal; Notable for the following components:    BNP 4,123 (*)     All other components within normal limits   TROPONIN I - Abnormal; Notable for the following components:    Troponin I 0.074 (*)     All other components within normal limits   TROPONIN I - Abnormal; Notable for the following components:    Troponin I 0.066 (*)     All other components within normal limits   LIPASE   URINALYSIS MICROSCOPIC    Narrative:     Specimen Source->Urine   SARS-COV-2 RDRP GENE    Narrative:     This test utilizes isothermal nucleic acid amplification   technology to detect the SARS-CoV-2 " RdRp nucleic acid segment.   The analytical sensitivity (limit of detection) is 125 genome   equivalents/mL.   A POSITIVE result implies infection with the SARS-CoV-2 virus;   the patient is presumed to be contagious.     A NEGATIVE result means that SARS-CoV-2 nucleic acids are not   present above the limit of detection. A NEGATIVE result should be   treated as presumptive. It does not rule out the possibility of   COVID-19 and should not be the sole basis for treatment decisions.   If COVID-19 is strongly suspected based on clinical and exposure   history, re-testing using an alternate molecular assay should be   considered.   This test is only for use under the Food and Drug   Administration s Emergency Use Authorization (EUA).   Commercial kits are provided by [employer].   Performance characteristics of the EUA have been independently   verified by [facility] Department of   Pathology and Laboratory Medicine.   _________________________________________________________________   The authorized Fact Sheet for Healthcare Providers and the authorized Fact   Sheet for Patients of the ID NOW COVID-19 are available on the FDA   website:     https://www.fda.gov/media/056881/download  https://www.fda.gov/media/627561/download         POCT INFLUENZA A/B MOLECULAR        All Lab Results:  Results for orders placed or performed during the hospital encounter of 02/12/22   CBC auto differential   Result Value Ref Range    WBC 5.33 3.90 - 12.70 K/uL    RBC 4.54 4.00 - 5.40 M/uL    Hemoglobin 14.0 12.0 - 16.0 g/dL    Hematocrit 41.5 37.0 - 48.5 %    MCV 91 82 - 98 fL    MCH 30.8 27.0 - 31.0 pg    MCHC 33.7 32.0 - 36.0 g/dL    RDW 21.1 (H) 11.5 - 14.5 %    Platelets 194 150 - 450 K/uL    MPV 11.6 9.2 - 12.9 fL    Immature Granulocytes 0.8 (H) 0.0 - 0.5 %    Gran # (ANC) 3.2 1.8 - 7.7 K/uL    Immature Grans (Abs) 0.04 0.00 - 0.04 K/uL    Lymph # 1.2 1.0 - 4.8 K/uL    Mono # 0.7 0.3 - 1.0 K/uL    Eos # 0.2 0.0  - 0.5 K/uL    Baso # 0.09 0.00 - 0.20 K/uL    nRBC 0 0 /100 WBC    Gran % 59.2 38.0 - 73.0 %    Lymph % 21.8 18.0 - 48.0 %    Mono % 12.9 4.0 - 15.0 %    Eosinophil % 3.6 0.0 - 8.0 %    Basophil % 1.7 0.0 - 1.9 %    Differential Method Automated    Comprehensive metabolic panel   Result Value Ref Range    Sodium 143 136 - 145 mmol/L    Potassium 3.5 3.5 - 5.1 mmol/L    Chloride 107 95 - 110 mmol/L    CO2 24 23 - 29 mmol/L    Glucose 102 70 - 110 mg/dL    BUN 16 8 - 23 mg/dL    Creatinine 1.6 (H) 0.5 - 1.4 mg/dL    Calcium 10.3 8.7 - 10.5 mg/dL    Total Protein 7.0 6.0 - 8.4 g/dL    Albumin 3.6 3.5 - 5.2 g/dL    Total Bilirubin 3.1 (H) 0.1 - 1.0 mg/dL    Alkaline Phosphatase 121 55 - 135 U/L    AST 16 10 - 40 U/L    ALT 9 (L) 10 - 44 U/L    Anion Gap 12 8 - 16 mmol/L    eGFR if African American 34 (A) >60 mL/min/1.73 m^2    eGFR if non African American 29 (A) >60 mL/min/1.73 m^2   Lipase   Result Value Ref Range    Lipase 5 4 - 60 U/L   Urinalysis, Reflex to Urine Culture Urine, Clean Catch    Specimen: Urine   Result Value Ref Range    Specimen UA Urine, Clean Catch     Color, UA Yellow Yellow, Straw, Alyssa    Appearance, UA Clear Clear    pH, UA 6.0 5.0 - 8.0    Specific Gravity, UA 1.010 1.005 - 1.030    Protein, UA 1+ (A) Negative    Glucose, UA Negative Negative    Ketones, UA Negative Negative    Bilirubin (UA) Negative Negative    Occult Blood UA Trace (A) Negative    Nitrite, UA Negative Negative    Urobilinogen, UA Negative <2.0 EU/dL    Leukocytes, UA Negative Negative   Brain natriuretic peptide   Result Value Ref Range    BNP 4,123 (H) 0 - 99 pg/mL   Troponin I   Result Value Ref Range    Troponin I 0.074 (H) 0.000 - 0.026 ng/mL   Urinalysis Microscopic   Result Value Ref Range    RBC, UA 1 0 - 4 /hpf    WBC, UA 1 0 - 5 /hpf    Bacteria Rare None-Occ /hpf    Hyaline Casts, UA 0 0-1/lpf /lpf    Microscopic Comment SEE COMMENT    Troponin I   Result Value Ref Range    Troponin I 0.066 (H) 0.000 - 0.026  ng/mL   POCT COVID-19 Rapid Screening   Result Value Ref Range    POC Rapid COVID Negative Negative     Acceptable Yes    POCT Influenza A/B Molecular   Result Value Ref Range    POC Molecular Influenza A Ag Negative Negative, Not Reported    POC Molecular Influenza B Ag Negative Negative, Not Reported     Acceptable Yes          Imaging Results:  Imaging Results          X-Ray Chest AP Portable (Final result)  Result time 02/12/22 12:20:57    Final result by Natalio Briscoe MD (02/12/22 12:20:57)                 Impression:      1.  Moderate vascular congestion.  Small effusions.  Progressive cardiac silhouette size enlargement (pericardial effusion difficult to exclude).  Findings are concerning for pulmonary edema.  Clinical correlation is advised.  Less likely could an interstitial infectious process cause this appearance.    2.  Stable findings as noted above.      Electronically signed by: Natalio Briscoe MD  Date:    02/12/2022  Time:    12:20             Narrative:    EXAMINATION:  XR CHEST AP PORTABLE    CLINICAL HISTORY:  Cough, unspecified    COMPARISON:  September 23, 2021    FINDINGS:  Moderate vascular congestion.  Small effusions.  Progressive cardiac silhouette size enlargement.  Negative for focal alveolar infiltrate.  The trachea is midline and the mediastinal width is normal. Negative for pneumothorax.  Negative for osseous abnormalities. Stable single lead left subclavian pacemaker, degenerative changes of the spine and both shoulder girdles with convex left curvature of the midthoracic spine, ectatic and tortuous aorta and aortic arch calcifications.                                          The Emergency Provider reviewed the vital signs and test results, which are outlined above.     ED Discussion     4:00 PM: Dr. Cruz transfers care of patient to Dr. Chisholm pending lab results.    5:09 PM: Reassessed pt at this time. Discussed with pt all pertinent ED information  and results. Discussed pt dx of  and plan of tx. Gave pt all f/u and return to the ED instructions. All questions and concerns were addressed at this time. Pt expresses understanding of information and instructions, and is comfortable with plan to discharge. Pt is stable for discharge.         Medical Decision Making:   Clinical Tests:   Lab Tests: Ordered and Reviewed  Radiological Study: Ordered and Reviewed           ED Medication(s):  Medications   furosemide injection 40 mg (40 mg Intravenous Given 2/12/22 4685)       Discharge Medication List as of 2/12/2022  5:06 PM           Follow-up Information     Schedule an appointment as soon as possible for a visit  with Christian Douglass MD.    Specialty: Internal Medicine  Why: As needed  Contact information:  [address] [place] [address] [phone]                             Scribe Attestation:   Scribe #1: I performed the above scribed service and the documentation accurately describes the services I performed. I attest to the accuracy of the note.     Attending:   Physician Attestation Statement for Scribe #1: I, Alexsander Cruz MD, personally performed the services described in this documentation, as scribed by Lorraine Alanis, in my presence, and it is both accurate and complete.       Scribe Attestation:   Scribe #2: I performed the above scribed service and the documentation accurately describes the services I performed. I attest to the accuracy of the note.    Attending Attestation:           Physician Attestation for Scribe:    Physician Attestation Statement for Scribe #2: I, Kate Chisholm MD, reviewed documentation, as scribed by Deng Petty in my presence, and it is both accurate and complete. I also acknowledge and confirm the content of the note done by Scribe #1.           Clinical Impression       ICD-10-CM ICD-9-CM   1. Acute on chronic congestive heart failure, unspecified heart failure type  I50.9 428.0   2. Cough   R05.9 786.2       Disposition:   Disposition: Discharged  Condition: Stable       Kate Chisholm MD  02/13/22 0366

## 2022-02-21 ENCOUNTER — OFFICE VISIT (OUTPATIENT)
Dept: PULMONOLOGY | Facility: CLINIC | Age: 87
End: 2022-02-21
Payer: MEDICARE

## 2022-02-21 ENCOUNTER — TELEPHONE (OUTPATIENT)
Dept: PULMONOLOGY | Facility: CLINIC | Age: 87
End: 2022-02-21
Payer: MEDICARE

## 2022-02-21 ENCOUNTER — CLINICAL SUPPORT (OUTPATIENT)
Dept: PULMONOLOGY | Facility: CLINIC | Age: 87
End: 2022-02-21
Payer: MEDICARE

## 2022-02-21 ENCOUNTER — HOSPITAL ENCOUNTER (OUTPATIENT)
Dept: RADIOLOGY | Facility: HOSPITAL | Age: 87
Discharge: HOME | End: 2022-02-21
Attending: INTERNAL MEDICINE
Payer: MEDICARE

## 2022-02-21 VITALS
OXYGEN SATURATION: 96 % | HEIGHT: 63 IN | RESPIRATION RATE: 16 BRPM | SYSTOLIC BLOOD PRESSURE: 158 MMHG | WEIGHT: 141 LBS | BODY MASS INDEX: 24.98 KG/M2 | DIASTOLIC BLOOD PRESSURE: 78 MMHG | HEART RATE: 88 BPM

## 2022-02-21 VITALS — BODY MASS INDEX: 25.01 KG/M2 | WEIGHT: 141.13 LBS | HEIGHT: 63 IN

## 2022-02-21 DIAGNOSIS — I48.21 PERMANENT ATRIAL FIBRILLATION: Chronic | ICD-10-CM

## 2022-02-21 DIAGNOSIS — Z99.81 CHRONIC RESPIRATORY FAILURE WITH HYPOXIA, ON HOME OXYGEN THERAPY: Chronic | ICD-10-CM

## 2022-02-21 DIAGNOSIS — J44.9 CHRONIC OBSTRUCTIVE PULMONARY DISEASE, UNSPECIFIED COPD TYPE: Chronic | ICD-10-CM

## 2022-02-21 DIAGNOSIS — J44.9 CHRONIC OBSTRUCTIVE PULMONARY DISEASE, UNSPECIFIED COPD TYPE: Primary | ICD-10-CM

## 2022-02-21 DIAGNOSIS — I35.1 NONRHEUMATIC AORTIC VALVE INSUFFICIENCY: Chronic | ICD-10-CM

## 2022-02-21 DIAGNOSIS — J43.2 CENTRILOBULAR EMPHYSEMA: Chronic | ICD-10-CM

## 2022-02-21 DIAGNOSIS — J44.9 CHRONIC OBSTRUCTIVE PULMONARY DISEASE, UNSPECIFIED COPD TYPE: ICD-10-CM

## 2022-02-21 DIAGNOSIS — I27.20 PULMONARY HYPERTENSION: Chronic | ICD-10-CM

## 2022-02-21 DIAGNOSIS — I10 ESSENTIAL HYPERTENSION: Chronic | ICD-10-CM

## 2022-02-21 DIAGNOSIS — J96.11 CHRONIC RESPIRATORY FAILURE WITH HYPOXIA, ON HOME OXYGEN THERAPY: Chronic | ICD-10-CM

## 2022-02-21 DIAGNOSIS — I27.20 PULMONARY HYPERTENSION: Primary | Chronic | ICD-10-CM

## 2022-02-21 DIAGNOSIS — I50.43 ACUTE ON CHRONIC COMBINED SYSTOLIC AND DIASTOLIC CONGESTIVE HEART FAILURE: ICD-10-CM

## 2022-02-21 DIAGNOSIS — E78.5 DYSLIPIDEMIA: Chronic | ICD-10-CM

## 2022-02-21 PROBLEM — Z78.9 NURSING HOME RESIDENT: Chronic | Status: RESOLVED | Noted: 2020-11-02 | Resolved: 2022-02-21

## 2022-02-21 PROBLEM — J18.9 LEFT LOWER LOBE PNEUMONIA: Status: RESOLVED | Noted: 2020-11-01 | Resolved: 2022-02-21

## 2022-02-21 PROCEDURE — 71046 X-RAY EXAM CHEST 2 VIEWS: CPT | Mod: TC

## 2022-02-21 PROCEDURE — 1159F PR MEDICATION LIST DOCUMENTED IN MEDICAL RECORD: ICD-10-PCS | Mod: CPTII,S$GLB,, | Performed by: INTERNAL MEDICINE

## 2022-02-21 PROCEDURE — 99499 RISK ADDL DX/OHS AUDIT: ICD-10-PCS | Mod: S$GLB,,, | Performed by: INTERNAL MEDICINE

## 2022-02-21 PROCEDURE — 99214 OFFICE O/P EST MOD 30 MIN: CPT | Mod: 25,S$GLB,, | Performed by: INTERNAL MEDICINE

## 2022-02-21 PROCEDURE — 3288F PR FALLS RISK ASSESSMENT DOCUMENTED: ICD-10-PCS | Mod: CPTII,S$GLB,, | Performed by: INTERNAL MEDICINE

## 2022-02-21 PROCEDURE — 99999 PR PBB SHADOW E&M-EST. PATIENT-LVL I: CPT | Mod: PBBFAC,,,

## 2022-02-21 PROCEDURE — 3078F PR MOST RECENT DIASTOLIC BLOOD PRESSURE < 80 MM HG: ICD-10-PCS | Mod: CPTII,S$GLB,, | Performed by: INTERNAL MEDICINE

## 2022-02-21 PROCEDURE — 99999 PR PBB SHADOW E&M-EST. PATIENT-LVL IV: CPT | Mod: PBBFAC,,, | Performed by: INTERNAL MEDICINE

## 2022-02-21 PROCEDURE — 71046 X-RAY EXAM CHEST 2 VIEWS: CPT | Mod: 26,,, | Performed by: RADIOLOGY

## 2022-02-21 PROCEDURE — 3078F DIAST BP <80 MM HG: CPT | Mod: CPTII,S$GLB,, | Performed by: INTERNAL MEDICINE

## 2022-02-21 PROCEDURE — 99499 UNLISTED E&M SERVICE: CPT | Mod: S$GLB,,, | Performed by: INTERNAL MEDICINE

## 2022-02-21 PROCEDURE — 1160F RVW MEDS BY RX/DR IN RCRD: CPT | Mod: CPTII,S$GLB,, | Performed by: INTERNAL MEDICINE

## 2022-02-21 PROCEDURE — 3077F PR MOST RECENT SYSTOLIC BLOOD PRESSURE >= 140 MM HG: ICD-10-PCS | Mod: CPTII,S$GLB,, | Performed by: INTERNAL MEDICINE

## 2022-02-21 PROCEDURE — 1101F PR PT FALLS ASSESS DOC 0-1 FALLS W/OUT INJ PAST YR: ICD-10-PCS | Mod: CPTII,S$GLB,, | Performed by: INTERNAL MEDICINE

## 2022-02-21 PROCEDURE — 94618 PULMONARY STRESS TESTING: CPT | Mod: S$GLB,,, | Performed by: INTERNAL MEDICINE

## 2022-02-21 PROCEDURE — 1160F PR REVIEW ALL MEDS BY PRESCRIBER/CLIN PHARMACIST DOCUMENTED: ICD-10-PCS | Mod: CPTII,S$GLB,, | Performed by: INTERNAL MEDICINE

## 2022-02-21 PROCEDURE — 94618 PULMONARY STRESS TESTING: ICD-10-PCS | Mod: S$GLB,,, | Performed by: INTERNAL MEDICINE

## 2022-02-21 PROCEDURE — 1159F MED LIST DOCD IN RCRD: CPT | Mod: CPTII,S$GLB,, | Performed by: INTERNAL MEDICINE

## 2022-02-21 PROCEDURE — 99214 PR OFFICE/OUTPT VISIT, EST, LEVL IV, 30-39 MIN: ICD-10-PCS | Mod: 25,S$GLB,, | Performed by: INTERNAL MEDICINE

## 2022-02-21 PROCEDURE — 71046 XR CHEST PA AND LATERAL: ICD-10-PCS | Mod: 26,,, | Performed by: RADIOLOGY

## 2022-02-21 PROCEDURE — 99999 PR PBB SHADOW E&M-EST. PATIENT-LVL IV: ICD-10-PCS | Mod: PBBFAC,,, | Performed by: INTERNAL MEDICINE

## 2022-02-21 PROCEDURE — 3077F SYST BP >= 140 MM HG: CPT | Mod: CPTII,S$GLB,, | Performed by: INTERNAL MEDICINE

## 2022-02-21 PROCEDURE — 3288F FALL RISK ASSESSMENT DOCD: CPT | Mod: CPTII,S$GLB,, | Performed by: INTERNAL MEDICINE

## 2022-02-21 PROCEDURE — 1101F PT FALLS ASSESS-DOCD LE1/YR: CPT | Mod: CPTII,S$GLB,, | Performed by: INTERNAL MEDICINE

## 2022-02-21 PROCEDURE — 99999 PR PBB SHADOW E&M-EST. PATIENT-LVL I: ICD-10-PCS | Mod: PBBFAC,,,

## 2022-02-21 RX ORDER — SULFAMETHOXAZOLE AND TRIMETHOPRIM 800; 160 MG/1; MG/1
1 TABLET ORAL 2 TIMES DAILY
COMMUNITY
Start: 2021-11-01 | End: 2022-07-19

## 2022-02-21 RX ORDER — BENZONATATE 100 MG/1
100 CAPSULE ORAL EVERY 4 HOURS PRN
Qty: 120 CAPSULE | Refills: 3 | Status: SHIPPED | OUTPATIENT
Start: 2022-02-21 | End: 2022-03-23

## 2022-02-21 NOTE — ASSESSMENT & PLAN NOTE
Patient is identified as having Combined Systolic and Diastolic heart failure that is Acute on chronic. CHF is currently controlled. Latest ECHO performed and demonstrates- Results for orders placed during the hospital encounter of 10/27/20    Echo    Interpretation Summary  · The left ventricle is mildly enlarged with mildly decreased systolic function. The estimated ejection fraction is 40%.  · Septal wall has abnormal motion consistent with right ventricular pacemaker.  · Grade II diastolic dysfunction.  · There is left ventricular eccentric hypertrophy.  · Severe left atrial enlargement.  · There is moderate left ventricular global hypokinesis.  · Moderate right ventricular enlargement.  · Moderately reduced right ventricular systolic function.  · Severe right atrial enlargement.  · Mild aortic regurgitation.  · Mild-to-moderate mitral regurgitation.  · Severe tricuspid regurgitation.  · Mild to moderate pulmonic regurgitation.  · Elevated central venous pressure (15 mmHg).  · The estimated PA systolic pressure is 42 mmHg.  · There is pulmonary hypertension.  . Continue Furosemide and monitor clinical status closely. Monitor on telemetry. Patient is on CHF pathway.  Monitor strict Is&Os and daily weights.  Place on fluid restriction of 1.5 L. Continue to stress to patient importance of self efficacy and  on diet for CHF. Last BNP reviewed- and noted below @LABRCNTIP(BNP,BNPTRIAGEBLO)@.        Rise in BNP suggest decompensation

## 2022-02-21 NOTE — PROCEDURES
"[facility] - Pulmonary Function  Six Minute Walk     SUMMARY     Ordering Provider: Dr. Watson   Interpreting Provider: Dr. Watson  Performing nurse/tech/RT: Lorraine CRT  Diagnosis:  (pulmonary hypertension, centrilobular emphysema)  Height: 5' 3" (160 cm)  Weight: 64 kg (141 lb 1.5 oz)  BMI (Calculated): 25   Patient Race:             Phase Oxygen Assessment Supplemental O2 Heart   Rate Blood Pressure De Dyspnea Scale Rating   Resting 96 % Room Air 86 bpm 133/69 2   Exercise        Minute        1 97 % Room Air 107 bpm     2 95 % Room Air 88 bpm     3 92 % Room Air 85 bpm     4 95 % Room Air 88 bpm     5 97 % Room Air 102 bpm     6  95 % Room Air 90 bpm 144/78 5-6   Recovery        Minute        1 95 % Room Air 92 bpm     2 97 % Room Air 74 bpm     3 97 % Room Air 87 bpm     4 98 % Room Air 80 bpm 151/80 4     Six Minute Walk Summary  6MWT Status: completed with stops  Patient Reported: Dyspnea (stomach pain)     Interpretation:  Did the patient stop or pause?: Yes  How many times did the patient stop or pause?: 2  Stop Time 1: 60  Restart Time 1: 90  Pause Time 1: 30 seconds  Stop Time 2: 150  Restart Time 2: 290  Pause Time 2: 140 seconds                    Total Time Walked (Calculated): 190 seconds  Final Partial Lap Distance (feet): 100 feet  Total Distance Meters (Calculated): 91.44 meters  Predicted Distance Meters (Calculated): 366.74 meters  Percentage of Predicted (Calculated): 24.93  Peak VO2 (Calculated): 6.72  Mets: 1.92  Has The Patient Had a Previous Six Minute Walk Test?: No       Previous 6MWT Results  Has The Patient Had a Previous Six Minute Walk Test?: No         CLINICAL INTERPRETATION:  Six minute walk distance is 91.44m (24.93 % predicted) with moderate dyspnea.  During exercise, there was no significant desaturation while breathing room air.  Blood pressure remained stable and Heart rate remained stable with walking.  The patient reported non-pulmonary symptoms during " exercise.  The patient did not complete the study, walking 190 seconds of the 360 second test.  No previous study performed.  Based upon age and body mass index, exercise capacity is less than predicted.      [x] Mild exercise-induced hypoxemia described as an arterial oxygen saturation of 93-95% (or 3-4% less than at rest)  []  Moderate exercise-induced hypoxemia as 89-93%  []  Severe exercise induced hypoxemia as < 89% O2 saturation.  Medicare Criteria for oxygen prescription comments:   []  When arterial oxygen saturation is at or below 88% during exercise (severe exercise induced hypoxemia) then the patient falls under Medicare Group 1 criteria for supplemental oxygen

## 2022-02-21 NOTE — PROGRESS NOTES
Pulmonary Outpatient   Visit     Subjective:       Patient ID: Christy Webber is a 86 y.o. female.    Chief Complaint: COPD    The following portions of the patient's history were reviewed and updated as appropriate:   She  has a past medical history of Atrial flutter, Biatrial enlargement, CAD S/P percutaneous coronary angioplasty, CHF (congestive heart failure), Chronic respiratory failure with hypoxia, on home oxygen therapy, Hyperlipidemia, Hypertension, Non-STEMI (non-ST elevated myocardial infarction), Pacemaker (08/2016), Sick sinus syndrome, and SSS (sick sinus syndrome) (8/24/2016).  She does not have any pertinent problems on file.  She  has a past surgical history that includes Kidney surgery; Hysterectomy; and Cardiac pacemaker placement.  Her family history is not on file.  She  reports that she quit smoking about 5 years ago. Her smoking use included cigarettes. She has a 94.50 pack-year smoking history. She has never used smokeless tobacco. She reports current alcohol use. She reports that she does not use drugs.  She has a current medication list which includes the following prescription(s): albuterol, apixaban, donepezil, famotidine, furosemide, metoprolol succinate, nitroglycerin, rosuvastatin, sulfamethoxazole-trimethoprim 800-160mg, and benzonatate.  Current Outpatient Medications on File Prior to Visit   Medication Sig Dispense Refill    albuterol (PROVENTIL/VENTOLIN HFA) 90 mcg/actuation inhaler Inhale 1-2 puffs into the lungs every 6 (six) hours as needed for Wheezing. Rescue 18 g 1    apixaban (ELIQUIS) 2.5 mg Tab Take 1 tablet (2.5 mg total) by mouth 2 (two) times daily. 60 tablet 6    donepeziL (ARICEPT) 5 MG tablet Take 1 tablet (5 mg total) by mouth every evening. 90 tablet 3    famotidine (PEPCID) 20 MG tablet Take 1 tablet (20 mg total) by mouth daily as needed for Heartburn. 90 tablet 0    furosemide (LASIX) 20 MG tablet TAKE 1 TABLET  BY MOUTH TWICE A  tablet 1    metoprolol succinate (TOPROL-XL) 25 MG 24 hr tablet Take 1 tablet (25 mg total) by mouth once daily. 90 tablet 1    nitroGLYCERIN (NITROSTAT) 0.4 MG SL tablet Place 1 tablet (0.4 mg total) under the tongue every 5 (five) minutes as needed for Chest pain. 25 tablet 5    rosuvastatin (CRESTOR) 10 MG tablet Take 1 tablet (10 mg total) by mouth once daily. 90 tablet 1     No current facility-administered medications on file prior to visit.     She has No Known Allergies..    Immunization History   Administered Date(s) Administered    COVID-19 Vaccine 02/01/2021, 03/01/2021    Influenza (FLUAD) - Quadrivalent - Adjuvanted - PF *Preferred* (65+) 11/03/2020, 10/21/2021    Influenza - High Dose - PF (65 years and older) 10/16/2019    PPD Test 10/16/2019    Pneumococcal Conjugate - 13 Valent 10/16/2019    Tdap 11/03/2019, 11/05/2019       Tobacco Use: Medium Risk    Smoking Tobacco Use: Former Smoker    Smokeless Tobacco Use: Never Used     COPD Questionnaire  How often do you cough?: All of the time  How often do you have phlegm (mucus) in your chest?: A little of the time  How often does your chest feel tight?: A little of the time  When you walk up a hill or one flight of stairs, how often are you breathless?: All of the time  How often are you limited doing any activities at home?: Most of the time  How often are you confident leaving the house despite your lung condition?: Almost Never  How often do you sleep soundly?: Never  How often do you have energy?: Almost never  Total score: 31     Christytrevon Webber is 86 y.o.  New to me  Daughter her  [place] recently: in [facility], after [place]  No inhaler meds  Cough, PND especially at nightCArried COPD Dx: No prior PFT  Care everywhere show in 2019 had test at [facility], results not available  Recently see ER BNP > 4000  JVD 4 cm    Worked at [employer]  Also worked at [employer]          "    Review of Systems   Constitutional: Positive for fatigue.   Respiratory: Positive for dyspnea on extertion and use of rescue inhaler.    Gastrointestinal: Negative.    Neurological: Negative.    Psychiatric/Behavioral: Positive for sleep disturbance.       Outpatient Encounter Medications as of 2/21/2022   Medication Sig Dispense Refill    albuterol (PROVENTIL/VENTOLIN HFA) 90 mcg/actuation inhaler Inhale 1-2 puffs into the lungs every 6 (six) hours as needed for Wheezing. Rescue 18 g 1    apixaban (ELIQUIS) 2.5 mg Tab Take 1 tablet (2.5 mg total) by mouth 2 (two) times daily. 60 tablet 6    donepeziL (ARICEPT) 5 MG tablet Take 1 tablet (5 mg total) by mouth every evening. 90 tablet 3    famotidine (PEPCID) 20 MG tablet Take 1 tablet (20 mg total) by mouth daily as needed for Heartburn. 90 tablet 0    furosemide (LASIX) 20 MG tablet TAKE 1 TABLET BY MOUTH TWICE A  tablet 1    metoprolol succinate (TOPROL-XL) 25 MG 24 hr tablet Take 1 tablet (25 mg total) by mouth once daily. 90 tablet 1    nitroGLYCERIN (NITROSTAT) 0.4 MG SL tablet Place 1 tablet (0.4 mg total) under the tongue every 5 (five) minutes as needed for Chest pain. 25 tablet 5    rosuvastatin (CRESTOR) 10 MG tablet Take 1 tablet (10 mg total) by mouth once daily. 90 tablet 1    sulfamethoxazole-trimethoprim 800-160mg (BACTRIM DS) 800-160 mg Tab Take 1 tablet by mouth 2 (two) times daily.      benzonatate (TESSALON) 100 MG capsule Take 1 capsule (100 mg total) by mouth every 4 (four) hours as needed for Cough. 120 capsule 3     No facility-administered encounter medications on file as of 2/21/2022.       Objective:     Vital Signs (Most Recent)  Vital Signs  Pulse: 88  Resp: 16  SpO2: 96 %  BP: (!) 158/78  Height and Weight  Height: 5' 3" (160 cm)  Weight: 64 kg (140 lb 15.8 oz)  BSA (Calculated - sq m): 1.69 sq meters  BMI (Calculated): 25  Weight in (lb) to have BMI = 25: 140.8]  Wt Readings from Last 2 Encounters:   02/21/22 64 kg " (140 lb 15.8 oz)   02/21/22 64 kg (141 lb 1.5 oz)       Physical Exam   Constitutional: She is oriented to person, place, and time.   HENT:   Head: Normocephalic.   Neck: Hepatojugular reflux and JVD present.   Cardiovascular:   Murmur heard.  Pulmonary/Chest: She has decreased breath sounds.   Abdominal: Bowel sounds are normal.   Musculoskeletal:         General: Normal range of motion.      Cervical back: Normal range of motion.   Lymphadenopathy:     She has no cervical adenopathy.     She has no axillary adenopathy.   Neurological: She is alert and oriented to person, place, and time.   Skin: Skin is warm and dry. No cyanosis. Nails show no clubbing.   Psychiatric: She has a normal mood and affect.   Nursing note and vitals reviewed.      Laboratory  Lab Results   Component Value Date    WBC 5.33 02/12/2022    RBC 4.54 02/12/2022    HGB 14.0 02/12/2022    HCT 41.5 02/12/2022    MCV 91 02/12/2022    MCH 30.8 02/12/2022    MCHC 33.7 02/12/2022    RDW 21.1 (H) 02/12/2022     02/12/2022    MPV 11.6 02/12/2022    GRAN 3.2 02/12/2022    GRAN 59.2 02/12/2022    LYMPH 1.2 02/12/2022    LYMPH 21.8 02/12/2022    MONO 0.7 02/12/2022    MONO 12.9 02/12/2022    EOS 0.2 02/12/2022    BASO 0.09 02/12/2022    EOSINOPHIL 3.6 02/12/2022    BASOPHIL 1.7 02/12/2022       BMP  Lab Results   Component Value Date     02/12/2022    K 3.5 02/12/2022     02/12/2022    CO2 24 02/12/2022    BUN 16 02/12/2022    CREATININE 1.6 (H) 02/12/2022    CALCIUM 10.3 02/12/2022    ANIONGAP 12 02/12/2022    ESTGFRAFRICA 34 (A) 02/12/2022    EGFRNONAA 29 (A) 02/12/2022    AST 16 02/12/2022    ALT 9 (L) 02/12/2022    PROT 7.0 02/12/2022       Lab Results   Component Value Date    BNP 4,123 (H) 02/12/2022     (H) 09/23/2021    BNP 2,087 (H) 01/08/2021    BNP 1,474 (H) 12/02/2020    BNP 2,416 (H) 11/01/2020    BNP 1,686 (H) 11/16/2018       Lab Results   Component Value Date    TSH 2.278 09/23/2021       No results found for:  "SEDRATE    Lab Results   Component Value Date    CRP 40.1 (H) 10/27/2020     No results found for: IGE     No results found for: ASPERGILLUS  No results found for: AFUMIGATUSCL     No results found for: ACE     Diagnostic Results:  I have personally reviewed today the following studies:    · The left ventricle is mildly enlarged with mildly decreased systolic function. The estimated ejection fraction is 40%.  · Septal wall has abnormal motion consistent with right ventricular pacemaker.  · Grade II diastolic dysfunction.  · There is left ventricular eccentric hypertrophy.  · Severe left atrial enlargement.  · There is moderate left ventricular global hypokinesis.  · Moderate right ventricular enlargement.  · Moderately reduced right ventricular systolic function.  · Severe right atrial enlargement.  · Mild aortic regurgitation.  · Mild-to-moderate mitral regurgitation.  · Severe tricuspid regurgitation.  · Mild to moderate pulmonic regurgitation.  · Elevated central venous pressure (15 mmHg).  · The estimated PA systolic pressure is 42 mmHg.  · There is pulmonary hypertension    Ordering Provider: Dr. Watson          Interpreting Provider: Dr. Watson  Performing nurse/tech/RT: Lorraine CRT  Diagnosis:  (pulmonary hypertension, centrilobular emphysema)  Height: 5' 3" (160 cm)  Weight: 64 kg (141 lb 1.5 oz)  BMI (Calculated): 25     Patient Race:                                                                 Phase Oxygen Assessment Supplemental O2 Heart   Rate Blood Pressure De Dyspnea Scale Rating   Resting 96 % Room Air 86 bpm 133/69 2   Exercise             Minute             1 97 % Room Air 107 bpm       2 95 % Room Air 88 bpm       3 92 % Room Air 85 bpm       4 95 % Room Air 88 bpm       5 97 % Room Air 102 bpm       6  95 % Room Air 90 bpm 144/78 5-6   Recovery             Minute             1 95 % Room Air 92 bpm       2 97 % Room Air 74 bpm       3 97 % Room Air 87 bpm       4 98 % " Room Air 80 bpm 151/80 4      Six Minute Walk Summary  6MWT Status: completed with stops  Patient Reported: Dyspnea (stomach pain)        Interpretation:  Did the patient stop or pause?: Yes  How many times did the patient stop or pause?: 2  Stop Time 1: 60  Restart Time 1: 90  Pause Time 1: 30 seconds  Stop Time 2: 150  Restart Time 2: 290  Pause Time 2: 140 seconds  Total Time Walked (Calculated): 190 seconds  Final Partial Lap Distance (feet): 100 feet  Total Distance Meters (Calculated): 91.44 meters  Predicted Distance Meters (Calculated): 366.74 meters  Percentage of Predicted (Calculated): 24.93  Peak VO2 (Calculated): 6.72  Mets: 1.92  Has The Patient Had a Previous Six Minute Walk Test?: No     Previous 6MWT Results  Has The Patient Had a Previous Six Minute Walk Test?: No      X-Ray Chest PA And Lateral  Narrative: EXAMINATION:  XR CHEST PA AND LATERAL    CLINICAL HISTORY:  Chronic obstructive pulmonary disease, unspecified    TECHNIQUE:  PA and lateral views of the chest were performed.    COMPARISON:  02/12/2022    FINDINGS:  Cardiac silhouette remains severely enlarged.  Cardiac pacing device again noted.    Congested appearance of the pulmonary vasculature is similar to prior without evidence of overt pulmonary edema.  No focal parenchymal consolidation or definite pleural effusion visualized.  No acute osseous findings demonstrated.  Impression: Unchanged appearance of the chest as above    Electronically signed by: Robert Fall MD  Date:    02/21/2022  Time:    14:06       Assessment/Plan:      Problem List Items Addressed This Visit     Chronic respiratory failure with hypoxia, on home oxygen therapy (Chronic)    Permanent atrial fibrillation (Chronic)     On Eliquis and metoprolol           Essential hypertension (Chronic)     Stable  Metoprolol           Aortic regurgitation (Chronic)     Follow with Cardiology           Centrilobular emphysema (Chronic)     Noted radiologically  Has No O2  currently           Relevant Orders    Spirometry without Bronchodilator    Stress test, pulmonary (Completed)    Pulmonary hypertension - Primary (Chronic)     PA pressure Echo 42    ONSAT  PFT  6MWD  Likely WHO group 2 or 3           Relevant Orders    Stress test, pulmonary (Completed)    PULSE OXIMETRY OVERNIGHT    Dyslipidemia (Chronic)     Stable on CRESTOR           COPD (chronic obstructive pulmonary disease) (Chronic)     Has Nebuliser  No Prior PFT available  PFT           Relevant Medications    benzonatate (TESSALON) 100 MG capsule    Other Relevant Orders    Spirometry without Bronchodilator    Stress test, pulmonary (Completed)    Spirometry without Bronchodilator    Stress test, pulmonary (Completed)    PULSE OXIMETRY OVERNIGHT    Acute on chronic combined systolic and diastolic congestive heart failure     Patient is identified as having Combined Systolic and Diastolic heart failure that is Acute on chronic. CHF is currently controlled. Latest ECHO performed and demonstrates- Results for orders placed during the hospital encounter of 10/27/20    Echo    Interpretation Summary  · The left ventricle is mildly enlarged with mildly decreased systolic function. The estimated ejection fraction is 40%.  · Septal wall has abnormal motion consistent with right ventricular pacemaker.  · Grade II diastolic dysfunction.  · There is left ventricular eccentric hypertrophy.  · Severe left atrial enlargement.  · There is moderate left ventricular global hypokinesis.  · Moderate right ventricular enlargement.  · Moderately reduced right ventricular systolic function.  · Severe right atrial enlargement.  · Mild aortic regurgitation.  · Mild-to-moderate mitral regurgitation.  · Severe tricuspid regurgitation.  · Mild to moderate pulmonic regurgitation.  · Elevated central venous pressure (15 mmHg).  · The estimated PA systolic pressure is 42 mmHg.  · There is pulmonary hypertension.  . Continue Furosemide and monitor  clinical status closely. Monitor on telemetry. Patient is on CHF pathway.  Monitor strict Is&Os and daily weights.  Place on fluid restriction of 1.5 L. Continue to stress to patient importance of self efficacy and  on diet for CHF. Last BNP reviewed- and noted below @LABRCNTIP(BNP,BNPTRIAGEBLO)@.        Rise in BNP suggest decompensation                CXR : significant cardiomegaly  Symptomatic CHF  Will message Cardiology    Follow up in about 2 weeks (around 3/7/2022), or Walk, [place] today, ONSAT, appt with cardiology,Appt with GIL.    This note was prepared using voice recognition system and is likely to have sound alike errors that may have been overlooked even after proof reading.  Please call me with any questions    Discussed diagnosis, its evaluation, treatment and usual course. All questions answered.      Keith Watson MD

## 2022-03-10 ENCOUNTER — PATIENT OUTREACH (OUTPATIENT)
Dept: ADMINISTRATIVE | Facility: OTHER | Age: 87
End: 2022-03-10
Payer: MEDICARE

## 2022-03-10 DIAGNOSIS — I10 ESSENTIAL HYPERTENSION: Primary | ICD-10-CM

## 2022-03-11 ENCOUNTER — OFFICE VISIT (OUTPATIENT)
Dept: CARDIOLOGY | Facility: CLINIC | Age: 87
End: 2022-03-11
Payer: MEDICARE

## 2022-03-11 ENCOUNTER — HOSPITAL ENCOUNTER (OUTPATIENT)
Dept: CARDIOLOGY | Facility: HOSPITAL | Age: 87
Discharge: HOME | End: 2022-03-11
Attending: INTERNAL MEDICINE
Payer: MEDICARE

## 2022-03-11 VITALS
WEIGHT: 134.94 LBS | BODY MASS INDEX: 23.9 KG/M2 | HEART RATE: 97 BPM | OXYGEN SATURATION: 100 % | SYSTOLIC BLOOD PRESSURE: 132 MMHG | DIASTOLIC BLOOD PRESSURE: 74 MMHG

## 2022-03-11 DIAGNOSIS — I48.21 PERMANENT ATRIAL FIBRILLATION: Chronic | ICD-10-CM

## 2022-03-11 DIAGNOSIS — N18.4 CHRONIC KIDNEY DISEASE, STAGE 4 (SEVERE): Chronic | ICD-10-CM

## 2022-03-11 DIAGNOSIS — I25.10 CAD S/P PERCUTANEOUS CORONARY ANGIOPLASTY: Chronic | ICD-10-CM

## 2022-03-11 DIAGNOSIS — I10 ESSENTIAL HYPERTENSION: ICD-10-CM

## 2022-03-11 DIAGNOSIS — F01.50 VASCULAR DEMENTIA WITHOUT BEHAVIORAL DISTURBANCE: Chronic | ICD-10-CM

## 2022-03-11 DIAGNOSIS — Z98.61 CAD S/P PERCUTANEOUS CORONARY ANGIOPLASTY: Chronic | ICD-10-CM

## 2022-03-11 DIAGNOSIS — I34.0 NON-RHEUMATIC MITRAL REGURGITATION: Chronic | ICD-10-CM

## 2022-03-11 DIAGNOSIS — I50.43 ACUTE ON CHRONIC COMBINED SYSTOLIC AND DIASTOLIC CONGESTIVE HEART FAILURE: Primary | ICD-10-CM

## 2022-03-11 PROCEDURE — 93010 EKG 12-LEAD: ICD-10-PCS | Mod: ,,, | Performed by: INTERNAL MEDICINE

## 2022-03-11 PROCEDURE — 1159F MED LIST DOCD IN RCRD: CPT | Mod: CPTII,S$GLB,, | Performed by: INTERNAL MEDICINE

## 2022-03-11 PROCEDURE — 1160F PR REVIEW ALL MEDS BY PRESCRIBER/CLIN PHARMACIST DOCUMENTED: ICD-10-PCS | Mod: CPTII,S$GLB,, | Performed by: INTERNAL MEDICINE

## 2022-03-11 PROCEDURE — 99213 OFFICE O/P EST LOW 20 MIN: CPT | Mod: PBBFAC | Performed by: INTERNAL MEDICINE

## 2022-03-11 PROCEDURE — 3075F PR MOST RECENT SYSTOLIC BLOOD PRESS GE 130-139MM HG: ICD-10-PCS | Mod: CPTII,S$GLB,, | Performed by: INTERNAL MEDICINE

## 2022-03-11 PROCEDURE — 93005 ELECTROCARDIOGRAM TRACING: CPT

## 2022-03-11 PROCEDURE — 99999 PR PBB SHADOW E&M-EST. PATIENT-LVL III: CPT | Mod: PBBFAC,,, | Performed by: INTERNAL MEDICINE

## 2022-03-11 PROCEDURE — 99214 PR OFFICE/OUTPT VISIT, EST, LEVL IV, 30-39 MIN: ICD-10-PCS | Mod: S$GLB,,, | Performed by: INTERNAL MEDICINE

## 2022-03-11 PROCEDURE — 93010 ELECTROCARDIOGRAM REPORT: CPT | Mod: ,,, | Performed by: INTERNAL MEDICINE

## 2022-03-11 PROCEDURE — 3075F SYST BP GE 130 - 139MM HG: CPT | Mod: CPTII,S$GLB,, | Performed by: INTERNAL MEDICINE

## 2022-03-11 PROCEDURE — 3078F PR MOST RECENT DIASTOLIC BLOOD PRESSURE < 80 MM HG: ICD-10-PCS | Mod: CPTII,S$GLB,, | Performed by: INTERNAL MEDICINE

## 2022-03-11 PROCEDURE — 99214 OFFICE O/P EST MOD 30 MIN: CPT | Mod: S$GLB,,, | Performed by: INTERNAL MEDICINE

## 2022-03-11 PROCEDURE — 99999 PR PBB SHADOW E&M-EST. PATIENT-LVL III: ICD-10-PCS | Mod: PBBFAC,,, | Performed by: INTERNAL MEDICINE

## 2022-03-11 PROCEDURE — 1159F PR MEDICATION LIST DOCUMENTED IN MEDICAL RECORD: ICD-10-PCS | Mod: CPTII,S$GLB,, | Performed by: INTERNAL MEDICINE

## 2022-03-11 PROCEDURE — 1160F RVW MEDS BY RX/DR IN RCRD: CPT | Mod: CPTII,S$GLB,, | Performed by: INTERNAL MEDICINE

## 2022-03-11 PROCEDURE — 3078F DIAST BP <80 MM HG: CPT | Mod: CPTII,S$GLB,, | Performed by: INTERNAL MEDICINE

## 2022-03-11 RX ORDER — TORSEMIDE 20 MG/1
20 TABLET ORAL 2 TIMES DAILY
Qty: 60 TABLET | Refills: 11 | Status: SHIPPED | OUTPATIENT
Start: 2022-03-11 | End: 2022-03-17 | Stop reason: SDUPTHER
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
## 2022-03-14 NOTE — TELEPHONE ENCOUNTER
No new care gaps identified.  Powered by [employer] by [employer]. Reference number: [id number].   3/14/2022 10:50:13 AM CDT

## 2022-03-17 NOTE — TELEPHONE ENCOUNTER
----- Message from Moi Garland sent at 3/17/2022  2:56 PM CDT -----  Contact: Rickie Guzman (daughter) would like to consult with nurse regarding Rx for fluid.  Rickie stated Dr Salgado was supposed to send a Rx and it was not at the pharmacy when she went to  the pt's other medication.  Please contact rickie @ [phone].  Thanks/As

## 2022-03-18 ENCOUNTER — LAB VISIT (OUTPATIENT)
Dept: LAB | Facility: HOSPITAL | Age: 87
End: 2022-03-18
Attending: INTERNAL MEDICINE
Payer: MEDICARE

## 2022-03-18 DIAGNOSIS — I50.43 ACUTE ON CHRONIC COMBINED SYSTOLIC AND DIASTOLIC CONGESTIVE HEART FAILURE: ICD-10-CM

## 2022-03-18 LAB
ANION GAP SERPL CALC-SCNC: 10 MMOL/L (ref 8–16)
BNP SERPL-MCNC: 4306 PG/ML (ref 0–99)
BUN SERPL-MCNC: 27 MG/DL (ref 8–23)
CALCIUM SERPL-MCNC: 11.3 MG/DL (ref 8.7–10.5)
CHLORIDE SERPL-SCNC: 110 MMOL/L (ref 95–110)
CO2 SERPL-SCNC: 21 MMOL/L (ref 23–29)
CREAT SERPL-MCNC: 2 MG/DL (ref 0.5–1.4)
EST. GFR  (AFRICAN AMERICAN): 25.7 ML/MIN/1.73 M^2
EST. GFR  (NON AFRICAN AMERICAN): 22.3 ML/MIN/1.73 M^2
GLUCOSE SERPL-MCNC: 128 MG/DL (ref 70–110)
POTASSIUM SERPL-SCNC: 4.5 MMOL/L (ref 3.5–5.1)
SODIUM SERPL-SCNC: 141 MMOL/L (ref 136–145)

## 2022-03-18 PROCEDURE — 83880 ASSAY OF NATRIURETIC PEPTIDE: CPT | Performed by: INTERNAL MEDICINE

## 2022-03-18 PROCEDURE — 80048 BASIC METABOLIC PNL TOTAL CA: CPT | Performed by: INTERNAL MEDICINE

## 2022-03-18 PROCEDURE — 36415 COLL VENOUS BLD VENIPUNCTURE: CPT | Performed by: INTERNAL MEDICINE

## 2022-03-18 RX ORDER — ROSUVASTATIN CALCIUM 10 MG/1
TABLET, COATED ORAL
Qty: 90 TABLET | Refills: 1 | Status: SHIPPED | OUTPATIENT
Start: 2022-03-18 | End: 2022-11-09

## 2022-03-18 RX ORDER — TORSEMIDE 20 MG/1
20 TABLET ORAL 2 TIMES DAILY
Qty: 60 TABLET | Refills: 11 | Status: SHIPPED | OUTPATIENT
Start: 2022-03-18 | End: 2022-03-20 | Stop reason: SDUPTHER

## 2022-03-18 NOTE — TELEPHONE ENCOUNTER
This Rx Request does not qualify for assessment with the [facility]   Please review protocol details and the Care Due Message for extra clinical information    Reasons Rx Request may be deferred:  Patient has been seen in the ED/Hospital since the last PCP visit    Note composed:12:32 PM 03/18/2022

## 2022-03-20 ENCOUNTER — TELEPHONE (OUTPATIENT)
Dept: CARDIOLOGY | Facility: CLINIC | Age: 87
End: 2022-03-20
Payer: MEDICARE

## 2022-03-20 DIAGNOSIS — I50.43 ACUTE ON CHRONIC COMBINED SYSTOLIC AND DIASTOLIC CONGESTIVE HEART FAILURE: Primary | ICD-10-CM

## 2022-03-20 RX ORDER — TORSEMIDE 20 MG/1
40 TABLET ORAL 2 TIMES DAILY
Qty: 120 TABLET | Refills: 11 | Status: SHIPPED | OUTPATIENT
Start: 2022-03-20 | End: 2022-05-17 | Stop reason: SDUPTHER

## 2022-03-21 ENCOUNTER — TELEPHONE (OUTPATIENT)
Dept: CARDIOLOGY | Facility: CLINIC | Age: 87
End: 2022-03-21
Payer: MEDICARE

## 2022-03-21 NOTE — TELEPHONE ENCOUNTER
Pt contacted about results, daughter verbalized understanding          ----- Message from Jorge Salgado MD sent at 3/20/2022  1:17 PM CDT -----  The lab showed CHF  Increase torsemide from 20 mg bid to 40 mg bid  Repeat BNP and BMP In 1 week

## 2022-03-28 ENCOUNTER — LAB VISIT (OUTPATIENT)
Dept: LAB | Facility: HOSPITAL | Age: 87
End: 2022-03-28
Attending: INTERNAL MEDICINE
Payer: MEDICARE

## 2022-03-28 DIAGNOSIS — I50.43 ACUTE ON CHRONIC COMBINED SYSTOLIC AND DIASTOLIC CONGESTIVE HEART FAILURE: ICD-10-CM

## 2022-03-28 LAB
ANION GAP SERPL CALC-SCNC: 11 MMOL/L (ref 8–16)
BNP SERPL-MCNC: 810 PG/ML (ref 0–99)
BUN SERPL-MCNC: 48 MG/DL (ref 8–23)
CALCIUM SERPL-MCNC: 11.8 MG/DL (ref 8.7–10.5)
CHLORIDE SERPL-SCNC: 103 MMOL/L (ref 95–110)
CO2 SERPL-SCNC: 30 MMOL/L (ref 23–29)
CREAT SERPL-MCNC: 2.1 MG/DL (ref 0.5–1.4)
EST. GFR  (AFRICAN AMERICAN): 24.2 ML/MIN/1.73 M^2
EST. GFR  (NON AFRICAN AMERICAN): 21 ML/MIN/1.73 M^2
GLUCOSE SERPL-MCNC: 151 MG/DL (ref 70–110)
POTASSIUM SERPL-SCNC: 3.7 MMOL/L (ref 3.5–5.1)
SODIUM SERPL-SCNC: 144 MMOL/L (ref 136–145)

## 2022-03-28 PROCEDURE — 80048 BASIC METABOLIC PNL TOTAL CA: CPT | Performed by: INTERNAL MEDICINE

## 2022-03-28 PROCEDURE — 83880 ASSAY OF NATRIURETIC PEPTIDE: CPT | Performed by: INTERNAL MEDICINE

## 2022-03-28 PROCEDURE — 36415 COLL VENOUS BLD VENIPUNCTURE: CPT | Performed by: INTERNAL MEDICINE

## 2022-03-30 ENCOUNTER — TELEPHONE (OUTPATIENT)
Dept: CARDIOLOGY | Facility: HOSPITAL | Age: 87
End: 2022-03-30
Payer: MEDICARE

## 2022-03-30 DIAGNOSIS — I50.43 ACUTE ON CHRONIC COMBINED SYSTOLIC AND DIASTOLIC CONGESTIVE HEART FAILURE: ICD-10-CM

## 2022-03-30 DIAGNOSIS — J96.11 CHRONIC RESPIRATORY FAILURE WITH HYPOXIA, ON HOME OXYGEN THERAPY: Primary | ICD-10-CM

## 2022-03-30 DIAGNOSIS — Z99.81 CHRONIC RESPIRATORY FAILURE WITH HYPOXIA, ON HOME OXYGEN THERAPY: Primary | ICD-10-CM

## 2022-03-31 ENCOUNTER — TELEPHONE (OUTPATIENT)
Dept: CARDIOLOGY | Facility: CLINIC | Age: 87
End: 2022-03-31
Payer: MEDICARE

## 2022-03-31 NOTE — TELEPHONE ENCOUNTER
Pt contacted about results, spoke with daughter verbalized understanding will call back to get labs scheduled.        ----- Message from Jorge Salgado MD sent at 3/30/2022  4:12 PM CDT -----  The lab showed CHF improved  Continue current torsemide 40 mg bid  Repeat BNP and BMP in  1 week

## 2022-04-11 ENCOUNTER — PATIENT OUTREACH (OUTPATIENT)
Dept: ADMINISTRATIVE | Facility: OTHER | Age: 87
End: 2022-04-11
Payer: MEDICARE

## 2022-05-05 ENCOUNTER — OFFICE VISIT (OUTPATIENT)
Dept: CARDIOLOGY | Facility: CLINIC | Age: 87
End: 2022-05-05
Payer: MEDICARE

## 2022-05-05 ENCOUNTER — LAB VISIT (OUTPATIENT)
Dept: LAB | Facility: HOSPITAL | Age: 87
End: 2022-05-05
Attending: INTERNAL MEDICINE
Payer: MEDICARE

## 2022-05-05 VITALS
WEIGHT: 129.88 LBS | SYSTOLIC BLOOD PRESSURE: 118 MMHG | OXYGEN SATURATION: 95 % | BODY MASS INDEX: 23 KG/M2 | DIASTOLIC BLOOD PRESSURE: 64 MMHG | HEART RATE: 102 BPM

## 2022-05-05 DIAGNOSIS — I50.43 ACUTE ON CHRONIC COMBINED SYSTOLIC AND DIASTOLIC CONGESTIVE HEART FAILURE: ICD-10-CM

## 2022-05-05 DIAGNOSIS — I48.21 PERMANENT ATRIAL FIBRILLATION: Chronic | ICD-10-CM

## 2022-05-05 DIAGNOSIS — I10 ESSENTIAL HYPERTENSION: Chronic | ICD-10-CM

## 2022-05-05 DIAGNOSIS — N18.4 CHRONIC KIDNEY DISEASE, STAGE 4 (SEVERE): Chronic | ICD-10-CM

## 2022-05-05 DIAGNOSIS — I50.43 ACUTE ON CHRONIC COMBINED SYSTOLIC AND DIASTOLIC CONGESTIVE HEART FAILURE: Primary | ICD-10-CM

## 2022-05-05 DIAGNOSIS — E78.5 DYSLIPIDEMIA: Chronic | ICD-10-CM

## 2022-05-05 LAB
ANION GAP SERPL CALC-SCNC: 12 MMOL/L (ref 8–16)
BNP SERPL-MCNC: 1445 PG/ML (ref 0–99)
BUN SERPL-MCNC: 36 MG/DL (ref 8–23)
CALCIUM SERPL-MCNC: 10.7 MG/DL (ref 8.7–10.5)
CHLORIDE SERPL-SCNC: 102 MMOL/L (ref 95–110)
CO2 SERPL-SCNC: 27 MMOL/L (ref 23–29)
CREAT SERPL-MCNC: 2 MG/DL (ref 0.5–1.4)
EST. GFR  (AFRICAN AMERICAN): 25.7 ML/MIN/1.73 M^2
EST. GFR  (NON AFRICAN AMERICAN): 22.3 ML/MIN/1.73 M^2
GLUCOSE SERPL-MCNC: 156 MG/DL (ref 70–110)
POTASSIUM SERPL-SCNC: 3.5 MMOL/L (ref 3.5–5.1)
SODIUM SERPL-SCNC: 141 MMOL/L (ref 136–145)

## 2022-05-05 PROCEDURE — 1159F MED LIST DOCD IN RCRD: CPT | Mod: CPTII,S$GLB,, | Performed by: INTERNAL MEDICINE

## 2022-05-05 PROCEDURE — 3074F PR MOST RECENT SYSTOLIC BLOOD PRESSURE < 130 MM HG: ICD-10-PCS | Mod: CPTII,S$GLB,, | Performed by: INTERNAL MEDICINE

## 2022-05-05 PROCEDURE — 99499 UNLISTED E&M SERVICE: CPT | Mod: S$GLB,,, | Performed by: INTERNAL MEDICINE

## 2022-05-05 PROCEDURE — 36415 COLL VENOUS BLD VENIPUNCTURE: CPT | Performed by: INTERNAL MEDICINE

## 2022-05-05 PROCEDURE — 3074F SYST BP LT 130 MM HG: CPT | Mod: CPTII,S$GLB,, | Performed by: INTERNAL MEDICINE

## 2022-05-05 PROCEDURE — 1159F PR MEDICATION LIST DOCUMENTED IN MEDICAL RECORD: ICD-10-PCS | Mod: CPTII,S$GLB,, | Performed by: INTERNAL MEDICINE

## 2022-05-05 PROCEDURE — 99999 PR PBB SHADOW E&M-EST. PATIENT-LVL III: ICD-10-PCS | Mod: PBBFAC,,, | Performed by: INTERNAL MEDICINE

## 2022-05-05 PROCEDURE — 83880 ASSAY OF NATRIURETIC PEPTIDE: CPT | Performed by: INTERNAL MEDICINE

## 2022-05-05 PROCEDURE — 3078F PR MOST RECENT DIASTOLIC BLOOD PRESSURE < 80 MM HG: ICD-10-PCS | Mod: CPTII,S$GLB,, | Performed by: INTERNAL MEDICINE

## 2022-05-05 PROCEDURE — 99999 PR PBB SHADOW E&M-EST. PATIENT-LVL III: CPT | Mod: PBBFAC,,, | Performed by: INTERNAL MEDICINE

## 2022-05-05 PROCEDURE — 3078F DIAST BP <80 MM HG: CPT | Mod: CPTII,S$GLB,, | Performed by: INTERNAL MEDICINE

## 2022-05-05 PROCEDURE — 1160F PR REVIEW ALL MEDS BY PRESCRIBER/CLIN PHARMACIST DOCUMENTED: ICD-10-PCS | Mod: CPTII,S$GLB,, | Performed by: INTERNAL MEDICINE

## 2022-05-05 PROCEDURE — 99214 OFFICE O/P EST MOD 30 MIN: CPT | Mod: S$GLB,,, | Performed by: INTERNAL MEDICINE

## 2022-05-05 PROCEDURE — 99214 PR OFFICE/OUTPT VISIT, EST, LEVL IV, 30-39 MIN: ICD-10-PCS | Mod: S$GLB,,, | Performed by: INTERNAL MEDICINE

## 2022-05-05 PROCEDURE — 1160F RVW MEDS BY RX/DR IN RCRD: CPT | Mod: CPTII,S$GLB,, | Performed by: INTERNAL MEDICINE

## 2022-05-05 PROCEDURE — 80048 BASIC METABOLIC PNL TOTAL CA: CPT | Performed by: INTERNAL MEDICINE

## 2022-05-05 PROCEDURE — 99499 RISK ADDL DX/OHS AUDIT: ICD-10-PCS | Mod: S$GLB,,, | Performed by: INTERNAL MEDICINE
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
## 2022-05-06 ENCOUNTER — TELEPHONE (OUTPATIENT)
Dept: CARDIOLOGY | Facility: CLINIC | Age: 87
End: 2022-05-06
Payer: MEDICARE

## 2022-05-06 DIAGNOSIS — I50.43 ACUTE ON CHRONIC COMBINED SYSTOLIC AND DIASTOLIC CONGESTIVE HEART FAILURE: Primary | ICD-10-CM

## 2022-05-06 NOTE — TELEPHONE ENCOUNTER
Pt contacted about results, verbalized understanding.        ----- Message from Jorge Salgado MD sent at 5/6/2022  4:40 PM CDT -----  The lab showe CHF  Stress FLUID RESTRICTION to 1 liter a day  Repeat BNP and BMP in 1 week

## 2022-05-16 ENCOUNTER — HOSPITAL ENCOUNTER (OUTPATIENT)
Dept: CARDIOLOGY | Facility: HOSPITAL | Age: 87
Discharge: HOME | End: 2022-05-16
Attending: INTERNAL MEDICINE
Payer: MEDICARE

## 2022-05-16 VITALS — BODY MASS INDEX: 22.86 KG/M2 | WEIGHT: 129 LBS | HEIGHT: 63 IN

## 2022-05-16 DIAGNOSIS — I50.43 ACUTE ON CHRONIC COMBINED SYSTOLIC AND DIASTOLIC CONGESTIVE HEART FAILURE: ICD-10-CM

## 2022-05-16 LAB
AV INDEX (PROSTH): 0.55
AV MEAN GRADIENT: 5 MMHG
AV PEAK GRADIENT: 9 MMHG
AV REGURGITATION PRESSURE HALF TIME: 431.45 MS
AV VALVE AREA: 1.65 CM2
AV VELOCITY RATIO: 0.52
BSA FOR ECHO PROCEDURE: 1.61 M2
CV ECHO LV RWT: 0.3 CM
DOP CALC AO PEAK VEL: 1.5 M/S
DOP CALC AO VTI: 24.7 CM
DOP CALC LVOT AREA: 3 CM2
DOP CALC LVOT DIAMETER: 1.96 CM
DOP CALC LVOT PEAK VEL: 0.78 M/S
DOP CALC LVOT STROKE VOLUME: 40.71 CM3
DOP CALC RVOT PEAK VEL: 0.52 M/S
DOP CALC RVOT VTI: 9 CM
DOP CALCLVOT PEAK VEL VTI: 13.5 CM
ECHO EF ESTIMATED: 35 %
ECHO LV POSTERIOR WALL: 0.82 CM (ref 0.6–1.1)
EJECTION FRACTION: 35 %
FRACTIONAL SHORTENING: 17 % (ref 28–44)
INTERVENTRICULAR SEPTUM: 0.93 CM (ref 0.6–1.1)
IVRT: 65.65 MSEC
LA MAJOR: 6.43 CM
LA MINOR: 5.79 CM
LA WIDTH: 6 CM
LEFT ATRIUM SIZE: 4.59 CM
LEFT ATRIUM VOLUME INDEX MOD: 85.7 ML/M2
LEFT ATRIUM VOLUME INDEX: 88.6 ML/M2
LEFT ATRIUM VOLUME MOD: 138 CM3
LEFT ATRIUM VOLUME: 142.64 CM3
LEFT INTERNAL DIMENSION IN SYSTOLE: 4.54 CM (ref 2.1–4)
LEFT VENTRICLE DIASTOLIC VOLUME INDEX: 90.85 ML/M2
LEFT VENTRICLE DIASTOLIC VOLUME: 146.27 ML
LEFT VENTRICLE MASS INDEX: 111 G/M2
LEFT VENTRICLE SYSTOLIC VOLUME INDEX: 58.6 ML/M2
LEFT VENTRICLE SYSTOLIC VOLUME: 94.36 ML
LEFT VENTRICULAR INTERNAL DIMENSION IN DIASTOLE: 5.48 CM (ref 3.5–6)
LEFT VENTRICULAR MASS: 178.12 G
LVOT MG: 1.1 MMHG
LVOT MV: 0.47 CM/S
PISA AR MAX VEL: 3.8 M/S
PISA TR MAX VEL: 3.1 M/S
PV MEAN GRADIENT: 0.57 MMHG
PV PEAK VELOCITY: 0.61 CM/S
RA MAJOR: 6.49 CM
RA WIDTH: 4.48 CM
RIGHT VENTRICULAR END-DIASTOLIC DIMENSION: 3.26 CM
SINUS: 3.15 CM
STJ: 2.67 CM
TDI LATERAL: 0.1 M/S
TDI SEPTAL: 0.08 M/S
TDI: 0.09 M/S
TR MAX PG: 38 MMHG
TRICUSPID ANNULAR PLANE SYSTOLIC EXCURSION: 0.81 CM

## 2022-05-16 PROCEDURE — 93306 TTE W/DOPPLER COMPLETE: CPT

## 2022-05-16 PROCEDURE — 93306 ECHO (CUPID ONLY): ICD-10-PCS | Mod: 26,,, | Performed by: INTERNAL MEDICINE

## 2022-05-16 PROCEDURE — 93306 TTE W/DOPPLER COMPLETE: CPT | Mod: 26,,, | Performed by: INTERNAL MEDICINE

## 2022-05-17 ENCOUNTER — TELEPHONE (OUTPATIENT)
Dept: CARDIOLOGY | Facility: CLINIC | Age: 87
End: 2022-05-17
Payer: MEDICARE

## 2022-05-17 DIAGNOSIS — I50.43 ACUTE ON CHRONIC COMBINED SYSTOLIC AND DIASTOLIC CONGESTIVE HEART FAILURE: Primary | ICD-10-CM

## 2022-05-17 RX ORDER — TORSEMIDE 20 MG/1
60 TABLET ORAL 2 TIMES DAILY
Qty: 180 TABLET | Refills: 11 | Status: SHIPPED | OUTPATIENT
Start: 2022-05-17 | End: 2022-06-17 | Stop reason: SDUPTHER

## 2022-05-18 ENCOUNTER — TELEPHONE (OUTPATIENT)
Dept: CARDIOLOGY | Facility: CLINIC | Age: 87
End: 2022-05-18
Payer: MEDICARE

## 2022-05-18 NOTE — TELEPHONE ENCOUNTER
Patient contacted and spoke to the daughter. The patient is non-compliant with the fluid restriction. The daughter was advised  Of the results.  The lab showed CHF   Increase torsemide from 40 mg bid to 60 mg bid   Fluid restriction 40 oz a day   Repeat BNP and BMP in 1 week     Echo showed mild to moderate  function.   Compared to prior echo no change   Continue Rx. The patient's daughter stated understanding with no questions or concerns.   Labs were scheduled

## 2022-06-11 NOTE — TELEPHONE ENCOUNTER
No new care gaps identified.  [facility] Embedded Care Gaps. Reference number: [id number]. 6/11/2022   12:16:32 AM CDT

## 2022-06-12 NOTE — TELEPHONE ENCOUNTER
Refill Routing Note   Medication(s) are not appropriate for processing by [facility] for the following reason(s):      - Patient has been seen in the ED/Hospital since the last PCP visit    [facility] action(s):  Defer          Medication reconciliation completed: No     Appointments  past 12m or future 3m with PCP    Date Provider   Last Visit   1/25/2022 Christian Douglass MD   Next Visit   7/15/2022 Christian Douglass MD   ED visits in past 90 days: 0        Note composed:10:29 AM 06/12/2022

## 2022-06-13 RX ORDER — METOPROLOL SUCCINATE 25 MG/1
TABLET, EXTENDED RELEASE ORAL
Qty: 90 TABLET | Refills: 2 | Status: SHIPPED | OUTPATIENT
Start: 2022-06-13 | End: 2022-08-18

## 2022-06-17 NOTE — TELEPHONE ENCOUNTER
----- Message from Bhupinder Guajardo sent at 6/17/2022  8:52 AM CDT -----  Contact: PT  Calling to get a refill for her fluid pills. Call back at [phone]

## 2022-06-19 RX ORDER — TORSEMIDE 20 MG/1
60 TABLET ORAL 2 TIMES DAILY
Qty: 180 TABLET | Refills: 11 | Status: SHIPPED | OUTPATIENT
Start: 2022-06-19 | End: 2022-07-13

## 2022-07-19 ENCOUNTER — OFFICE VISIT (OUTPATIENT)
Dept: FAMILY MEDICINE | Facility: CLINIC | Age: 87
End: 2022-07-19
Payer: MEDICARE

## 2022-07-19 ENCOUNTER — LAB VISIT (OUTPATIENT)
Dept: LAB | Facility: HOSPITAL | Age: 87
End: 2022-07-19
Attending: INTERNAL MEDICINE
Payer: MEDICARE

## 2022-07-19 VITALS
BODY MASS INDEX: 22.57 KG/M2 | RESPIRATION RATE: 18 BRPM | TEMPERATURE: 98 F | WEIGHT: 127.44 LBS | DIASTOLIC BLOOD PRESSURE: 60 MMHG | SYSTOLIC BLOOD PRESSURE: 100 MMHG | HEART RATE: 64 BPM

## 2022-07-19 DIAGNOSIS — R73.02 IGT (IMPAIRED GLUCOSE TOLERANCE): ICD-10-CM

## 2022-07-19 DIAGNOSIS — I50.43 ACUTE ON CHRONIC COMBINED SYSTOLIC AND DIASTOLIC CONGESTIVE HEART FAILURE: ICD-10-CM

## 2022-07-19 DIAGNOSIS — E78.5 DYSLIPIDEMIA: Chronic | ICD-10-CM

## 2022-07-19 DIAGNOSIS — R54 SENILE DEBILITY: ICD-10-CM

## 2022-07-19 DIAGNOSIS — E21.0 PRIMARY HYPERPARATHYROIDISM: ICD-10-CM

## 2022-07-19 DIAGNOSIS — I50.9 CONGESTIVE HEART FAILURE, UNSPECIFIED HF CHRONICITY, UNSPECIFIED HEART FAILURE TYPE: ICD-10-CM

## 2022-07-19 DIAGNOSIS — Z95.0 PRESENCE OF CARDIAC PACEMAKER: Primary | Chronic | ICD-10-CM

## 2022-07-19 DIAGNOSIS — N18.4 CHRONIC KIDNEY DISEASE, STAGE 4 (SEVERE): Chronic | ICD-10-CM

## 2022-07-19 DIAGNOSIS — Z98.61 CAD S/P PERCUTANEOUS CORONARY ANGIOPLASTY: Chronic | ICD-10-CM

## 2022-07-19 DIAGNOSIS — Z79.01 ANTICOAGULATED: ICD-10-CM

## 2022-07-19 DIAGNOSIS — I25.10 CAD S/P PERCUTANEOUS CORONARY ANGIOPLASTY: Chronic | ICD-10-CM

## 2022-07-19 DIAGNOSIS — I48.21 PERMANENT ATRIAL FIBRILLATION: Chronic | ICD-10-CM

## 2022-07-19 DIAGNOSIS — I25.119 CORONARY ARTERY DISEASE INVOLVING NATIVE CORONARY ARTERY OF NATIVE HEART WITH ANGINA PECTORIS: ICD-10-CM

## 2022-07-19 DIAGNOSIS — I10 ESSENTIAL HYPERTENSION: Chronic | ICD-10-CM

## 2022-07-19 PROBLEM — S06.5X0A: Status: ACTIVE | Noted: 2022-07-19

## 2022-07-19 LAB
ANION GAP SERPL CALC-SCNC: 9 MMOL/L (ref 8–16)
BNP SERPL-MCNC: 1013 PG/ML (ref 0–99)
BUN SERPL-MCNC: 48 MG/DL (ref 8–23)
CALCIUM SERPL-MCNC: 11.1 MG/DL (ref 8.7–10.5)
CHLORIDE SERPL-SCNC: 101 MMOL/L (ref 95–110)
CO2 SERPL-SCNC: 30 MMOL/L (ref 23–29)
CREAT SERPL-MCNC: 2.2 MG/DL (ref 0.5–1.4)
EST. GFR  (AFRICAN AMERICAN): 22.9 ML/MIN/1.73 M^2
EST. GFR  (NON AFRICAN AMERICAN): 19.9 ML/MIN/1.73 M^2
GLUCOSE SERPL-MCNC: 99 MG/DL (ref 70–110)
POTASSIUM SERPL-SCNC: 4.3 MMOL/L (ref 3.5–5.1)
SODIUM SERPL-SCNC: 140 MMOL/L (ref 136–145)

## 2022-07-19 PROCEDURE — 99999 PR PBB SHADOW E&M-EST. PATIENT-LVL IV: ICD-10-PCS | Mod: PBBFAC,,, | Performed by: INTERNAL MEDICINE

## 2022-07-19 PROCEDURE — 1126F AMNT PAIN NOTED NONE PRSNT: CPT | Mod: CPTII,S$GLB,, | Performed by: INTERNAL MEDICINE

## 2022-07-19 PROCEDURE — 99215 PR OFFICE/OUTPT VISIT, EST, LEVL V, 40-54 MIN: ICD-10-PCS | Mod: S$GLB,,, | Performed by: INTERNAL MEDICINE

## 2022-07-19 PROCEDURE — 3074F SYST BP LT 130 MM HG: CPT | Mod: CPTII,S$GLB,, | Performed by: INTERNAL MEDICINE

## 2022-07-19 PROCEDURE — 1101F PT FALLS ASSESS-DOCD LE1/YR: CPT | Mod: CPTII,S$GLB,, | Performed by: INTERNAL MEDICINE

## 2022-07-19 PROCEDURE — 83880 ASSAY OF NATRIURETIC PEPTIDE: CPT | Performed by: INTERNAL MEDICINE

## 2022-07-19 PROCEDURE — 3288F PR FALLS RISK ASSESSMENT DOCUMENTED: ICD-10-PCS | Mod: CPTII,S$GLB,, | Performed by: INTERNAL MEDICINE

## 2022-07-19 PROCEDURE — 1126F PR PAIN SEVERITY QUANTIFIED, NO PAIN PRESENT: ICD-10-PCS | Mod: CPTII,S$GLB,, | Performed by: INTERNAL MEDICINE

## 2022-07-19 PROCEDURE — 1159F PR MEDICATION LIST DOCUMENTED IN MEDICAL RECORD: ICD-10-PCS | Mod: CPTII,S$GLB,, | Performed by: INTERNAL MEDICINE

## 2022-07-19 PROCEDURE — 80048 BASIC METABOLIC PNL TOTAL CA: CPT | Performed by: INTERNAL MEDICINE

## 2022-07-19 PROCEDURE — 99999 PR PBB SHADOW E&M-EST. PATIENT-LVL IV: CPT | Mod: PBBFAC,,, | Performed by: INTERNAL MEDICINE

## 2022-07-19 PROCEDURE — 3078F PR MOST RECENT DIASTOLIC BLOOD PRESSURE < 80 MM HG: ICD-10-PCS | Mod: CPTII,S$GLB,, | Performed by: INTERNAL MEDICINE

## 2022-07-19 PROCEDURE — 1159F MED LIST DOCD IN RCRD: CPT | Mod: CPTII,S$GLB,, | Performed by: INTERNAL MEDICINE

## 2022-07-19 PROCEDURE — 3288F FALL RISK ASSESSMENT DOCD: CPT | Mod: CPTII,S$GLB,, | Performed by: INTERNAL MEDICINE

## 2022-07-19 PROCEDURE — 3074F PR MOST RECENT SYSTOLIC BLOOD PRESSURE < 130 MM HG: ICD-10-PCS | Mod: CPTII,S$GLB,, | Performed by: INTERNAL MEDICINE

## 2022-07-19 PROCEDURE — 36415 COLL VENOUS BLD VENIPUNCTURE: CPT | Mod: PO | Performed by: INTERNAL MEDICINE

## 2022-07-19 PROCEDURE — 99215 OFFICE O/P EST HI 40 MIN: CPT | Mod: S$GLB,,, | Performed by: INTERNAL MEDICINE

## 2022-07-19 PROCEDURE — 3078F DIAST BP <80 MM HG: CPT | Mod: CPTII,S$GLB,, | Performed by: INTERNAL MEDICINE

## 2022-07-19 PROCEDURE — 1101F PR PT FALLS ASSESS DOC 0-1 FALLS W/OUT INJ PAST YR: ICD-10-PCS | Mod: CPTII,S$GLB,, | Performed by: INTERNAL MEDICINE

## 2022-07-19 NOTE — PROGRESS NOTES
Subjective:       Patient ID: Christy Webber is a 86 y.o. female.    Chief Complaint: Follow-up (4m ), Hypertension, Hyperlipidemia, Coronary Artery Disease, Congestive Heart Failure, Anticoagulation, and Chronic Kidney Disease    Here with daughter-----    Follow-up  Associated symptoms include arthralgias, fatigue, myalgias and weakness. Pertinent negatives include no abdominal pain, chest pain, chills, congestion, coughing, diaphoresis, fever, headaches, joint swelling, nausea, neck pain, numbness, rash, sore throat or vomiting.   Hypertension  Pertinent negatives include no chest pain, headaches, neck pain, palpitations or shortness of breath.   Hyperlipidemia  Associated symptoms include myalgias. Pertinent negatives include no chest pain or shortness of breath.   Coronary Artery Disease  Pertinent negatives include no chest pain, chest tightness, dizziness, leg swelling, palpitations or shortness of breath. Risk factors include hyperlipidemia. Her past medical history is significant for CHF.   Congestive Heart Failure  Associated symptoms include fatigue. Pertinent negatives include no abdominal pain, chest pain, palpitations, shortness of breath or unexpected weight change. Her past medical history is significant for CAD.     Past Medical History:   Diagnosis Date    Atrial flutter     Biatrial enlargement     CAD S/P percutaneous coronary angioplasty     CHF (congestive heart failure)     Chronic respiratory failure with hypoxia, on home oxygen therapy     Hyperlipidemia     Hypertension     Non-STEMI (non-ST elevated myocardial infarction)     Pacemaker 08/2016    Single lead [employer] Pacemaker for sick sinus syndrome    Sick sinus syndrome     SSS (sick sinus syndrome) 8/24/2016    - pacemaker in place     Past Surgical History:   Procedure Laterality Date    CARDIAC PACEMAKER PLACEMENT      HYSTERECTOMY      KIDNEY SURGERY       No family history on file.  Social History     Socioeconomic  History    Marital status:    Tobacco Use    Smoking status: Former Smoker     Packs/day: 1.50     Years: 63.00     Pack years: 94.50     Types: Cigarettes     Quit date: 2016     Years since quittin.0    Smokeless tobacco: Never Used   Substance and Sexual Activity    Alcohol use: Yes     Comment: rarely    Drug use: No    Sexual activity: Not Currently     Review of Systems   Constitutional: Positive for fatigue. Negative for activity change, appetite change, chills, diaphoresis, fever and unexpected weight change.   HENT: Negative for congestion, drooling, ear discharge, ear pain, facial swelling, hearing loss, mouth sores, nosebleeds, postnasal drip, rhinorrhea, sinus pressure, sneezing, sore throat, tinnitus, trouble swallowing and voice change.    Eyes: Negative for photophobia, redness and visual disturbance.   Respiratory: Negative for apnea, cough, choking, chest tightness, shortness of breath and wheezing.    Cardiovascular: Negative for chest pain, palpitations and leg swelling.   Gastrointestinal: Negative for abdominal distention, abdominal pain, blood in stool, constipation, diarrhea, nausea and vomiting.   Endocrine: Negative for cold intolerance, heat intolerance, polydipsia, polyphagia and polyuria.   Genitourinary: Negative for decreased urine volume, difficulty urinating, dysuria, flank pain, frequency, genital sores, hematuria, pelvic pain and urgency.   Musculoskeletal: Positive for arthralgias, gait problem and myalgias. Negative for back pain, joint swelling, neck pain and neck stiffness.   Skin: Negative for color change, pallor, rash and wound.   Allergic/Immunologic: Negative for food allergies and immunocompromised state.   Neurological: Positive for weakness. Negative for dizziness, tremors, seizures, syncope, speech difficulty, light-headedness, numbness and headaches.   Hematological: Negative for adenopathy. Does not bruise/bleed easily.   Psychiatric/Behavioral:  Negative for agitation, behavioral problems, confusion, decreased concentration, dysphoric mood, hallucinations, self-injury, sleep disturbance and suicidal ideas. The patient is not nervous/anxious and is not hyperactive.    All other systems reviewed and are negative.      Objective:      Physical Exam  Vitals and nursing note reviewed.   Constitutional:       General: She is not in acute distress.     Appearance: Normal appearance. She is well-developed. She is not diaphoretic.   HENT:      Head: Normocephalic and atraumatic.      Mouth/Throat:      Pharynx: No oropharyngeal exudate.   Eyes:      General: No scleral icterus.  Neck:      Thyroid: No thyromegaly.      Vascular: No carotid bruit or JVD.      Trachea: No tracheal deviation.   Cardiovascular:      Rate and Rhythm: Normal rate and regular rhythm.      Heart sounds: Normal heart sounds.   Pulmonary:      Effort: Pulmonary effort is normal. No respiratory distress.      Breath sounds: Normal breath sounds. No wheezing or rales.   Chest:      Chest wall: No tenderness.   Abdominal:      General: Bowel sounds are normal. There is no distension.      Palpations: Abdomen is soft.      Tenderness: There is no abdominal tenderness. There is no guarding or rebound.   Musculoskeletal:         General: No tenderness. Normal range of motion.      Cervical back: Normal range of motion and neck supple.   Lymphadenopathy:      Cervical: No cervical adenopathy.   Skin:     General: Skin is warm and dry.      Coloration: Skin is not pale.      Findings: No erythema or rash.   Neurological:      Mental Status: She is alert and oriented to person, place, and time.         CMP  Sodium   Date Value Ref Range Status   05/16/2022 141 136 - 145 mmol/L Final   03/06/2020 135 135 - 148 Final     Potassium   Date Value Ref Range Status   05/16/2022 3.6 3.5 - 5.1 mmol/L Final   03/06/2020 4.2 3.5 - 5.5 Final     Chloride   Date Value Ref Range Status   05/16/2022 106 95 - 110  mmol/L Final   03/06/2020 103 96 - 109 Final     CO2   Date Value Ref Range Status   05/16/2022 25 23 - 29 mmol/L Final   03/06/2020 25 20 - 32 Final     Glucose   Date Value Ref Range Status   05/16/2022 94 70 - 110 mg/dL Final     BUN   Date Value Ref Range Status   05/16/2022 29 (H) 8 - 23 mg/dL Final   03/06/2020 28 (H) 5 - 26 Final     Creatinine   Date Value Ref Range Status   05/16/2022 1.7 (H) 0.5 - 1.4 mg/dL Final   03/06/2020 1.15 0.50 - 1.50 Final     Calcium   Date Value Ref Range Status   05/16/2022 10.0 8.7 - 10.5 mg/dL Final   03/06/2020 10.1 8.5 - 10.6 Final     Total Protein   Date Value Ref Range Status   02/12/2022 7.0 6.0 - 8.4 g/dL Final     Albumin   Date Value Ref Range Status   02/12/2022 3.6 3.5 - 5.2 g/dL Final   03/06/2020 3.2 3.2 - 5.6 Final     Total Bilirubin   Date Value Ref Range Status   02/12/2022 3.1 (H) 0.1 - 1.0 mg/dL Final     Comment:     For infants and newborns, interpretation of results should be based  on gestational age, weight and in agreement with clinical  observations.    Premature Infant recommended reference ranges:  Up to 24 hours.............<8.0 mg/dL  Up to 48 hours............<12.0 mg/dL  3-5 days..................<15.0 mg/dL  6-29 days.................<15.0 mg/dL       Alkaline Phosphatase   Date Value Ref Range Status   02/12/2022 121 55 - 135 U/L Final     AST   Date Value Ref Range Status   02/12/2022 16 10 - 40 U/L Final   03/06/2020 19 0 - 40 Final     ALT   Date Value Ref Range Status   02/12/2022 9 (L) 10 - 44 U/L Final   03/06/2020 15 0 - 40 Final     Anion Gap   Date Value Ref Range Status   05/16/2022 10 8 - 16 mmol/L Final   03/06/2020 7 0 - 25 Final     eGFR if    Date Value Ref Range Status   05/16/2022 31.3 (A) >60 mL/min/1.73 m^2 Final     eGFR if non    Date Value Ref Range Status   05/16/2022 27.1 (A) >60 mL/min/1.73 m^2 Final     Comment:     Calculation used to obtain the estimated glomerular filtration  rate  (eGFR) is the CKD-EPI equation.        Lab Results   Component Value Date    WBC 5.33 02/12/2022    HGB 14.0 02/12/2022    HCT 41.5 02/12/2022    MCV 91 02/12/2022     02/12/2022     Lab Results   Component Value Date    CHOL 166 09/23/2021     Lab Results   Component Value Date    HDL 42 09/23/2021     Lab Results   Component Value Date    LDLCALC 102.6 09/23/2021     Lab Results   Component Value Date    TRIG 107 09/23/2021     Lab Results   Component Value Date    CHOLHDL 25.3 09/23/2021     Lab Results   Component Value Date    TSH 2.278 09/23/2021     Lab Results   Component Value Date    HGBA1C 6.3 (H) 01/25/2022     Assessment:       1. Presence of cardiac pacemaker    2. Senile debility    3. Permanent atrial fibrillation    4. IGT (impaired glucose tolerance)    5. Essential hypertension    6. Dyslipidemia    7. Chronic kidney disease, stage 4 (severe)    8. Congestive heart failure, unspecified HF chronicity, unspecified heart failure type    9. CAD S/P percutaneous coronary angioplasty    10. Coronary artery disease involving native coronary artery of native heart with angina pectoris    11. Primary hyperparathyroidism    12. Anticoagulated        Plan:   Presence of cardiac pacemaker    Senile debility  -     Ambulatory referral/consult to Home Health; Future; Expected date: 07/20/2022    Permanent atrial fibrillation    IGT (impaired glucose tolerance)  -     Hemoglobin A1C; Future; Expected date: 07/19/2022    Essential hypertension    Dyslipidemia  -     Lipid Panel; Future; Expected date: 07/19/2022    Chronic kidney disease, stage 4 (severe)  -     Ambulatory referral/consult to Home Health; Future; Expected date: 07/20/2022    Congestive heart failure, unspecified HF chronicity, unspecified heart failure type  -     Ambulatory referral/consult to Home Health; Future; Expected date: 07/20/2022    CAD S/P percutaneous coronary angioplasty    Coronary artery disease involving native coronary  artery of native heart with angina pectoris    Primary hyperparathyroidism    Anticoagulated    Stable-------continue meds--------------as above---------f/u 4 months--------------

## 2022-07-20 ENCOUNTER — TELEPHONE (OUTPATIENT)
Dept: CARDIOLOGY | Facility: CLINIC | Age: 87
End: 2022-07-20
Payer: MEDICARE

## 2022-07-20 ENCOUNTER — TELEPHONE (OUTPATIENT)
Dept: FAMILY MEDICINE | Facility: CLINIC | Age: 87
End: 2022-07-20
Payer: MEDICARE

## 2022-07-20 DIAGNOSIS — I50.32 CHRONIC DIASTOLIC CONGESTIVE HEART FAILURE: Primary | ICD-10-CM

## 2022-07-20 PROCEDURE — G0180 PR HOME HEALTH MD CERTIFICATION: ICD-10-PCS | Mod: ,,, | Performed by: INTERNAL MEDICINE

## 2022-07-20 PROCEDURE — G0180 MD CERTIFICATION HHA PATIENT: HCPCS | Mod: ,,, | Performed by: INTERNAL MEDICINE

## 2022-07-20 NOTE — TELEPHONE ENCOUNTER
----- Message from Phyllis Aranda sent at 7/20/2022  9:18 AM CDT -----  Contact: Rhonda/[facility]  Type:  Patient Returning Call    Who Called: Rhonda  Who Left Message for Patient: Alcira  Does the patient know what this is regarding?: Regards to the patient low blood pressure  Would the patient rather a call back or a response via [facility]? Call back   Best Call Back Number: Please call her at  [phone]  Additional Information:

## 2022-07-20 NOTE — TELEPHONE ENCOUNTER
----- Message from Franchesca Mckeon sent at 7/20/2022  8:54 AM CDT -----  Contact: [facility]onda [facility] )  Rhonda would like a call back at [phone], in regards to concerns about pt low blood pressure.

## 2022-07-20 NOTE — TELEPHONE ENCOUNTER
Pt contacted about results, spoke with daughter verbalized understanding.              ----- Message from Jorge Salgado MD sent at 7/20/2022  7:53 AM CDT -----  The lab showed improved CHF  Continue torsemide 60 mg bid  Repeat BNP and BMP in 2 weeks

## 2022-07-20 NOTE — TELEPHONE ENCOUNTER
H.H. nurse stated her b/p was 70/60. Pt stated she felt fine.  Got her up and moving around and was able to get it to 90/60.  Pt has not had any of her meds yet today.  She was told to hold off until we contact her back.  Please advise

## 2022-07-20 NOTE — TELEPHONE ENCOUNTER
Pt contacted and advised the daughter to Ok to hold Metoprolol. Keep taking the torsemide and check the bp if it drops below 100 to give us a call and let us know.               [facility] nurse called and stated that she was checking pt bp and it was 68/40 at first then 72/44 and 90/50 and this was all before she took her meds this morning. Nurse stated that she told the pt to not take anything just yet. Please advise.  
No

## 2022-08-02 ENCOUNTER — EXTERNAL HOME HEALTH (OUTPATIENT)
Dept: HOME HEALTH SERVICES | Facility: HOSPITAL | Age: 87
End: 2022-08-02
Payer: MEDICARE

## 2022-08-05 ENCOUNTER — TELEPHONE (OUTPATIENT)
Dept: CARDIOLOGY | Facility: CLINIC | Age: 87
End: 2022-08-05
Payer: MEDICARE

## 2022-08-05 NOTE — TELEPHONE ENCOUNTER
Contacted pt daughter and Ok to hold BP med if SBP < 100 mmHG.  Advise to contact PCP for constipation              ----- Message from Jorge Salgado MD sent at 8/5/2022  1:30 PM CDT -----  Regarding: RE: Advice  Contact: [facility]-  Darlene  Ok to hold BP med if SBP < 100 mmHG.  Advise to contact PCP for constipation  ----- Message -----  From: Gina Vigil MA  Sent: 8/5/2022  10:32 AM CDT  To: Jorge Salgado MD  Subject: FW: Advice                                       Please advise.  ----- Message -----  From: Erma Hawthorne  Sent: 8/5/2022  10:28 AM CDT  To: Damian Patel Staff  Subject: Advice                                           Patient's home health nurse states her daughter has been holding patient's BP medication because patient's BP was running low, and patient was also complaining of some chest pain also. Patient hasn't had a bowel movement since Monday also. Please call nurse Colon  at Ph [phone] to advise if the daughter should continue holding off on the BP medication.

## 2022-08-11 ENCOUNTER — TELEPHONE (OUTPATIENT)
Dept: CARDIAC REHAB | Facility: HOSPITAL | Age: 87
End: 2022-08-11
Payer: MEDICARE

## 2022-08-11 NOTE — TELEPHONE ENCOUNTER
HH nurse called to say pt not feeling well - did at home covid and was negative - pt coughing and SOB - lungs sounded clear to HH nurse O2 96-95%-   Refused HH advise to go to ER    HH nurse calling to let us know pt has appt with DR Salgado next week    Spoke with daughter - pt not eating correctly - drinking more than usual - if you tell her something - she gets combative - also daughter doesn't think she is taking her meds correctly    Told daughter to take her to hospital if her SOB or swelling gets worse. Spoke with pt - pt said she is doing a bit better - again instructed to go to ER if worse and follow up next Thursday in clinic. also would let MD know    Daughter also asked if pt should be back on fliud pills they stopped fluid pills after last lab work results on 7/20 - I called daughter back and told her to start the fluid pills back - pt supposed to be on 60mg toresemide BID- daughter verbalized understanding and is giving her the medication now

## 2022-08-11 NOTE — TELEPHONE ENCOUNTER
----- Message from Sue Adan sent at 8/11/2022  3:30 PM CDT -----  Contact: Darlene/Home Health  Type:  Needs Medical Advice    Who Called: Darlene  Symptoms (please be specific): Felling unwell/Shortness of breath/dry cough/mucus  How long has patient had these symptoms: A couple of days  Pharmacy name and phone #:    [id number] - [place], [place] - [address] [address]  Phone: [phone] Fax: [phone]  Would the patient rather a call back or a response via [facility]? call  Best Call Back Number: [phone]  Additional Information: Reports patient is feeling unwell and appears to have shortness of breath and request medical advice. Please give Ms. Colon a call back as soon as possible.   Thank you,  GH

## 2022-08-15 ENCOUNTER — TELEPHONE (OUTPATIENT)
Dept: ADMINISTRATIVE | Facility: HOSPITAL | Age: 87
End: 2022-08-15
Payer: MEDICARE

## 2022-08-18 ENCOUNTER — OFFICE VISIT (OUTPATIENT)
Dept: CARDIOLOGY | Facility: CLINIC | Age: 87
End: 2022-08-18
Payer: MEDICARE

## 2022-08-18 VITALS
OXYGEN SATURATION: 100 % | DIASTOLIC BLOOD PRESSURE: 68 MMHG | WEIGHT: 125.44 LBS | SYSTOLIC BLOOD PRESSURE: 104 MMHG | HEART RATE: 89 BPM | BODY MASS INDEX: 22.22 KG/M2

## 2022-08-18 DIAGNOSIS — I25.119 CORONARY ARTERY DISEASE INVOLVING NATIVE CORONARY ARTERY OF NATIVE HEART WITH ANGINA PECTORIS: ICD-10-CM

## 2022-08-18 DIAGNOSIS — I25.10 CAD S/P PERCUTANEOUS CORONARY ANGIOPLASTY: Chronic | ICD-10-CM

## 2022-08-18 DIAGNOSIS — I50.43 ACUTE ON CHRONIC COMBINED SYSTOLIC AND DIASTOLIC CONGESTIVE HEART FAILURE: Primary | ICD-10-CM

## 2022-08-18 DIAGNOSIS — Z98.61 CAD S/P PERCUTANEOUS CORONARY ANGIOPLASTY: Chronic | ICD-10-CM

## 2022-08-18 DIAGNOSIS — I10 ESSENTIAL HYPERTENSION: Chronic | ICD-10-CM

## 2022-08-18 DIAGNOSIS — I48.21 PERMANENT ATRIAL FIBRILLATION: Chronic | ICD-10-CM

## 2022-08-18 PROCEDURE — 1159F PR MEDICATION LIST DOCUMENTED IN MEDICAL RECORD: ICD-10-PCS | Mod: CPTII,S$GLB,, | Performed by: INTERNAL MEDICINE

## 2022-08-18 PROCEDURE — 99999 PR PBB SHADOW E&M-EST. PATIENT-LVL III: CPT | Mod: PBBFAC,,, | Performed by: INTERNAL MEDICINE

## 2022-08-18 PROCEDURE — 1160F PR REVIEW ALL MEDS BY PRESCRIBER/CLIN PHARMACIST DOCUMENTED: ICD-10-PCS | Mod: CPTII,S$GLB,, | Performed by: INTERNAL MEDICINE

## 2022-08-18 PROCEDURE — 1160F RVW MEDS BY RX/DR IN RCRD: CPT | Mod: CPTII,S$GLB,, | Performed by: INTERNAL MEDICINE

## 2022-08-18 PROCEDURE — 99499 RISK ADDL DX/OHS AUDIT: ICD-10-PCS | Mod: HCNC,S$GLB,, | Performed by: INTERNAL MEDICINE

## 2022-08-18 PROCEDURE — 99214 OFFICE O/P EST MOD 30 MIN: CPT | Mod: S$GLB,,, | Performed by: INTERNAL MEDICINE

## 2022-08-18 PROCEDURE — 1159F MED LIST DOCD IN RCRD: CPT | Mod: CPTII,S$GLB,, | Performed by: INTERNAL MEDICINE

## 2022-08-18 PROCEDURE — 99499 UNLISTED E&M SERVICE: CPT | Mod: HCNC,S$GLB,, | Performed by: INTERNAL MEDICINE

## 2022-08-18 PROCEDURE — 99214 PR OFFICE/OUTPT VISIT, EST, LEVL IV, 30-39 MIN: ICD-10-PCS | Mod: S$GLB,,, | Performed by: INTERNAL MEDICINE

## 2022-08-18 PROCEDURE — 99999 PR PBB SHADOW E&M-EST. PATIENT-LVL III: ICD-10-PCS | Mod: PBBFAC,,, | Performed by: INTERNAL MEDICINE
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
## 2022-09-14 ENCOUNTER — TELEPHONE (OUTPATIENT)
Dept: FAMILY MEDICINE | Facility: CLINIC | Age: 87
End: 2022-09-14
Payer: MEDICARE

## 2022-09-18 PROCEDURE — G0179 PR HOME HEALTH MD RECERTIFICATION: ICD-10-PCS | Mod: ,,, | Performed by: INTERNAL MEDICINE

## 2022-09-18 PROCEDURE — G0179 MD RECERTIFICATION HHA PT: HCPCS | Mod: ,,, | Performed by: INTERNAL MEDICINE

## 2022-09-28 ENCOUNTER — EXTERNAL HOME HEALTH (OUTPATIENT)
Dept: HOME HEALTH SERVICES | Facility: HOSPITAL | Age: 87
End: 2022-09-28
Payer: MEDICARE

## 2022-10-21 ENCOUNTER — PES CALL (OUTPATIENT)
Dept: ADMINISTRATIVE | Facility: CLINIC | Age: 87
End: 2022-10-21
Payer: MEDICARE

## 2022-10-31 ENCOUNTER — PES CALL (OUTPATIENT)
Dept: ADMINISTRATIVE | Facility: CLINIC | Age: 87
End: 2022-10-31
Payer: MEDICARE

## 2022-11-15 ENCOUNTER — PATIENT MESSAGE (OUTPATIENT)
Dept: FAMILY MEDICINE | Facility: CLINIC | Age: 87
End: 2022-11-15
Payer: MEDICARE

## 2022-11-17 PROCEDURE — G0179 MD RECERTIFICATION HHA PT: HCPCS | Mod: ,,, | Performed by: INTERNAL MEDICINE

## 2022-11-17 PROCEDURE — G0179 PR HOME HEALTH MD RECERTIFICATION: ICD-10-PCS | Mod: ,,, | Performed by: INTERNAL MEDICINE

## 2022-12-05 ENCOUNTER — EXTERNAL HOME HEALTH (OUTPATIENT)
Dept: HOME HEALTH SERVICES | Facility: HOSPITAL | Age: 87
End: 2022-12-05
Payer: MEDICARE

## 2022-12-05 NOTE — ASSESSMENT & PLAN NOTE
- Multifactorial, including pulmonary hypertension, worsening COPD, anemia, and poorly controlled atrial fibrillation/flutter.   - See chronic cough.      Complex Repair And A-T Advancement Flap Text: The defect edges were debeveled with a #15 scalpel blade.  The primary defect was closed partially with a complex linear closure.  Given the location of the remaining defect, shape of the defect and the proximity to free margins an A-T advancement flap was deemed most appropriate for complete closure of the defect.  Using a sterile surgical marker, an appropriate advancement flap was drawn incorporating the defect and placing the expected incisions within the relaxed skin tension lines where possible.    The area thus outlined was incised deep to adipose tissue with a #15 scalpel blade.  The skin margins were undermined to an appropriate distance in all directions utilizing iris scissors.

## 2022-12-14 DIAGNOSIS — I10 ESSENTIAL HYPERTENSION: Primary | ICD-10-CM

## 2022-12-20 ENCOUNTER — HOSPITAL ENCOUNTER (OUTPATIENT)
Dept: CARDIOLOGY | Facility: HOSPITAL | Age: 87
Discharge: HOME | End: 2022-12-20
Attending: INTERNAL MEDICINE
Payer: MEDICARE

## 2022-12-20 ENCOUNTER — OFFICE VISIT (OUTPATIENT)
Dept: CARDIOLOGY | Facility: CLINIC | Age: 87
End: 2022-12-20
Payer: MEDICARE

## 2022-12-20 VITALS
DIASTOLIC BLOOD PRESSURE: 68 MMHG | HEART RATE: 65 BPM | BODY MASS INDEX: 23.04 KG/M2 | WEIGHT: 130 LBS | OXYGEN SATURATION: 97 % | HEIGHT: 63 IN | SYSTOLIC BLOOD PRESSURE: 132 MMHG

## 2022-12-20 DIAGNOSIS — Z95.0 PRESENCE OF CARDIAC PACEMAKER: Chronic | ICD-10-CM

## 2022-12-20 DIAGNOSIS — Z79.01 ANTICOAGULATED: ICD-10-CM

## 2022-12-20 DIAGNOSIS — I10 ESSENTIAL HYPERTENSION: Chronic | ICD-10-CM

## 2022-12-20 DIAGNOSIS — I48.21 PERMANENT ATRIAL FIBRILLATION: Chronic | ICD-10-CM

## 2022-12-20 DIAGNOSIS — J44.9 CHRONIC OBSTRUCTIVE PULMONARY DISEASE, UNSPECIFIED COPD TYPE: Chronic | ICD-10-CM

## 2022-12-20 DIAGNOSIS — I36.1 NONRHEUMATIC TRICUSPID VALVE REGURGITATION: Chronic | ICD-10-CM

## 2022-12-20 DIAGNOSIS — I25.10 CAD S/P PERCUTANEOUS CORONARY ANGIOPLASTY: Chronic | ICD-10-CM

## 2022-12-20 DIAGNOSIS — F01.50 VASCULAR DEMENTIA WITHOUT BEHAVIORAL DISTURBANCE: Chronic | ICD-10-CM

## 2022-12-20 DIAGNOSIS — I50.43 ACUTE ON CHRONIC COMBINED SYSTOLIC AND DIASTOLIC CONGESTIVE HEART FAILURE: Primary | ICD-10-CM

## 2022-12-20 DIAGNOSIS — Z98.61 CAD S/P PERCUTANEOUS CORONARY ANGIOPLASTY: Chronic | ICD-10-CM

## 2022-12-20 DIAGNOSIS — N18.4 CHRONIC KIDNEY DISEASE, STAGE 4 (SEVERE): Chronic | ICD-10-CM

## 2022-12-20 DIAGNOSIS — I10 ESSENTIAL HYPERTENSION: ICD-10-CM

## 2022-12-20 DIAGNOSIS — E78.5 DYSLIPIDEMIA: Chronic | ICD-10-CM

## 2022-12-20 PROCEDURE — 93010 ELECTROCARDIOGRAM REPORT: CPT | Mod: HCNC,,, | Performed by: INTERNAL MEDICINE

## 2022-12-20 PROCEDURE — 93010 EKG 12-LEAD: ICD-10-PCS | Mod: HCNC,,, | Performed by: INTERNAL MEDICINE

## 2022-12-20 PROCEDURE — 1159F PR MEDICATION LIST DOCUMENTED IN MEDICAL RECORD: ICD-10-PCS | Mod: HCNC,CPTII,S$GLB, | Performed by: INTERNAL MEDICINE

## 2022-12-20 PROCEDURE — 93005 ELECTROCARDIOGRAM TRACING: CPT | Mod: HCNC

## 2022-12-20 PROCEDURE — 1159F MED LIST DOCD IN RCRD: CPT | Mod: HCNC,CPTII,S$GLB, | Performed by: INTERNAL MEDICINE

## 2022-12-20 PROCEDURE — 99214 OFFICE O/P EST MOD 30 MIN: CPT | Mod: HCNC,S$GLB,, | Performed by: INTERNAL MEDICINE

## 2022-12-20 PROCEDURE — 3288F FALL RISK ASSESSMENT DOCD: CPT | Mod: HCNC,CPTII,S$GLB, | Performed by: INTERNAL MEDICINE

## 2022-12-20 PROCEDURE — 3288F PR FALLS RISK ASSESSMENT DOCUMENTED: ICD-10-PCS | Mod: HCNC,CPTII,S$GLB, | Performed by: INTERNAL MEDICINE

## 2022-12-20 PROCEDURE — 1101F PT FALLS ASSESS-DOCD LE1/YR: CPT | Mod: HCNC,CPTII,S$GLB, | Performed by: INTERNAL MEDICINE

## 2022-12-20 PROCEDURE — 1101F PR PT FALLS ASSESS DOC 0-1 FALLS W/OUT INJ PAST YR: ICD-10-PCS | Mod: HCNC,CPTII,S$GLB, | Performed by: INTERNAL MEDICINE

## 2022-12-20 PROCEDURE — 99214 PR OFFICE/OUTPT VISIT, EST, LEVL IV, 30-39 MIN: ICD-10-PCS | Mod: HCNC,S$GLB,, | Performed by: INTERNAL MEDICINE

## 2022-12-20 PROCEDURE — 99999 PR PBB SHADOW E&M-EST. PATIENT-LVL III: CPT | Mod: PBBFAC,HCNC,, | Performed by: INTERNAL MEDICINE

## 2022-12-20 PROCEDURE — 1160F RVW MEDS BY RX/DR IN RCRD: CPT | Mod: HCNC,CPTII,S$GLB, | Performed by: INTERNAL MEDICINE

## 2022-12-20 PROCEDURE — 1126F AMNT PAIN NOTED NONE PRSNT: CPT | Mod: HCNC,CPTII,S$GLB, | Performed by: INTERNAL MEDICINE

## 2022-12-20 PROCEDURE — 1126F PR PAIN SEVERITY QUANTIFIED, NO PAIN PRESENT: ICD-10-PCS | Mod: HCNC,CPTII,S$GLB, | Performed by: INTERNAL MEDICINE

## 2022-12-20 PROCEDURE — 99999 PR PBB SHADOW E&M-EST. PATIENT-LVL III: ICD-10-PCS | Mod: PBBFAC,HCNC,, | Performed by: INTERNAL MEDICINE

## 2022-12-20 PROCEDURE — 1160F PR REVIEW ALL MEDS BY PRESCRIBER/CLIN PHARMACIST DOCUMENTED: ICD-10-PCS | Mod: HCNC,CPTII,S$GLB, | Performed by: INTERNAL MEDICINE

## 2022-12-20 RX ORDER — ALBUTEROL SULFATE 90 UG/1
1-2 AEROSOL, METERED RESPIRATORY (INHALATION) EVERY 6 HOURS PRN
Qty: 18 G | Refills: 1 | OUTPATIENT
Start: 2022-12-20 | End: 2022-12-21 | Stop reason: SDUPTHER
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
## 2022-12-21 ENCOUNTER — OFFICE VISIT (OUTPATIENT)
Dept: HOME HEALTH SERVICES | Facility: CLINIC | Age: 87
End: 2022-12-21
Payer: MEDICARE

## 2022-12-21 VITALS
HEIGHT: 63 IN | HEART RATE: 86 BPM | OXYGEN SATURATION: 97 % | DIASTOLIC BLOOD PRESSURE: 64 MMHG | SYSTOLIC BLOOD PRESSURE: 124 MMHG | BODY MASS INDEX: 23.04 KG/M2 | TEMPERATURE: 100 F | WEIGHT: 130 LBS

## 2022-12-21 DIAGNOSIS — Z00.00 ENCOUNTER FOR PREVENTIVE HEALTH EXAMINATION: Primary | ICD-10-CM

## 2022-12-21 DIAGNOSIS — E78.5 DYSLIPIDEMIA: Chronic | ICD-10-CM

## 2022-12-21 DIAGNOSIS — R73.02 IGT (IMPAIRED GLUCOSE TOLERANCE): ICD-10-CM

## 2022-12-21 DIAGNOSIS — Z95.0 PRESENCE OF CARDIAC PACEMAKER: Chronic | ICD-10-CM

## 2022-12-21 DIAGNOSIS — J44.9 CHRONIC OBSTRUCTIVE PULMONARY DISEASE, UNSPECIFIED COPD TYPE: Chronic | ICD-10-CM

## 2022-12-21 DIAGNOSIS — I13.0 BENIGN HYPERTENSIVE HEART AND KIDNEY DISEASE WITH HF AND CKD: Chronic | ICD-10-CM

## 2022-12-21 DIAGNOSIS — I48.21 PERMANENT ATRIAL FIBRILLATION: Chronic | ICD-10-CM

## 2022-12-21 DIAGNOSIS — Z74.09 OTHER REDUCED MOBILITY: ICD-10-CM

## 2022-12-21 DIAGNOSIS — J43.2 CENTRILOBULAR EMPHYSEMA: Chronic | ICD-10-CM

## 2022-12-21 DIAGNOSIS — I65.23 BILATERAL CAROTID ARTERY STENOSIS: ICD-10-CM

## 2022-12-21 DIAGNOSIS — I35.1 NONRHEUMATIC AORTIC VALVE INSUFFICIENCY: Chronic | ICD-10-CM

## 2022-12-21 DIAGNOSIS — I36.1 NONRHEUMATIC TRICUSPID VALVE REGURGITATION: Chronic | ICD-10-CM

## 2022-12-21 DIAGNOSIS — I50.43 ACUTE ON CHRONIC COMBINED SYSTOLIC AND DIASTOLIC CONGESTIVE HEART FAILURE: ICD-10-CM

## 2022-12-21 DIAGNOSIS — I25.10 CAD S/P PERCUTANEOUS CORONARY ANGIOPLASTY: Chronic | ICD-10-CM

## 2022-12-21 DIAGNOSIS — I25.119 CORONARY ARTERY DISEASE INVOLVING NATIVE CORONARY ARTERY OF NATIVE HEART WITH ANGINA PECTORIS: ICD-10-CM

## 2022-12-21 DIAGNOSIS — E21.0 PRIMARY HYPERPARATHYROIDISM: Chronic | ICD-10-CM

## 2022-12-21 DIAGNOSIS — F01.50 VASCULAR DEMENTIA WITHOUT BEHAVIORAL DISTURBANCE: Chronic | ICD-10-CM

## 2022-12-21 DIAGNOSIS — Z79.01 CURRENT USE OF LONG TERM ANTICOAGULATION: Chronic | ICD-10-CM

## 2022-12-21 DIAGNOSIS — K59.01 SLOW TRANSIT CONSTIPATION: ICD-10-CM

## 2022-12-21 DIAGNOSIS — I27.20 PULMONARY HYPERTENSION: Chronic | ICD-10-CM

## 2022-12-21 DIAGNOSIS — N18.4 CHRONIC KIDNEY DISEASE, STAGE 4 (SEVERE): Chronic | ICD-10-CM

## 2022-12-21 DIAGNOSIS — R54 SENILE DEBILITY: ICD-10-CM

## 2022-12-21 DIAGNOSIS — H91.93 DECREASED HEARING OF BOTH EARS: ICD-10-CM

## 2022-12-21 DIAGNOSIS — I10 ESSENTIAL HYPERTENSION: Chronic | ICD-10-CM

## 2022-12-21 DIAGNOSIS — Z98.61 CAD S/P PERCUTANEOUS CORONARY ANGIOPLASTY: Chronic | ICD-10-CM

## 2022-12-21 DIAGNOSIS — I34.0 NON-RHEUMATIC MITRAL REGURGITATION: Chronic | ICD-10-CM

## 2022-12-21 PROCEDURE — G9919 SCRN ND POS ND PROV OF REC: HCPCS | Mod: CPTII,S$GLB,, | Performed by: NURSE PRACTITIONER

## 2022-12-21 PROCEDURE — 3288F PR FALLS RISK ASSESSMENT DOCUMENTED: ICD-10-PCS | Mod: CPTII,S$GLB,, | Performed by: NURSE PRACTITIONER

## 2022-12-21 PROCEDURE — 3288F FALL RISK ASSESSMENT DOCD: CPT | Mod: CPTII,S$GLB,, | Performed by: NURSE PRACTITIONER

## 2022-12-21 PROCEDURE — G0439 PR MEDICARE ANNUAL WELLNESS SUBSEQUENT VISIT: ICD-10-PCS | Mod: S$GLB,,, | Performed by: NURSE PRACTITIONER

## 2022-12-21 PROCEDURE — 1126F AMNT PAIN NOTED NONE PRSNT: CPT | Mod: CPTII,S$GLB,, | Performed by: NURSE PRACTITIONER

## 2022-12-21 PROCEDURE — 1170F FXNL STATUS ASSESSED: CPT | Mod: CPTII,S$GLB,, | Performed by: NURSE PRACTITIONER

## 2022-12-21 PROCEDURE — 1160F PR REVIEW ALL MEDS BY PRESCRIBER/CLIN PHARMACIST DOCUMENTED: ICD-10-PCS | Mod: CPTII,S$GLB,, | Performed by: NURSE PRACTITIONER

## 2022-12-21 PROCEDURE — 1160F RVW MEDS BY RX/DR IN RCRD: CPT | Mod: CPTII,S$GLB,, | Performed by: NURSE PRACTITIONER

## 2022-12-21 PROCEDURE — G9919 PR SCREENING AND POSITIVE: ICD-10-PCS | Mod: CPTII,S$GLB,, | Performed by: NURSE PRACTITIONER

## 2022-12-21 PROCEDURE — 1126F PR PAIN SEVERITY QUANTIFIED, NO PAIN PRESENT: ICD-10-PCS | Mod: CPTII,S$GLB,, | Performed by: NURSE PRACTITIONER

## 2022-12-21 PROCEDURE — 1170F PR FUNCTIONAL STATUS ASSESSED: ICD-10-PCS | Mod: CPTII,S$GLB,, | Performed by: NURSE PRACTITIONER

## 2022-12-21 PROCEDURE — 1159F MED LIST DOCD IN RCRD: CPT | Mod: CPTII,S$GLB,, | Performed by: NURSE PRACTITIONER

## 2022-12-21 PROCEDURE — 1159F PR MEDICATION LIST DOCUMENTED IN MEDICAL RECORD: ICD-10-PCS | Mod: CPTII,S$GLB,, | Performed by: NURSE PRACTITIONER

## 2022-12-21 PROCEDURE — 1101F PT FALLS ASSESS-DOCD LE1/YR: CPT | Mod: CPTII,S$GLB,, | Performed by: NURSE PRACTITIONER

## 2022-12-21 PROCEDURE — G0439 PPPS, SUBSEQ VISIT: HCPCS | Mod: S$GLB,,, | Performed by: NURSE PRACTITIONER

## 2022-12-21 PROCEDURE — 1101F PR PT FALLS ASSESS DOC 0-1 FALLS W/OUT INJ PAST YR: ICD-10-PCS | Mod: CPTII,S$GLB,, | Performed by: NURSE PRACTITIONER

## 2022-12-21 RX ORDER — INDOMETHACIN 50 MG/1
50 CAPSULE ORAL EVERY 8 HOURS PRN
Status: ON HOLD | COMMUNITY
End: 2022-12-29

## 2022-12-21 RX ORDER — ALBUTEROL SULFATE 90 UG/1
1-2 AEROSOL, METERED RESPIRATORY (INHALATION) EVERY 6 HOURS PRN
Qty: 18 G | Refills: 1 | OUTPATIENT
Start: 2022-12-21 | End: 2022-12-29

## 2022-12-21 RX ORDER — METOPROLOL SUCCINATE 25 MG/1
25 TABLET, EXTENDED RELEASE ORAL DAILY
COMMUNITY
End: 2023-03-09

## 2022-12-21 NOTE — Clinical Note
Medicare awv complete. Health maintenance:  shingles, pneumococcal and covid 19 3rd vaccine due-encouraged patient to obtain. Ordered lipid panel and hgba1c. Message sent to staff to call patient to schedule.  Recommend patient's daughter manage patient's medications and bring her medication bottles with her every visit since there were some discrepancies when reviewing today. Corrected today: added metoprolol to med list per Dr. Salgado's approval and patient to take crestor daily and not take lasix.   Patient requests refill on albuterol. Rx sent.   Patient informed to call to schedule an appointment with pcp for candace, nephrology, and pulmonology.   Patient states she has difficulty with bathing and no longer has home health. CM consulted.   Referral to audiology for hearing.

## 2022-12-21 NOTE — PROGRESS NOTES
"Christy Webber presented for Medicare AW today. The following components were reviewed and updated:    Medical history  Family History  Social history  Allergies and Current Medications  Health Risk Assessment  Health Maintenance  Care Team    **See Completed Assessments for Annual Wellness visit with in the encounter summary    The following assessments were completed:  Depression Screening  Cognitive function Screening      Timed Get Up Test  Whisper Test    Vitals:    12/21/22 1222   BP: 124/64   Pulse: 86   Temp: 99.5 °F (37.5 °C)   TempSrc: Temporal   SpO2: 97%   Weight: 59 kg (130 lb)   Height: 5' 3" (1.6 m)     Body mass index is 23.03 kg/m².   ]    Physical Exam  Vitals reviewed.   Constitutional:       Appearance: Normal appearance.   HENT:      Head: Normocephalic and atraumatic.   Cardiovascular:      Rate and Rhythm: Normal rate and regular rhythm.      Pulses: Normal pulses.      Heart sounds: Murmur heard.   Pulmonary:      Effort: Pulmonary effort is normal.      Breath sounds: Normal breath sounds.   Musculoskeletal:         General: Normal range of motion.      Cervical back: Normal range of motion and neck supple.   Skin:     General: Skin is warm and dry.   Neurological:      Mental Status: She is alert and oriented to person, place, and time.      Gait: Gait abnormal.   Psychiatric:         Mood and Affect: Mood normal.         Behavior: Behavior normal.        Outpatient Medications Marked as Taking for the 12/21/22 encounter (Office Visit) with MITCH Noe   Medication Sig Dispense Refill    albuterol (PROVENTIL/VENTOLIN HFA) 90 mcg/actuation inhaler Inhale 1-2 puffs into the lungs every 6 (six) hours as needed for Wheezing. Rescue 18 g 1    donepeziL (ARICEPT) 5 MG tablet Take 1 tablet (5 mg total) by mouth every evening. 90 tablet 3    ELIQUIS 2.5 mg Tab TAKE 1 TABLET BY MOUTH TWICE A DAY 60 tablet 6    famotidine (PEPCID) 20 MG tablet Take 1 tablet (20 mg total) by mouth daily as " needed for Heartburn. 90 tablet 0    indomethacin (INDOCIN) 50 MG capsule Take 50 mg by mouth every 8 (eight) hours as needed (pain).      metoprolol succinate (TOPROL-XL) 25 MG 24 hr tablet Take 25 mg by mouth once daily.      nitroGLYCERIN (NITROSTAT) 0.4 MG SL tablet Place 1 tablet (0.4 mg total) under the tongue every 5 (five) minutes as needed for Chest pain. 25 tablet 5    rosuvastatin (CRESTOR) 10 MG tablet TAKE 1 TABLET BY MOUTH EVERY DAY 90 tablet 0    torsemide (DEMADEX) 20 MG Tab TAKE 3 TABLETS BY MOUTH 2 TIMES A DAY. 180 tablet 3        Diagnoses and health risks identified today and associated recommendations/orders:  1. Encounter for preventive health examination  Medicare awv complete. Health maintenance:  shingles, pneumococcal and covid 19 3rd vaccine due-encouraged patient to obtain. Ordered lipid panel and hgba1c. Message sent to staff to call patient to schedule.   Recommend patient's daughter manage patient's medications and bring her medication bottles with her every visit since there were some discrepancies when reviewing today. Corrected today: added metoprolol to med list per Dr. Salgado's approval and patient to take crestor daily and not take lasix.     2. Chronic obstructive pulmonary disease, unspecified COPD type  Chronic and stable on current management. See med list above. Follow up with pulmonology. Patient requests refill on albuterol. Rx sent.   - albuterol (PROVENTIL/VENTOLIN HFA) 90 mcg/actuation inhaler; Inhale 1-2 puffs into the lungs every 6 (six) hours as needed for Wheezing. Rescue  Dispense: 18 g; Refill: 1    3. Centrilobular emphysema  Chronic and stable on current management. See med list above. Follow up with pulmonology.      4. Vascular dementia without behavioral disturbance  Chronic and stable on current management. On donepazil. See med list above. Follow up with PCP.      5. Acute on chronic combined systolic and diastolic congestive heart failure  Chronic and  stable. No acute issues. Continue current management. See med list above. Follow up with cardiology.      6. 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 No acute issues. Continue current management. See med list above. Follow up with cardiology and nephrology.     7. Pulmonary hypertension  Chronic and stable. No acute issues. Continue current management. See med list above. Follow up with cardiology.      8. Coronary artery disease involving native coronary artery of native heart with angina pectoris  Chronic and stable. No acute issues. Continue current management. See med list above. Follow up with cardiology.      9. Permanent atrial fibrillation  Chronic and stable. No acute issues. Continue current management. On eliquis and metoprolol. See med list above. Follow up with cardiology.      10. Chronic kidney disease, stage 4 (severe)   Latest Reference Range & Units 03/28/22 11:26 05/05/22 14:52 05/16/22 14:30 07/19/22 09:03   eGFR if African American >60 mL/min/1.73 m^2 24.2 ! 25.7 ! 31.3 ! 22.9 !   !: Data is abnormal  Chronic and stable. No acute issues. Continue current management. Recommend avoid nsaids and other nephrotoxic medications. Follow up with PCP/nephrologist.      11. Primary hyperparathyroidism  Lab Results   Component Value Date    .7 (H) 11/30/2021    .7 (H) 11/30/2021    CALCIUM 11.1 (H) 07/19/2022    PHOS 1.5 (L) 11/30/2021    Chronic and stable on current management. See med list above. Follow up with pcp/nephrology.     12. Nonrheumatic aortic valve insufficiency  Chronic and stable. No acute issues. Continue current management. See med list above. Follow up with cardiology.      13. Bilateral carotid artery stenosis  Chronic and stable on current management. See med list above. Follow up with PCP.      14. CAD S/P percutaneous coronary angioplasty  Chronic and stable. No acute issues. Continue current management. See med list above.  Follow up with cardiology.      15. Essential hypertension  Chronic and stable on current management. See med list above. Recommend low sodium diet. Follow up with PCP.       16. Dyslipidemia  Lab Results   Component Value Date    CHOL 166 09/23/2021    CHOL 88 (L) 03/13/2017     Lab Results   Component Value Date    HDL 42 09/23/2021    HDL 28 (L) 03/13/2017     Lab Results   Component Value Date    LDLCALC 102.6 09/23/2021    LDLCALC 49.2 (L) 03/13/2017     Lab Results   Component Value Date    TRIG 107 09/23/2021    TRIG 54 03/13/2017     Lab Results   Component Value Date    CHOLHDL 25.3 09/23/2021    CHOLHDL 31.8 03/13/2017    Chronic and stable on statin medication. Will order repeat lipid panel. F/u with pcp.      17. Non-rheumatic mitral regurgitation  Chronic and stable. No acute issues. Continue current management. See med list above. Follow up with cardiology.      18. Nonrheumatic tricuspid valve regurgitation  Chronic and stable. No acute issues. Continue current management. See med list above. Follow up with cardiology.      19. Presence of cardiac pacemaker  Stable. Dr. Salgado following.     20. IGT (impaired glucose tolerance)   Latest Reference Range & Units 11/07/20 03:54 09/23/21 12:52 01/25/22 11:03   Hemoglobin A1C External 4.0 - 5.6 % 7.5 (H) 5.6 6.3 (H)   Estimated Avg Glucose 68 - 131 mg/dL 169 (H) 114 134 (H)   (H): Data is abnormally high  Controlled. Continue current management. Reviewed diabetic diet, preferred BS, and HgbA1C levels. Follow up with your PCP as instructed, podiatry and ophthalmology yearly.      21. Current use of long term anticoagulation  See #9. On eliquis.     22. Slow transit constipation  Chronic and stable on current management. See med list above. Follow up with PCP.      23. Decreased hearing of both ears  - Ambulatory referral/consult to Audiology; Future    24. Senile debility  Chronic. Fall precautions recommended. Patient states she has difficulty with bathing  and no longer has home health. CM consulted.   - Ambulatory referral/consult to Outpatient Case Management    25. Other reduced mobility  Chronic. Fall precautions recommended. Follow up with PCP.          Provided Christy with a 5-10 year written screening schedule and personal prevention plan. Recommendations were developed using the USPSTF age appropriate recommendations. Education, counseling, and referrals were provided as needed.  After Visit Summary printed and given to patient which includes a list of additional screenings\tests needed.    Follow up in about 1 year (around 12/21/2023) for annual wellness visit.      SAMUEL Noe offered to discuss advanced care planning, including how to pick a person who would make decisions for you if you were unable to make them for yourself, called a health care power of , and what kind of decisions you might make such as use of life sustaining treatments such as ventilators and tube feeding when faced with a life limiting illness recorded on a living will that they will need to know. (How you want to be cared for as you near the end of your natural life)     X  Patient is unwilling to engage in a discussion regarding advance directives at this time.

## 2022-12-21 NOTE — PATIENT INSTRUCTIONS
Counseling and Referral of Other Preventative  (Italic type indicates deductible and co-insurance are waived)    Patient Name: Christy Webber  Today's Date: 12/21/2022    Health Maintenance       Date Due Completion Date    Shingles Vaccine (1 of 2) Never done ---    Pneumococcal Vaccines (Age 65+) (2 - PPSV23 if available, else PCV20) 10/16/2020 10/16/2019    COVID-19 Vaccine (3 - Booster) 04/26/2021 3/1/2021    Influenza Vaccine (1) 09/01/2022 10/21/2021    Hemoglobin A1c (Prediabetes) 01/25/2023 1/25/2022    Lipid Panel 09/23/2026 9/23/2021    TETANUS VACCINE 11/05/2029 11/5/2019        Orders Placed This Encounter   Procedures    Ambulatory referral/consult to Audiology    Ambulatory referral/consult to Outpatient Case Management     The following information is provided to all patients.  This information is to help you find resources for any of the problems found today that may be affecting your health:                Living healthy guide: www.[facility].[place].gov      Understanding Diabetes: www.diabetes.org      Eating healthy: www.cdc.gov/healthyweight      CDC home safety checklist: www.cdc.gov/steadi/patient.html      Agency on Aging: www.goea.[place].gov      Alcoholics anonymous (AA): www.aa.org      Physical Activity: www.david.nih.gov/by2tmmv      Tobacco use: www.quitwithusla.org

## 2022-12-29 ENCOUNTER — HOSPITAL ENCOUNTER (OUTPATIENT)
Facility: HOSPITAL | Age: 87
Discharge: HOME | End: 2023-01-01
Attending: EMERGENCY MEDICINE | Admitting: STUDENT IN AN ORGANIZED HEALTH CARE EDUCATION/TRAINING PROGRAM
Payer: MEDICARE

## 2022-12-29 DIAGNOSIS — I50.9 CHF (CONGESTIVE HEART FAILURE): ICD-10-CM

## 2022-12-29 DIAGNOSIS — I50.9 ACUTE DECOMPENSATED HEART FAILURE: Primary | ICD-10-CM

## 2022-12-29 DIAGNOSIS — N30.00 ACUTE CYSTITIS WITHOUT HEMATURIA: ICD-10-CM

## 2022-12-29 DIAGNOSIS — R07.9 CHEST PAIN: ICD-10-CM

## 2022-12-29 LAB
ABO + RH BLD: NORMAL
ALBUMIN SERPL BCP-MCNC: 3.3 G/DL (ref 3.5–5.2)
ALP SERPL-CCNC: 113 U/L (ref 55–135)
ALT SERPL W/O P-5'-P-CCNC: <5 U/L (ref 10–44)
ANION GAP SERPL CALC-SCNC: 16 MMOL/L (ref 8–16)
APTT BLDCRRT: 27.1 SEC (ref 21–32)
AST SERPL-CCNC: 18 U/L (ref 10–40)
BACTERIA #/AREA URNS HPF: ABNORMAL /HPF
BASOPHILS # BLD AUTO: 0.05 K/UL (ref 0–0.2)
BASOPHILS NFR BLD: 1 % (ref 0–1.9)
BILIRUB SERPL-MCNC: 1.9 MG/DL (ref 0.1–1)
BILIRUB UR QL STRIP: NEGATIVE
BLD GP AB SCN CELLS X3 SERPL QL: NORMAL
BNP SERPL-MCNC: 3569 PG/ML (ref 0–99)
BUN SERPL-MCNC: 28 MG/DL (ref 8–23)
CALCIUM SERPL-MCNC: 10.2 MG/DL (ref 8.7–10.5)
CHLORIDE SERPL-SCNC: 110 MMOL/L (ref 95–110)
CLARITY UR: ABNORMAL
CO2 SERPL-SCNC: 17 MMOL/L (ref 23–29)
COLOR UR: YELLOW
CREAT SERPL-MCNC: 1.7 MG/DL (ref 0.5–1.4)
DIFFERENTIAL METHOD: ABNORMAL
EOSINOPHIL # BLD AUTO: 0.1 K/UL (ref 0–0.5)
EOSINOPHIL NFR BLD: 1.6 % (ref 0–8)
ERYTHROCYTE [DISTWIDTH] IN BLOOD BY AUTOMATED COUNT: 14 % (ref 11.5–14.5)
EST. GFR  (NO RACE VARIABLE): 29 ML/MIN/1.73 M^2
GLUCOSE SERPL-MCNC: 142 MG/DL (ref 70–110)
GLUCOSE UR QL STRIP: NEGATIVE
HCT VFR BLD AUTO: 40.1 % (ref 37–48.5)
HGB BLD-MCNC: 13 G/DL (ref 12–16)
HGB UR QL STRIP: ABNORMAL
HYALINE CASTS #/AREA URNS LPF: 24 /LPF
IMM GRANULOCYTES # BLD AUTO: 0.02 K/UL (ref 0–0.04)
IMM GRANULOCYTES NFR BLD AUTO: 0.4 % (ref 0–0.5)
INFLUENZA A, MOLECULAR: NEGATIVE
INFLUENZA B, MOLECULAR: NEGATIVE
INR PPP: 1.3 (ref 0.8–1.2)
KETONES UR QL STRIP: NEGATIVE
LEUKOCYTE ESTERASE UR QL STRIP: ABNORMAL
LYMPHOCYTES # BLD AUTO: 1.2 K/UL (ref 1–4.8)
LYMPHOCYTES NFR BLD: 24.4 % (ref 18–48)
MAGNESIUM SERPL-MCNC: 2.2 MG/DL (ref 1.6–2.6)
MCH RBC QN AUTO: 31.7 PG (ref 27–31)
MCHC RBC AUTO-ENTMCNC: 32.4 G/DL (ref 32–36)
MCV RBC AUTO: 98 FL (ref 82–98)
MICROSCOPIC COMMENT: ABNORMAL
MONOCYTES # BLD AUTO: 0.5 K/UL (ref 0.3–1)
MONOCYTES NFR BLD: 10.4 % (ref 4–15)
NEUTROPHILS # BLD AUTO: 3.1 K/UL (ref 1.8–7.7)
NEUTROPHILS NFR BLD: 62.2 % (ref 38–73)
NITRITE UR QL STRIP: NEGATIVE
NRBC BLD-RTO: 0 /100 WBC
PH UR STRIP: 6 [PH] (ref 5–8)
PLATELET # BLD AUTO: 207 K/UL (ref 150–450)
PMV BLD AUTO: 11.4 FL (ref 9.2–12.9)
POTASSIUM SERPL-SCNC: 3.8 MMOL/L (ref 3.5–5.1)
PROT SERPL-MCNC: 7.2 G/DL (ref 6–8.4)
PROT UR QL STRIP: ABNORMAL
PROTHROMBIN TIME: 14 SEC (ref 9–12.5)
RBC # BLD AUTO: 4.1 M/UL (ref 4–5.4)
RBC #/AREA URNS HPF: 12 /HPF (ref 0–4)
SARS-COV-2 RDRP RESP QL NAA+PROBE: NEGATIVE
SODIUM SERPL-SCNC: 143 MMOL/L (ref 136–145)
SP GR UR STRIP: 1.02 (ref 1–1.03)
SPECIMEN SOURCE: NORMAL
SQUAMOUS #/AREA URNS HPF: 4 /HPF
TROPONIN I SERPL DL<=0.01 NG/ML-MCNC: 0.05 NG/ML (ref 0–0.03)
TROPONIN I SERPL DL<=0.01 NG/ML-MCNC: 0.06 NG/ML (ref 0–0.03)
UNIDENT CRYS URNS QL MICRO: ABNORMAL
URN SPEC COLLECT METH UR: ABNORMAL
UROBILINOGEN UR STRIP-ACNC: ABNORMAL EU/DL
WBC # BLD AUTO: 4.99 K/UL (ref 3.9–12.7)
WBC #/AREA URNS HPF: >100 /HPF (ref 0–5)
WBC CLUMPS URNS QL MICRO: ABNORMAL

## 2022-12-29 PROCEDURE — 25000003 PHARM REV CODE 250: Mod: HCNC | Performed by: STUDENT IN AN ORGANIZED HEALTH CARE EDUCATION/TRAINING PROGRAM

## 2022-12-29 PROCEDURE — 99291 CRITICAL CARE FIRST HOUR: CPT | Mod: 25,HCNC

## 2022-12-29 PROCEDURE — 87077 CULTURE AEROBIC IDENTIFY: CPT | Mod: HCNC | Performed by: EMERGENCY MEDICINE

## 2022-12-29 PROCEDURE — G0378 HOSPITAL OBSERVATION PER HR: HCPCS | Mod: HCNC

## 2022-12-29 PROCEDURE — 87502 INFLUENZA DNA AMP PROBE: CPT | Mod: HCNC

## 2022-12-29 PROCEDURE — 83036 HEMOGLOBIN GLYCOSYLATED A1C: CPT | Mod: HCNC | Performed by: STUDENT IN AN ORGANIZED HEALTH CARE EDUCATION/TRAINING PROGRAM

## 2022-12-29 PROCEDURE — U0002 COVID-19 LAB TEST NON-CDC: HCPCS | Mod: HCNC | Performed by: EMERGENCY MEDICINE

## 2022-12-29 PROCEDURE — 83735 ASSAY OF MAGNESIUM: CPT | Mod: HCNC | Performed by: STUDENT IN AN ORGANIZED HEALTH CARE EDUCATION/TRAINING PROGRAM

## 2022-12-29 PROCEDURE — 87502 INFLUENZA DNA AMP PROBE: CPT | Mod: HCNC | Performed by: EMERGENCY MEDICINE

## 2022-12-29 PROCEDURE — 87186 SC STD MICRODIL/AGAR DIL: CPT | Mod: HCNC | Performed by: EMERGENCY MEDICINE

## 2022-12-29 PROCEDURE — 85730 THROMBOPLASTIN TIME PARTIAL: CPT | Mod: HCNC | Performed by: EMERGENCY MEDICINE

## 2022-12-29 PROCEDURE — 25000003 PHARM REV CODE 250: Mod: HCNC | Performed by: EMERGENCY MEDICINE

## 2022-12-29 PROCEDURE — 85025 COMPLETE CBC W/AUTO DIFF WBC: CPT | Mod: HCNC | Performed by: EMERGENCY MEDICINE

## 2022-12-29 PROCEDURE — 86900 BLOOD TYPING SEROLOGIC ABO: CPT | Mod: HCNC | Performed by: EMERGENCY MEDICINE

## 2022-12-29 PROCEDURE — 80053 COMPREHEN METABOLIC PANEL: CPT | Mod: HCNC | Performed by: EMERGENCY MEDICINE

## 2022-12-29 PROCEDURE — 96365 THER/PROPH/DIAG IV INF INIT: CPT | Mod: HCNC

## 2022-12-29 PROCEDURE — 81000 URINALYSIS NONAUTO W/SCOPE: CPT | Mod: HCNC | Performed by: EMERGENCY MEDICINE

## 2022-12-29 PROCEDURE — P9612 CATHETERIZE FOR URINE SPEC: HCPCS | Mod: HCNC

## 2022-12-29 PROCEDURE — 87086 URINE CULTURE/COLONY COUNT: CPT | Mod: HCNC | Performed by: EMERGENCY MEDICINE

## 2022-12-29 PROCEDURE — 87088 URINE BACTERIA CULTURE: CPT | Mod: HCNC | Performed by: EMERGENCY MEDICINE

## 2022-12-29 PROCEDURE — 96375 TX/PRO/DX INJ NEW DRUG ADDON: CPT | Mod: HCNC

## 2022-12-29 PROCEDURE — 94761 N-INVAS EAR/PLS OXIMETRY MLT: CPT | Mod: HCNC

## 2022-12-29 PROCEDURE — 85610 PROTHROMBIN TIME: CPT | Mod: HCNC | Performed by: EMERGENCY MEDICINE

## 2022-12-29 PROCEDURE — 96376 TX/PRO/DX INJ SAME DRUG ADON: CPT

## 2022-12-29 PROCEDURE — 63600175 PHARM REV CODE 636 W HCPCS: Mod: HCNC | Performed by: EMERGENCY MEDICINE

## 2022-12-29 PROCEDURE — 83880 ASSAY OF NATRIURETIC PEPTIDE: CPT | Mod: HCNC | Performed by: EMERGENCY MEDICINE

## 2022-12-29 PROCEDURE — 36415 COLL VENOUS BLD VENIPUNCTURE: CPT | Mod: HCNC | Performed by: STUDENT IN AN ORGANIZED HEALTH CARE EDUCATION/TRAINING PROGRAM

## 2022-12-29 PROCEDURE — 84484 ASSAY OF TROPONIN QUANT: CPT | Mod: HCNC | Performed by: EMERGENCY MEDICINE

## 2022-12-29 PROCEDURE — 63600175 PHARM REV CODE 636 W HCPCS: Mod: HCNC | Performed by: STUDENT IN AN ORGANIZED HEALTH CARE EDUCATION/TRAINING PROGRAM

## 2022-12-29 RX ORDER — FUROSEMIDE 10 MG/ML
60 INJECTION INTRAMUSCULAR; INTRAVENOUS
Status: DISCONTINUED | OUTPATIENT
Start: 2022-12-29 | End: 2022-12-31

## 2022-12-29 RX ORDER — FUROSEMIDE 20 MG/1
20 TABLET ORAL 2 TIMES DAILY
Status: ON HOLD | COMMUNITY
Start: 2022-09-25 | End: 2023-01-01 | Stop reason: HOSPADM

## 2022-12-29 RX ORDER — FUROSEMIDE 10 MG/ML
60 INJECTION INTRAMUSCULAR; INTRAVENOUS
Status: COMPLETED | OUTPATIENT
Start: 2022-12-29 | End: 2022-12-29

## 2022-12-29 RX ORDER — METOPROLOL SUCCINATE 25 MG/1
25 TABLET, EXTENDED RELEASE ORAL DAILY
Status: DISCONTINUED | OUTPATIENT
Start: 2022-12-30 | End: 2023-01-01 | Stop reason: HOSPADM

## 2022-12-29 RX ORDER — SODIUM CHLORIDE 0.9 % (FLUSH) 0.9 %
10 SYRINGE (ML) INJECTION
Status: DISCONTINUED | OUTPATIENT
Start: 2022-12-29 | End: 2023-01-01 | Stop reason: HOSPADM

## 2022-12-29 RX ORDER — DONEPEZIL HYDROCHLORIDE 5 MG/1
5 TABLET, FILM COATED ORAL NIGHTLY
Status: DISCONTINUED | OUTPATIENT
Start: 2022-12-29 | End: 2023-01-01 | Stop reason: HOSPADM

## 2022-12-29 RX ORDER — DICLOFENAC SODIUM 10 MG/G
GEL TOPICAL 4 TIMES DAILY PRN
COMMUNITY
Start: 2022-12-16 | End: 2023-10-30

## 2022-12-29 RX ADMIN — APIXABAN 2.5 MG: 2.5 TABLET, FILM COATED ORAL at 08:12

## 2022-12-29 RX ADMIN — FUROSEMIDE 60 MG: 10 INJECTION, SOLUTION INTRAMUSCULAR; INTRAVENOUS at 01:12

## 2022-12-29 RX ADMIN — CEFTRIAXONE 1 G: 1 INJECTION, POWDER, FOR SOLUTION INTRAMUSCULAR; INTRAVENOUS at 02:12

## 2022-12-29 RX ADMIN — FUROSEMIDE 60 MG: 10 INJECTION, SOLUTION INTRAMUSCULAR; INTRAVENOUS at 06:12

## 2022-12-29 RX ADMIN — DONEPEZIL HYDROCHLORIDE 5 MG: 5 TABLET, FILM COATED ORAL at 08:12

## 2022-12-29 NOTE — ED PROVIDER NOTES
SCRIBE #1 NOTE: I, Nubia Grullon, am scribing for, and in the presence of, Ayla Kelly MD. I have scribed the entire note.       History     Chief Complaint   Patient presents with    Shortness of Breath     Shortness of breath and weakness     Review of patient's allergies indicates:  No Known Allergies      History of Present Illness     HPI    12/29/2022, 12:57 PM  History obtained from the patient      History of Present Illness: Christy Webber is a 87 y.o. female patient with a PMHx of HTN, HLD, pacemaker, a-fib, CHF, NSTEMI, chronic respiratory failure with hypoxia, and CAD who presents to the Emergency Department for evaluation of SOB which onset gradually. Pt notes she is on Eliquis. Symptoms are constant and moderate in severity. No mitigating or exacerbating factors reported. Associated sxs include difficulty swallowing, diaphoresis, cough, and CP. Pt describes CP as a discomfort that is centralized on the left side of her chest. Patient denies any black stool, blood in stool, n/v, fever, and all other sxs at this time. No further complaints or concerns at this time.       Arrival mode: EMS    PCP: Christian Douglass MD        Past Medical History:  Past Medical History:   Diagnosis Date    Atrial flutter     Biatrial enlargement     CAD S/P percutaneous coronary angioplasty     CHF (congestive heart failure)     Chronic respiratory failure with hypoxia, on home oxygen therapy     Hyperlipidemia     Hypertension     Non-STEMI (non-ST elevated myocardial infarction)     Pacemaker 08/2016    Single lead [employer] Pacemaker for sick sinus syndrome    Sick sinus syndrome     SSS (sick sinus syndrome) 8/24/2016    - pacemaker in place       Past Surgical History:  Past Surgical History:   Procedure Laterality Date    CARDIAC PACEMAKER PLACEMENT      HYSTERECTOMY      KIDNEY SURGERY           Family History:  No family history on file.    Social History:  Social History     Tobacco Use    Smoking  status: Former     Packs/day: 1.50     Years: 63.00     Pack years: 94.50     Types: Cigarettes     Quit date: 2016     Years since quittin.5    Smokeless tobacco: Never   Substance and Sexual Activity    Alcohol use: Not Currently    Drug use: No    Sexual activity: Not Currently        Review of Systems     Review of Systems   Constitutional:  Positive for diaphoresis. Negative for fever.   HENT:  Positive for trouble swallowing. Negative for sore throat.    Respiratory:  Positive for cough and shortness of breath.    Cardiovascular:  Positive for chest pain.   Gastrointestinal:  Negative for blood in stool, nausea and vomiting.        (-) black stool   Genitourinary:  Negative for dysuria.   Musculoskeletal:  Negative for back pain.   Skin:  Negative for rash.   Neurological:  Negative for weakness.   Hematological:  Does not bruise/bleed easily.   All other systems reviewed and are negative.     Physical Exam     Initial Vitals   BP Pulse Resp Temp SpO2   22 1236 22 1236 22 1236 22 1300 22 1236   (!) 160/90 90 (!) 24 99.3 °F (37.4 °C) 96 %      MAP       --                 Physical Exam   Nursing Notes and Vital Signs Reviewed.  Constitutional: Patient is in no obvious distress. Well-developed and well-nourished. Elderly.   Head: Atraumatic. Normocephalic.  Eyes: PERRL. EOM intact. Conjunctivae are not pale. No scleral icterus.  ENT: Mucous membranes are dry. Oropharynx is clear and symmetric.    Neck: Supple. Full ROM. No lymphadenopathy.  Cardiovascular: Regular rate. Irregularly irregular rhythm. No murmurs, rubs, or gallops. Distal pulses are 2+ and symmetric.  Pulmonary/Chest: No respiratory distress. Clear to auscultation bilaterally. No wheezing or rales.  Abdominal: Soft and non-distended.  There is no tenderness.  No rebound, guarding, or rigidity. Good bowel sounds.  Genitourinary: No CVA tenderness  Musculoskeletal: Moves all extremities. No obvious deformities.  No edema. No calf tenderness.  Skin: Warm and dry. Pale.   Neurological:  Alert, awake, and appropriate.  Normal speech.  No acute focal neurological deficits are appreciated.  Psychiatric: Normal affect. Good eye contact. Appropriate in content.     ED Course   Critical Care    Date/Time: 12/29/2022 2:52 PM  Performed by: Ayla Kelly MD  Authorized by: Ayla Kelly MD   Direct patient critical care time: 20 minutes  Additional history critical care time: 10 minutes  Ordering / reviewing critical care time: 10 minutes  Documentation critical care time: 10 minutes  Consulting other physicians critical care time: 10 minutes  Total critical care time (exclusive of procedural time) : 60 minutes  Critical care time was exclusive of separately billable procedures and treating other patients and teaching time.  Critical care was necessary to treat or prevent imminent or life-threatening deterioration of the following conditions: CHF exacerbation.  Critical care was time spent personally by me on the following activities: blood draw for specimens, development of treatment plan with patient or surrogate, discussions with consultants, interpretation of cardiac output measurements, evaluation of patient's response to treatment, examination of patient, obtaining history from patient or surrogate, ordering and performing treatments and interventions, ordering and review of laboratory studies, ordering and review of radiographic studies, pulse oximetry, re-evaluation of patient's condition and review of old charts.      ED Vital Signs:  Vitals:    12/29/22 1315 12/29/22 1330 12/29/22 1336 12/29/22 1345   BP: (!) 152/79 (!) 156/74  138/71   Pulse: 85 85  85   Resp: (!) 27 (!) 32  (!) 25   Temp:       TempSrc:       SpO2: 98% 99%  100%   Weight:   68.9 kg (152 lb)     12/29/22 1400 12/29/22 1415 12/29/22 1430 12/29/22 1445   BP: (!) 143/62 (!) 159/70 (!) 156/79 (!) 151/90   Pulse: 86 85 85 85   Resp: (!) 30 (!) 23 18 (!)  27   Temp:       TempSrc:       SpO2: 98% 98% 98% 100%   Weight:        12/29/22 1500 12/29/22 1515 12/29/22 1530 12/29/22 1545   BP: (!) 149/80 (!) 149/82 (!) 148/80 (!) 148/84   Pulse: 85 85 85 85   Resp: (!) 25 (!) 34 (!) 25 (!) 23   Temp:       TempSrc:       SpO2: 96% 98% 98% 97%   Weight:        12/29/22 1600 12/29/22 1615 12/29/22 1630   BP: (!) 143/87 (!) 147/82 (!) 142/88   Pulse: 85 86 87   Resp: 17 19 20   Temp:      TempSrc:      SpO2: 97% 99% 95%   Weight:          Abnormal Lab Results:  Labs Reviewed   CBC W/ AUTO DIFFERENTIAL - Abnormal; Notable for the following components:       Result Value    MCH 31.7 (*)     All other components within normal limits   TROPONIN I - Abnormal; Notable for the following components:    Troponin I 0.061 (*)     All other components within normal limits   B-TYPE NATRIURETIC PEPTIDE - Abnormal; Notable for the following components:    BNP 3,569 (*)     All other components within normal limits   URINALYSIS, REFLEX TO URINE CULTURE - Abnormal; Notable for the following components:    Appearance, UA Hazy (*)     Protein, UA 2+ (*)     Occult Blood UA 1+ (*)     Urobilinogen, UA 2.0-3.0 (*)     Leukocytes, UA 3+ (*)     All other components within normal limits    Narrative:     Specimen Source->Urine   COMPREHENSIVE METABOLIC PANEL - Abnormal; Notable for the following components:    CO2 17 (*)     Glucose 142 (*)     BUN 28 (*)     Creatinine 1.7 (*)     Albumin 3.3 (*)     Total Bilirubin 1.9 (*)     ALT <5 (*)     eGFR 29 (*)     All other components within normal limits   TROPONIN I - Abnormal; Notable for the following components:    Troponin I 0.049 (*)     All other components within normal limits   URINALYSIS MICROSCOPIC - Abnormal; Notable for the following components:    RBC, UA 12 (*)     WBC, UA >100 (*)     WBC Clumps, UA Many (*)     Bacteria Moderate (*)     Hyaline Casts, UA 24 (*)     All other components within normal limits    Narrative:     Specimen  Source->Urine   PROTIME-INR - Abnormal; Notable for the following components:    Prothrombin Time 14.0 (*)     INR 1.3 (*)     All other components within normal limits   INFLUENZA A & B BY MOLECULAR   CULTURE, URINE   SARS-COV-2 RNA AMPLIFICATION, QUAL   APTT   TYPE & SCREEN        All Lab Results:  Results for orders placed or performed during the hospital encounter of 12/29/22   Influenza A & B by Molecular    Specimen: Nasopharyngeal Swab   Result Value Ref Range    Influenza A, Molecular Negative Negative    Influenza B, Molecular Negative Negative    Flu A & B Source Nasal swab    CBC auto differential   Result Value Ref Range    WBC 4.99 3.90 - 12.70 K/uL    RBC 4.10 4.00 - 5.40 M/uL    Hemoglobin 13.0 12.0 - 16.0 g/dL    Hematocrit 40.1 37.0 - 48.5 %    MCV 98 82 - 98 fL    MCH 31.7 (H) 27.0 - 31.0 pg    MCHC 32.4 32.0 - 36.0 g/dL    RDW 14.0 11.5 - 14.5 %    Platelets 207 150 - 450 K/uL    MPV 11.4 9.2 - 12.9 fL    Immature Granulocytes 0.4 0.0 - 0.5 %    Gran # (ANC) 3.1 1.8 - 7.7 K/uL    Immature Grans (Abs) 0.02 0.00 - 0.04 K/uL    Lymph # 1.2 1.0 - 4.8 K/uL    Mono # 0.5 0.3 - 1.0 K/uL    Eos # 0.1 0.0 - 0.5 K/uL    Baso # 0.05 0.00 - 0.20 K/uL    nRBC 0 0 /100 WBC    Gran % 62.2 38.0 - 73.0 %    Lymph % 24.4 18.0 - 48.0 %    Mono % 10.4 4.0 - 15.0 %    Eosinophil % 1.6 0.0 - 8.0 %    Basophil % 1.0 0.0 - 1.9 %    Differential Method Automated    Troponin I #2   Result Value Ref Range    Troponin I 0.061 (H) 0.000 - 0.026 ng/mL   BNP   Result Value Ref Range    BNP 3,569 (H) 0 - 99 pg/mL   Urinalysis, Reflex to Urine Culture Urine, Catheterized    Specimen: Urine   Result Value Ref Range    Specimen UA Urine, Catheterized     Color, UA Yellow Yellow, Straw, Alyssa    Appearance, UA Hazy (A) Clear    pH, UA 6.0 5.0 - 8.0    Specific Gravity, UA 1.020 1.005 - 1.030    Protein, UA 2+ (A) Negative    Glucose, UA Negative Negative    Ketones, UA Negative Negative    Bilirubin (UA) Negative Negative    Occult  Blood UA 1+ (A) Negative    Nitrite, UA Negative Negative    Urobilinogen, UA 2.0-3.0 (A) <2.0 EU/dL    Leukocytes, UA 3+ (A) Negative   Comprehensive metabolic panel   Result Value Ref Range    Sodium 143 136 - 145 mmol/L    Potassium 3.8 3.5 - 5.1 mmol/L    Chloride 110 95 - 110 mmol/L    CO2 17 (L) 23 - 29 mmol/L    Glucose 142 (H) 70 - 110 mg/dL    BUN 28 (H) 8 - 23 mg/dL    Creatinine 1.7 (H) 0.5 - 1.4 mg/dL    Calcium 10.2 8.7 - 10.5 mg/dL    Total Protein 7.2 6.0 - 8.4 g/dL    Albumin 3.3 (L) 3.5 - 5.2 g/dL    Total Bilirubin 1.9 (H) 0.1 - 1.0 mg/dL    Alkaline Phosphatase 113 55 - 135 U/L    AST 18 10 - 40 U/L    ALT <5 (L) 10 - 44 U/L    Anion Gap 16 8 - 16 mmol/L    eGFR 29 (A) >60 mL/min/1.73 m^2   Troponin I   Result Value Ref Range    Troponin I 0.049 (H) 0.000 - 0.026 ng/mL   COVID-19 Rapid Screening   Result Value Ref Range    SARS-CoV-2 RNA, Amplification, Qual Negative Negative   Urinalysis Microscopic   Result Value Ref Range    RBC, UA 12 (H) 0 - 4 /hpf    WBC, UA >100 (H) 0 - 5 /hpf    WBC Clumps, UA Many (A) None-Rare    Bacteria Moderate (A) None-Occ /hpf    Squam Epithel, UA 4 /hpf    Hyaline Casts, UA 24 (A) 0-1/lpf /lpf    Unclass Eboni UA Rare None-Moderate    Microscopic Comment SEE COMMENT    APTT   Result Value Ref Range    aPTT 27.1 21.0 - 32.0 sec   Protime-INR   Result Value Ref Range    Prothrombin Time 14.0 (H) 9.0 - 12.5 sec    INR 1.3 (H) 0.8 - 1.2   Type & Screen   Result Value Ref Range    Group & Rh O POS     Indirect Lucia NEG          Imaging Results:  Imaging Results              X-Ray Chest AP Portable (Final result)  Result time 12/29/22 13:16:31      Final result by Michael Gonzalez MD (12/29/22 13:16:31)                   Impression:      In comparison to the prior study, there is no adverse interval changes      Electronically signed by: Michael Gonzalez MD  Date:    12/29/2022  Time:    13:16               Narrative:    EXAMINATION:  XR CHEST AP PORTABLE    CLINICAL  HISTORY:  Chest Pain;    TECHNIQUE:  Single frontal view of the chest was performed.    COMPARISON:  02/21/2022    FINDINGS:  Cardiac silhouette remains severely enlarged.  Cardiac pacing device again noted.  Mild pulmonary vascular congestion but without demetrius pulmonary edema.  No focal parenchymal consolidation or definite pleural effusion visualized.  No acute osseous findings demonstrated.                                       The EKG was ordered, reviewed, and independently interpreted by the ED provider.  Interpretation time: 12:38  Rate: 85 BPM  Rhythm: Ventricular-paced rhythm  Interpretation: No STEMI.             The Emergency Provider reviewed the vital signs and test results, which are outlined above.     ED Discussion       2:38 PM: Discussed case with Madhuri Vanegas MD ([facility]). Dr. Cannon agrees with current care and management of pt and accepts admission.   Admitting Service: [facility]  Admitting Physician: Dr. Cannon  Admit to: Observation Telemetry    2:38 PM: Re-evaluated pt. I have discussed test results, shared treatment plan, and the need for admission with patient and family at bedside. Pt and family express understanding at this time and agree with all information. All questions answered. Pt and family have no further questions or concerns at this time. Pt is ready for admit.         Medical Decision Making:   Clinical Tests:   Lab Tests: Ordered and Reviewed  Radiological Study: Ordered and Reviewed  Medical Tests: Ordered and Reviewed         ED Medication(s):  Medications   cefTRIAXone (ROCEPHIN) 1 g in dextrose 5 % in water (D5W) 5 % 50 mL IVPB (MB+) (0 g Intravenous Stopped 12/29/22 1432)   furosemide injection 60 mg (60 mg Intravenous Given 12/29/22 1356)       Current Discharge Medication List                  Scribe Attestation:   Scribe #1: I performed the above scribed service and the documentation accurately describes the services I performed. I attest to the  accuracy of the note.     Attending:   Physician Attestation Statement for Scribe #1: I, Ayla Kelly MD, personally performed the services described in this documentation, as scribed by Nubia Grullon, in my presence, and it is both accurate and complete.           Clinical Impression       ICD-10-CM ICD-9-CM   1. Acute decompensated heart failure  I50.9 428.0   2. Chest pain  R07.9 786.50   3. Acute cystitis without hematuria  N30.00 595.0       Disposition:   Disposition: Placed in Observation  Condition: Fair       Ayla Kelly MD  12/29/22 1827

## 2022-12-29 NOTE — PHARMACY MED REC
"Admission Medication History     The home medication history was taken by Juan Perkins.    You may go to "Admission" then "Reconcile Home Medications" tabs to review and/or act upon these items.     The home medication list has been updated by the Pharmacy department.   Please read ALL comments highlighted in yellow.   Please address this information as you see fit.    Feel free to contact us if you have any questions or require assistance.    Unable to assess, patient had some medication bottles in her room but not all. I believe list to be accurate but last dosages unknown.    The medications listed below were removed from the home medication list. Please reorder if appropriate:  Patient reports no longer taking the following medication(s):  ALBUTEROL HFA 90 MCG/ACTUATION INHALER  FAMOTIDINE 20 MG TABLET  NITROGLYCERIN 0.4 MG SUBLINGUAL TABLETS (RX )    Medications listed below were obtained from: Medications brought from home, [employer], and Patient's pharmacy  (Not in a hospital admission)      Potential issues to be addressed PRIOR TO DISCHARGE: NONE    Juan Perkins, Baltazar-Adv  Pharmacy Technician Specialist-Medication History  [employer] [phone]  Secure chat preferred     Current Outpatient Medications on File Prior to Encounter   Medication Sig Dispense Refill Last Dose    diclofenac sodium (VOLTAREN) 1 % Gel Apply topically 4 (four) times daily as needed.   Unknown    donepeziL (ARICEPT) 5 MG tablet Take 1 tablet (5 mg total) by mouth every evening. 90 tablet 3 Unknown    ELIQUIS 2.5 mg Tab TAKE 1 TABLET BY MOUTH TWICE A DAY 60 tablet 6 Unknown    furosemide (LASIX) 20 MG tablet Take 20 mg by mouth 2 (two) times daily.   Unknown    indomethacin (INDOCIN) 50 MG capsule Take 50 mg by mouth every 8 (eight) hours as needed (pain).   Unknown    metoprolol succinate (TOPROL-XL) 25 MG 24 hr tablet Take 25 mg by mouth once daily.   Unknown    rosuvastatin (CRESTOR) 10 MG tablet TAKE 1 " TABLET BY MOUTH EVERY DAY 90 tablet 0 Unknown    torsemide (DEMADEX) 20 MG Tab TAKE 3 TABLETS BY MOUTH 2 TIMES A DAY. (Patient taking differently: Take 20 mg by mouth 2 (two) times a day.) 180 tablet 3 Unknown    [DISCONTINUED] albuterol (PROVENTIL/VENTOLIN HFA) 90 mcg/actuation inhaler Inhale 1-2 puffs into the lungs every 6 (six) hours as needed for Wheezing. Rescue 18 g 1     [DISCONTINUED] famotidine (PEPCID) 20 MG tablet Take 1 tablet (20 mg total) by mouth daily as needed for Heartburn. 90 tablet 0     [DISCONTINUED] nitroGLYCERIN (NITROSTAT) 0.4 MG SL tablet Place 1 tablet (0.4 mg total) under the tongue every 5 (five) minutes as needed for Chest pain. 25 tablet 5                          .

## 2022-12-30 ENCOUNTER — DOCUMENTATION ONLY (OUTPATIENT)
Dept: CARDIOLOGY | Facility: CLINIC | Age: 87
End: 2022-12-30
Payer: MEDICARE

## 2022-12-30 LAB
ANION GAP SERPL CALC-SCNC: 14 MMOL/L (ref 8–16)
AORTIC ROOT ANNULUS: 2.36 CM
ASCENDING AORTA: 2.83 CM
AV INDEX (PROSTH): 0.5
AV MEAN GRADIENT: 9 MMHG
AV PEAK GRADIENT: 18 MMHG
AV REGURGITATION PRESSURE HALF TIME: 475.92 MS
AV VALVE AREA: 1.53 CM2
AV VELOCITY RATIO: 0.39
BSA FOR ECHO PROCEDURE: 1.69 M2
BUN SERPL-MCNC: 39 MG/DL (ref 8–23)
CALCIUM SERPL-MCNC: 10.5 MG/DL (ref 8.7–10.5)
CHLORIDE SERPL-SCNC: 107 MMOL/L (ref 95–110)
CO2 SERPL-SCNC: 21 MMOL/L (ref 23–29)
CREAT SERPL-MCNC: 1.8 MG/DL (ref 0.5–1.4)
CV ECHO LV RWT: 0.47 CM
DOP CALC AO PEAK VEL: 2.11 M/S
DOP CALC AO VTI: 33 CM
DOP CALC LVOT AREA: 3.1 CM2
DOP CALC LVOT DIAMETER: 1.98 CM
DOP CALC LVOT PEAK VEL: 0.83 M/S
DOP CALC LVOT STROKE VOLUME: 50.47 CM3
DOP CALC RVOT PEAK VEL: 0.63 M/S
DOP CALC RVOT VTI: 11.9 CM
DOP CALCLVOT PEAK VEL VTI: 16.4 CM
E WAVE DECELERATION TIME: 169 MSEC
E/E' RATIO: 9.04 M/S
ECHO LV POSTERIOR WALL: 1.19 CM (ref 0.6–1.1)
EJECTION FRACTION: 30 %
EST. GFR  (NO RACE VARIABLE): 27 ML/MIN/1.73 M^2
ESTIMATED AVG GLUCOSE: 111 MG/DL (ref 68–131)
FRACTIONAL SHORTENING: 15 % (ref 28–44)
GLUCOSE SERPL-MCNC: 101 MG/DL (ref 70–110)
HBA1C MFR BLD: 5.5 % (ref 4–5.6)
INTERVENTRICULAR SEPTUM: 1.2 CM (ref 0.6–1.1)
IVC DIAMETER: 2.47 CM
IVRT: 65.65 MSEC
LA MAJOR: 8.02 CM
LA MINOR: 7.83 CM
LEFT ATRIUM SIZE: 4.97 CM
LEFT ATRIUM VOLUME INDEX MOD: 74.2 ML/M2
LEFT ATRIUM VOLUME MOD: 123.84 CM3
LEFT INTERNAL DIMENSION IN SYSTOLE: 4.28 CM (ref 2.1–4)
LEFT VENTRICLE DIASTOLIC VOLUME INDEX: 71.93 ML/M2
LEFT VENTRICLE DIASTOLIC VOLUME: 120.13 ML
LEFT VENTRICLE MASS INDEX: 140 G/M2
LEFT VENTRICLE SYSTOLIC VOLUME INDEX: 49.3 ML/M2
LEFT VENTRICLE SYSTOLIC VOLUME: 82.26 ML
LEFT VENTRICULAR INTERNAL DIMENSION IN DIASTOLE: 5.03 CM (ref 3.5–6)
LEFT VENTRICULAR MASS: 234.6 G
LV LATERAL E/E' RATIO: 6.5 M/S
LV SEPTAL E/E' RATIO: 14.86 M/S
LVOT MG: 1.36 MMHG
LVOT MV: 0.55 CM/S
MV PEAK E VEL: 1.04 M/S
PISA AR MAX VEL: 3.72 M/S
PISA TR MAX VEL: 3.54 M/S
POTASSIUM SERPL-SCNC: 4.7 MMOL/L (ref 3.5–5.1)
PV MEAN GRADIENT: 0.84 MMHG
PV PEAK VELOCITY: 0.77 CM/S
RA MAJOR: 8.89 CM
RA PRESSURE: 15 MMHG
RA WIDTH: 5.19 CM
RIGHT VENTRICULAR END-DIASTOLIC DIMENSION: 4.6 CM
SINUS: 2.84 CM
SODIUM SERPL-SCNC: 142 MMOL/L (ref 136–145)
STJ: 2.88 CM
TDI LATERAL: 0.16 M/S
TDI SEPTAL: 0.07 M/S
TDI: 0.12 M/S
TR MAX PG: 50 MMHG
TV REST PULMONARY ARTERY PRESSURE: 65 MMHG

## 2022-12-30 PROCEDURE — 97162 PT EVAL MOD COMPLEX 30 MIN: CPT | Mod: HCNC

## 2022-12-30 PROCEDURE — 25000003 PHARM REV CODE 250: Mod: HCNC | Performed by: STUDENT IN AN ORGANIZED HEALTH CARE EDUCATION/TRAINING PROGRAM

## 2022-12-30 PROCEDURE — G0378 HOSPITAL OBSERVATION PER HR: HCPCS | Mod: HCNC

## 2022-12-30 PROCEDURE — 36415 COLL VENOUS BLD VENIPUNCTURE: CPT | Mod: HCNC | Performed by: STUDENT IN AN ORGANIZED HEALTH CARE EDUCATION/TRAINING PROGRAM

## 2022-12-30 PROCEDURE — 25000003 PHARM REV CODE 250: Mod: HCNC | Performed by: HOSPITALIST

## 2022-12-30 PROCEDURE — 99214 PR OFFICE/OUTPT VISIT, EST, LEVL IV, 30-39 MIN: ICD-10-PCS | Mod: HCNC,25,, | Performed by: INTERNAL MEDICINE

## 2022-12-30 PROCEDURE — 92610 EVALUATE SWALLOWING FUNCTION: CPT | Mod: HCNC

## 2022-12-30 PROCEDURE — 25000242 PHARM REV CODE 250 ALT 637 W/ HCPCS: Mod: HCNC | Performed by: NURSE PRACTITIONER

## 2022-12-30 PROCEDURE — 80048 BASIC METABOLIC PNL TOTAL CA: CPT | Mod: HCNC | Performed by: STUDENT IN AN ORGANIZED HEALTH CARE EDUCATION/TRAINING PROGRAM

## 2022-12-30 PROCEDURE — 99214 OFFICE O/P EST MOD 30 MIN: CPT | Mod: HCNC,25,, | Performed by: INTERNAL MEDICINE

## 2022-12-30 PROCEDURE — 94640 AIRWAY INHALATION TREATMENT: CPT | Mod: HCNC,XB

## 2022-12-30 PROCEDURE — 96366 THER/PROPH/DIAG IV INF ADDON: CPT

## 2022-12-30 PROCEDURE — 63600175 PHARM REV CODE 636 W HCPCS: Mod: HCNC | Performed by: STUDENT IN AN ORGANIZED HEALTH CARE EDUCATION/TRAINING PROGRAM

## 2022-12-30 PROCEDURE — 94761 N-INVAS EAR/PLS OXIMETRY MLT: CPT | Mod: HCNC

## 2022-12-30 PROCEDURE — 94640 AIRWAY INHALATION TREATMENT: CPT | Mod: HCNC

## 2022-12-30 PROCEDURE — 97535 SELF CARE MNGMENT TRAINING: CPT | Mod: HCNC

## 2022-12-30 PROCEDURE — 96376 TX/PRO/DX INJ SAME DRUG ADON: CPT | Mod: 59

## 2022-12-30 PROCEDURE — 97530 THERAPEUTIC ACTIVITIES: CPT | Mod: HCNC

## 2022-12-30 PROCEDURE — 97166 OT EVAL MOD COMPLEX 45 MIN: CPT | Mod: HCNC

## 2022-12-30 RX ORDER — BUDESONIDE 0.5 MG/2ML
0.5 INHALANT ORAL EVERY 12 HOURS
Status: DISCONTINUED | OUTPATIENT
Start: 2022-12-30 | End: 2023-01-01 | Stop reason: HOSPADM

## 2022-12-30 RX ORDER — TALC
6 POWDER (GRAM) TOPICAL NIGHTLY PRN
Status: DISCONTINUED | OUTPATIENT
Start: 2022-12-30 | End: 2023-01-01 | Stop reason: HOSPADM

## 2022-12-30 RX ORDER — ARFORMOTEROL TARTRATE 15 UG/2ML
15 SOLUTION RESPIRATORY (INHALATION) 2 TIMES DAILY
Status: DISCONTINUED | OUTPATIENT
Start: 2022-12-30 | End: 2023-01-01 | Stop reason: HOSPADM

## 2022-12-30 RX ADMIN — APIXABAN 2.5 MG: 2.5 TABLET, FILM COATED ORAL at 08:12

## 2022-12-30 RX ADMIN — BUDESONIDE 0.5 MG: 0.5 INHALANT ORAL at 08:12

## 2022-12-30 RX ADMIN — FUROSEMIDE 60 MG: 10 INJECTION, SOLUTION INTRAMUSCULAR; INTRAVENOUS at 06:12

## 2022-12-30 RX ADMIN — Medication 6 MG: at 01:12

## 2022-12-30 RX ADMIN — METOPROLOL SUCCINATE 25 MG: 25 TABLET, FILM COATED, EXTENDED RELEASE ORAL at 09:12

## 2022-12-30 RX ADMIN — ARFORMOTEROL TARTRATE 15 MCG: 15 SOLUTION RESPIRATORY (INHALATION) at 08:12

## 2022-12-30 RX ADMIN — DONEPEZIL HYDROCHLORIDE 5 MG: 5 TABLET, FILM COATED ORAL at 08:12

## 2022-12-30 RX ADMIN — Medication 6 MG: at 08:12

## 2022-12-30 RX ADMIN — APIXABAN 2.5 MG: 2.5 TABLET, FILM COATED ORAL at 09:12

## 2022-12-30 RX ADMIN — CEFTRIAXONE 1 G: 1 INJECTION, POWDER, FOR SOLUTION INTRAMUSCULAR; INTRAVENOUS at 03:12

## 2022-12-30 NOTE — HOSPITAL COURSE
The patient is a 86 yo female  with EF 35% dilated biatria, mod MR/TR. Mild AI (Echo ), COPD-NOT on home oxygen, CAD s/p PCi, chronic afib, SSS s/p PPM, HTN, HLD, CKD3, lower back pain who was placed in observation for decompensated CHF on IV Lasix and UTI on IV Rocephin.   Baseline weight is approx 130lb, pt currently 141lb. BNP 3569. Unmeasured UOP. SOB at rest improved. +HYLTON above baseline when getting up to chair. Off of oxygen. Check ambulatory pulse ox. Echo pending. Cont IV Diuresis. Urine culture pending. 12/31/22- Symptoms improved. No acute issues overnight. Lasix decreased to 40 mg po daily. Urine cx growing gram negative rods, awaiting identification. Continue IV rocephin.     1/1/23  Patient is doing well. Cardiology recommends Torsemide twice daily. Urine culture grew E. Coli which as been treated with Rocephin x 3 doses. She was asked to weigt her self daily and monitor sodium intake. She will be discharged with family support and home health. Patient is hemodynamically stable for discharge.

## 2022-12-30 NOTE — PROGRESS NOTES
"[facility]) nurse navigator notified of H[facility] candidate in need of education and introduction to 4-6 week program.      PT aao x 3 while lying in bed. Introduced self to pt as HFT[facility] nurse navigator.     Patient given "Home Care Guide for Heart Failure Patients" , "[facility]" flyer and "Daily weight and symptom tracker".  Encouraged pt to review information.      Reviewed the following key points of HFT[facility] program with pt and family:   1.) Take your medications as directed.    2.) Weight yourself daily   3.) Follow low salt and limited fluid diet.    4.) Stop smoking and start exercising   5.) Go to your appointments and call your team.      Pt reminded to follow Symptom tracker and to call at the onset of symptoms according to tracker.     Reviewed plan for follow up once discharged to include phone calls, in person and virtual visits to assist pt optimizing their heart failure medication regimen and encouraging healthy lifestyle modifications.  Reminded pt that program will assist them over the next 4-6 weeks and then patient will be transferred to long term care provider .  Reminded pt how to contact HFT[facility] navigator via phone and or via [employer].     Pt instructed appointment with CHELSI Padilla will be printed on hospital discharge paperwork.     Pt also reminded HF nurse will call 48-72 hours after discharge to check on them.     PT verbalize read back of information given.  Encouraged pt and family to read over information often and contact team with any questions or concerns.      "

## 2022-12-30 NOTE — PT/OT/SLP EVAL
Speech Language Pathology Evaluation  Bedside Swallow    Patient Name:  Christy Webber   MRN:  [id number]  Admitting Diagnosis: Acute on chronic combined systolic and diastolic heart failure    Recommendations:                 General Recommendations:  Follow-up not indicated  Diet recommendations:  Soft & Bite Sized Diet - IDDSI Level 6, Thin liquids - IDDSI Level 0   Aspiration Precautions: Standard aspiration precautions   General Precautions: Standard, aspiration  Communication strategies:  none    History:     Past Medical History:   Diagnosis Date    Atrial flutter     Biatrial enlargement     CAD S/P percutaneous coronary angioplasty     CHF (congestive heart failure)     Chronic respiratory failure with hypoxia, on home oxygen therapy     Hyperlipidemia     Hypertension     Non-STEMI (non-ST elevated myocardial infarction)     Pacemaker 08/2016    Single lead [employer] Pacemaker for sick sinus syndrome    Sick sinus syndrome     SSS (sick sinus syndrome) 8/24/2016    - pacemaker in place       Past Surgical History:   Procedure Laterality Date    CARDIAC PACEMAKER PLACEMENT      HYSTERECTOMY      KIDNEY SURGERY         Subjective    The primary encounter diagnosis was Acute decompensated heart failure. Diagnoses of Chest pain, Acute cystitis without hematuria, and CHF (congestive heart failure) were also pertinent to this visit. Patient was seen at bedside. She was awake and alert. She denied any pain but reported she continues to have shortness of breath.       Patient goals: To improve overall medical status.      Pain/Comfort:  Pain Rating 1: 0/10  Pain Rating Post-Intervention 1: 0/10  Pain Rating 2: 0/10  Pain Rating Post-Intervention 2: 0/10    Respiratory Status: Room air    Objective:     Oral Musculature Evaluation  Oral Musculature: WFL  Dentition: edentulous  Secretion Management: problems swallowing secretions  Mucosal Quality: good  Mandibular Strength and Mobility: WFL  Oral Labial Strength and  "Mobility: WFL  Lingual Strength and Mobility: WFL  Velar Elevation: WFL  Buccal Strength and Mobility: WFL  Volitional Cough: Present/productive  Volitional Swallow: present  Voice Prior to PO Intake: WFL  Oral Musculature Comments: WFL      Assessment:  Clinical Swallow Exam/ Ja Mechanism Exam:  Oral Motor Exam:  Mandibular Strength and Mobility - Trigeminal Nerve (CNV) Rest: WFL   Lateralization: WFL  Protrusion: WFL  Retraction:   WFL  Involuntary Movement: absent    Oral Labial Strength and Mobility -Facial Nerve (CN VII)  Rest:  WFL   Lateralization:  WFL  Protrusion:  WFL  Retraction:   WFL  Involuntary Movement: absent    Lingual Strength and Mobility- Hypoglossal Nerve (CN XII)  Rest:  WFL   Lateralization:  WFL  Protrusion:  WFL  Retraction:   WFL  Involuntary Movement: absent    Velar Elevation - Glossopharyngeal Nerve (CN IX) and Vagus (CN X) Rest:  WFL   Symmetry with Short Production of "ah":  WFL  Involuntary Movement: absent    Buccal Strength and Mobility - Facial Nerve (CN VII)  WFL    Facial Sensation and Movement - Facial Nerve (CN VII) Symmetrical at rest: yes  Wrinkle forehead: yes  Able to close eyes tightly: yes  Taste to Anterior 2/3 of Tongue: yes     Other information:  Volitional Swallow: Able to palpate laryngeal rise  Mucosal Quality: No abnormal findings  Secretion Management:  WFL  Dentition: Edentulous       Patient presented with:     Consistency Amount/Presentation   Thin liquid (IDDSI 0) cup sip with straw/self regulated   Puree (pudding (IDDSI 4) tablespoon/ self regulated   Solid (IDDSI 7) Cracker/ self fed     Oral phase characterized by adequate lingual and labial strength and range of motion for tongue control, bolus preparation and transport. Lip closure was adequate with no labial escape. Bolus prep and mastication was timely and efficient. There was no significant oral residue. No overt s/s of pharyngeal dysphagia or aspiration were observed during today's assessment. " "Patient reported that the cracker was "kind of hard" due to edentulous status. Soft/bite sized consistency recommended.     Assessment:     Christy Webber is a 87 y.o. female with a diagnosis of Acute on chronic combined systolic and diastolic heart failure. She presents with oral phase of the swallow within functional limits. No overt s/s of pharyngeal dysphagia observed during today's assessment.    Plan:       Plan of Care reviewed with:  patient   SLP Follow-Up:  No       Discharge recommendations:  home   Barriers to Discharge:  None      Time Tracking:     SLP Treatment Date:   12/30/22  Speech Start Time:  1305  Speech Stop Time:  1320     Speech Total Time (min):  15 min    Billable Minutes: Eval Swallow and Oral Function 15 minutes    12/30/2022     "

## 2022-12-30 NOTE — PLAN OF CARE
P.T. EVAL COMPLETE.  PT CURRENTLY REQUIRES SBA FOR BED MOBILITY, CGA FOR TF'S AND GAIT WITH RW.  P.T. RECOMMENDS HHPT AT D/C

## 2022-12-30 NOTE — ASSESSMENT & PLAN NOTE
Patient with Persistent (7 days or more) atrial fibrillation which is controlled currently with Beta Blocker.   Continue home Eliquis

## 2022-12-30 NOTE — PROGRESS NOTES
[facility] ([facility] Medicine  Progress Note    Patient Name: Christy Webber  MRN: [id number]  Patient Class: OP- Observation   Admission Date: 12/29/2022  Length of Stay: 0 days  Attending Physician: Alek Hernandez MD  Primary Care Provider: Christian Douglass MD        Subjective:     Principal Problem:Acute on chronic combined systolic and diastolic heart failure        HPI:  87 y.o. female patient with a PMHx of HTN, HLD, pacemaker, a-fib, CHF, NSTEMI, chronic respiratory failure with hypoxia, and CAD who presents to the Emergency Department for evaluation of SOB which onset gradually. SOB worse with exertion and improved at rest.  Pt notes she is on Eliquis. Symptoms are constant and moderate in severity. No mitigating or exacerbating factors reported. Associated sxs include difficulty swallowing, diaphoresis, cough, and CP. Pt describes CP as a discomfort that is centralized on the left side of her chest. Patient denies any black stool, blood in stool, n/v, fever, and all other sxs at this time.      Overview/Hospital Course:  The patient is a 88 yo female  with EF 35% dilated biatria, mod MR/TR. Mild AI (Echo ), COPD-NOT on home oxygen, CAD s/p PCi, chronic afib, SSS s/p PPM, HTN, HLD, CKD3, lower back pain who was placed in observation for decompensated CHF on IV Lasix and UTI on IV Rocephin.   Baseline weight is approx 130lb, pt currently 141lb. BNP 3569. Unmeasured UOP. SOB at rest improved. +HYLTON above baseline when getting up to chair. Off of oxygen. Check ambulatory pulse ox. Echo pending. Cont IV Diuresis. Urine culture pending.       Interval History: Remains HYLTON above baseline     Review of Systems   Constitutional:  Positive for activity change, fatigue and unexpected weight change. Negative for appetite change, chills, diaphoresis and fever.   HENT:  Negative for congestion, nosebleeds, sinus pressure and sore throat.    Eyes:  Negative for pain, discharge and visual  disturbance.   Respiratory:  Positive for shortness of breath. Negative for cough, chest tightness, wheezing and stridor.    Cardiovascular:  Negative for chest pain, palpitations and leg swelling.   Gastrointestinal:  Negative for abdominal distention, abdominal pain, blood in stool, constipation, diarrhea, nausea and vomiting.   Endocrine: Negative for cold intolerance and heat intolerance.   Genitourinary:  Negative for difficulty urinating, dysuria, flank pain, frequency and urgency.   Musculoskeletal:  Negative for arthralgias, back pain, joint swelling, myalgias, neck pain and neck stiffness.   Skin:  Negative for rash and wound.   Allergic/Immunologic: Negative for food allergies and immunocompromised state.   Neurological:  Negative for dizziness, seizures, syncope, facial asymmetry, speech difficulty, weakness, light-headedness, numbness and headaches.   Hematological:  Negative for adenopathy.   Psychiatric/Behavioral:  Negative for agitation, confusion and hallucinations.    Objective:     Vital Signs (Most Recent):  Temp: 98.1 °F (36.7 °C) (12/30/22 0807)  Pulse: 85 (12/30/22 0807)  Resp: 20 (12/30/22 0807)  BP: (!) 135/91 (12/30/22 0807)  SpO2: 96 % (12/30/22 0807)   Vital Signs (24h Range):  Temp:  [96.1 °F (35.6 °C)-99.3 °F (37.4 °C)] 98.1 °F (36.7 °C)  Pulse:  [79-90] 85  Resp:  [16-34] 20  SpO2:  [95 %-100 %] 96 %  BP: (119-160)/(62-97) 135/91     Weight: 64 kg (141 lb)  Body mass index is 24.98 kg/m².    Intake/Output Summary (Last 24 hours) at 12/30/2022 1056  Last data filed at 12/30/2022 0805  Gross per 24 hour   Intake 168 ml   Output --   Net 168 ml      Physical Exam  Vitals and nursing note reviewed.   Constitutional:       General: She is not in acute distress.     Appearance: She is well-developed. She is not diaphoretic.   HENT:      Head: Normocephalic and atraumatic.      Nose: Nose normal.   Eyes:      General: No scleral icterus.     Conjunctiva/sclera: Conjunctivae normal.   Neck:       Trachea: No tracheal deviation.   Cardiovascular:      Rate and Rhythm: Normal rate and regular rhythm.      Heart sounds: Normal heart sounds. No murmur heard.    No friction rub. No gallop.   Pulmonary:      Effort: Pulmonary effort is normal. No respiratory distress.      Breath sounds: No stridor. Rales (scant, intermittent) present. No wheezing.      Comments: +conversational dyspnea after getting OOB to chair  Chest:      Chest wall: No tenderness.   Abdominal:      General: Bowel sounds are normal. There is no distension.      Palpations: Abdomen is soft. There is no mass.      Tenderness: There is no abdominal tenderness. There is no guarding or rebound.   Musculoskeletal:         General: No tenderness or deformity. Normal range of motion.      Cervical back: Normal range of motion and neck supple.   Skin:     General: Skin is warm and dry.      Coloration: Skin is not pale.      Findings: No erythema or rash.   Neurological:      Mental Status: She is alert and oriented to person, place, and time.      Cranial Nerves: No cranial nerve deficit.      Motor: No abnormal muscle tone.      Coordination: Coordination normal.   Psychiatric:         Behavior: Behavior normal.         Thought Content: Thought content normal.       Significant Labs: All pertinent labs within the past 24 hours have been reviewed.    Significant Imaging: I have reviewed all pertinent imaging results/findings within the past 24 hours.      Assessment/Plan:      * Acute on chronic combined systolic and diastolic heart failure  IV diuresis  Supplemental O2  Monitor UOP    12/30/22: Unmeasured UOP. SOB at rest improved. +HYLTON above baseline when getting up to chair. Off of oxygen. Check ambulatory pulse ox. Echo pending. Cont IV Diuresis. PT/OT consulted -pending. Urine culture pending.     Acute cystitis  IV Rocephin  F/u urine cx      COPD (chronic obstructive pulmonary disease)  Add neb txs      Chronic kidney disease, stage 4  (severe)  Stable  Trend Cr      Essential hypertension  BP stable   Cont Toprol and IV Lasix       Permanent atrial fibrillation  Patient with Persistent (7 days or more) atrial fibrillation which is controlled currently with Beta Blocker.   Continue home Eliquis          VTE Risk Mitigation (From admission, onward)         Ordered     apixaban tablet 2.5 mg  2 times daily         12/29/22 1826     IP VTE HIGH RISK PATIENT  Once         12/29/22 1826     Place sequential compression device  Until discontinued         12/29/22 1826                Discharge Planning   KAREN:      Code Status: DNR   Is the patient medically ready for discharge?:     Reason for patient still in hospital (select all that apply): Patient trending condition                     Priti Brown NP  Department of Hospital Medicine   [facility] - Telemetry ([facility])

## 2022-12-30 NOTE — HPI
87 y.o. female patient with a PMHx of HTN, HLD, pacemaker, a-fib, CHF, NSTEMI, chronic respiratory failure with hypoxia, and CAD who presents to the Emergency Department for evaluation of SOB which onset gradually. SOB worse with exertion and improved at rest.  Pt notes she is on Eliquis. Symptoms are constant and moderate in severity. No mitigating or exacerbating factors reported. Associated sxs include difficulty swallowing, diaphoresis, cough, and CP. Pt describes CP as a discomfort that is centralized on the left side of her chest. Patient denies any black stool, blood in stool, n/v, fever, and all other sxs at this time.

## 2022-12-30 NOTE — ASSESSMENT & PLAN NOTE
IV diuresis  Supplemental O2  Monitor UOP    12/30/22: Unmeasured UOP. SOB at rest improved. +HYLTON above baseline when getting up to chair. Off of oxygen. Check ambulatory pulse ox. Echo pending. Cont IV Diuresis. Urine culture pending.

## 2022-12-30 NOTE — PT/OT/SLP EVAL
"Occupational Therapy   Evaluation    Name: Christy Webber  MRN: [id number]  Admitting Diagnosis: Acute on chronic combined systolic and diastolic heart failure  Recent Surgery: * No surgery found *      Recommendations:     Discharge Recommendations: home health OT  Discharge Equipment Recommendations:  shower chair  Barriers to discharge:  None    Assessment:     Christy Webber is a 87 y.o. female with a medical diagnosis of Acute on chronic combined systolic and diastolic heart failure.  She presents with the following performance deficits affecting function: weakness, impaired endurance, impaired self care skills, impaired functional mobility, gait instability, impaired balance, decreased upper extremity function, decreased safety awareness, impaired cardiopulmonary response to activity.      Rehab Prognosis: Good; patient would benefit from acute skilled OT services to address these deficits and reach maximum level of function.       Plan:     Patient to be seen 2 x/week to address the above listed problems via self-care/home management, therapeutic activities, therapeutic exercises  Plan of Care Expires: 01/13/23  Plan of Care Reviewed with: patient    Subjective     Chief Complaint: SOB, fatigue  Patient/Family Comments/goals: return to PLOF    Occupational Profile:  Living Environment: lives with daughter in a 1 story house with no steps to enter.  Previous level of function: Pt (I) with ADLs and Mod (I) with functional mobility household distances.  Roles and Routines: does not drive  Equipment Used at Home: walker, standard  Assistance upon Discharge: daughter    Pain/Comfort:  Pain Rating 1: 0/10    "My right hand has arthritis, I just started moving it again."    Objective:     Communicated with: nurse and epic chart review prior to session.  Patient found supine with PureWick, telemetry, peripheral IV upon OT entry to room.    General Precautions: Standard, fall  Orthopedic Precautions: N/A  Braces: " N/A  Respiratory Status: Room air    Occupational Performance:    Bed Mobility:    Patient completed Rolling/Turning to Right with stand by assistance  Patient completed Scooting/Bridging with stand by assistance  Patient completed Supine to Sit with stand by assistance  Patient completed Sit to Supine with stand by assistance    Functional Mobility/Transfers:  Patient completed Sit <> Stand Transfer with stand by assistance  with  rolling walker   Patient completed Toilet Transfer Stand Pivot technique with contact guard assistance with  rolling walker  Functional Mobility: Patient completed x8ft x 2 reps to and from bathroom functional mobility with CGA and RW to increase dynamic standing balance and activity tolerance needed for ADL completion.   Step t/f to EOB with CGA and RW.    Activities of Daily Living:  Feeding:  independence drinking water from cup  Lower Body Dressing: supervision doff/kalyn socks EOB    Cognitive/Visual Perceptual:  Cognitive/Psychosocial Skills:     -       Oriented to: Person, Place, Time, and Situation   -       Follows Commands/attention:Follows multistep  commands  -       Communication: clear/fluent  -       Safety awareness/insight to disability: impaired     Physical Exam:  Sensation:    -       Intact  Dominant hand:    -       right  Upper Extremity Range of Motion:     -       Right Upper Extremity: WNL  -       Left Upper Extremity: WNL  Upper Extremity Strength:    -       Right Upper Extremity: 4/5 grossly  -       Left Upper Extremity: 4/5 grossly   Strength:    -       Right Upper Extremity: Deficits: fair, pt reports decreased  d/t arthritis.  -       Left Upper Extremity: WNL    AMPAC 6 Click ADL:  AMPAC Total Score: 23    Treatment & Education:  Pt found soiled with urine, requiring Min A to doff underwear. Pt requesting bathroom and performed toileting with SBA on toilet in bathroom. Pt demonstrating increased SOB with exertion and quickly fatiguing. Pt  performed sit>stand from toilet, but immediately required seated rest break d/t fatigue. Educated pt on pursed lip breathing technique and importance of activity pacing.   Patient educated on role of OT in acute setting and benefits of participation. Educated on techniques to use to increase independence and decrease fall risk with functional transfers. Educated on importance of OOB activity and calling for A to meet needs. Encouraged completion of B UE AROM therex throughout the day to tolerance to increase functional strength and activity tolerance. Patient stated understanding and in agreement with POC.     Patient left HOB elevated with purewick removed, call button in reach and nurse notified    GOALS:   Multidisciplinary Problems       Occupational Therapy Goals          Problem: Occupational Therapy    Goal Priority Disciplines Outcome Interventions   Occupational Therapy Goal     OT, PT/OT     Description: Goals to be met by: 1/13/23     Patient will increase functional independence with ADLs by performing:    Toileting from toilet with Modified [place] for hygiene and clothing management.   Stand pivot transfers with Modified [place].  Upper extremity exercise program x20 reps per handout, with independence.                         History:     Past Medical History:   Diagnosis Date    Atrial flutter     Biatrial enlargement     CAD S/P percutaneous coronary angioplasty     CHF (congestive heart failure)     Chronic respiratory failure with hypoxia, on home oxygen therapy     Hyperlipidemia     Hypertension     Non-STEMI (non-ST elevated myocardial infarction)     Pacemaker 08/2016    Single lead [employer] Pacemaker for sick sinus syndrome    Sick sinus syndrome     SSS (sick sinus syndrome) 8/24/2016    - pacemaker in place         Past Surgical History:   Procedure Laterality Date    CARDIAC PACEMAKER PLACEMENT      HYSTERECTOMY      KIDNEY SURGERY         Time Tracking:     OT Date of Treatment:  12/30/22  OT Start Time: 1010  OT Stop Time: 1035  OT Total Time (min): 25 min    Billable Minutes:Evaluation 15  Self Care/Home Management 10    12/30/2022  Rhonda Batista OT

## 2022-12-30 NOTE — ASSESSMENT & PLAN NOTE
Previous Echo in May 22' EF 35%, repeat pending  BNP 3569  Cont OMT- lasix, BB  Monitor Crt trends and consider entresto vs ACE/ARB  Trend troponins

## 2022-12-30 NOTE — CONSULTS
[facility] - Tele[facility] ([facility])  Cardiology  Consult Note    Patient Name: Christy Webber  MRN: [id number]  Admission Date: 12/29/2022  Hospital Length of Stay: 0 days  Code Status: DNR   Attending Provider: Alek Hernandez MD   Consulting Provider: Radha Jenkins NP  Primary Care Physician: Christian Douglass MD  Principal Problem:Acute on chronic combined systolic and diastolic heart failure    Patient information was obtained from patient and ER records.     Inpatient consult to Cardiology  Consult performed by: Radha Jenkins NP  Consult ordered by: Fernando Cannon MD        Subjective:     Chief Complaint:  SOB     HPI:   87 y.o. female patient with a PMHx of HTN, HLD, pacemaker, a-fib, CHF, NSTEMI, chronic respiratory failure with hypoxia, and CAD and dementia who presents to the Emergency Department for evaluation of SOB which onset gradually. SOB worse with exertion and improved at rest.  Pt notes she is on Eliquis. Symptoms are constant and moderate in severity. No mitigating or exacerbating factors reported. Associated sxs include difficulty swallowing, diaphoresis, cough, and CP. Pt describes CP as a discomfort that is centralized on the left side of her chest. Patient denies any black stool, blood in stool, n/v, fever, and all other sxs at this time. Cardiology consulted for CHF. Pt seen and examined today c/o a little SOB, pt denies CP at this time. Labs reviewed Echo EF 35% in May 2022, repeat pending   BNP 3569, Crt 1.7, Troponin 0.049->0.061        Past Medical History:   Diagnosis Date    Atrial flutter     Biatrial enlargement     CAD S/P percutaneous coronary angioplasty     CHF (congestive heart failure)     Chronic respiratory failure with hypoxia, on home oxygen therapy     Hyperlipidemia     Hypertension     Non-STEMI (non-ST elevated myocardial infarction)     Pacemaker 08/2016    Single lead [employer] Pacemaker for sick sinus syndrome    Sick sinus syndrome     SSS  (sick sinus syndrome) 2016    - pacemaker in place       Past Surgical History:   Procedure Laterality Date    CARDIAC PACEMAKER PLACEMENT      HYSTERECTOMY      KIDNEY SURGERY         Review of patient's allergies indicates:  No Known Allergies    No current facility-administered medications on file prior to encounter.     Current Outpatient Medications on File Prior to Encounter   Medication Sig    diclofenac sodium (VOLTAREN) 1 % Gel Apply topically 4 (four) times daily as needed.    donepeziL (ARICEPT) 5 MG tablet Take 1 tablet (5 mg total) by mouth every evening.    ELIQUIS 2.5 mg Tab TAKE 1 TABLET BY MOUTH TWICE A DAY    furosemide (LASIX) 20 MG tablet Take 20 mg by mouth 2 (two) times daily.    metoprolol succinate (TOPROL-XL) 25 MG 24 hr tablet Take 25 mg by mouth once daily.    rosuvastatin (CRESTOR) 10 MG tablet TAKE 1 TABLET BY MOUTH EVERY DAY    torsemide (DEMADEX) 20 MG Tab TAKE 3 TABLETS BY MOUTH 2 TIMES A DAY. (Patient taking differently: Take 20 mg by mouth 2 (two) times a day.)     Family History    None       Tobacco Use    Smoking status: Former     Packs/day: 1.50     Years: 63.00     Pack years: 94.50     Types: Cigarettes     Quit date: 2016     Years since quittin.5    Smokeless tobacco: Never   Substance and Sexual Activity    Alcohol use: Not Currently    Drug use: No    Sexual activity: Not Currently     Review of Systems   Constitutional: Positive for malaise/fatigue.   HENT: Negative.     Eyes: Negative.    Cardiovascular: Negative.    Respiratory:  Positive for shortness of breath.    Skin: Negative.    Musculoskeletal: Negative.    Gastrointestinal: Negative.    Genitourinary: Negative.    Neurological: Negative.    Psychiatric/Behavioral: Negative.     Objective:     Vital Signs (Most Recent):  Temp: 98.4 °F (36.9 °C) (22 1125)  Pulse: 86 (22 1125)  Resp: 18 (22 1125)  BP: (!) 141/74 (22 1125)  SpO2: 100 % (22)   Vital  Signs (24h Range):  Temp:  [96.1 °F (35.6 °C)-98.4 °F (36.9 °C)] 98.4 °F (36.9 °C)  Pulse:  [79-87] 86  Resp:  [16-34] 18  SpO2:  [95 %-100 %] 100 %  BP: (119-159)/(62-91) 141/74     Weight: 64 kg (141 lb)  Body mass index is 24.98 kg/m².    SpO2: 100 %         Intake/Output Summary (Last 24 hours) at 12/30/2022 1303  Last data filed at 12/30/2022 0805  Gross per 24 hour   Intake 168 ml   Output --   Net 168 ml       Lines/Drains/Airways       Drain  Duration             Female External Urinary Catheter 12/29/22 1400 <1 day              Peripheral Intravenous Line  Duration                  Peripheral IV - Single Lumen 12/29/22 18 G Anterior;Right Forearm 1 day                    Physical Exam  Vitals and nursing note reviewed.   Constitutional:       Appearance: Normal appearance.   HENT:      Head: Normocephalic.   Eyes:      Pupils: Pupils are equal, round, and reactive to light.   Cardiovascular:      Rate and Rhythm: Normal rate and regular rhythm.      Heart sounds: Normal heart sounds, S1 normal and S2 normal. No murmur heard.    No S3 or S4 sounds.   Pulmonary:      Effort: Pulmonary effort is normal.      Breath sounds: Wheezing present.   Abdominal:      General: Bowel sounds are normal.      Palpations: Abdomen is soft.   Musculoskeletal:         General: Normal range of motion.      Cervical back: Normal range of motion.   Skin:     Capillary Refill: Capillary refill takes less than 2 seconds.   Neurological:      General: No focal deficit present.      Mental Status: She is alert and oriented to person, place, and time.   Psychiatric:         Mood and Affect: Mood normal.         Behavior: Behavior normal.         Thought Content: Thought content normal.       Significant Labs: BMP:   Recent Labs   Lab 12/29/22  1311 12/29/22  1836 12/30/22  0550   *  --  101     --  142   K 3.8  --  4.7     --  107   CO2 17*  --  21*   BUN 28*  --  39*   CREATININE 1.7*  --  1.8*   CALCIUM 10.2  --   10.5   MG  --  2.2  --    , CMP   Recent Labs   Lab 12/29/22  1311 12/30/22  0550    142   K 3.8 4.7    107   CO2 17* 21*   * 101   BUN 28* 39*   CREATININE 1.7* 1.8*   CALCIUM 10.2 10.5   PROT 7.2  --    ALBUMIN 3.3*  --    BILITOT 1.9*  --    ALKPHOS 113  --    AST 18  --    ALT <5*  --    ANIONGAP 16 14   , CBC   Recent Labs   Lab 12/29/22  1243   WBC 4.99   HGB 13.0   HCT 40.1      , INR   Recent Labs   Lab 12/29/22  1355   INR 1.3*   , Lipid Panel No results for input(s): CHOL, HDL, LDLCALC, TRIG, CHOLHDL in the last 48 hours., Troponin   Recent Labs   Lab 12/29/22  1311 12/29/22  1539   TROPONINI 0.049* 0.061*   , and All pertinent lab results from the last 24 hours have been reviewed.    Significant Imaging: Cardiac Cath: reivewed, Echocardiogram: Transthoracic echo (TTE) complete (Cupid Only):   Results for orders placed or performed during the hospital encounter of 12/29/22   Echo   Result Value Ref Range    BSA 1.69 m2    TDI SEPTAL 0.07 m/s    LV LATERAL E/E' RATIO 6.50 m/s    LV SEPTAL E/E' RATIO 14.86 m/s    IVC diameter 2.47 cm    Left Ventricular Outflow Tract Mean Velocity 0.55 cm/s    Left Ventricular Outflow Tract Mean Gradient 1.36 mmHg    TDI LATERAL 0.16 m/s    PV PEAK VELOCITY 0.77 cm/s    LVIDd 5.03 3.5 - 6.0 cm    IVS 1.20 (A) 0.6 - 1.1 cm    Posterior Wall 1.19 (A) 0.6 - 1.1 cm    Ao root annulus 2.36 cm    LVIDs 4.28 (A) 2.1 - 4.0 cm    FS 15 28 - 44 %    Sinus 2.84 cm    STJ 2.88 cm    Ascending aorta 2.83 cm    LV mass 234.60 g    LA size 4.97 cm    RVDD 4.60 cm    Left Ventricle Relative Wall Thickness 0.47 cm    AV regurgitation pressure 1/2 time 475.042140527327032 ms    AV mean gradient 9 mmHg    AV valve area 1.53 cm2    AV Velocity Ratio 0.39     AV index (prosthetic) 0.50     PV peak gradient 1.60 mmHg    Mean e' 0.12 m/s    E wave deceleration time 169.00 msec    IVRT 65.65 msec    LVOT diameter 1.98 cm    LVOT area 3.1 cm2    LVOT peak mackenzie 0.83 m/s     LVOT peak VTI 16.40 cm    Ao peak elton 2.11 m/s    Ao VTI 33.0 cm    RVOT peak elton 0.63 m/s    RVOT peak VTI 11.9 cm    LVOT stroke volume 50.47 cm3    AV peak gradient 18 mmHg    PV mean gradient 0.84 mmHg    E/E' ratio 9.04 m/s    MV Peak E Elton 1.04 m/s    AR Max Elton 3.72 m/s    TR Max Elton 3.54 m/s    LV Systolic Volume 82.26 mL    LV Systolic Volume Index 49.3 mL/m2    LV Diastolic Volume 120.13 mL    LV Diastolic Volume Index 71.93 mL/m2    LV Mass Index 140 g/m2    RA Major Axis 8.89 cm    Left Atrium Minor Axis 7.83 cm    Left Atrium Major Axis 8.02 cm    Triscuspid Valve Regurgitation Peak Gradient 50 mmHg    LA Volume Index (Mod) 74.2 mL/m2    LA volume (mod) 123.84 cm3    RA Width 5.19 cm   , EKG: reviewed, Stress Test: reviewed, and X-Ray: CXR: X-Ray Chest 1 View (CXR): No results found for this visit on 12/29/22.    Assessment and Plan:     * Acute on chronic combined systolic and diastolic heart failure  Previous Echo in May 22' EF 35%, repeat pending  BNP 3569  Cont OMT- lasix, BB  Monitor Crt trends and consider entresto vs ACE/ARB  Trend troponins    COPD (chronic obstructive pulmonary disease)  Continue management as per hospital medicine    Chronic kidney disease, stage 4 (severe)  Continue management as per hospital medicine    Essential hypertension  stable    Permanent atrial fibrillation  Cont Eliquis, HR controlled        VTE Risk Mitigation (From admission, onward)         Ordered     apixaban tablet 2.5 mg  2 times daily         12/29/22 1826     IP VTE HIGH RISK PATIENT  Once         12/29/22 1826     Place sequential compression device  Until discontinued         12/29/22 1826                Thank you for your consult. I will follow-up with patient. Please contact us if you have any additional questions.    Radha Jenkins, NP  Cardiology   [facility] - Telemetry ([facility])

## 2022-12-30 NOTE — SUBJECTIVE & OBJECTIVE
Past Medical History:   Diagnosis Date    Atrial flutter     Biatrial enlargement     CAD S/P percutaneous coronary angioplasty     CHF (congestive heart failure)     Chronic respiratory failure with hypoxia, on home oxygen therapy     Hyperlipidemia     Hypertension     Non-STEMI (non-ST elevated myocardial infarction)     Pacemaker 08/2016    Single lead [employer] Pacemaker for sick sinus syndrome    Sick sinus syndrome     SSS (sick sinus syndrome) 8/24/2016    - pacemaker in place       Past Surgical History:   Procedure Laterality Date    CARDIAC PACEMAKER PLACEMENT      HYSTERECTOMY      KIDNEY SURGERY         Review of patient's allergies indicates:  No Known Allergies    No current facility-administered medications on file prior to encounter.     Current Outpatient Medications on File Prior to Encounter   Medication Sig    diclofenac sodium (VOLTAREN) 1 % Gel Apply topically 4 (four) times daily as needed.    donepeziL (ARICEPT) 5 MG tablet Take 1 tablet (5 mg total) by mouth every evening.    ELIQUIS 2.5 mg Tab TAKE 1 TABLET BY MOUTH TWICE A DAY    furosemide (LASIX) 20 MG tablet Take 20 mg by mouth 2 (two) times daily.    metoprolol succinate (TOPROL-XL) 25 MG 24 hr tablet Take 25 mg by mouth once daily.    rosuvastatin (CRESTOR) 10 MG tablet TAKE 1 TABLET BY MOUTH EVERY DAY    torsemide (DEMADEX) 20 MG Tab TAKE 3 TABLETS BY MOUTH 2 TIMES A DAY. (Patient taking differently: Take 20 mg by mouth 2 (two) times a day.)    [DISCONTINUED] albuterol (PROVENTIL/VENTOLIN HFA) 90 mcg/actuation inhaler Inhale 1-2 puffs into the lungs every 6 (six) hours as needed for Wheezing. Rescue    [DISCONTINUED] famotidine (PEPCID) 20 MG tablet Take 1 tablet (20 mg total) by mouth daily as needed for Heartburn.    [DISCONTINUED] indomethacin (INDOCIN) 50 MG capsule Take 50 mg by mouth every 8 (eight) hours as needed (pain).    [DISCONTINUED] nitroGLYCERIN (NITROSTAT) 0.4 MG SL tablet Place 1 tablet (0.4 mg total) under the  tongue every 5 (five) minutes as needed for Chest pain.     Family History    None       Tobacco Use    Smoking status: Former     Packs/day: 1.50     Years: 63.00     Pack years: 94.50     Types: Cigarettes     Quit date: 2016     Years since quittin.5    Smokeless tobacco: Never   Substance and Sexual Activity    Alcohol use: Not Currently    Drug use: No    Sexual activity: Not Currently     Review of Systems   Constitutional:  Positive for activity change, appetite change, diaphoresis and fatigue. Negative for chills and fever.   HENT: Negative.     Respiratory:  Positive for cough and shortness of breath. Negative for wheezing.    Cardiovascular:  Positive for chest pain. Negative for leg swelling.   Gastrointestinal:  Negative for abdominal pain, diarrhea, nausea and vomiting.   Genitourinary:  Negative for difficulty urinating.   Musculoskeletal:  Negative for arthralgias and back pain.   Neurological:  Negative for dizziness and headaches.   Objective:     Vital Signs (Most Recent):  Temp: 99.3 °F (37.4 °C) (22 1300)  Pulse: 87 (22 1826)  Resp: 16 (22 1826)  BP: 136/66 (22 1826)  SpO2: 98 % (22 1826) Vital Signs (24h Range):  Temp:  [99.3 °F (37.4 °C)] 99.3 °F (37.4 °C)  Pulse:  [85-90] 87  Resp:  [16-34] 16  SpO2:  [95 %-100 %] 98 %  BP: (136-160)/(62-97) 136/66     Weight: 68.9 kg (152 lb)  Body mass index is 26.93 kg/m².    Physical Exam  Constitutional:       General: She is not in acute distress.     Appearance: Normal appearance. She is ill-appearing.   Cardiovascular:      Rate and Rhythm: Normal rate. Rhythm irregular.   Pulmonary:      Effort: Pulmonary effort is normal. No respiratory distress.      Breath sounds: Normal breath sounds. No wheezing.   Abdominal:      General: Abdomen is flat.      Palpations: Abdomen is soft.   Musculoskeletal:         General: Normal range of motion.      Right lower leg: No edema.      Left lower leg: No edema.   Skin:      General: Skin is warm and dry.   Neurological:      General: No focal deficit present.      Mental Status: She is alert and oriented to person, place, and time.           Significant Labs: All pertinent labs within the past 24 hours have been reviewed.    Significant Imaging: I have reviewed all pertinent imaging results/findings within the past 24 hours.   No

## 2022-12-30 NOTE — PT/OT/SLP EVAL
Physical Therapy Evaluation    Patient Name:  Christy Webber   MRN:  [id number]    Recommendations:     Discharge Recommendations: home health PT   Discharge Equipment Recommendations: shower chair   Barriers to discharge: None    Assessment:     Christy Webber is a 87 y.o. female admitted with a medical diagnosis of Acute on chronic combined systolic and diastolic heart failure.  She presents with the following impairments/functional limitations: weakness, impaired endurance, impaired functional mobility, gait instability, impaired balance, decreased safety awareness, decreased coordination.    Rehab Prognosis: Good; patient would benefit from acute skilled PT services to address these deficits and reach maximum level of function.    Recent Surgery: * No surgery found *    Plan:     During this hospitalization, patient to be seen 3 x/week to address the identified rehab impairments via gait training, therapeutic activities, therapeutic exercises and progress toward the following goals:    Plan of Care Expires:  01/13/23    Subjective     Chief Complaint: SOB WITH EXERTION  Patient/Family Comments/goals:   Pain/Comfort:  Pain Rating 1: 0/10    Patients cultural, spiritual, [employer] conflicts given the current situation:      Living Environment:  PT LIVES WITH DAUGHTER, 1 STORY HOUSE NO STEPS, AMB INDEP WITH USING RW HOUSEHOLD DISTANCES, DOES NOT DRIVE, INDEP WITH ADL'S  Prior to admission, patients level of function was INDEP.  Equipment used at home: walker, rolling.  DME owned (not currently used): none.  Upon discharge, patient will have assistance from DAUGHTER.    Objective:     Communicated with NURSE LEMONS prior to session.  Patient found supine with telemetry, peripheral IV, PureWick  upon PT entry to room.    General Precautions: Standard, fall  Orthopedic Precautions:N/A   Braces: N/A  Respiratory Status: Room air    Exams:  Cognitive Exam:  Patient is oriented to Person, Place, Time, and  "Situation  Postural Exam:  Patient presented with the following abnormalities:    -       Rounded shoulders  Sensation:    -       Intact  RLE ROM: WFL  RLE Strength: GROSSLY 3+/5  LLE ROM: WFL  LLE Strength: GROSSLY 3+/5    Functional Mobility:  Bed Mobility:     Rolling Left:  stand by assistance  Rolling Right: stand by assistance  Scooting: stand by assistance  Supine to Sit: stand by assistance  Sit to Supine: stand by assistance  Transfers:     Sit to Stand:  stand by assistance with no AD  Toilet Transfer: contact guard assistance with  rolling walker  using  Step Transfer-NO ASSIST FOR CLEANING  Gait: PT AMB 8' X 2 TRIALS WITH RW AND CGA TO AND FROM BATHROOM, SLOW PACE, QUICK TO FATIGUE, C/O INCREASED SOB ON ROOM AIR AS WELL AS DIZZINESS, NO GROSS LOB  Balance: FAIR    AM-PAC 6 CLICK MOBILITY  Total Score:18     Treatment & Education:  PT EDUCATED IN ROLE OF P.T. AND POC IN ACUTE CARE HOSPITAL SETTING, EDUCATED IN RW USE AND SAFETY DURING TF'S AND GAIT, ENCOURAGED TO INCREASE TIME OOB IN CHAIR TO TOLERANCE, EDUCATED IN AND ENCOURAGED TO PERFORM BLE THEREX WHILE SEATED OR SUPINE THROUGHOUT THE DAY TO TOLERANCE: HIP FLEX/EXT, QUAD SET, LAQ, HEEL SLIDES, AP'S.  PT AGREEABLE TO REQUESTS  PT EDUCATED ON RISK FOR FALLS DUE TO GENERALIZED WEAKNESS, EDUCATED ON "CALL DON'T FALL", ENCOURAGED TO CALL FOR ASSISTANCE WITH ALL NEEDS SUCH AS BED<>CHAIR TRANSFERS OR TRIPS TO BATHROOM, PT AGREEABLE TO SAFETY PRECAUTIONS    Patient left HOB elevated with all lines intact, call button in reach, and NURSE notified.    GOALS:   Multidisciplinary Problems       Physical Therapy Goals          Problem: Physical Therapy    Goal Priority Disciplines Outcome Goal Variances Interventions   Physical Therapy Goal     PT, PT/OT      Description: LTG'S TO BE MET IN 14 DAYS (1-13-23)  PT WILL BE RAPHAEL FOR BED MOBILITY  PT WILL BE RAPHAEL FOR BED<>CHAIR TF'S  PT WILL  FEET WITH RW AND SPV                         History:     Past " Medical History:   Diagnosis Date    Atrial flutter     Biatrial enlargement     CAD S/P percutaneous coronary angioplasty     CHF (congestive heart failure)     Chronic respiratory failure with hypoxia, on home oxygen therapy     Hyperlipidemia     Hypertension     Non-STEMI (non-ST elevated myocardial infarction)     Pacemaker 08/2016    Single lead [employer] Pacemaker for sick sinus syndrome    Sick sinus syndrome     SSS (sick sinus syndrome) 8/24/2016    - pacemaker in place       Past Surgical History:   Procedure Laterality Date    CARDIAC PACEMAKER PLACEMENT      HYSTERECTOMY      KIDNEY SURGERY         Time Tracking:     PT Received On: 12/30/22  PT Start Time: 0955     PT Stop Time: 1020  PT Total Time (min): 25 min     Billable Minutes: Evaluation 15 and Therapeutic Activity 10    12/30/2022

## 2022-12-30 NOTE — H&P
[facility] Medicine  History & Physical    Patient Name: Christy Webber  MRN: [id number]  Patient Class: OP- Observation  Admission Date: 12/29/2022  Attending Physician: Fernando Cannon MD   Primary Care Provider: Christian Douglass MD         Patient information was obtained from patient, past medical records and ER records.     Subjective:     Principal Problem:Acute on chronic combined systolic and diastolic heart failure    Chief Complaint:   Chief Complaint   Patient presents with    Shortness of Breath     Shortness of breath and weakness        HPI: 87 y.o. female patient with a PMHx of HTN, HLD, pacemaker, a-fib, CHF, NSTEMI, chronic respiratory failure with hypoxia, and CAD who presents to the Emergency Department for evaluation of SOB which onset gradually. SOB worse with exertion and improved at rest.  Pt notes she is on Eliquis. Symptoms are constant and moderate in severity. No mitigating or exacerbating factors reported. Associated sxs include difficulty swallowing, diaphoresis, cough, and CP. Pt describes CP as a discomfort that is centralized on the left side of her chest. Patient denies any black stool, blood in stool, n/v, fever, and all other sxs at this time.      Past Medical History:   Diagnosis Date    Atrial flutter     Biatrial enlargement     CAD S/P percutaneous coronary angioplasty     CHF (congestive heart failure)     Chronic respiratory failure with hypoxia, on home oxygen therapy     Hyperlipidemia     Hypertension     Non-STEMI (non-ST elevated myocardial infarction)     Pacemaker 08/2016    Single lead [employer] Pacemaker for sick sinus syndrome    Sick sinus syndrome     SSS (sick sinus syndrome) 8/24/2016    - pacemaker in place       Past Surgical History:   Procedure Laterality Date    CARDIAC PACEMAKER PLACEMENT      HYSTERECTOMY      KIDNEY SURGERY         Review of patient's allergies indicates:  No Known Allergies    No current  facility-administered medications on file prior to encounter.     Current Outpatient Medications on File Prior to Encounter   Medication Sig    diclofenac sodium (VOLTAREN) 1 % Gel Apply topically 4 (four) times daily as needed.    donepeziL (ARICEPT) 5 MG tablet Take 1 tablet (5 mg total) by mouth every evening.    ELIQUIS 2.5 mg Tab TAKE 1 TABLET BY MOUTH TWICE A DAY    furosemide (LASIX) 20 MG tablet Take 20 mg by mouth 2 (two) times daily.    metoprolol succinate (TOPROL-XL) 25 MG 24 hr tablet Take 25 mg by mouth once daily.    rosuvastatin (CRESTOR) 10 MG tablet TAKE 1 TABLET BY MOUTH EVERY DAY    torsemide (DEMADEX) 20 MG Tab TAKE 3 TABLETS BY MOUTH 2 TIMES A DAY. (Patient taking differently: Take 20 mg by mouth 2 (two) times a day.)    [DISCONTINUED] albuterol (PROVENTIL/VENTOLIN HFA) 90 mcg/actuation inhaler Inhale 1-2 puffs into the lungs every 6 (six) hours as needed for Wheezing. Rescue    [DISCONTINUED] famotidine (PEPCID) 20 MG tablet Take 1 tablet (20 mg total) by mouth daily as needed for Heartburn.    [DISCONTINUED] indomethacin (INDOCIN) 50 MG capsule Take 50 mg by mouth every 8 (eight) hours as needed (pain).    [DISCONTINUED] nitroGLYCERIN (NITROSTAT) 0.4 MG SL tablet Place 1 tablet (0.4 mg total) under the tongue every 5 (five) minutes as needed for Chest pain.     Family History    None       Tobacco Use    Smoking status: Former     Packs/day: 1.50     Years: 63.00     Pack years: 94.50     Types: Cigarettes     Quit date: 2016     Years since quittin.5    Smokeless tobacco: Never   Substance and Sexual Activity    Alcohol use: Not Currently    Drug use: No    Sexual activity: Not Currently     Review of Systems   Constitutional:  Positive for activity change, appetite change, diaphoresis and fatigue. Negative for chills and fever.   HENT: Negative.     Respiratory:  Positive for cough and shortness of breath. Negative for wheezing.    Cardiovascular:  Positive for  chest pain. Negative for leg swelling.   Gastrointestinal:  Negative for abdominal pain, diarrhea, nausea and vomiting.   Genitourinary:  Negative for difficulty urinating.   Musculoskeletal:  Negative for arthralgias and back pain.   Neurological:  Negative for dizziness and headaches.   Objective:     Vital Signs (Most Recent):  Temp: 99.3 °F (37.4 °C) (12/29/22 1300)  Pulse: 87 (12/29/22 1826)  Resp: 16 (12/29/22 1826)  BP: 136/66 (12/29/22 1826)  SpO2: 98 % (12/29/22 1826) Vital Signs (24h Range):  Temp:  [99.3 °F (37.4 °C)] 99.3 °F (37.4 °C)  Pulse:  [85-90] 87  Resp:  [16-34] 16  SpO2:  [95 %-100 %] 98 %  BP: (136-160)/(62-97) 136/66     Weight: 68.9 kg (152 lb)  Body mass index is 26.93 kg/m².    Physical Exam  Constitutional:       General: She is not in acute distress.     Appearance: Normal appearance. She is ill-appearing.   Cardiovascular:      Rate and Rhythm: Normal rate. Rhythm irregular.   Pulmonary:      Effort: Pulmonary effort is normal. No respiratory distress.      Breath sounds: Normal breath sounds. No wheezing.   Abdominal:      General: Abdomen is flat.      Palpations: Abdomen is soft.   Musculoskeletal:         General: Normal range of motion.      Right lower leg: No edema.      Left lower leg: No edema.   Skin:     General: Skin is warm and dry.   Neurological:      General: No focal deficit present.      Mental Status: She is alert and oriented to person, place, and time.           Significant Labs: All pertinent labs within the past 24 hours have been reviewed.    Significant Imaging: I have reviewed all pertinent imaging results/findings within the past 24 hours.    Assessment/Plan:     * Acute on chronic combined systolic and diastolic heart failure  IV diuresis  Supplemental O2  Monitor UOP    Admit for observation    Acute cystitis  IV Rocephin  F/u urine cx      Chronic kidney disease, stage 4 (severe)  Stable  Trend Cr      Essential hypertension  Restart home meds as  tolerated      Permanent atrial fibrillation  Patient with Persistent (7 days or more) atrial fibrillation which is controlled currently with Beta Blocker.   Continue home Eliquis          VTE Risk Mitigation (From admission, onward)         Ordered     apixaban tablet 2.5 mg  2 times daily         12/29/22 1826     IP VTE HIGH RISK PATIENT  Once         12/29/22 1826     Place sequential compression device  Until discontinued         12/29/22 1826                   Fernando Cannon MD  Department of Hospital Medicine   '[facility] - Telemetry ([facility])

## 2022-12-30 NOTE — PLAN OF CARE
OT [place] completed. SBA for bed mobility and sit>stand with RW. CGA for stand pivot t/fs and ambulation 8ft x2 reps with RW. SBA for toileting. Recommends home health OT.

## 2022-12-30 NOTE — SUBJECTIVE & OBJECTIVE
Past Medical History:   Diagnosis Date    Atrial flutter     Biatrial enlargement     CAD S/P percutaneous coronary angioplasty     CHF (congestive heart failure)     Chronic respiratory failure with hypoxia, on home oxygen therapy     Hyperlipidemia     Hypertension     Non-STEMI (non-ST elevated myocardial infarction)     Pacemaker 2016    Single lead [employer] Pacemaker for sick sinus syndrome    Sick sinus syndrome     SSS (sick sinus syndrome) 2016    - pacemaker in place       Past Surgical History:   Procedure Laterality Date    CARDIAC PACEMAKER PLACEMENT      HYSTERECTOMY      KIDNEY SURGERY         Review of patient's allergies indicates:  No Known Allergies    No current facility-administered medications on file prior to encounter.     Current Outpatient Medications on File Prior to Encounter   Medication Sig    diclofenac sodium (VOLTAREN) 1 % Gel Apply topically 4 (four) times daily as needed.    donepeziL (ARICEPT) 5 MG tablet Take 1 tablet (5 mg total) by mouth every evening.    ELIQUIS 2.5 mg Tab TAKE 1 TABLET BY MOUTH TWICE A DAY    furosemide (LASIX) 20 MG tablet Take 20 mg by mouth 2 (two) times daily.    metoprolol succinate (TOPROL-XL) 25 MG 24 hr tablet Take 25 mg by mouth once daily.    rosuvastatin (CRESTOR) 10 MG tablet TAKE 1 TABLET BY MOUTH EVERY DAY    torsemide (DEMADEX) 20 MG Tab TAKE 3 TABLETS BY MOUTH 2 TIMES A DAY. (Patient taking differently: Take 20 mg by mouth 2 (two) times a day.)     Family History    None       Tobacco Use    Smoking status: Former     Packs/day: 1.50     Years: 63.00     Pack years: 94.50     Types: Cigarettes     Quit date: 2016     Years since quittin.5    Smokeless tobacco: Never   Substance and Sexual Activity    Alcohol use: Not Currently    Drug use: No    Sexual activity: Not Currently     Review of Systems   Constitutional: Positive for malaise/fatigue.   HENT: Negative.     Eyes: Negative.    Cardiovascular: Negative.    Respiratory:   Positive for shortness of breath.    Skin: Negative.    Musculoskeletal: Negative.    Gastrointestinal: Negative.    Genitourinary: Negative.    Neurological: Negative.    Psychiatric/Behavioral: Negative.     Objective:     Vital Signs (Most Recent):  Temp: 98.4 °F (36.9 °C) (12/30/22 1125)  Pulse: 86 (12/30/22 1125)  Resp: 18 (12/30/22 1125)  BP: (!) 141/74 (12/30/22 1125)  SpO2: 100 % (12/30/22 1125)   Vital Signs (24h Range):  Temp:  [96.1 °F (35.6 °C)-98.4 °F (36.9 °C)] 98.4 °F (36.9 °C)  Pulse:  [79-87] 86  Resp:  [16-34] 18  SpO2:  [95 %-100 %] 100 %  BP: (119-159)/(62-91) 141/74     Weight: 64 kg (141 lb)  Body mass index is 24.98 kg/m².    SpO2: 100 %         Intake/Output Summary (Last 24 hours) at 12/30/2022 1303  Last data filed at 12/30/2022 0805  Gross per 24 hour   Intake 168 ml   Output --   Net 168 ml       Lines/Drains/Airways       Drain  Duration             Female External Urinary Catheter 12/29/22 1400 <1 day              Peripheral Intravenous Line  Duration                  Peripheral IV - Single Lumen 12/29/22 18 G Anterior;Right Forearm 1 day                    Physical Exam  Vitals and nursing note reviewed.   Constitutional:       Appearance: Normal appearance.   HENT:      Head: Normocephalic.   Eyes:      Pupils: Pupils are equal, round, and reactive to light.   Cardiovascular:      Rate and Rhythm: Normal rate and regular rhythm.      Heart sounds: Normal heart sounds, S1 normal and S2 normal. No murmur heard.    No S3 or S4 sounds.   Pulmonary:      Effort: Pulmonary effort is normal.      Breath sounds: Wheezing present.   Abdominal:      General: Bowel sounds are normal.      Palpations: Abdomen is soft.   Musculoskeletal:         General: Normal range of motion.      Cervical back: Normal range of motion.   Skin:     Capillary Refill: Capillary refill takes less than 2 seconds.   Neurological:      General: No focal deficit present.      Mental Status: She is alert and oriented to  person, place, and time.   Psychiatric:         Mood and Affect: Mood normal.         Behavior: Behavior normal.         Thought Content: Thought content normal.       Significant Labs: BMP:   Recent Labs   Lab 12/29/22  1311 12/29/22  1836 12/30/22  0550   *  --  101     --  142   K 3.8  --  4.7     --  107   CO2 17*  --  21*   BUN 28*  --  39*   CREATININE 1.7*  --  1.8*   CALCIUM 10.2  --  10.5   MG  --  2.2  --    , CMP   Recent Labs   Lab 12/29/22  1311 12/30/22  0550    142   K 3.8 4.7    107   CO2 17* 21*   * 101   BUN 28* 39*   CREATININE 1.7* 1.8*   CALCIUM 10.2 10.5   PROT 7.2  --    ALBUMIN 3.3*  --    BILITOT 1.9*  --    ALKPHOS 113  --    AST 18  --    ALT <5*  --    ANIONGAP 16 14   , CBC   Recent Labs   Lab 12/29/22  1243   WBC 4.99   HGB 13.0   HCT 40.1      , INR   Recent Labs   Lab 12/29/22  1355   INR 1.3*   , Lipid Panel No results for input(s): CHOL, HDL, LDLCALC, TRIG, CHOLHDL in the last 48 hours., Troponin   Recent Labs   Lab 12/29/22  1311 12/29/22  1539   TROPONINI 0.049* 0.061*   , and All pertinent lab results from the last 24 hours have been reviewed.    Significant Imaging: Cardiac Cath: reivewed, Echocardiogram: Transthoracic echo (TTE) complete (C[facility] Only):   Results for orders placed or performed during the hospital encounter of 12/29/22   Echo   Result Value Ref Range    BSA 1.69 m2    TDI SEPTAL 0.07 m/s    LV LATERAL E/E' RATIO 6.50 m/s    LV SEPTAL E/E' RATIO 14.86 m/s    IVC diameter 2.47 cm    Left Ventricular Outflow Tract Mean Velocity 0.55 cm/s    Left Ventricular Outflow Tract Mean Gradient 1.36 mmHg    TDI LATERAL 0.16 m/s    PV PEAK VELOCITY 0.77 cm/s    LVIDd 5.03 3.5 - 6.0 cm    IVS 1.20 (A) 0.6 - 1.1 cm    Posterior Wall 1.19 (A) 0.6 - 1.1 cm    Ao root annulus 2.36 cm    LVIDs 4.28 (A) 2.1 - 4.0 cm    FS 15 28 - 44 %    Sinus 2.84 cm    STJ 2.88 cm    Ascending aorta 2.83 cm    LV mass 234.60 g    LA size 4.97 cm    RVDD  4.60 cm    Left Ventricle Relative Wall Thickness 0.47 cm    AV regurgitation pressure 1/2 time [id number] ms    AV mean gradient 9 mmHg    AV valve area 1.53 cm2    AV Velocity Ratio 0.39     AV index (prosthetic) 0.50     PV peak gradient 1.60 mmHg    Mean e' 0.12 m/s    E wave deceleration time 169.00 msec    IVRT 65.65 msec    LVOT diameter 1.98 cm    LVOT area 3.1 cm2    LVOT peak elton 0.83 m/s    LVOT peak VTI 16.40 cm    Ao peak elton 2.11 m/s    Ao VTI 33.0 cm    RVOT peak elton 0.63 m/s    RVOT peak VTI 11.9 cm    LVOT stroke volume 50.47 cm3    AV peak gradient 18 mmHg    PV mean gradient 0.84 mmHg    E/E' ratio 9.04 m/s    MV Peak E Elton 1.04 m/s    AR Max Elton 3.72 m/s    TR Max Elton 3.54 m/s    LV Systolic Volume 82.26 mL    LV Systolic Volume Index 49.3 mL/m2    LV Diastolic Volume 120.13 mL    LV Diastolic Volume Index 71.93 mL/m2    LV Mass Index 140 g/m2    RA Major Axis 8.89 cm    Left Atrium Minor Axis 7.83 cm    Left Atrium Major Axis 8.02 cm    Triscuspid Valve Regurgitation Peak Gradient 50 mmHg    LA Volume Index (Mod) 74.2 mL/m2    LA volume (mod) 123.84 cm3    RA Width 5.19 cm   , EKG: reviewed, Stress Test: reviewed, and X-Ray: CXR: X-Ray Chest 1 View (CXR): No results found for this visit on 12/29/22.

## 2022-12-30 NOTE — CONSULTS
Food & Nutrition Education    Diet Education: Heart Failure  Time Spent: 10 minutes    Learners: Pt    Nutrition Education provided with handouts:  Heart Failure Nutrition Therapy, Fluid-Restricted Diet, Low-Sodium Nutrition Therapy ([email or web address].org)    Comments:  Dietitian educated patient on low sodium diet and fluid restriction related to hospital diagnosis. Discussed the importance of limiting sodium to 2,000 mg per day and reading food labels to avoid further complications of CHF. Discussed using salt free seasonings and other herbs and spices in meals to enhance flavor without additional sodium. Discussed 1500 ml fluid restriction per MD and dietary sources of fluid. Dietitian recommended using a cup with measurements for fluids and to try to consume small sips spread throughout the day rather than a lot at one time.      All questions and concerns answered.    Provided handout with dietitian's contact information.    *Please re-consult as needed.    Thank You!  Ruben Ng, Registration Eligible, Provisional LDN

## 2022-12-30 NOTE — HPI
87 y.o. female patient with a PMHx of HTN, HLD, pacemaker, a-fib, CHF, NSTEMI, chronic respiratory failure with hypoxia, and CAD and dementia who presents to the Emergency Department for evaluation of SOB which onset gradually. SOB worse with exertion and improved at rest.  Pt notes she is on Eliquis. Symptoms are constant and moderate in severity. No mitigating or exacerbating factors reported. Associated sxs include difficulty swallowing, diaphoresis, cough, and CP. Pt describes CP as a discomfort that is centralized on the left side of her chest. Patient denies any black stool, blood in stool, n/v, fever, and all other sxs at this time. Cardiology consulted for CHF. Pt seen and examined today c/o a little SOB, pt denies CP at this time. Labs reviewed Echo EF 35% in May 2022, repeat pending   BNP 3569, Crt 1.7, Troponin 0.049->0.061

## 2022-12-30 NOTE — PLAN OF CARE
[facility] - Telemetry (Hospital)  Initial Discharge Assessment       Primary Care Provider: Christian Douglass MD    Admission Diagnosis: Acute cystitis without hematuria [N30.00]  Chest pain [R07.9]  Acute decompensated heart failure [I50.9]    Admission Date: 12/29/2022  Expected Discharge Date:     Discharge Barriers Identified: None    Payor: HU[employer] MANAGED MEDICARE / Plan: HU[employer] SNP (SPECIAL NEEDS PLAN) / Product Type: Medicare Advantage /     Extended Emergency Contact Information  Primary Emergency Contact: iraj troy  Mobile Phone: [phone]  Relation: Son  Secondary Emergency Contact: Whitney Troy   [facility]  Home Phone: [phone]  Relation: Daughter    Discharge Plan A: Home Health         [id number] [place], [place] - [facility] [address]  Phone: [phone] Fax: [phone]      Initial Assessment (most recent)       Adult Discharge Assessment - 12/30/22 1330          Discharge Assessment    Assessment Type Discharge Planning Assessment     Confirmed/corrected address, phone number and insurance Yes     Confirmed Demographics Correct on Facesheet     Source of Information patient     Communicated KAREN with patient/caregiver Yes     Reason For Admission CHF     People in Home child(courtney), adult     Do you expect to return to your current living situation? Yes     Do you have help at home or someone to help you manage your care at home? Yes     Who are your caregiver(s) and their phone number(s)? Dtr     Prior to hospitilization cognitive status: Alert/Oriented     Current cognitive status: Alert/Oriented     Walking or Climbing Stairs --   Indp    Dressing/Bathing --   Indp    Home Accessibility wheelchair accessible     Home Layout Able to live on 1st floor     Equipment Currently Used at Home walker, rolling     Readmission within 30 days? No     Patient currently being followed by outpatient case  management? No     Do you currently have service(s) that help you manage your care at home? Yes     Name and Contact number of agency [facility]     Is the pt/caregiver preference to resume services with current agency Yes     Do you take prescription medications? Yes     Do you have prescription coverage? Yes     Do you have any problems affording any of your prescribed medications? No     Is the patient taking medications as prescribed? yes     Who is going to help you get home at discharge? daughter     How do you get to doctors appointments? family or friend will provide     Are you on dialysis? No     Do you take coumadin? No     Discharge Plan A Home Health     DME Needed Upon Discharge  none     Discharge Barriers Identified None        Physical Activity    On average, how many days per week do you engage in moderate to strenuous exercise (like a brisk walk)? 0 days     On average, how many minutes do you engage in exercise at this level? 0 min        Financial Resource Strain    How hard is it for you to pay for the very basics like food, housing, medical care, and heating? Not hard at all        Housing Stability    In the last 12 months, was there a time when you were not able to pay the mortgage or rent on time? No     In the last 12 months, was there a time when you did not have a steady place to sleep or slept in a shelter (including now)? No        Transportation Needs    In the past 12 months, has lack of transportation kept you from medical appointments or from getting medications? No     In the past 12 months, has lack of transportation kept you from meetings, work, or from getting things needed for daily living? No        Food Insecurity    Within the past 12 months, you worried that your food would run out before you got the money to buy more. Never true     Within the past 12 months, the food you bought just didn't last and you didn't have money to get more. Never true                    Anticipated DC dispo: home health w/ [facility]  Prior Level of Function: Indp  PCP: ENE Douglass    Comments:  CM met with patient at bedside to introduce role and discuss discharge planning. Patient lives with daughter  who will also be help at home and can provide transport at time of discharge. CM discharge needs depends on hospital progress. CM will continue following to assist with other needs.

## 2022-12-30 NOTE — SUBJECTIVE & OBJECTIVE
Interval History: Remains HYLTON above baseline     Review of Systems   Constitutional:  Positive for activity change, fatigue and unexpected weight change. Negative for appetite change, chills, diaphoresis and fever.   HENT:  Negative for congestion, nosebleeds, sinus pressure and sore throat.    Eyes:  Negative for pain, discharge and visual disturbance.   Respiratory:  Positive for shortness of breath. Negative for cough, chest tightness, wheezing and stridor.    Cardiovascular:  Negative for chest pain, palpitations and leg swelling.   Gastrointestinal:  Negative for abdominal distention, abdominal pain, blood in stool, constipation, diarrhea, nausea and vomiting.   Endocrine: Negative for cold intolerance and heat intolerance.   Genitourinary:  Negative for difficulty urinating, dysuria, flank pain, frequency and urgency.   Musculoskeletal:  Negative for arthralgias, back pain, joint swelling, myalgias, neck pain and neck stiffness.   Skin:  Negative for rash and wound.   Allergic/Immunologic: Negative for food allergies and immunocompromised state.   Neurological:  Negative for dizziness, seizures, syncope, facial asymmetry, speech difficulty, weakness, light-headedness, numbness and headaches.   Hematological:  Negative for adenopathy.   Psychiatric/Behavioral:  Negative for agitation, confusion and hallucinations.    Objective:     Vital Signs (Most Recent):  Temp: 98.1 °F (36.7 °C) (12/30/22 0807)  Pulse: 85 (12/30/22 0807)  Resp: 20 (12/30/22 0807)  BP: (!) 135/91 (12/30/22 0807)  SpO2: 96 % (12/30/22 0807)   Vital Signs (24h Range):  Temp:  [96.1 °F (35.6 °C)-99.3 °F (37.4 °C)] 98.1 °F (36.7 °C)  Pulse:  [79-90] 85  Resp:  [16-34] 20  SpO2:  [95 %-100 %] 96 %  BP: (119-160)/(62-97) 135/91     Weight: 64 kg (141 lb)  Body mass index is 24.98 kg/m².    Intake/Output Summary (Last 24 hours) at 12/30/2022 1056  Last data filed at 12/30/2022 0805  Gross per 24 hour   Intake 168 ml   Output --   Net 168 ml       Physical Exam  Vitals and nursing note reviewed.   Constitutional:       General: She is not in acute distress.     Appearance: She is well-developed. She is not diaphoretic.   HENT:      Head: Normocephalic and atraumatic.      Nose: Nose normal.   Eyes:      General: No scleral icterus.     Conjunctiva/sclera: Conjunctivae normal.   Neck:      Trachea: No tracheal deviation.   Cardiovascular:      Rate and Rhythm: Normal rate and regular rhythm.      Heart sounds: Normal heart sounds. No murmur heard.    No friction rub. No gallop.   Pulmonary:      Effort: Pulmonary effort is normal. No respiratory distress.      Breath sounds: No stridor. Rales (scant, intermittent) present. No wheezing.      Comments: +conversational dyspnea after getting OOB to chair  Chest:      Chest wall: No tenderness.   Abdominal:      General: Bowel sounds are normal. There is no distension.      Palpations: Abdomen is soft. There is no mass.      Tenderness: There is no abdominal tenderness. There is no guarding or rebound.   Musculoskeletal:         General: No tenderness or deformity. Normal range of motion.      Cervical back: Normal range of motion and neck supple.   Skin:     General: Skin is warm and dry.      Coloration: Skin is not pale.      Findings: No erythema or rash.   Neurological:      Mental Status: She is alert and oriented to person, place, and time.      Cranial Nerves: No cranial nerve deficit.      Motor: No abnormal muscle tone.      Coordination: Coordination normal.   Psychiatric:         Behavior: Behavior normal.         Thought Content: Thought content normal.       Significant Labs: All pertinent labs within the past 24 hours have been reviewed.    Significant Imaging: I have reviewed all pertinent imaging results/findings within the past 24 hours.

## 2022-12-31 LAB
ALBUMIN SERPL BCP-MCNC: 3.2 G/DL (ref 3.5–5.2)
ALP SERPL-CCNC: 110 U/L (ref 55–135)
ALT SERPL W/O P-5'-P-CCNC: 11 U/L (ref 10–44)
ANION GAP SERPL CALC-SCNC: 14 MMOL/L (ref 8–16)
ANION GAP SERPL CALC-SCNC: 14 MMOL/L (ref 8–16)
AST SERPL-CCNC: 18 U/L (ref 10–40)
BASOPHILS # BLD AUTO: 0.1 K/UL (ref 0–0.2)
BASOPHILS NFR BLD: 1.8 % (ref 0–1.9)
BILIRUB SERPL-MCNC: 1 MG/DL (ref 0.1–1)
BNP SERPL-MCNC: 2967 PG/ML (ref 0–99)
BUN SERPL-MCNC: 42 MG/DL (ref 8–23)
BUN SERPL-MCNC: 42 MG/DL (ref 8–23)
CALCIUM SERPL-MCNC: 10 MG/DL (ref 8.7–10.5)
CALCIUM SERPL-MCNC: 10 MG/DL (ref 8.7–10.5)
CHLORIDE SERPL-SCNC: 105 MMOL/L (ref 95–110)
CHLORIDE SERPL-SCNC: 105 MMOL/L (ref 95–110)
CO2 SERPL-SCNC: 19 MMOL/L (ref 23–29)
CO2 SERPL-SCNC: 19 MMOL/L (ref 23–29)
CREAT SERPL-MCNC: 2 MG/DL (ref 0.5–1.4)
CREAT SERPL-MCNC: 2 MG/DL (ref 0.5–1.4)
DIFFERENTIAL METHOD: ABNORMAL
EOSINOPHIL # BLD AUTO: 0.4 K/UL (ref 0–0.5)
EOSINOPHIL NFR BLD: 7.3 % (ref 0–8)
ERYTHROCYTE [DISTWIDTH] IN BLOOD BY AUTOMATED COUNT: 13.7 % (ref 11.5–14.5)
EST. GFR  (NO RACE VARIABLE): 24 ML/MIN/1.73 M^2
EST. GFR  (NO RACE VARIABLE): 24 ML/MIN/1.73 M^2
GLUCOSE SERPL-MCNC: 103 MG/DL (ref 70–110)
GLUCOSE SERPL-MCNC: 103 MG/DL (ref 70–110)
HCT VFR BLD AUTO: 38.8 % (ref 37–48.5)
HGB BLD-MCNC: 12.8 G/DL (ref 12–16)
IMM GRANULOCYTES # BLD AUTO: 0.01 K/UL (ref 0–0.04)
IMM GRANULOCYTES NFR BLD AUTO: 0.2 % (ref 0–0.5)
LYMPHOCYTES # BLD AUTO: 1.5 K/UL (ref 1–4.8)
LYMPHOCYTES NFR BLD: 26.5 % (ref 18–48)
MCH RBC QN AUTO: 32.1 PG (ref 27–31)
MCHC RBC AUTO-ENTMCNC: 33 G/DL (ref 32–36)
MCV RBC AUTO: 97 FL (ref 82–98)
MONOCYTES # BLD AUTO: 0.6 K/UL (ref 0.3–1)
MONOCYTES NFR BLD: 11.3 % (ref 4–15)
NEUTROPHILS # BLD AUTO: 2.9 K/UL (ref 1.8–7.7)
NEUTROPHILS NFR BLD: 52.9 % (ref 38–73)
NRBC BLD-RTO: 0 /100 WBC
PLATELET # BLD AUTO: 205 K/UL (ref 150–450)
PMV BLD AUTO: 11.3 FL (ref 9.2–12.9)
POTASSIUM SERPL-SCNC: 4 MMOL/L (ref 3.5–5.1)
POTASSIUM SERPL-SCNC: 4 MMOL/L (ref 3.5–5.1)
PROT SERPL-MCNC: 6.7 G/DL (ref 6–8.4)
RBC # BLD AUTO: 3.99 M/UL (ref 4–5.4)
SODIUM SERPL-SCNC: 138 MMOL/L (ref 136–145)
SODIUM SERPL-SCNC: 138 MMOL/L (ref 136–145)
WBC # BLD AUTO: 5.48 K/UL (ref 3.9–12.7)

## 2022-12-31 PROCEDURE — 96376 TX/PRO/DX INJ SAME DRUG ADON: CPT

## 2022-12-31 PROCEDURE — 94761 N-INVAS EAR/PLS OXIMETRY MLT: CPT | Mod: HCNC

## 2022-12-31 PROCEDURE — 94760 N-INVAS EAR/PLS OXIMETRY 1: CPT | Mod: HCNC

## 2022-12-31 PROCEDURE — 63600175 PHARM REV CODE 636 W HCPCS: Mod: HCNC | Performed by: STUDENT IN AN ORGANIZED HEALTH CARE EDUCATION/TRAINING PROGRAM

## 2022-12-31 PROCEDURE — 96366 THER/PROPH/DIAG IV INF ADDON: CPT

## 2022-12-31 PROCEDURE — G0378 HOSPITAL OBSERVATION PER HR: HCPCS | Mod: HCNC

## 2022-12-31 PROCEDURE — 25000242 PHARM REV CODE 250 ALT 637 W/ HCPCS: Mod: HCNC | Performed by: NURSE PRACTITIONER

## 2022-12-31 PROCEDURE — 80053 COMPREHEN METABOLIC PANEL: CPT | Mod: HCNC | Performed by: NURSE PRACTITIONER

## 2022-12-31 PROCEDURE — 25000003 PHARM REV CODE 250: Mod: HCNC | Performed by: STUDENT IN AN ORGANIZED HEALTH CARE EDUCATION/TRAINING PROGRAM

## 2022-12-31 PROCEDURE — 83880 ASSAY OF NATRIURETIC PEPTIDE: CPT | Mod: HCNC | Performed by: NURSE PRACTITIONER

## 2022-12-31 PROCEDURE — 99213 PR OFFICE/OUTPT VISIT, EST, LEVL III, 20-29 MIN: ICD-10-PCS | Mod: HCNC,,, | Performed by: INTERNAL MEDICINE

## 2022-12-31 PROCEDURE — 99213 OFFICE O/P EST LOW 20 MIN: CPT | Mod: HCNC,,, | Performed by: INTERNAL MEDICINE

## 2022-12-31 PROCEDURE — 94640 AIRWAY INHALATION TREATMENT: CPT | Mod: HCNC

## 2022-12-31 PROCEDURE — 36415 COLL VENOUS BLD VENIPUNCTURE: CPT | Mod: HCNC | Performed by: NURSE PRACTITIONER

## 2022-12-31 PROCEDURE — 85025 COMPLETE CBC W/AUTO DIFF WBC: CPT | Mod: HCNC | Performed by: NURSE PRACTITIONER

## 2022-12-31 RX ORDER — FUROSEMIDE 10 MG/ML
40 INJECTION INTRAMUSCULAR; INTRAVENOUS
Status: DISCONTINUED | OUTPATIENT
Start: 2022-12-31 | End: 2022-12-31

## 2022-12-31 RX ORDER — FUROSEMIDE 10 MG/ML
40 INJECTION INTRAMUSCULAR; INTRAVENOUS DAILY
Status: DISCONTINUED | OUTPATIENT
Start: 2022-12-31 | End: 2022-12-31

## 2022-12-31 RX ORDER — IPRATROPIUM BROMIDE AND ALBUTEROL SULFATE 2.5; .5 MG/3ML; MG/3ML
3 SOLUTION RESPIRATORY (INHALATION) EVERY 6 HOURS PRN
Status: DISCONTINUED | OUTPATIENT
Start: 2022-12-31 | End: 2023-01-01 | Stop reason: HOSPADM

## 2022-12-31 RX ORDER — FUROSEMIDE 10 MG/ML
40 INJECTION INTRAMUSCULAR; INTRAVENOUS DAILY
Status: DISCONTINUED | OUTPATIENT
Start: 2023-01-01 | End: 2022-12-31

## 2022-12-31 RX ORDER — FUROSEMIDE 10 MG/ML
40 INJECTION INTRAMUSCULAR; INTRAVENOUS DAILY
Status: DISCONTINUED | OUTPATIENT
Start: 2023-01-01 | End: 2023-01-01 | Stop reason: HOSPADM

## 2022-12-31 RX ADMIN — ARFORMOTEROL TARTRATE 15 MCG: 15 SOLUTION RESPIRATORY (INHALATION) at 07:12

## 2022-12-31 RX ADMIN — BUDESONIDE 0.5 MG: 0.5 INHALANT ORAL at 07:12

## 2022-12-31 RX ADMIN — FUROSEMIDE 60 MG: 10 INJECTION, SOLUTION INTRAMUSCULAR; INTRAVENOUS at 07:12

## 2022-12-31 RX ADMIN — METOPROLOL SUCCINATE 25 MG: 25 TABLET, FILM COATED, EXTENDED RELEASE ORAL at 09:12

## 2022-12-31 RX ADMIN — CEFTRIAXONE 1 G: 1 INJECTION, POWDER, FOR SOLUTION INTRAMUSCULAR; INTRAVENOUS at 03:12

## 2022-12-31 RX ADMIN — APIXABAN 2.5 MG: 2.5 TABLET, FILM COATED ORAL at 09:12

## 2022-12-31 RX ADMIN — DONEPEZIL HYDROCHLORIDE 5 MG: 5 TABLET, FILM COATED ORAL at 09:12

## 2022-12-31 NOTE — HOSPITAL COURSE
87 y.o. female patient with a PMHx of HTN, HLD, pacemaker, a-fib, CHF, NSTEMI, chronic respiratory failure with hypoxia, and CAD and dementia who presents to the Emergency Department for evaluation of SOB which onset gradually. SOB worse with exertion and improved at rest.  Pt notes she is on Eliquis. Symptoms are constant and moderate in severity. No mitigating or exacerbating factors reported. Associated sxs include difficulty swallowing, diaphoresis, cough, and CP. Pt describes CP as a discomfort that is centralized on the left side of her chest. Patient denies any black stool, blood in stool, n/v, fever, and all other sxs at this time. Cardiology consulted for CHF. Pt seen and examined today c/o a little SOB, pt denies CP at this time. Labs reviewed Echo EF 35% in May 2022, repeat pending   BNP 3569, Crt 1.7, Troponin 0.049->0.061  12.31.2022  Feels well, euvolemic, diuresed well per her report

## 2022-12-31 NOTE — PROGRESS NOTES
[facility] ([facility])  Cardiology  Progress Note    Patient Name: Christy Webber  MRN: [id number]  Admission Date: 12/29/2022  Hospital Length of Stay: 0 days  Code Status: DNR   Attending Physician: Alek Hernandez MD   Primary Care Physician: Christian Douglass MD  Expected Discharge Date:   Principal Problem:Acute on chronic combined systolic and diastolic heart failure    Subjective:     Hospital Course:   87 y.o. female patient with a PMHx of HTN, HLD, pacemaker, a-fib, CHF, NSTEMI, chronic respiratory failure with hypoxia, and CAD and dementia who presents to the Emergency Department for evaluation of SOB which onset gradually. SOB worse with exertion and improved at rest.  Pt notes she is on Eliquis. Symptoms are constant and moderate in severity. No mitigating or exacerbating factors reported. Associated sxs include difficulty swallowing, diaphoresis, cough, and CP. Pt describes CP as a discomfort that is centralized on the left side of her chest. Patient denies any black stool, blood in stool, n/v, fever, and all other sxs at this time. Cardiology consulted for CHF. Pt seen and examined today c/o a little SOB, pt denies CP at this time. Labs reviewed Echo EF 35% in May 2022, repeat pending   BNP 3569, Crt 1.7, Troponin 0.049->0.061  12.31.2022  Feels well, euvolemic, diuresed well per her report         Past Medical History:   Diagnosis Date    Atrial flutter     Biatrial enlargement     CAD S/P percutaneous coronary angioplasty     CHF (congestive heart failure)     Chronic respiratory failure with hypoxia, on home oxygen therapy     Hyperlipidemia     Hypertension     Non-STEMI (non-ST elevated myocardial infarction)     Pacemaker 08/2016    Single lead [employer] Pacemaker for sick sinus syndrome    Sick sinus syndrome     SSS (sick sinus syndrome) 8/24/2016    - pacemaker in place       Past Surgical History:   Procedure Laterality Date    CARDIAC PACEMAKER PLACEMENT       HYSTERECTOMY      KIDNEY SURGERY         Review of patient's allergies indicates:  No Known Allergies    No current facility-administered medications on file prior to encounter.     Current Outpatient Medications on File Prior to Encounter   Medication Sig    diclofenac sodium (VOLTAREN) 1 % Gel Apply topically 4 (four) times daily as needed.    donepeziL (ARICEPT) 5 MG tablet Take 1 tablet (5 mg total) by mouth every evening.    ELIQUIS 2.5 mg Tab TAKE 1 TABLET BY MOUTH TWICE A DAY    furosemide (LASIX) 20 MG tablet Take 20 mg by mouth 2 (two) times daily.    metoprolol succinate (TOPROL-XL) 25 MG 24 hr tablet Take 25 mg by mouth once daily.    rosuvastatin (CRESTOR) 10 MG tablet TAKE 1 TABLET BY MOUTH EVERY DAY    torsemide (DEMADEX) 20 MG Tab TAKE 3 TABLETS BY MOUTH 2 TIMES A DAY. (Patient taking differently: Take 20 mg by mouth 2 (two) times a day.)     Family History    None       Tobacco Use    Smoking status: Former     Packs/day: 1.50     Years: 63.00     Pack years: 94.50     Types: Cigarettes     Quit date: 2016     Years since quittin.5    Smokeless tobacco: Never   Substance and Sexual Activity    Alcohol use: Not Currently    Drug use: No    Sexual activity: Not Currently     Review of Systems   Constitutional: Negative for malaise/fatigue.   HENT: Negative.     Eyes: Negative.    Cardiovascular: Negative.    Respiratory:  Negative for shortness of breath.    Skin: Negative.    Musculoskeletal: Negative.    Gastrointestinal: Negative.    Genitourinary: Negative.    Neurological: Negative.    Psychiatric/Behavioral: Negative.     Objective:     Vital Signs (Most Recent):  Temp: 97.3 °F (36.3 °C) (22 1137)  Pulse: 86 (22 1137)  Resp: 18 (22 1137)  BP: 120/64 (22 1137)  SpO2: 98 % (22 1137)   Vital Signs (24h Range):  Temp:  [97.3 °F (36.3 °C)-98.7 °F (37.1 °C)] 97.3 °F (36.3 °C)  Pulse:  [78-87] 86  Resp:  [17-20] 18  SpO2:  [96 %-100 %] 98 %  BP:  (115-128)/(55-77) 120/64     Weight: 64 kg (141 lb)  Body mass index is 24.98 kg/m².    SpO2: 98 %         Intake/Output Summary (Last 24 hours) at 12/31/2022 1229  Last data filed at 12/30/2022 1809  Gross per 24 hour   Intake 48.39 ml   Output --   Net 48.39 ml         Lines/Drains/Airways       Drain  Duration             Female External Urinary Catheter 12/29/22 1400 1 day              Peripheral Intravenous Line  Duration                  Peripheral IV - Single Lumen 12/29/22 18 G Anterior;Right Forearm 2 days                    Physical Exam  Vitals and nursing note reviewed.   Constitutional:       Appearance: Normal appearance.   HENT:      Head: Normocephalic.   Eyes:      Pupils: Pupils are equal, round, and reactive to light.   Cardiovascular:      Rate and Rhythm: Normal rate and regular rhythm.      Heart sounds: Normal heart sounds, S1 normal and S2 normal. No murmur heard.    No S3 or S4 sounds.   Pulmonary:      Effort: Pulmonary effort is normal.      Breath sounds: Wheezing present.   Abdominal:      General: Bowel sounds are normal.      Palpations: Abdomen is soft.   Musculoskeletal:         General: Normal range of motion.      Cervical back: Normal range of motion.   Skin:     Capillary Refill: Capillary refill takes less than 2 seconds.   Neurological:      General: No focal deficit present.      Mental Status: She is alert and oriented to person, place, and time.   Psychiatric:         Mood and Affect: Mood normal.         Behavior: Behavior normal.         Thought Content: Thought content normal.       Significant Labs: BMP:   Recent Labs   Lab 12/29/22  1311 12/29/22  1836 12/30/22  0550 12/31/22  0603   *  --  101 103  103     --  142 138  138   K 3.8  --  4.7 4.0  4.0     --  107 105  105   CO2 17*  --  21* 19*  19*   BUN 28*  --  39* 42*  42*   CREATININE 1.7*  --  1.8* 2.0*  2.0*   CALCIUM 10.2  --  10.5 10.0  10.0   MG  --  2.2  --   --      , CMP   Recent  Labs   Lab 12/29/22  1311 12/30/22  0550 12/31/22  0603    142 138  138   K 3.8 4.7 4.0  4.0    107 105  105   CO2 17* 21* 19*  19*   * 101 103  103   BUN 28* 39* 42*  42*   CREATININE 1.7* 1.8* 2.0*  2.0*   CALCIUM 10.2 10.5 10.0  10.0   PROT 7.2  --  6.7   ALBUMIN 3.3*  --  3.2*   BILITOT 1.9*  --  1.0   ALKPHOS 113  --  110   AST 18  --  18   ALT <5*  --  11   ANIONGAP 16 14 14  14     , CBC   Recent Labs   Lab 12/29/22  1243 12/31/22  0603   WBC 4.99 5.48   HGB 13.0 12.8   HCT 40.1 38.8    205     , INR   Recent Labs   Lab 12/29/22  1355   INR 1.3*     , Lipid Panel No results for input(s): CHOL, HDL, LDLCALC, TRIG, CHOLHDL in the last 48 hours., Troponin   Recent Labs   Lab 12/29/22  1311 12/29/22  1539   TROPONINI 0.049* 0.061*     , and All pertinent lab results from the last 24 hours have been reviewed.    Significant Imaging: Cardiac Cath: reivewed, Echocardiogram: Transthoracic echo (TTE) complete (Cupid Only):   Results for orders placed or performed during the hospital encounter of 12/29/22   Echo   Result Value Ref Range    BSA 1.69 m2    TDI SEPTAL 0.07 m/s    LV LATERAL E/E' RATIO 6.50 m/s    LV SEPTAL E/E' RATIO 14.86 m/s    IVC diameter 2.47 cm    Left Ventricular Outflow Tract Mean Velocity 0.55 cm/s    Left Ventricular Outflow Tract Mean Gradient 1.36 mmHg    TDI LATERAL 0.16 m/s    PV PEAK VELOCITY 0.77 cm/s    LVIDd 5.03 3.5 - 6.0 cm    IVS 1.20 (A) 0.6 - 1.1 cm    Posterior Wall 1.19 (A) 0.6 - 1.1 cm    Ao root annulus 2.36 cm    LVIDs 4.28 (A) 2.1 - 4.0 cm    FS 15 28 - 44 %    Sinus 2.84 cm    STJ 2.88 cm    Ascending aorta 2.83 cm    LV mass 234.60 g    LA size 4.97 cm    RVDD 4.60 cm    Left Ventricle Relative Wall Thickness 0.47 cm    AV regurgitation pressure 1/2 time 475.206298814329234 ms    AV mean gradient 9 mmHg    AV valve area 1.53 cm2    AV Velocity Ratio 0.39     AV index (prosthetic) 0.50     Mean e' 0.12 m/s    E wave deceleration time 169.00  msec    IVRT 65.65 msec    LVOT diameter 1.98 cm    LVOT area 3.1 cm2    LVOT peak elton 0.83 m/s    LVOT peak VTI 16.40 cm    Ao peak elton 2.11 m/s    Ao VTI 33.0 cm    RVOT peak elton 0.63 m/s    RVOT peak VTI 11.9 cm    LVOT stroke volume 50.47 cm3    AV peak gradient 18 mmHg    PV mean gradient 0.84 mmHg    E/E' ratio 9.04 m/s    MV Peak E Elton 1.04 m/s    AR Max Elton 3.72 m/s    TR Max Elton 3.54 m/s    LV Systolic Volume 82.26 mL    LV Systolic Volume Index 49.3 mL/m2    LV Diastolic Volume 120.13 mL    LV Diastolic Volume Index 71.93 mL/m2    LV Mass Index 140 g/m2    RA Major Axis 8.89 cm    Left Atrium Minor Axis 7.83 cm    Left Atrium Major Axis 8.02 cm    Triscuspid Valve Regurgitation Peak Gradient 50 mmHg    LA Volume Index (Mod) 74.2 mL/m2    LA volume (mod) 123.84 cm3    RA Width 5.19 cm    Right Atrial Pressure (from IVC) 15 mmHg    EF 30 %    TV rest pulmonary artery pressure 65 mmHg    Narrative    · The left ventricle is mildly enlarged with eccentric hypertrophy and  · Left ventricular diastolic dysfunction.  · The estimated PA systolic pressure is 65 mmHg.  · Mild right ventricular enlargement with mildly reduced right ventricular   systolic function.  · There is pulmonary hypertension.  · Elevated central venous pressure (15 mmHg).  · The estimated ejection fraction is 30%.  · There is severe left ventricular global hypokinesis.  · Severe left atrial enlargement.  · Severe right atrial enlargement.  · Moderate aortic regurgitation.  · Moderate mitral regurgitation.  · Moderate to severe tricuspid regurgitation.      , EKG: reviewed, Stress Test: reviewed, and X-Ray: CXR: X-Ray Chest 1 View (CXR): No results found for this visit on 12/29/22.    Assessment and Plan:         * Acute on chronic combined systolic and diastolic heart failure  Previous Echo in May 22' EF 35%, repeat pending  BNP 3569  Cont OMT- lasix, BB  Monitor Crt trends and consider entresto vs ACE/ARB  Trend  troponins      12.31.2022  Euvolemic today   Resume torsemide on discharge 40 mg bid   Ok for discharge from cardiac standpoint   Follow  In clinic within 1-2 weeks       COPD (chronic obstructive pulmonary disease)  Continue management as per hospital medicine    Chronic kidney disease, stage 4 (severe)  Continue management as per hospital medicine    Essential hypertension  stable    Permanent atrial fibrillation  Cont Eliquis, HR controlled        VTE Risk Mitigation (From admission, onward)         Ordered     apixaban tablet 2.5 mg  2 times daily         12/29/22 1826     IP VTE HIGH RISK PATIENT  Once         12/29/22 1826     Place sequential compression device  Until discontinued         12/29/22 1826                Mary Alice Rangel MD  Cardiology  [facility] - Telemetry ([facility])

## 2022-12-31 NOTE — PLAN OF CARE
Discussed Plan of Care with patient and verbalized understanding - Patient remains AAOx4 - remains free of falls, accidents and trauma during the day shift. Bed is in the low position and the call light is within reach. SLP consult completed.  Will continue to monitor

## 2022-12-31 NOTE — SUBJECTIVE & OBJECTIVE
Past Medical History:   Diagnosis Date    Atrial flutter     Biatrial enlargement     CAD S/P percutaneous coronary angioplasty     CHF (congestive heart failure)     Chronic respiratory failure with hypoxia, on home oxygen therapy     Hyperlipidemia     Hypertension     Non-STEMI (non-ST elevated myocardial infarction)     Pacemaker 2016    Single lead [employer] Pacemaker for sick sinus syndrome    Sick sinus syndrome     SSS (sick sinus syndrome) 2016    - pacemaker in place       Past Surgical History:   Procedure Laterality Date    CARDIAC PACEMAKER PLACEMENT      HYSTERECTOMY      KIDNEY SURGERY         Review of patient's allergies indicates:  No Known Allergies    No current facility-administered medications on file prior to encounter.     Current Outpatient Medications on File Prior to Encounter   Medication Sig    diclofenac sodium (VOLTAREN) 1 % Gel Apply topically 4 (four) times daily as needed.    donepeziL (ARICEPT) 5 MG tablet Take 1 tablet (5 mg total) by mouth every evening.    ELIQUIS 2.5 mg Tab TAKE 1 TABLET BY MOUTH TWICE A DAY    furosemide (LASIX) 20 MG tablet Take 20 mg by mouth 2 (two) times daily.    metoprolol succinate (TOPROL-XL) 25 MG 24 hr tablet Take 25 mg by mouth once daily.    rosuvastatin (CRESTOR) 10 MG tablet TAKE 1 TABLET BY MOUTH EVERY DAY    torsemide (DEMADEX) 20 MG Tab TAKE 3 TABLETS BY MOUTH 2 TIMES A DAY. (Patient taking differently: Take 20 mg by mouth 2 (two) times a day.)     Family History    None       Tobacco Use    Smoking status: Former     Packs/day: 1.50     Years: 63.00     Pack years: 94.50     Types: Cigarettes     Quit date: 2016     Years since quittin.5    Smokeless tobacco: Never   Substance and Sexual Activity    Alcohol use: Not Currently    Drug use: No    Sexual activity: Not Currently     Review of Systems   Constitutional: Negative for malaise/fatigue.   HENT: Negative.     Eyes: Negative.    Cardiovascular: Negative.    Respiratory:   Negative for shortness of breath.    Skin: Negative.    Musculoskeletal: Negative.    Gastrointestinal: Negative.    Genitourinary: Negative.    Neurological: Negative.    Psychiatric/Behavioral: Negative.     Objective:     Vital Signs (Most Recent):  Temp: 97.3 °F (36.3 °C) (12/31/22 1137)  Pulse: 86 (12/31/22 1137)  Resp: 18 (12/31/22 1137)  BP: 120/64 (12/31/22 1137)  SpO2: 98 % (12/31/22 1137)   Vital Signs (24h Range):  Temp:  [97.3 °F (36.3 °C)-98.7 °F (37.1 °C)] 97.3 °F (36.3 °C)  Pulse:  [78-87] 86  Resp:  [17-20] 18  SpO2:  [96 %-100 %] 98 %  BP: (115-128)/(55-77) 120/64     Weight: 64 kg (141 lb)  Body mass index is 24.98 kg/m².    SpO2: 98 %         Intake/Output Summary (Last 24 hours) at 12/31/2022 1229  Last data filed at 12/30/2022 1809  Gross per 24 hour   Intake 48.39 ml   Output --   Net 48.39 ml         Lines/Drains/Airways       Drain  Duration             Female External Urinary Catheter 12/29/22 1400 1 day              Peripheral Intravenous Line  Duration                  Peripheral IV - Single Lumen 12/29/22 18 G Anterior;Right Forearm 2 days                    Physical Exam  Vitals and nursing note reviewed.   Constitutional:       Appearance: Normal appearance.   HENT:      Head: Normocephalic.   Eyes:      Pupils: Pupils are equal, round, and reactive to light.   Cardiovascular:      Rate and Rhythm: Normal rate and regular rhythm.      Heart sounds: Normal heart sounds, S1 normal and S2 normal. No murmur heard.    No S3 or S4 sounds.   Pulmonary:      Effort: Pulmonary effort is normal.      Breath sounds: Wheezing present.   Abdominal:      General: Bowel sounds are normal.      Palpations: Abdomen is soft.   Musculoskeletal:         General: Normal range of motion.      Cervical back: Normal range of motion.   Skin:     Capillary Refill: Capillary refill takes less than 2 seconds.   Neurological:      General: No focal deficit present.      Mental Status: She is alert and oriented to  person, place, and time.   Psychiatric:         Mood and Affect: Mood normal.         Behavior: Behavior normal.         Thought Content: Thought content normal.       Significant Labs: BMP:   Recent Labs   Lab 12/29/22  1311 12/29/22  1836 12/30/22  0550 12/31/22  0603   *  --  101 103  103     --  142 138  138   K 3.8  --  4.7 4.0  4.0     --  107 105  105   CO2 17*  --  21* 19*  19*   BUN 28*  --  39* 42*  42*   CREATININE 1.7*  --  1.8* 2.0*  2.0*   CALCIUM 10.2  --  10.5 10.0  10.0   MG  --  2.2  --   --      , CMP   Recent Labs   Lab 12/29/22  1311 12/30/22  0550 12/31/22  0603    142 138  138   K 3.8 4.7 4.0  4.0    107 105  105   CO2 17* 21* 19*  19*   * 101 103  103   BUN 28* 39* 42*  42*   CREATININE 1.7* 1.8* 2.0*  2.0*   CALCIUM 10.2 10.5 10.0  10.0   PROT 7.2  --  6.7   ALBUMIN 3.3*  --  3.2*   BILITOT 1.9*  --  1.0   ALKPHOS 113  --  110   AST 18  --  18   ALT <5*  --  11   ANIONGAP 16 14 14  14     , CBC   Recent Labs   Lab 12/29/22  1243 12/31/22  0603   WBC 4.99 5.48   HGB 13.0 12.8   HCT 40.1 38.8    205     , INR   Recent Labs   Lab 12/29/22  1355   INR 1.3*     , Lipid Panel No results for input(s): CHOL, HDL, LDLCALC, TRIG, CHOLHDL in the last 48 hours., Troponin   Recent Labs   Lab 12/29/22  1311 12/29/22  1539   TROPONINI 0.049* 0.061*     , and All pertinent lab results from the last 24 hours have been reviewed.    Significant Imaging: Cardiac Cath: reivewed, Echocardiogram: Transthoracic echo (TTE) complete (Cupid Only):   Results for orders placed or performed during the hospital encounter of 12/29/22   Echo   Result Value Ref Range    BSA 1.69 m2    TDI SEPTAL 0.07 m/s    LV LATERAL E/E' RATIO 6.50 m/s    LV SEPTAL E/E' RATIO 14.86 m/s    IVC diameter 2.47 cm    Left Ventricular Outflow Tract Mean Velocity 0.55 cm/s    Left Ventricular Outflow Tract Mean Gradient 1.36 mmHg    TDI LATERAL 0.16 m/s    PV PEAK VELOCITY 0.77 cm/s     LVIDd 5.03 3.5 - 6.0 cm    IVS 1.20 (A) 0.6 - 1.1 cm    Posterior Wall 1.19 (A) 0.6 - 1.1 cm    Ao root annulus 2.36 cm    LVIDs 4.28 (A) 2.1 - 4.0 cm    FS 15 28 - 44 %    Sinus 2.84 cm    STJ 2.88 cm    Ascending aorta 2.83 cm    LV mass 234.60 g    LA size 4.97 cm    RVDD 4.60 cm    Left Ventricle Relative Wall Thickness 0.47 cm    AV regurgitation pressure 1/2 time 475.206018153738234 ms    AV mean gradient 9 mmHg    AV valve area 1.53 cm2    AV Velocity Ratio 0.39     AV index (prosthetic) 0.50     Mean e' 0.12 m/s    E wave deceleration time 169.00 msec    IVRT 65.65 msec    LVOT diameter 1.98 cm    LVOT area 3.1 cm2    LVOT peak elton 0.83 m/s    LVOT peak VTI 16.40 cm    Ao peak elton 2.11 m/s    Ao VTI 33.0 cm    RVOT peak elton 0.63 m/s    RVOT peak VTI 11.9 cm    LVOT stroke volume 50.47 cm3    AV peak gradient 18 mmHg    PV mean gradient 0.84 mmHg    E/E' ratio 9.04 m/s    MV Peak E Elton 1.04 m/s    AR Max Elton 3.72 m/s    TR Max Elton 3.54 m/s    LV Systolic Volume 82.26 mL    LV Systolic Volume Index 49.3 mL/m2    LV Diastolic Volume 120.13 mL    LV Diastolic Volume Index 71.93 mL/m2    LV Mass Index 140 g/m2    RA Major Axis 8.89 cm    Left Atrium Minor Axis 7.83 cm    Left Atrium Major Axis 8.02 cm    Triscuspid Valve Regurgitation Peak Gradient 50 mmHg    LA Volume Index (Mod) 74.2 mL/m2    LA volume (mod) 123.84 cm3    RA Width 5.19 cm    Right Atrial Pressure (from IVC) 15 mmHg    EF 30 %    TV rest pulmonary artery pressure 65 mmHg    Narrative    · The left ventricle is mildly enlarged with eccentric hypertrophy and  · Left ventricular diastolic dysfunction.  · The estimated PA systolic pressure is 65 mmHg.  · Mild right ventricular enlargement with mildly reduced right ventricular   systolic function.  · There is pulmonary hypertension.  · Elevated central venous pressure (15 mmHg).  · The estimated ejection fraction is 30%.  · There is severe left ventricular global hypokinesis.  · Severe left atrial  enlargement.  · Severe right atrial enlargement.  · Moderate aortic regurgitation.  · Moderate mitral regurgitation.  · Moderate to severe tricuspid regurgitation.      , EKG: reviewed, Stress Test: reviewed, and X-Ray: CXR: X-Ray Chest 1 View (CXR): No results found for this visit on 12/29/22.

## 2022-12-31 NOTE — ASSESSMENT & PLAN NOTE
IV Rocephin  F/u urine cx     Urine cx growing gram negative rods, awaiting identification.   Continue IV rocephin.

## 2022-12-31 NOTE — SUBJECTIVE & OBJECTIVE
Review of Systems   Constitutional:  Positive for activity change, fatigue and unexpected weight change. Negative for appetite change, chills, diaphoresis and fever.   HENT:  Negative for congestion, nosebleeds, sinus pressure and sore throat.    Eyes:  Negative for pain, discharge and visual disturbance.   Respiratory:  Positive for shortness of breath (improved). Negative for cough, chest tightness, wheezing and stridor.    Cardiovascular:  Negative for chest pain, palpitations and leg swelling.   Gastrointestinal:  Negative for abdominal distention, abdominal pain, blood in stool, constipation, diarrhea, nausea and vomiting.   Endocrine: Negative for cold intolerance and heat intolerance.   Genitourinary:  Negative for difficulty urinating, dysuria, flank pain, frequency and urgency.   Musculoskeletal:  Negative for arthralgias, back pain, joint swelling, myalgias, neck pain and neck stiffness.   Skin:  Negative for rash and wound.   Allergic/Immunologic: Negative for food allergies and immunocompromised state.   Neurological:  Negative for dizziness, seizures, syncope, facial asymmetry, speech difficulty, weakness, light-headedness, numbness and headaches.   Hematological:  Negative for adenopathy.   Psychiatric/Behavioral:  Negative for agitation, confusion and hallucinations.    Objective:     Vital Signs (Most Recent):  Temp: 97.3 °F (36.3 °C) (12/31/22 1137)  Pulse: 86 (12/31/22 1137)  Resp: 18 (12/31/22 1137)  BP: 120/64 (12/31/22 1137)  SpO2: 98 % (12/31/22 1137) Vital Signs (24h Range):  Temp:  [97.3 °F (36.3 °C)-98.7 °F (37.1 °C)] 97.3 °F (36.3 °C)  Pulse:  [78-87] 86  Resp:  [17-20] 18  SpO2:  [96 %-100 %] 98 %  BP: (115-128)/(55-77) 120/64     Weight: 64 kg (141 lb)  Body mass index is 24.98 kg/m².    Intake/Output Summary (Last 24 hours) at 12/31/2022 1352  Last data filed at 12/30/2022 1809  Gross per 24 hour   Intake 48.39 ml   Output --   Net 48.39 ml      Physical Exam  Vitals and nursing note  reviewed.   Constitutional:       General: She is not in acute distress.     Appearance: She is well-developed. She is not diaphoretic.   HENT:      Head: Normocephalic and atraumatic.      Nose: Nose normal.   Eyes:      General: No scleral icterus.     Conjunctiva/sclera: Conjunctivae normal.   Neck:      Trachea: No tracheal deviation.   Cardiovascular:      Rate and Rhythm: Normal rate and regular rhythm.      Heart sounds: Normal heart sounds. No murmur heard.    No friction rub. No gallop.   Pulmonary:      Effort: Pulmonary effort is normal. No respiratory distress.      Breath sounds: No stridor. Rales present. No wheezing.   Chest:      Chest wall: No tenderness.   Abdominal:      General: Bowel sounds are normal. There is no distension.      Palpations: Abdomen is soft. There is no mass.      Tenderness: There is no abdominal tenderness. There is no guarding or rebound.   Musculoskeletal:         General: No tenderness or deformity. Normal range of motion.      Cervical back: Normal range of motion and neck supple.   Skin:     General: Skin is warm and dry.      Coloration: Skin is not pale.      Findings: No erythema or rash.   Neurological:      Mental Status: She is alert and oriented to person, place, and time.      Cranial Nerves: No cranial nerve deficit.      Motor: No abnormal muscle tone.      Coordination: Coordination normal.   Psychiatric:         Behavior: Behavior normal.         Thought Content: Thought content normal.       Significant Labs: All pertinent labs within the past 24 hours have been reviewed.  BMP:   Recent Labs   Lab 12/29/22  1836 12/30/22  0550 12/31/22  0603   GLU  --    < > 103  103   NA  --    < > 138  138   K  --    < > 4.0  4.0   CL  --    < > 105  105   CO2  --    < > 19*  19*   BUN  --    < > 42*  42*   CREATININE  --    < > 2.0*  2.0*   CALCIUM  --    < > 10.0  10.0   MG 2.2  --   --     < > = values in this interval not displayed.     CBC:   Recent Labs   Lab  12/31/22  0603   WBC 5.48   HGB 12.8   HCT 38.8        CMP:   Recent Labs   Lab 12/30/22  0550 12/31/22  0603    138  138   K 4.7 4.0  4.0    105  105   CO2 21* 19*  19*    103  103   BUN 39* 42*  42*   CREATININE 1.8* 2.0*  2.0*   CALCIUM 10.5 10.0  10.0   PROT  --  6.7   ALBUMIN  --  3.2*   BILITOT  --  1.0   ALKPHOS  --  110   AST  --  18   ALT  --  11   ANIONGAP 14 14  14       Significant Imaging: I have reviewed all pertinent imaging results/findings within the past 24 hours.

## 2022-12-31 NOTE — ASSESSMENT & PLAN NOTE
Previous Echo in May 22' EF 35%, repeat pending  BNP 3569  Cont OMT- lasix, BB  Monitor Crt trends and consider entresto vs ACE/ARB  Trend troponins      12.31.2022  Euvolemic today   Resume torsemide on discharge 40 mg bid   Ok for discharge from cardiac standpoint   Follow  In clinic within 1-2 weeks

## 2022-12-31 NOTE — PROGRESS NOTES
[facility] - Tele[facility] ([facility] Medicine  Progress Note    Patient Name: Christy Webber  MRN: [id number]  Patient Class: OP- Observation   Admission Date: 12/29/2022  Length of Stay: 0 days  Attending Physician: Alek Hernandez MD  Primary Care Provider: Christian Douglass MD        Subjective:     Principal Problem:Acute on chronic combined systolic and diastolic heart failure        HPI:  87 y.o. female patient with a PMHx of HTN, HLD, pacemaker, a-fib, CHF, NSTEMI, chronic respiratory failure with hypoxia, and CAD who presents to the Emergency Department for evaluation of SOB which onset gradually. SOB worse with exertion and improved at rest.  Pt notes she is on Eliquis. Symptoms are constant and moderate in severity. No mitigating or exacerbating factors reported. Associated sxs include difficulty swallowing, diaphoresis, cough, and CP. Pt describes CP as a discomfort that is centralized on the left side of her chest. Patient denies any black stool, blood in stool, n/v, fever, and all other sxs at this time.      Overview/Hospital Course:  The patient is a 88 yo female  with EF 35% dilated biatria, mod MR/TR. Mild AI (Echo ), COPD-NOT on home oxygen, CAD s/p PCi, chronic afib, SSS s/p PPM, HTN, HLD, CKD3, lower back pain who was placed in observation for decompensated CHF on IV Lasix and UTI on IV Rocephin.   Baseline weight is approx 130lb, pt currently 141lb. BNP 3569. Unmeasured UOP. SOB at rest improved. +HYLTON above baseline when getting up to chair. Off of oxygen. Check ambulatory pulse ox. Echo pending. Cont IV Diuresis. Urine culture pending. 12/31/22- Symptoms improved. No acute issues overnight. Lasix decreased to 40 mg po daily. Urine cx growing gram negative rods, awaiting identification. Continue IV rocephin.           Review of Systems   Constitutional:  Positive for activity change, fatigue and unexpected weight change. Negative for appetite change, chills, diaphoresis and  fever.   HENT:  Negative for congestion, nosebleeds, sinus pressure and sore throat.    Eyes:  Negative for pain, discharge and visual disturbance.   Respiratory:  Positive for shortness of breath (improved). Negative for cough, chest tightness, wheezing and stridor.    Cardiovascular:  Negative for chest pain, palpitations and leg swelling.   Gastrointestinal:  Negative for abdominal distention, abdominal pain, blood in stool, constipation, diarrhea, nausea and vomiting.   Endocrine: Negative for cold intolerance and heat intolerance.   Genitourinary:  Negative for difficulty urinating, dysuria, flank pain, frequency and urgency.   Musculoskeletal:  Negative for arthralgias, back pain, joint swelling, myalgias, neck pain and neck stiffness.   Skin:  Negative for rash and wound.   Allergic/Immunologic: Negative for food allergies and immunocompromised state.   Neurological:  Negative for dizziness, seizures, syncope, facial asymmetry, speech difficulty, weakness, light-headedness, numbness and headaches.   Hematological:  Negative for adenopathy.   Psychiatric/Behavioral:  Negative for agitation, confusion and hallucinations.    Objective:     Vital Signs (Most Recent):  Temp: 97.3 °F (36.3 °C) (12/31/22 1137)  Pulse: 86 (12/31/22 1137)  Resp: 18 (12/31/22 1137)  BP: 120/64 (12/31/22 1137)  SpO2: 98 % (12/31/22 1137) Vital Signs (24h Range):  Temp:  [97.3 °F (36.3 °C)-98.7 °F (37.1 °C)] 97.3 °F (36.3 °C)  Pulse:  [78-87] 86  Resp:  [17-20] 18  SpO2:  [96 %-100 %] 98 %  BP: (115-128)/(55-77) 120/64     Weight: 64 kg (141 lb)  Body mass index is 24.98 kg/m².    Intake/Output Summary (Last 24 hours) at 12/31/2022 1352  Last data filed at 12/30/2022 1809  Gross per 24 hour   Intake 48.39 ml   Output --   Net 48.39 ml      Physical Exam  Vitals and nursing note reviewed.   Constitutional:       General: She is not in acute distress.     Appearance: She is well-developed. She is not diaphoretic.   HENT:      Head:  Normocephalic and atraumatic.      Nose: Nose normal.   Eyes:      General: No scleral icterus.     Conjunctiva/sclera: Conjunctivae normal.   Neck:      Trachea: No tracheal deviation.   Cardiovascular:      Rate and Rhythm: Normal rate and regular rhythm.      Heart sounds: Normal heart sounds. No murmur heard.    No friction rub. No gallop.   Pulmonary:      Effort: Pulmonary effort is normal. No respiratory distress.      Breath sounds: No stridor. Rales present. No wheezing.   Chest:      Chest wall: No tenderness.   Abdominal:      General: Bowel sounds are normal. There is no distension.      Palpations: Abdomen is soft. There is no mass.      Tenderness: There is no abdominal tenderness. There is no guarding or rebound.   Musculoskeletal:         General: No tenderness or deformity. Normal range of motion.      Cervical back: Normal range of motion and neck supple.   Skin:     General: Skin is warm and dry.      Coloration: Skin is not pale.      Findings: No erythema or rash.   Neurological:      Mental Status: She is alert and oriented to person, place, and time.      Cranial Nerves: No cranial nerve deficit.      Motor: No abnormal muscle tone.      Coordination: Coordination normal.   Psychiatric:         Behavior: Behavior normal.         Thought Content: Thought content normal.       Significant Labs: All pertinent labs within the past 24 hours have been reviewed.  BMP:   Recent Labs   Lab 12/29/22  1836 12/30/22  0550 12/31/22  0603   GLU  --    < > 103  103   NA  --    < > 138  138   K  --    < > 4.0  4.0   CL  --    < > 105  105   CO2  --    < > 19*  19*   BUN  --    < > 42*  42*   CREATININE  --    < > 2.0*  2.0*   CALCIUM  --    < > 10.0  10.0   MG 2.2  --   --     < > = values in this interval not displayed.     CBC:   Recent Labs   Lab 12/31/22  0603   WBC 5.48   HGB 12.8   HCT 38.8        CMP:   Recent Labs   Lab 12/30/22  0550 12/31/22  0603    138  138   K 4.7 4.0  4.0     105  105   CO2 21* 19*  19*    103  103   BUN 39* 42*  42*   CREATININE 1.8* 2.0*  2.0*   CALCIUM 10.5 10.0  10.0   PROT  --  6.7   ALBUMIN  --  3.2*   BILITOT  --  1.0   ALKPHOS  --  110   AST  --  18   ALT  --  11   ANIONGAP 14 14  14       Significant Imaging: I have reviewed all pertinent imaging results/findings within the past 24 hours.      Assessment/Plan:      * Acute on chronic combined systolic and diastolic heart failure  IV diuresis  Supplemental O2  Monitor UOP    12/30/22: Unmeasured UOP. SOB at rest improved. +HYLTON above baseline when getting up to chair. Off of oxygen. Check ambulatory pulse ox. Echo pending. Cont IV Diuresis. Urine culture pending.     Acute cystitis  IV Rocephin  F/u urine cx     Urine cx growing gram negative rods, awaiting identification.   Continue IV rocephin.       COPD (chronic obstructive pulmonary disease)  Duoneb tx prn  Supplemental oxygen       Chronic kidney disease, stage 4 (severe)  Stable  Trend Cr      Essential hypertension  BP stable   Cont Toprol and IV Lasix       Permanent atrial fibrillation  Patient with Persistent (7 days or more) atrial fibrillation which is controlled currently with Beta Blocker.   Continue home Eliquis          VTE Risk Mitigation (From admission, onward)         Ordered     apixaban tablet 2.5 mg  2 times daily         12/29/22 1826     IP VTE HIGH RISK PATIENT  Once         12/29/22 1826     Place sequential compression device  Until discontinued         12/29/22 1826                Discharge Planning   KAREN:      Code Status: DNR   Is the patient medically ready for discharge?:     Reason for patient still in hospital (select all that apply): Patient trending condition, Laboratory test and Treatment  Discharge Plan A: Home Health                  Janis Vanegas NP  Department of Hospital Medicine   [facility] - Telemetry ([facility])

## 2023-01-01 VITALS
BODY MASS INDEX: 24.98 KG/M2 | OXYGEN SATURATION: 98 % | WEIGHT: 141 LBS | TEMPERATURE: 97 F | HEIGHT: 63 IN | DIASTOLIC BLOOD PRESSURE: 77 MMHG | HEART RATE: 88 BPM | SYSTOLIC BLOOD PRESSURE: 134 MMHG | RESPIRATION RATE: 18 BRPM

## 2023-01-01 LAB
ANION GAP SERPL CALC-SCNC: 12 MMOL/L (ref 8–16)
BACTERIA UR CULT: ABNORMAL
BUN SERPL-MCNC: 39 MG/DL (ref 8–23)
CALCIUM SERPL-MCNC: 9.7 MG/DL (ref 8.7–10.5)
CHLORIDE SERPL-SCNC: 108 MMOL/L (ref 95–110)
CO2 SERPL-SCNC: 20 MMOL/L (ref 23–29)
CREAT SERPL-MCNC: 1.6 MG/DL (ref 0.5–1.4)
EST. GFR  (NO RACE VARIABLE): 31 ML/MIN/1.73 M^2
GLUCOSE SERPL-MCNC: 74 MG/DL (ref 70–110)
POTASSIUM SERPL-SCNC: 3.5 MMOL/L (ref 3.5–5.1)
SODIUM SERPL-SCNC: 140 MMOL/L (ref 136–145)

## 2023-01-01 PROCEDURE — 25000242 PHARM REV CODE 250 ALT 637 W/ HCPCS: Mod: HCNC | Performed by: NURSE PRACTITIONER

## 2023-01-01 PROCEDURE — G0378 HOSPITAL OBSERVATION PER HR: HCPCS | Mod: HCNC

## 2023-01-01 PROCEDURE — 80048 BASIC METABOLIC PNL TOTAL CA: CPT | Mod: HCNC | Performed by: STUDENT IN AN ORGANIZED HEALTH CARE EDUCATION/TRAINING PROGRAM

## 2023-01-01 PROCEDURE — 63600175 PHARM REV CODE 636 W HCPCS: Mod: HCNC | Performed by: STUDENT IN AN ORGANIZED HEALTH CARE EDUCATION/TRAINING PROGRAM

## 2023-01-01 PROCEDURE — 94761 N-INVAS EAR/PLS OXIMETRY MLT: CPT | Mod: HCNC

## 2023-01-01 PROCEDURE — 94640 AIRWAY INHALATION TREATMENT: CPT | Mod: HCNC,XB

## 2023-01-01 PROCEDURE — 36415 COLL VENOUS BLD VENIPUNCTURE: CPT | Mod: HCNC | Performed by: STUDENT IN AN ORGANIZED HEALTH CARE EDUCATION/TRAINING PROGRAM

## 2023-01-01 PROCEDURE — 96376 TX/PRO/DX INJ SAME DRUG ADON: CPT

## 2023-01-01 PROCEDURE — 25000003 PHARM REV CODE 250: Mod: HCNC | Performed by: STUDENT IN AN ORGANIZED HEALTH CARE EDUCATION/TRAINING PROGRAM

## 2023-01-01 RX ORDER — CEFDINIR 300 MG/1
300 CAPSULE ORAL 2 TIMES DAILY
Qty: 10 CAPSULE | Refills: 0 | Status: SHIPPED | OUTPATIENT
Start: 2023-01-01 | End: 2023-01-01 | Stop reason: SDUPTHER

## 2023-01-01 RX ORDER — CEFDINIR 300 MG/1
300 CAPSULE ORAL 2 TIMES DAILY
Qty: 10 CAPSULE | Refills: 0 | Status: SHIPPED | OUTPATIENT
Start: 2023-01-01 | End: 2023-01-07 | Stop reason: ALTCHOICE

## 2023-01-01 RX ORDER — TORSEMIDE 20 MG/1
40 TABLET ORAL 2 TIMES DAILY
Qty: 180 TABLET | Refills: 3 | Status: SHIPPED | OUTPATIENT
Start: 2023-01-01 | End: 2023-02-28 | Stop reason: SDUPTHER

## 2023-01-01 RX ADMIN — BUDESONIDE 0.5 MG: 0.5 INHALANT ORAL at 08:01

## 2023-01-01 RX ADMIN — FUROSEMIDE 40 MG: 10 INJECTION, SOLUTION INTRAMUSCULAR; INTRAVENOUS at 09:01

## 2023-01-01 RX ADMIN — APIXABAN 2.5 MG: 2.5 TABLET, FILM COATED ORAL at 09:01

## 2023-01-01 RX ADMIN — ARFORMOTEROL TARTRATE 15 MCG: 15 SOLUTION RESPIRATORY (INHALATION) at 08:01

## 2023-01-01 RX ADMIN — METOPROLOL SUCCINATE 25 MG: 25 TABLET, FILM COATED, EXTENDED RELEASE ORAL at 09:01

## 2023-01-01 NOTE — NURSING
Went over discharge paperwork with daughter Megan. Pt was dressed, IV and heart monitor removed. Pt wheeled down to from door to private vehicle with daughter.

## 2023-01-01 NOTE — PLAN OF CARE
[facility] - Telemetry (Hospital)  Discharge Final Note    Primary Care Provider: Christian Douglass MD    Expected Discharge Date: 1/1/2023    Final Discharge Note (most recent)       Final Note - 01/01/23 0943          Final Note    Assessment Type Final Discharge Note     Anticipated Discharge Disposition Home-Health Care Svc        Post-Acute Status    Post-Acute Authorization Home Health     Home Health Status Set-up Complete/Auth obtained     Discharge Delays None known at this time                   Pt to discharge home and resume care with [facility]. Pt notified of the above via phone; [facility] contact added to AVS.     No CM needs/questions for discharge.     Important Message from Medicare              Follow-up providers       Christian Douglass MD   Specialty: Internal Medicine   Relationship: PCP - General    [address] [facility] [place] [address]   Phone: [phone]       Next Steps: Follow up    [facility]        Next Steps: Follow up    Instructions: you will be contacted by office for appointment    Mary Alice Rangel MD   Specialty: Interventional Cardiology, Cardiology    [address]  [place] [address]   Phone: [phone]       Next Steps: Follow up in 2 week(s)              After-discharge care                Home Medical Care       [facility] [place]   Service: Home Health Services    [address] [facility] [address]  [facility] [place] [address]   Phone: [phone]

## 2023-01-01 NOTE — DISCHARGE SUMMARY
[facility] - Tele[facility] ([facility])  [facility] Medicine  Discharge Summary      Patient Name: Christy Webber  MRN: [id number]  CHEMA: [id number]  Patient Class: OP- Observation  Admission Date: 12/29/2022  Hospital Length of Stay: 0 days  Discharge Date and Time:  01/01/2023 5:46 PM  Attending Physician: No att. providers found   Discharging Provider: Mile Castillo NP  Primary Care Provider: Christian Douglass MD    Primary Care Team: Networked reference to record PCT     HPI:   87 y.o. female patient with a PMHx of HTN, HLD, pacemaker, a-fib, CHF, NSTEMI, chronic respiratory failure with hypoxia, and CAD who presents to the Emergency Department for evaluation of SOB which onset gradually. SOB worse with exertion and improved at rest.  Pt notes she is on Eliquis. Symptoms are constant and moderate in severity. No mitigating or exacerbating factors reported. Associated sxs include difficulty swallowing, diaphoresis, cough, and CP. Pt describes CP as a discomfort that is centralized on the left side of her chest. Patient denies any black stool, blood in stool, n/v, fever, and all other sxs at this time.      * No surgery found *      Hospital Course:   The patient is a 86 yo female  with EF 35% dilated biatria, mod MR/TR. Mild AI (Echo ), COPD-NOT on home oxygen, CAD s/p PCi, chronic afib, SSS s/p PPM, HTN, HLD, CKD3, lower back pain who was placed in observation for decompensated CHF on IV Lasix and UTI on IV Rocephin.   Baseline weight is approx 130lb, pt currently 141lb. BNP 3569. Unmeasured UOP. SOB at rest improved. +HYTLON above baseline when getting up to chair. Off of oxygen. Check ambulatory pulse ox. Echo pending. Cont IV Diuresis. Urine culture pending. 12/31/22- Symptoms improved. No acute issues overnight. Lasix decreased to 40 mg po daily. Urine cx growing gram negative rods, awaiting identification. Continue IV rocephin.     1/1/23  Patient is doing well. Cardiology recommends Torsemide twice  daily. Urine culture grew E. Coli which as been treated with Rocephin x 3 doses. She was asked to weigt her self daily and monitor sodium intake. She will be discharged with family support and home health. Patient is hemodynamically stable for discharge.       Goals of Care Treatment Preferences:  Code Status: DNR       LaPOST: Yes           Consults:   Consults (From admission, onward)        Status Ordering Provider     Inpatient consult to Social Work  Once        Provider:  (Not yet assigned)    Completed GALILEO REDD     Inpatient consult to Cardiology  Once        Provider:  Mary Alice Rangel MD    Completed MOSES TONY     Inpatient consult to Social Work/Case Management  Once        Provider:  (Not yet assigned)    Completed MOSSE TONY     Inpatient consult to Registered Dietitian/Nutritionist  Once        Provider:  (Not yet assigned)    Completed MOSES TONY          No new Assessment & Plan notes have been filed under this hospital service since the last note was generated.  Service: Hospital Medicine    Final Active Diagnoses:    Diagnosis Date Noted POA    PRINCIPAL PROBLEM:  Acute on chronic combined systolic and diastolic heart failure [I50.43]  Yes    Acute cystitis [N30.00] 12/29/2022 Yes    COPD (chronic obstructive pulmonary disease) [J44.9] 11/17/2019 Yes     Chronic    Essential hypertension [I10] 08/21/2016 Yes     Chronic    Chronic kidney disease, stage 4 (severe) [N18.4] 08/21/2016 Yes     Chronic    Permanent atrial fibrillation [I48.21] 08/20/2016 Yes     Chronic      Problems Resolved During this Admission:       Discharged Condition: stable    Disposition: Home or Self Care    Follow Up:   Contact information for follow-up providers     Christian Douglass MD Follow up.    Specialty: Internal Medicine  Contact information:  [address] [id number] [phone]             [facility] Follow up.    Why: you will be contacted by office for  "appointment           Mary Alice Rangel MD Follow up in 2 week(s).    Specialties: Interventional Cardiology, Cardiology  Contact information:  [address]  [place] [id number] [phone]                   Contact information for after-discharge care     Home Medical Care     [facility] [place] .    Service: Home Health Services  Contact information:  [address] [facility] [address]  [facility] [address] [phone]                           Patient Instructions:      BATH/SHOWER CHAIR FOR HOME USE     Order Specific Question Answer Comments   Height: 5' 3" (1.6 m)    Weight: 64 kg (141 lb)    Does patient have medical equipment at home? walker, rolling    Length of need (1-99 months): 99    Type: With back      Ambulatory referral/consult to [facility]   Standing Status: Future   Referral Priority: Routine Referral Type: Consultation   Referral Reason: Specialty Services Required   Requested Specialty: Cardiology   Number of Visits Requested: 1     Diet Cardiac     Call MD for:  temperature >100.4     Call MD for:  persistent nausea and vomiting or diarrhea     Call MD for:  severe uncontrolled pain     Call MD for:  redness, tenderness, or signs of infection (pain, swelling, redness, odor or green/yellow discharge around incision site)     Call MD for:  difficulty breathing or increased cough     Call MD for:  severe persistent headache     Call MD for:  worsening rash     Call MD for:  persistent dizziness, light-headedness, or visual disturbances     Call MD for:  increased confusion or weakness       Significant Diagnostic Studies: Labs:   CMP   Recent Labs   Lab 12/31/22  0603 01/01/23  0613     138 140   K 4.0  4.0 3.5     105 108   CO2 19*  19* 20*     103 74   BUN 42*  42* 39*   CREATININE 2.0*  2.0* 1.6*   CALCIUM 10.0  10.0 9.7   PROT 6.7  --    ALBUMIN 3.2*  --    BILITOT 1.0  --    ALKPHOS 110  --    AST 18  --  "   ALT 11  --    ANIONGAP 14  14 12    and CBC   Recent Labs   Lab 12/31/22  0603   WBC 5.48   HGB 12.8   HCT 38.8          Pending Diagnostic Studies:     None         Medications:  Reconciled Home Medications:      Medication List      START taking these medications    cefdinir 300 MG capsule  Commonly known as: OMNICEF  Take 1 capsule (300 mg total) by mouth 2 (two) times daily. for 5 days        CHANGE how you take these medications    torsemide 20 MG Tab  Commonly known as: DEMADEX  Take 2 tablets (40 mg total) by mouth 2 (two) times a day.  What changed: See the new instructions.        CONTINUE taking these medications    diclofenac sodium 1 % Gel  Commonly known as: VOLTAREN  Apply topically 4 (four) times daily as needed.     donepeziL 5 MG tablet  Commonly known as: ARICEPT  Take 1 tablet (5 mg total) by mouth every evening.     ELIQUIS 2.5 mg Tab  Generic drug: apixaban  TAKE 1 TABLET BY MOUTH TWICE A DAY     metoprolol succinate 25 MG 24 hr tablet  Commonly known as: TOPROL-XL  Take 25 mg by mouth once daily.     rosuvastatin 10 MG tablet  Commonly known as: CRESTOR  TAKE 1 TABLET BY MOUTH EVERY DAY        STOP taking these medications    furosemide 20 MG tablet  Commonly known as: LASIX            Indwelling Lines/Drains at time of discharge:   Lines/Drains/Airways     None                 Time spent on the discharge of patient: >35 minutes         Mile Castillo NP  Department of Hospital Medicine  [facility] - Telemetry ([facility])

## 2023-01-03 ENCOUNTER — TELEPHONE (OUTPATIENT)
Dept: CARDIOLOGY | Facility: CLINIC | Age: 88
End: 2023-01-03
Payer: MEDICARE

## 2023-01-03 NOTE — TELEPHONE ENCOUNTER
"[facility]) hospital discharge 48-72 hour phone follow up completed.     Most Recent Hospital Discharge Date: 1/1/2023  Last admission Diagnosis/chief complaint: Acute on chronic combined heart failure    TCC nurse Navigator spoke with Megan, daughter who patient lives with.     Current Patient reported weight: 132 lb    Current Patient reported blood pressure and heart rate: 115/ 60, 86    Pt reports the following:  []  Shortness of Breath with Activity  []  Shortness of Breath at rest   []  Fatigue  []  Edema   [] Chest pain or tightness  [] Weight Increase since discharge  [x] None of the above  Pt still having non-productive cough.    Medications:   Discharge medication reviewed.  Megan reports having medication list available and has all medications at home for use per list.     Education:   Confirmed pt received "Home Care Guide for Heart Failure Patients" while admitted.   Reviewed key points as listed below.     Recommend 2 -3 gram sodium restriction and 1500 cc-2000 cc fluid restriction.  Encourage physical activity with graded exercise program.  Requested patient to weigh themselves daily, and to notify us if their weight increases by more than 3 lbs in 1 day or 5 lbs in 3 days.   Reminded patient to use "Daily weight and symptom tracker" to record and guide patient on when and how to call [facility]. PT may also use symptom tracker if no scale available  Pt reports being in the green(color) Zone. If in yellow/red, reminded that they should be calling [facility] today or now.     Watch for these Signs and Symptoms: If any of these occur, contact [facility] immediately:   Increase in shortness of breath with movement   Increase in swelling in your legs and ankles   Weight gain of more than 3 pounds in a day or 5 pounds in 3 days.   Difficulty breathing when you are lying down   Worsening fatigue or tiredness   Stomach bloating, a full feeling or a loss of appetite   Increased " coughing--especially when you are lying down      Pt was able to verbalize back to nurse in their own words correct diet/fluid restrictions, necessity for exercise, warning signs and symptoms, when and how to contact their TCC team.      Pt educated on follow-up plan while in [facility] program to include:   Week 1 -  F/u appt with CHELSI Padilla on 1/9/2023 verified with Megan.    Week 2-5 - In person/ Virtual/ phone call check ins    Week 5-7 - Pt will discharge from [facility] and transition to longterm care provider (Cardiology/PCP/ Advanced Heart Failure).      Patient active on myChart? yes      Pt given the following contact information for ease of communication: [phone] (Mon-Fri, 8a-5p) & for urgent issues on the weekend call Janee on-call [phone] to page the Cardiology MD on call.  Pt also encouraged utilize [facility] messaging as well.      Will follow up with pt at first clinic visit and HF nurse navigator available for pt questions, issues or concerns.

## 2023-01-04 ENCOUNTER — PATIENT OUTREACH (OUTPATIENT)
Dept: ADMINISTRATIVE | Facility: HOSPITAL | Age: 88
End: 2023-01-04
Payer: MEDICARE

## 2023-01-05 ENCOUNTER — OFFICE VISIT (OUTPATIENT)
Dept: FAMILY MEDICINE | Facility: CLINIC | Age: 88
End: 2023-01-05
Payer: MEDICARE

## 2023-01-05 VITALS
TEMPERATURE: 98 F | HEART RATE: 85 BPM | BODY MASS INDEX: 24.1 KG/M2 | OXYGEN SATURATION: 97 % | SYSTOLIC BLOOD PRESSURE: 126 MMHG | DIASTOLIC BLOOD PRESSURE: 78 MMHG | WEIGHT: 136 LBS | HEIGHT: 63 IN

## 2023-01-05 DIAGNOSIS — I50.43 ACUTE ON CHRONIC COMBINED SYSTOLIC AND DIASTOLIC HEART FAILURE: Primary | ICD-10-CM

## 2023-01-05 DIAGNOSIS — N18.4 CHRONIC KIDNEY DISEASE, STAGE 4 (SEVERE): Chronic | ICD-10-CM

## 2023-01-05 DIAGNOSIS — I50.9 CONGESTIVE HEART FAILURE, UNSPECIFIED HF CHRONICITY, UNSPECIFIED HEART FAILURE TYPE: ICD-10-CM

## 2023-01-05 DIAGNOSIS — N39.0 URINARY TRACT INFECTION WITHOUT HEMATURIA, SITE UNSPECIFIED: ICD-10-CM

## 2023-01-05 DIAGNOSIS — Z09 HOSPITAL DISCHARGE FOLLOW-UP: ICD-10-CM

## 2023-01-05 PROCEDURE — 1101F PT FALLS ASSESS-DOCD LE1/YR: CPT | Mod: HCNC,CPTII,S$GLB, | Performed by: INTERNAL MEDICINE

## 2023-01-05 PROCEDURE — 99213 OFFICE O/P EST LOW 20 MIN: CPT | Mod: HCNC,S$GLB,, | Performed by: INTERNAL MEDICINE

## 2023-01-05 PROCEDURE — 99999 PR PBB SHADOW E&M-EST. PATIENT-LVL III: ICD-10-PCS | Mod: PBBFAC,HCNC,, | Performed by: INTERNAL MEDICINE

## 2023-01-05 PROCEDURE — 1159F MED LIST DOCD IN RCRD: CPT | Mod: HCNC,CPTII,S$GLB, | Performed by: INTERNAL MEDICINE

## 2023-01-05 PROCEDURE — 1159F PR MEDICATION LIST DOCUMENTED IN MEDICAL RECORD: ICD-10-PCS | Mod: HCNC,CPTII,S$GLB, | Performed by: INTERNAL MEDICINE

## 2023-01-05 PROCEDURE — 1101F PR PT FALLS ASSESS DOC 0-1 FALLS W/OUT INJ PAST YR: ICD-10-PCS | Mod: HCNC,CPTII,S$GLB, | Performed by: INTERNAL MEDICINE

## 2023-01-05 PROCEDURE — 1126F AMNT PAIN NOTED NONE PRSNT: CPT | Mod: HCNC,CPTII,S$GLB, | Performed by: INTERNAL MEDICINE

## 2023-01-05 PROCEDURE — 1126F PR PAIN SEVERITY QUANTIFIED, NO PAIN PRESENT: ICD-10-PCS | Mod: HCNC,CPTII,S$GLB, | Performed by: INTERNAL MEDICINE

## 2023-01-05 PROCEDURE — 3288F FALL RISK ASSESSMENT DOCD: CPT | Mod: HCNC,CPTII,S$GLB, | Performed by: INTERNAL MEDICINE

## 2023-01-05 PROCEDURE — 99999 PR PBB SHADOW E&M-EST. PATIENT-LVL III: CPT | Mod: PBBFAC,HCNC,, | Performed by: INTERNAL MEDICINE

## 2023-01-05 PROCEDURE — 3288F PR FALLS RISK ASSESSMENT DOCUMENTED: ICD-10-PCS | Mod: HCNC,CPTII,S$GLB, | Performed by: INTERNAL MEDICINE

## 2023-01-05 PROCEDURE — 99213 PR OFFICE/OUTPT VISIT, EST, LEVL III, 20-29 MIN: ICD-10-PCS | Mod: HCNC,S$GLB,, | Performed by: INTERNAL MEDICINE

## 2023-01-05 NOTE — PROGRESS NOTES
Subjective:       Patient ID: Christy Webber is a 87 y.o. female.    Chief Complaint: Follow-up (Hospital Friday discharged  ) and Congestive Heart Failure    Hospital f/u----chf, uti---------------stable--------------    Follow-up  Associated symptoms include weakness. Pertinent negatives include no abdominal pain, chest pain, chills, coughing, fever, nausea or vomiting.   Congestive Heart Failure  Associated symptoms include shortness of breath. Pertinent negatives include no abdominal pain, chest pain or palpitations.   Past Medical History:   Diagnosis Date    Atrial flutter     Biatrial enlargement     CAD S/P percutaneous coronary angioplasty     CHF (congestive heart failure)     Chronic respiratory failure with hypoxia, on home oxygen therapy     Hyperlipidemia     Hypertension     Non-STEMI (non-ST elevated myocardial infarction)     Pacemaker 2016    Single lead [employer] Pacemaker for sick sinus syndrome    Sick sinus syndrome     SSS (sick sinus syndrome) 2016    - pacemaker in place     Past Surgical History:   Procedure Laterality Date    CARDIAC PACEMAKER PLACEMENT      HYSTERECTOMY      KIDNEY SURGERY       History reviewed. No pertinent family history.  Social History     Socioeconomic History    Marital status:    Tobacco Use    Smoking status: Former     Packs/day: 1.50     Years: 63.00     Pack years: 94.50     Types: Cigarettes     Quit date: 2016     Years since quittin.5    Smokeless tobacco: Never   Substance and Sexual Activity    Alcohol use: Not Currently    Drug use: No    Sexual activity: Not Currently     Social Determinants of Health     Financial Resource Strain: Low Risk     Difficulty of Paying Living Expenses: Not hard at all   Food Insecurity: No Food Insecurity    Worried About Running Out of Food in the Last Year: Never true    Ran Out of Food in the Last Year: Never true   Transportation Needs: No Transportation Needs    Lack of Transportation  (Medical): No    Lack of Transportation (Non-Medical): No   Physical Activity: Inactive    Days of Exercise per Week: 0 days    Minutes of Exercise per Session: 0 min   Stress: Stress Concern Present    Feeling of Stress : Very much   Social Connections: Unknown    Frequency of Communication with Friends and Family: Once a week    Attends [employer] Services: Never    Active Member of Clubs or Organizations: No    Attends Club or Organization Meetings: Never    Marital Status:    Housing Stability: Low Risk     Unable to Pay for Housing in the Last Year: No    Number of Places Lived in the Last Year: 1    Unstable Housing in the Last Year: No     Review of Systems   Constitutional:  Negative for chills and fever.   HENT: Negative.     Respiratory:  Positive for shortness of breath. Negative for apnea, cough, choking, chest tightness, wheezing and stridor.    Cardiovascular:  Negative for chest pain and palpitations.   Gastrointestinal:  Negative for abdominal pain, nausea and vomiting.   Musculoskeletal:  Positive for gait problem.   Neurological:  Positive for dizziness and weakness.     Objective:      Physical Exam  Vitals and nursing note reviewed.   Constitutional:       General: She is not in acute distress.     Appearance: Normal appearance. She is well-developed. She is not diaphoretic.   HENT:      Head: Normocephalic and atraumatic.      Mouth/Throat:      Pharynx: No oropharyngeal exudate.   Eyes:      General: No scleral icterus.  Neck:      Thyroid: No thyromegaly.      Vascular: No carotid bruit or JVD.      Trachea: No tracheal deviation.   Cardiovascular:      Rate and Rhythm: Normal rate and regular rhythm.      Heart sounds: Normal heart sounds.   Pulmonary:      Effort: Pulmonary effort is normal. No respiratory distress.      Breath sounds: Normal breath sounds. No wheezing or rales.   Chest:      Chest wall: No tenderness.   Abdominal:      General: Bowel sounds are normal. There is no  distension.      Palpations: Abdomen is soft.      Tenderness: There is no abdominal tenderness. There is no guarding or rebound.   Musculoskeletal:         General: No tenderness. Normal range of motion.      Cervical back: Normal range of motion and neck supple.   Lymphadenopathy:      Cervical: No cervical adenopathy.   Skin:     General: Skin is warm and dry.      Coloration: Skin is not pale.      Findings: No erythema or rash.   Neurological:      Mental Status: She is alert.       CMP  Sodium   Date Value Ref Range Status   01/01/2023 140 136 - 145 mmol/L Final   03/06/2020 135 135 - 148 Final     Potassium   Date Value Ref Range Status   01/01/2023 3.5 3.5 - 5.1 mmol/L Final   03/06/2020 4.2 3.5 - 5.5 Final     Chloride   Date Value Ref Range Status   01/01/2023 108 95 - 110 mmol/L Final   03/06/2020 103 96 - 109 Final     CO2   Date Value Ref Range Status   01/01/2023 20 (L) 23 - 29 mmol/L Final   03/06/2020 25 20 - 32 Final     Glucose   Date Value Ref Range Status   01/01/2023 74 70 - 110 mg/dL Final     BUN   Date Value Ref Range Status   01/01/2023 39 (H) 8 - 23 mg/dL Final   03/06/2020 28 (H) 5 - 26 Final     Creatinine   Date Value Ref Range Status   01/01/2023 1.6 (H) 0.5 - 1.4 mg/dL Final   03/06/2020 1.15 0.50 - 1.50 Final     Calcium   Date Value Ref Range Status   01/01/2023 9.7 8.7 - 10.5 mg/dL Final   03/06/2020 10.1 8.5 - 10.6 Final     Total Protein   Date Value Ref Range Status   12/31/2022 6.7 6.0 - 8.4 g/dL Final     Albumin   Date Value Ref Range Status   12/31/2022 3.2 (L) 3.5 - 5.2 g/dL Final   03/06/2020 3.2 3.2 - 5.6 Final     Total Bilirubin   Date Value Ref Range Status   12/31/2022 1.0 0.1 - 1.0 mg/dL Final     Comment:     For infants and newborns, interpretation of results should be based  on gestational age, weight and in agreement with clinical  observations.    Premature Infant recommended reference ranges:  Up to 24 hours.............<8.0 mg/dL  Up to 48  hours............<12.0 mg/dL  3-5 days..................<15.0 mg/dL  6-29 days.................<15.0 mg/dL       Alkaline Phosphatase   Date Value Ref Range Status   12/31/2022 110 55 - 135 U/L Final     AST   Date Value Ref Range Status   12/31/2022 18 10 - 40 U/L Final   03/06/2020 19 0 - 40 Final     ALT   Date Value Ref Range Status   12/31/2022 11 10 - 44 U/L Final   03/06/2020 15 0 - 40 Final     Anion Gap   Date Value Ref Range Status   01/01/2023 12 8 - 16 mmol/L Final   03/06/2020 7 0 - 25 Final     eGFR if    Date Value Ref Range Status   07/19/2022 22.9 (A) >60 mL/min/1.73 m^2 Final     eGFR if non    Date Value Ref Range Status   07/19/2022 19.9 (A) >60 mL/min/1.73 m^2 Final     Comment:     Calculation used to obtain the estimated glomerular filtration  rate (eGFR) is the CKD-EPI equation.        Lab Results   Component Value Date    WBC 5.48 12/31/2022    HGB 12.8 12/31/2022    HCT 38.8 12/31/2022    MCV 97 12/31/2022     12/31/2022     Lab Results   Component Value Date    CHOL 166 09/23/2021     Lab Results   Component Value Date    HDL 42 09/23/2021     Lab Results   Component Value Date    LDLCALC 102.6 09/23/2021     Lab Results   Component Value Date    TRIG 107 09/23/2021     Lab Results   Component Value Date    CHOLHDL 25.3 09/23/2021     Lab Results   Component Value Date    TSH 2.278 09/23/2021     Lab Results   Component Value Date    HGBA1C 5.5 12/29/2022     Assessment:       1. Acute on chronic combined systolic and diastolic heart failure    2. Congestive heart failure, unspecified HF chronicity, unspecified heart failure type    3. Urinary tract infection without hematuria, site unspecified    4. Chronic kidney disease, stage 4 (severe)    5. Hospital discharge follow-up          Plan:   Acute on chronic combined systolic and diastolic heart failure    Congestive heart failure, unspecified HF chronicity, unspecified heart failure type    Urinary  tract infection without hematuria, site unspecified-----------------------treated-----------------    Chronic kidney disease, stage 4 (severe)    Hospital discharge follow-up    Stable------------------continue meds--------------------------f/u cards, nephrology------------------f/u as scheduled-------

## 2023-01-07 ENCOUNTER — HOSPITAL ENCOUNTER (OUTPATIENT)
Facility: HOSPITAL | Age: 88
Discharge: HOME HEALTH | End: 2023-01-10
Attending: EMERGENCY MEDICINE | Admitting: FAMILY MEDICINE
Payer: MEDICARE

## 2023-01-07 DIAGNOSIS — N18.30 ACUTE RENAL FAILURE SUPERIMPOSED ON STAGE 3 CHRONIC KIDNEY DISEASE, UNSPECIFIED ACUTE RENAL FAILURE TYPE, UNSPECIFIED WHETHER STAGE 3A OR 3B CKD: ICD-10-CM

## 2023-01-07 DIAGNOSIS — R06.02 SHORTNESS OF BREATH: ICD-10-CM

## 2023-01-07 DIAGNOSIS — I50.42 CHRONIC COMBINED SYSTOLIC AND DIASTOLIC HEART FAILURE: Primary | ICD-10-CM

## 2023-01-07 DIAGNOSIS — N17.9 ACUTE RENAL FAILURE SUPERIMPOSED ON STAGE 3 CHRONIC KIDNEY DISEASE, UNSPECIFIED ACUTE RENAL FAILURE TYPE, UNSPECIFIED WHETHER STAGE 3A OR 3B CKD: ICD-10-CM

## 2023-01-07 DIAGNOSIS — N18.4 CHRONIC KIDNEY DISEASE, STAGE 4 (SEVERE): Chronic | ICD-10-CM

## 2023-01-07 PROBLEM — R79.89 ELEVATED BRAIN NATRIURETIC PEPTIDE (BNP) LEVEL: Status: ACTIVE | Noted: 2023-01-07

## 2023-01-07 LAB
ALBUMIN SERPL BCP-MCNC: 3.5 G/DL (ref 3.5–5.2)
ALP SERPL-CCNC: 108 U/L (ref 55–135)
ALT SERPL W/O P-5'-P-CCNC: 55 U/L (ref 10–44)
ANION GAP SERPL CALC-SCNC: 20 MMOL/L (ref 8–16)
AST SERPL-CCNC: 89 U/L (ref 10–40)
BASOPHILS # BLD AUTO: 0.11 K/UL (ref 0–0.2)
BASOPHILS NFR BLD: 1.6 % (ref 0–1.9)
BILIRUB SERPL-MCNC: 2.7 MG/DL (ref 0.1–1)
BNP SERPL-MCNC: 4582 PG/ML (ref 0–99)
BUN SERPL-MCNC: 76 MG/DL (ref 8–23)
CALCIUM SERPL-MCNC: 10.2 MG/DL (ref 8.7–10.5)
CHLORIDE SERPL-SCNC: 107 MMOL/L (ref 95–110)
CO2 SERPL-SCNC: 14 MMOL/L (ref 23–29)
CREAT SERPL-MCNC: 3.6 MG/DL (ref 0.5–1.4)
DIFFERENTIAL METHOD: ABNORMAL
EOSINOPHIL # BLD AUTO: 0.1 K/UL (ref 0–0.5)
EOSINOPHIL NFR BLD: 2.1 % (ref 0–8)
ERYTHROCYTE [DISTWIDTH] IN BLOOD BY AUTOMATED COUNT: 14.5 % (ref 11.5–14.5)
EST. GFR  (NO RACE VARIABLE): 12 ML/MIN/1.73 M^2
GLUCOSE SERPL-MCNC: 88 MG/DL (ref 70–110)
HCT VFR BLD AUTO: 36.6 % (ref 37–48.5)
HGB BLD-MCNC: 12 G/DL (ref 12–16)
IMM GRANULOCYTES # BLD AUTO: 0.04 K/UL (ref 0–0.04)
IMM GRANULOCYTES NFR BLD AUTO: 0.6 % (ref 0–0.5)
LYMPHOCYTES # BLD AUTO: 1 K/UL (ref 1–4.8)
LYMPHOCYTES NFR BLD: 14.5 % (ref 18–48)
MAGNESIUM SERPL-MCNC: 2.7 MG/DL (ref 1.6–2.6)
MCH RBC QN AUTO: 31.8 PG (ref 27–31)
MCHC RBC AUTO-ENTMCNC: 32.8 G/DL (ref 32–36)
MCV RBC AUTO: 97 FL (ref 82–98)
MONOCYTES # BLD AUTO: 1 K/UL (ref 0.3–1)
MONOCYTES NFR BLD: 14.4 % (ref 4–15)
NEUTROPHILS # BLD AUTO: 4.5 K/UL (ref 1.8–7.7)
NEUTROPHILS NFR BLD: 66.8 % (ref 38–73)
NRBC BLD-RTO: 0 /100 WBC
PLATELET # BLD AUTO: 192 K/UL (ref 150–450)
PMV BLD AUTO: 11.3 FL (ref 9.2–12.9)
POTASSIUM SERPL-SCNC: 5.1 MMOL/L (ref 3.5–5.1)
PROT SERPL-MCNC: 6.8 G/DL (ref 6–8.4)
RBC # BLD AUTO: 3.77 M/UL (ref 4–5.4)
SARS-COV-2 RDRP RESP QL NAA+PROBE: NEGATIVE
SODIUM SERPL-SCNC: 141 MMOL/L (ref 136–145)
TROPONIN I SERPL DL<=0.01 NG/ML-MCNC: 0.09 NG/ML (ref 0–0.03)
WBC # BLD AUTO: 6.67 K/UL (ref 3.9–12.7)

## 2023-01-07 PROCEDURE — 96374 THER/PROPH/DIAG INJ IV PUSH: CPT

## 2023-01-07 PROCEDURE — 83036 HEMOGLOBIN GLYCOSYLATED A1C: CPT | Mod: HCNC | Performed by: FAMILY MEDICINE

## 2023-01-07 PROCEDURE — 93005 ELECTROCARDIOGRAM TRACING: CPT | Mod: HCNC

## 2023-01-07 PROCEDURE — 36415 COLL VENOUS BLD VENIPUNCTURE: CPT | Mod: HCNC | Performed by: FAMILY MEDICINE

## 2023-01-07 PROCEDURE — G0378 HOSPITAL OBSERVATION PER HR: HCPCS | Mod: HCNC

## 2023-01-07 PROCEDURE — 63600175 PHARM REV CODE 636 W HCPCS: Mod: HCNC | Performed by: FAMILY MEDICINE

## 2023-01-07 PROCEDURE — 25000003 PHARM REV CODE 250: Mod: HCNC | Performed by: FAMILY MEDICINE

## 2023-01-07 PROCEDURE — 83735 ASSAY OF MAGNESIUM: CPT | Mod: HCNC | Performed by: EMERGENCY MEDICINE

## 2023-01-07 PROCEDURE — 99214 PR OFFICE/OUTPT VISIT, EST, LEVL IV, 30-39 MIN: ICD-10-PCS | Mod: 25,HCNC,ICN, | Performed by: INTERNAL MEDICINE

## 2023-01-07 PROCEDURE — 80053 COMPREHEN METABOLIC PANEL: CPT | Mod: HCNC | Performed by: EMERGENCY MEDICINE

## 2023-01-07 PROCEDURE — 84484 ASSAY OF TROPONIN QUANT: CPT | Mod: HCNC | Performed by: EMERGENCY MEDICINE

## 2023-01-07 PROCEDURE — 99285 EMERGENCY DEPT VISIT HI MDM: CPT | Mod: 25,HCNC,CS

## 2023-01-07 PROCEDURE — 93010 ELECTROCARDIOGRAM REPORT: CPT | Mod: HCNC,,, | Performed by: INTERNAL MEDICINE

## 2023-01-07 PROCEDURE — U0002 COVID-19 LAB TEST NON-CDC: HCPCS | Mod: HCNC | Performed by: NURSE PRACTITIONER

## 2023-01-07 PROCEDURE — 85025 COMPLETE CBC W/AUTO DIFF WBC: CPT | Mod: HCNC | Performed by: EMERGENCY MEDICINE

## 2023-01-07 PROCEDURE — 99214 OFFICE O/P EST MOD 30 MIN: CPT | Mod: 25,HCNC,ICN, | Performed by: INTERNAL MEDICINE

## 2023-01-07 PROCEDURE — 93010 EKG 12-LEAD: ICD-10-PCS | Mod: HCNC,,, | Performed by: INTERNAL MEDICINE

## 2023-01-07 PROCEDURE — 83880 ASSAY OF NATRIURETIC PEPTIDE: CPT | Mod: HCNC | Performed by: EMERGENCY MEDICINE

## 2023-01-07 RX ORDER — ACETAMINOPHEN 325 MG/1
650 TABLET ORAL EVERY 4 HOURS PRN
Status: DISCONTINUED | OUTPATIENT
Start: 2023-01-07 | End: 2023-01-10 | Stop reason: HOSPADM

## 2023-01-07 RX ORDER — HYDROCODONE BITARTRATE AND ACETAMINOPHEN 5; 325 MG/1; MG/1
1 TABLET ORAL EVERY 4 HOURS PRN
Status: DISCONTINUED | OUTPATIENT
Start: 2023-01-07 | End: 2023-01-10 | Stop reason: HOSPADM

## 2023-01-07 RX ORDER — POLYETHYLENE GLYCOL 3350 17 G/17G
17 POWDER, FOR SOLUTION ORAL DAILY PRN
Status: DISCONTINUED | OUTPATIENT
Start: 2023-01-07 | End: 2023-01-10 | Stop reason: HOSPADM

## 2023-01-07 RX ORDER — ONDANSETRON 4 MG/1
4 TABLET, ORALLY DISINTEGRATING ORAL EVERY 8 HOURS PRN
Status: DISCONTINUED | OUTPATIENT
Start: 2023-01-07 | End: 2023-01-10 | Stop reason: HOSPADM

## 2023-01-07 RX ORDER — FUROSEMIDE 10 MG/ML
80 INJECTION INTRAMUSCULAR; INTRAVENOUS
Status: DISCONTINUED | OUTPATIENT
Start: 2023-01-07 | End: 2023-01-10 | Stop reason: HOSPADM

## 2023-01-07 RX ORDER — DONEPEZIL HYDROCHLORIDE 5 MG/1
5 TABLET, FILM COATED ORAL NIGHTLY
Status: DISCONTINUED | OUTPATIENT
Start: 2023-01-07 | End: 2023-01-10 | Stop reason: HOSPADM

## 2023-01-07 RX ORDER — POLYETHYLENE GLYCOL 3350 17 G/17G
17 POWDER, FOR SOLUTION ORAL DAILY
Status: DISCONTINUED | OUTPATIENT
Start: 2023-01-08 | End: 2023-01-07

## 2023-01-07 RX ORDER — SODIUM CHLORIDE 0.9 % (FLUSH) 0.9 %
10 SYRINGE (ML) INJECTION
Status: DISCONTINUED | OUTPATIENT
Start: 2023-01-07 | End: 2023-01-10 | Stop reason: HOSPADM

## 2023-01-07 RX ORDER — AMOXICILLIN 250 MG
1 CAPSULE ORAL 2 TIMES DAILY
Status: DISCONTINUED | OUTPATIENT
Start: 2023-01-07 | End: 2023-01-10 | Stop reason: HOSPADM

## 2023-01-07 RX ORDER — METOPROLOL SUCCINATE 25 MG/1
25 TABLET, EXTENDED RELEASE ORAL DAILY
Status: DISCONTINUED | OUTPATIENT
Start: 2023-01-07 | End: 2023-01-10 | Stop reason: HOSPADM

## 2023-01-07 RX ADMIN — METOPROLOL SUCCINATE 25 MG: 25 TABLET, EXTENDED RELEASE ORAL at 06:01

## 2023-01-07 RX ADMIN — SENNOSIDES AND DOCUSATE SODIUM 1 TABLET: 50; 8.6 TABLET ORAL at 09:01

## 2023-01-07 RX ADMIN — DONEPEZIL HYDROCHLORIDE 5 MG: 5 TABLET, FILM COATED ORAL at 09:01

## 2023-01-07 RX ADMIN — FUROSEMIDE 80 MG: 10 INJECTION, SOLUTION INTRAMUSCULAR; INTRAVENOUS at 06:01

## 2023-01-07 RX ADMIN — APIXABAN 2.5 MG: 2.5 TABLET, FILM COATED ORAL at 09:01

## 2023-01-07 NOTE — SUBJECTIVE & OBJECTIVE
Past Medical History:   Diagnosis Date    Atrial flutter     Biatrial enlargement     CAD S/P percutaneous coronary angioplasty     CHF (congestive heart failure)     Chronic respiratory failure with hypoxia, on home oxygen therapy     Hyperlipidemia     Hypertension     Non-STEMI (non-ST elevated myocardial infarction)     Pacemaker 2016    Single lead [employer] Pacemaker for sick sinus syndrome    Sick sinus syndrome     SSS (sick sinus syndrome) 2016    - pacemaker in place       Past Surgical History:   Procedure Laterality Date    CARDIAC PACEMAKER PLACEMENT      HYSTERECTOMY      KIDNEY SURGERY         Review of patient's allergies indicates:  No Known Allergies    No current facility-administered medications on file prior to encounter.     Current Outpatient Medications on File Prior to Encounter   Medication Sig    donepeziL (ARICEPT) 5 MG tablet Take 1 tablet (5 mg total) by mouth every evening.    ELIQUIS 2.5 mg Tab TAKE 1 TABLET BY MOUTH TWICE A DAY    metoprolol succinate (TOPROL-XL) 25 MG 24 hr tablet Take 25 mg by mouth once daily.    rosuvastatin (CRESTOR) 10 MG tablet TAKE 1 TABLET BY MOUTH EVERY DAY    torsemide (DEMADEX) 20 MG Tab Take 2 tablets (40 mg total) by mouth 2 (two) times a day.    diclofenac sodium (VOLTAREN) 1 % Gel Apply topically 4 (four) times daily as needed.    [DISCONTINUED] cefdinir (OMNICEF) 300 MG capsule Take 1 capsule (300 mg total) by mouth 2 (two) times daily. for 5 days (Patient not taking: Reported on 2023)     Family History    None       Tobacco Use    Smoking status: Former     Packs/day: 1.50     Years: 63.00     Pack years: 94.50     Types: Cigarettes     Quit date: 2016     Years since quittin.5    Smokeless tobacco: Never   Substance and Sexual Activity    Alcohol use: Not Currently    Drug use: No    Sexual activity: Not Currently     Review of Systems   Constitutional: Positive for malaise/fatigue. Negative for decreased appetite, diaphoresis,  fever and night sweats.   HENT:  Negative for nosebleeds.    Eyes:  Negative for blurred vision and double vision.   Cardiovascular:  Positive for dyspnea on exertion. Negative for chest pain, claudication, irregular heartbeat, leg swelling, near-syncope, orthopnea, palpitations, paroxysmal nocturnal dyspnea and syncope.   Respiratory:  Positive for shortness of breath. Negative for cough, sleep disturbances due to breathing, snoring, sputum production and wheezing.    Endocrine: Negative for cold intolerance and polyuria.   Hematologic/Lymphatic: Does not bruise/bleed easily.   Skin:  Negative for rash.   Musculoskeletal:  Negative for back pain, falls, joint pain, joint swelling and neck pain.   Gastrointestinal:  Negative for abdominal pain, heartburn, nausea and vomiting.   Genitourinary:  Negative for dysuria, frequency and hematuria.   Neurological:  Negative for difficulty with concentration, dizziness, focal weakness, headaches, light-headedness, numbness, seizures and weakness.   Psychiatric/Behavioral:  Negative for depression, memory loss and substance abuse. The patient does not have insomnia.    Allergic/Immunologic: Negative for HIV exposure and hives.   Objective:     Vital Signs (Most Recent):  Temp: 97 °F (36.1 °C) (01/07/23 1314)  Pulse: 98 (01/07/23 1530)  Resp: 14 (01/07/23 1530)  BP: 138/67 (01/07/23 1530)  SpO2: 99 % (01/07/23 1530) Vital Signs (24h Range):  Temp:  [97 °F (36.1 °C)] 97 °F (36.1 °C)  Pulse:  [86-98] 98  Resp:  [14-20] 14  SpO2:  [99 %-100 %] 99 %  BP: (132-138)/(67-80) 138/67     Weight: 61.3 kg (135 lb 1.6 oz)  Body mass index is 23.93 kg/m².    SpO2: 99 %       No intake or output data in the 24 hours ending 01/07/23 1658    Lines/Drains/Airways       None                   Physical Exam  HENT:      Head: Normocephalic.   Eyes:      Pupils: Pupils are equal, round, and reactive to light.   Neck:      Thyroid: No thyromegaly.      Vascular: Normal carotid pulses. JVD present.  No carotid bruit.   Cardiovascular:      Rate and Rhythm: Normal rate. Rhythm irregular. No extrasystoles are present.     Chest Wall: PMI is not displaced.      Pulses: Normal pulses.           Carotid pulses are 2+ on the right side and 2+ on the left side.     Heart sounds: Normal heart sounds. No murmur heard.    No gallop. No S3 sounds.   Pulmonary:      Effort: No respiratory distress.      Breath sounds: No stridor. Examination of the right-lower field reveals rales. Examination of the left-lower field reveals rales. Rales present.   Abdominal:      General: Bowel sounds are normal.      Palpations: Abdomen is soft.      Tenderness: There is no abdominal tenderness. There is no rebound.   Musculoskeletal:         General: Normal range of motion.   Skin:     General: Skin is warm and dry.      Findings: No rash.   Neurological:      Mental Status: She is alert and oriented to person, place, and time.   Psychiatric:         Behavior: Behavior normal.       Significant Labs: ABG: No results for input(s): PH, PCO2, HCO3, POCSATURATED, BE in the last 48 hours., Blood Culture: No results for input(s): LABBLOO in the last 48 hours., BMP:   Recent Labs   Lab 01/07/23  1350   GLU 88      K 5.1      CO2 14*   BUN 76*   CREATININE 3.6*   CALCIUM 10.2   MG 2.7*   , CMP   Recent Labs   Lab 01/07/23  1350      K 5.1      CO2 14*   GLU 88   BUN 76*   CREATININE 3.6*   CALCIUM 10.2   PROT 6.8   ALBUMIN 3.5   BILITOT 2.7*   ALKPHOS 108   AST 89*   ALT 55*   ANIONGAP 20*   , CBC   Recent Labs   Lab 01/07/23  1350   WBC 6.67   HGB 12.0   HCT 36.6*      , INR No results for input(s): INR, PROTIME in the last 48 hours., Lipid Panel No results for input(s): CHOL, HDL, LDLCALC, TRIG, CHOLHDL in the last 48 hours., and Troponin   Recent Labs   Lab 01/07/23  1350   TROPONINI 0.090*       Significant Imaging: EKG:

## 2023-01-07 NOTE — SUBJECTIVE & OBJECTIVE
Past Medical History:   Diagnosis Date    Atrial flutter     Biatrial enlargement     CAD S/P percutaneous coronary angioplasty     CHF (congestive heart failure)     Chronic respiratory failure with hypoxia, on home oxygen therapy     Hyperlipidemia     Hypertension     Non-STEMI (non-ST elevated myocardial infarction)     Pacemaker 2016    Single lead [employer] Pacemaker for sick sinus syndrome    Sick sinus syndrome     SSS (sick sinus syndrome) 2016    - pacemaker in place       Past Surgical History:   Procedure Laterality Date    CARDIAC PACEMAKER PLACEMENT      HYSTERECTOMY      KIDNEY SURGERY         Review of patient's allergies indicates:  No Known Allergies    No current facility-administered medications on file prior to encounter.     Current Outpatient Medications on File Prior to Encounter   Medication Sig    diclofenac sodium (VOLTAREN) 1 % Gel Apply topically 4 (four) times daily as needed.    donepeziL (ARICEPT) 5 MG tablet Take 1 tablet (5 mg total) by mouth every evening.    ELIQUIS 2.5 mg Tab TAKE 1 TABLET BY MOUTH TWICE A DAY    metoprolol succinate (TOPROL-XL) 25 MG 24 hr tablet Take 25 mg by mouth once daily.    rosuvastatin (CRESTOR) 10 MG tablet TAKE 1 TABLET BY MOUTH EVERY DAY    torsemide (DEMADEX) 20 MG Tab Take 2 tablets (40 mg total) by mouth 2 (two) times a day.    [DISCONTINUED] cefdinir (OMNICEF) 300 MG capsule Take 1 capsule (300 mg total) by mouth 2 (two) times daily. for 5 days (Patient not taking: Reported on 2023)     Family History    None       Tobacco Use    Smoking status: Former     Packs/day: 1.50     Years: 63.00     Pack years: 94.50     Types: Cigarettes     Quit date: 2016     Years since quittin.5    Smokeless tobacco: Never   Substance and Sexual Activity    Alcohol use: Not Currently    Drug use: No    Sexual activity: Not Currently     Review of Systems   Constitutional:  Positive for fatigue.   Respiratory:  Positive for cough and shortness of  breath.    Objective:     Vital Signs (Most Recent):  Temp: 97 °F (36.1 °C) (01/07/23 1314)  Pulse: 86 (01/07/23 1339)  Resp: 20 (01/07/23 1301)  BP: 132/80 (01/07/23 1301)  SpO2: 100 % (01/07/23 1301) Vital Signs (24h Range):  Temp:  [97 °F (36.1 °C)] 97 °F (36.1 °C)  Pulse:  [86-94] 86  Resp:  [20] 20  SpO2:  [100 %] 100 %  BP: (132)/(80) 132/80     Weight: 61.3 kg (135 lb 1.6 oz)  Body mass index is 23.93 kg/m².    Physical Exam  Vitals and nursing note reviewed.   Cardiovascular:      Rate and Rhythm: Normal rate. Rhythm irregular.      Pulses: Normal pulses.      Heart sounds: Normal heart sounds.   Pulmonary:      Effort: Pulmonary effort is normal.      Breath sounds: Normal breath sounds. No wheezing.   Abdominal:      General: Bowel sounds are normal. There is no distension.      Palpations: Abdomen is soft.      Tenderness: There is no abdominal tenderness.   Musculoskeletal:         General: No swelling.   Skin:     General: Skin is warm and dry.   Neurological:      Mental Status: She is alert and oriented to person, place, and time.           Significant Labs: All pertinent labs within the past 24 hours have been reviewed.    Significant Imaging: I have reviewed all pertinent imaging results/findings within the past 24 hours.

## 2023-01-07 NOTE — ASSESSMENT & PLAN NOTE
Patient with Paroxysmal (<7 days) atrial fibrillation which is controlled currently with Beta Blocker. Patient is currently in sinus rhythm.[id number] Score: 4. Anticoagulation indicated. Anticoagulation done with Eliquis.       details…

## 2023-01-07 NOTE — HPI
Mrs. Webber is a 88 yo female with a history of heart failure; EF 35%, COPD not on home oxygen, CAD s/p PCi, chronic afib, SSS s/p PPM, HTN, HLD, CKD3 who presented to ED with complaints of dyspnea with activity and fatigue x 3 days.  Symptoms are constant and moderate in nature, no allievating or exacerbating factors; assosciated s/sx are dry non-productive cough and orthopnea.  Of note, she was recently admitted to [facility] from 12/29-1/1 where she was treated for CHF exacerbation and Ecoli UTI.  Per chart review patient approx dry weight is 130lbs; today in ED weight 135lbs.  Work up in the ED notable for BNP 4,582, Trop 0.09, creatinine 3.6 (was 1.6 on 1/1).  Chest xray showed cardiomegaly, no infiltrates or pneumothorax.  O2 sats are stable on room air.  Will consult cards and nephrology.  Will obtain flu/covid testing. Patient will be admitted to observation for acute on chronic kidney disease and dyspnea.   Cardiology/nephrology will be consulted    Code Status: DNR  Surrogate Decision Maker: Telma Strange (daughter)

## 2023-01-07 NOTE — Clinical Note
Diagnosis: Acute renal failure superimposed on stage 3 chronic kidney disease, unspecified acute renal failure type, unspecified whether stage 3a or 3b CKD [[id number]]   Future Attending Provider: MARRY TONY [[id number]]   Admitting Provider:: MARRY TONY [[id number]]

## 2023-01-07 NOTE — ASSESSMENT & PLAN NOTE
- patient has a hx of CHF, but does not currently appear volume overloaded.  - BNP is approximately 4500.  - Will hold off on diuresis until evaluation by cardiology as she is asymptomatic and has Acute on chronic renal failure.  - Cardiology consulted.

## 2023-01-07 NOTE — ASSESSMENT & PLAN NOTE
- continue statin   Anesthesia Type: 1% lidocaine with epinephrine and a 1:10 solution of 8.4% sodium bicarbonate Size Of Lesion In Cm: 0 Biopsy Type: H and E Consent: Written consent was obtained and risks were reviewed including but not limited to scarring, infection, bleeding, scabbing, incomplete removal, nerve damage and allergy to anesthesia. Post-Care Instructions: I reviewed with the patient in detail post-care instructions. Patient is to keep the biopsy site dry overnight, and then apply bacitracin twice daily until healed. Patient may apply hydrogen peroxide soaks to remove any crusting. Hemostasis: Drysol Silver Nitrate Text: The wound bed was treated with silver nitrate after the biopsy was performed. Detail Level: Detailed Bill 63826 For Specimen Handling/Conveyance To Laboratory?: no Wound Care: Bacitracin Notification Instructions: Patient will be notified of biopsy results. However, patient instructed to call the office if not contacted within 2 weeks. Cryotherapy Text: The wound bed was treated with cryotherapy after the biopsy was performed. Anesthesia Volume In Cc: 0.5 Billing Type: Third-Party Bill Type Of Destruction Used: Curettage Biopsy Method: Dermablade Electrodesiccation And Curettage Text: The wound bed was treated with electrodesiccation and curettage after the biopsy was performed. Electrodesiccation Text: The wound bed was treated with electrodesiccation after the biopsy was performed. Curettage Text: The wound bed was treated with curettage after the biopsy was performed. Dressing: bandage

## 2023-01-07 NOTE — ASSESSMENT & PLAN NOTE
- Patient is in acute on chronic renal failure  - Baseline cr 2, currently 3.6  - Consult nephrology for assistance given hx of partial left nephrectomy and worsening renal function  - Will await starting fluids until evaluation by cardiology given elevation of BNP

## 2023-01-07 NOTE — ASSESSMENT & PLAN NOTE
Patient is identified as having Systolic (HFrEF) heart failure that is Acute on chronic. CHF is currently uncontrolled due to Continued edema of extremities. Latest ECHO performed and demonstrates- Results for orders placed during the hospital encounter of 12/29/22    Echo    Interpretation Summary  · The left ventricle is mildly enlarged with eccentric hypertrophy and  · Left ventricular diastolic dysfunction.  · The estimated PA systolic pressure is 65 mmHg.  · Mild right ventricular enlargement with mildly reduced right ventricular systolic function.  · There is pulmonary hypertension.  · Elevated central venous pressure (15 mmHg).  · The estimated ejection fraction is 30%.  · There is severe left ventricular global hypokinesis.  · Severe left atrial enlargement.  · Severe right atrial enlargement.  · Moderate aortic regurgitation.  · Moderate mitral regurgitation.  · Moderate to severe tricuspid regurgitation.  . Continue Beta Blocker, ACE/ARB and Furosemide and monitor clinical status closely. Monitor on telemetry. Patient is on CHF pathway.  Monitor strict Is&Os and daily weights.  Place on fluid restriction of 1.5 L. Continue to stress to patient importance of self efficacy and  on diet for CHF. Last BNP reviewed- and noted below   Recent Labs   Lab 01/07/23  1350   BNP 4,582*   .      Fluid overloaded, CKD     -Lasix 80 mg ivp bid  -continue metoprolol  -strict I&)  -ok to hold ARB  -Fluid restriction 1.5 liters a day; Na< 2 gm  -UA pending

## 2023-01-07 NOTE — CONSULTS
[facility] - Emergency Dept.  Cardiology  Consult Note    Patient Name: Christy Webber  MRN: [id number]  Admission Date: 1/7/2023  Hospital Length of Stay: 0 days  Code Status: DNR   Attending Provider: Naomi Cannon MD   Consulting Provider: Jorge Salgado MD  Primary Care Physician: Christian Douglass MD  Principal Problem:Chronic kidney disease, stage 4 (severe)    Patient information was obtained from patient and ER records.     Inpatient consult to Cardiology  Consult performed by: Jorge Salgado MD  Consult ordered by: Naomi Cannon MD        Subjective:       HPI:   87 y.o. female patient with a PMHx of HTN, HLD, pacemaker, a-fib, CHF, NSTEMI, chronic respiratory failure with hypoxia, and CAD and dementia who presents to the Emergency Department for evaluation of SOB which onset gradually. SOB worse with exertion and improved at rest.  Pt notes she is on Eliquis. Symptoms are constant and moderate in severity. No mitigating or exacerbating factors reported. Associated sxs include difficulty swallowing, diaphoresis, cough, and CP. Pt describes CP as a discomfort that is centralized on the left side of her chest. Patient denies any black stool, blood in stool, n/v, fever, and all other sxs at this time. Cardiology consulted for CHF. Pt seen and examined today c/o a little SOB, pt denies CP at this time. Labs reviewed Echo EF 35% in May 2022, repeat pending   BNP 3569, Crt 1.7, Troponin 0.049->0.061    88 yo female, cardiology consult for CHF exacerbation  PMH CAD s/p PCi chronic afib, s/p PPM, CHfrEF 40%, RVF, pulm HTN, valvular dz,  HTN HLD CKD, lower back pain  D/c for chf exacerbation one week ago and now dyspnea has been worse,   Work up in the ED notable for BNP 4,582 (3000's last week), Trop 0.09, creatinine 3.6 (was 1.6 on 1/1).  Chest xray showed cardiomegaly, no infiltrates or pneumothorax.  O2 sats are stable on room air.    Labor breathing.   Ekg today V pacing and underlying AFIB    12/30/2022 echo   The  left ventricle is mildly enlarged with eccentric hypertrophy and   Left ventricular diastolic dysfunction.   The estimated PA systolic pressure is 65 mmHg.   Mild right ventricular enlargement with mildly reduced right ventricular systolic function.   There is pulmonary hypertension.   Elevated central venous pressure (15 mmHg).   The estimated ejection fraction is 30%.   There is severe left ventricular global hypokinesis.   Severe left atrial enlargement.   Severe right atrial enlargement.   Moderate aortic regurgitation.   Moderate mitral regurgitation.   Moderate to severe tricuspid regurgitation.      Past Medical History:   Diagnosis Date    Atrial flutter     Biatrial enlargement     CAD S/P percutaneous coronary angioplasty     CHF (congestive heart failure)     Chronic respiratory failure with hypoxia, on home oxygen therapy     Hyperlipidemia     Hypertension     Non-STEMI (non-ST elevated myocardial infarction)     Pacemaker 08/2016    Single lead [employer] Pacemaker for sick sinus syndrome    Sick sinus syndrome     SSS (sick sinus syndrome) 8/24/2016    - pacemaker in place       Past Surgical History:   Procedure Laterality Date    CARDIAC PACEMAKER PLACEMENT      HYSTERECTOMY      KIDNEY SURGERY         Review of patient's allergies indicates:  No Known Allergies    No current facility-administered medications on file prior to encounter.     Current Outpatient Medications on File Prior to Encounter   Medication Sig    donepeziL (ARICEPT) 5 MG tablet Take 1 tablet (5 mg total) by mouth every evening.    ELIQUIS 2.5 mg Tab TAKE 1 TABLET BY MOUTH TWICE A DAY    metoprolol succinate (TOPROL-XL) 25 MG 24 hr tablet Take 25 mg by mouth once daily.    rosuvastatin (CRESTOR) 10 MG tablet TAKE 1 TABLET BY MOUTH EVERY DAY    torsemide (DEMADEX) 20 MG Tab Take 2 tablets (40 mg total) by mouth 2 (two) times a day.    diclofenac sodium (VOLTAREN) 1 % Gel Apply topically 4 (four) times  daily as needed.    [DISCONTINUED] cefdinir (OMNICEF) 300 MG capsule Take 1 capsule (300 mg total) by mouth 2 (two) times daily. for 5 days (Patient not taking: Reported on 2023)     Family History    None       Tobacco Use    Smoking status: Former     Packs/day: 1.50     Years: 63.00     Pack years: 94.50     Types: Cigarettes     Quit date: 2016     Years since quittin.5    Smokeless tobacco: Never   Substance and Sexual Activity    Alcohol use: Not Currently    Drug use: No    Sexual activity: Not Currently     Review of Systems   Constitutional: Positive for malaise/fatigue. Negative for decreased appetite, diaphoresis, fever and night sweats.   HENT:  Negative for nosebleeds.    Eyes:  Negative for blurred vision and double vision.   Cardiovascular:  Positive for dyspnea on exertion. Negative for chest pain, claudication, irregular heartbeat, leg swelling, near-syncope, orthopnea, palpitations, paroxysmal nocturnal dyspnea and syncope.   Respiratory:  Positive for shortness of breath. Negative for cough, sleep disturbances due to breathing, snoring, sputum production and wheezing.    Endocrine: Negative for cold intolerance and polyuria.   Hematologic/Lymphatic: Does not bruise/bleed easily.   Skin:  Negative for rash.   Musculoskeletal:  Negative for back pain, falls, joint pain, joint swelling and neck pain.   Gastrointestinal:  Negative for abdominal pain, heartburn, nausea and vomiting.   Genitourinary:  Negative for dysuria, frequency and hematuria.   Neurological:  Negative for difficulty with concentration, dizziness, focal weakness, headaches, light-headedness, numbness, seizures and weakness.   Psychiatric/Behavioral:  Negative for depression, memory loss and substance abuse. The patient does not have insomnia.    Allergic/Immunologic: Negative for HIV exposure and hives.   Objective:     Vital Signs (Most Recent):  Temp: 97 °F (36.1 °C) (23 1314)  Pulse: 98 (23  1530)  Resp: 14 (01/07/23 1530)  BP: 138/67 (01/07/23 1530)  SpO2: 99 % (01/07/23 1530) Vital Signs (24h Range):  Temp:  [97 °F (36.1 °C)] 97 °F (36.1 °C)  Pulse:  [86-98] 98  Resp:  [14-20] 14  SpO2:  [99 %-100 %] 99 %  BP: (132-138)/(67-80) 138/67     Weight: 61.3 kg (135 lb 1.6 oz)  Body mass index is 23.93 kg/m².    SpO2: 99 %       No intake or output data in the 24 hours ending 01/07/23 1658    Lines/Drains/Airways       None                   Physical Exam  HENT:      Head: Normocephalic.   Eyes:      Pupils: Pupils are equal, round, and reactive to light.   Neck:      Thyroid: No thyromegaly.      Vascular: Normal carotid pulses. JVD present. No carotid bruit.   Cardiovascular:      Rate and Rhythm: Normal rate. Rhythm irregular. No extrasystoles are present.     Chest Wall: PMI is not displaced.      Pulses: Normal pulses.           Carotid pulses are 2+ on the right side and 2+ on the left side.     Heart sounds: Normal heart sounds. No murmur heard.    No gallop. No S3 sounds.   Pulmonary:      Effort: No respiratory distress.      Breath sounds: No stridor. Examination of the right-lower field reveals rales. Examination of the left-lower field reveals rales. Rales present.   Abdominal:      General: Bowel sounds are normal.      Palpations: Abdomen is soft.      Tenderness: There is no abdominal tenderness. There is no rebound.   Musculoskeletal:         General: Normal range of motion.   Skin:     General: Skin is warm and dry.      Findings: No rash.   Neurological:      Mental Status: She is alert and oriented to person, place, and time.   Psychiatric:         Behavior: Behavior normal.       Significant Labs: ABG: No results for input(s): PH, PCO2, HCO3, POCSATURATED, BE in the last 48 hours., Blood Culture: No results for input(s): LABBLOO in the last 48 hours., BMP:   Recent Labs   Lab 01/07/23  1350   GLU 88      K 5.1      CO2 14*   BUN 76*   CREATININE 3.6*   CALCIUM 10.2   MG 2.7*    , CMP   Recent Labs   Lab 01/07/23  1350      K 5.1      CO2 14*   GLU 88   BUN 76*   CREATININE 3.6*   CALCIUM 10.2   PROT 6.8   ALBUMIN 3.5   BILITOT 2.7*   ALKPHOS 108   AST 89*   ALT 55*   ANIONGAP 20*   , CBC   Recent Labs   Lab 01/07/23  1350   WBC 6.67   HGB 12.0   HCT 36.6*      , INR No results for input(s): INR, PROTIME in the last 48 hours., Lipid Panel No results for input(s): CHOL, HDL, LDLCALC, TRIG, CHOLHDL in the last 48 hours., and Troponin   Recent Labs   Lab 01/07/23  1350   TROPONINI 0.090*       Significant Imaging: EKG:      Assessment and Plan:     * Chronic kidney disease, stage 4 (severe)  F/u Cr and UA    Acute on chronic systolic congestive heart failure  Patient is identified as having Systolic (HFrEF) heart failure that is Acute on chronic. CHF is currently uncontrolled due to Continued edema of extremities. Latest ECHO performed and demonstrates- Results for orders placed during the hospital encounter of 12/29/22    Echo    Interpretation Summary  · The left ventricle is mildly enlarged with eccentric hypertrophy and  · Left ventricular diastolic dysfunction.  · The estimated PA systolic pressure is 65 mmHg.  · Mild right ventricular enlargement with mildly reduced right ventricular systolic function.  · There is pulmonary hypertension.  · Elevated central venous pressure (15 mmHg).  · The estimated ejection fraction is 30%.  · There is severe left ventricular global hypokinesis.  · Severe left atrial enlargement.  · Severe right atrial enlargement.  · Moderate aortic regurgitation.  · Moderate mitral regurgitation.  · Moderate to severe tricuspid regurgitation.  . Continue Beta Blocker, ACE/ARB and Furosemide and monitor clinical status closely. Monitor on telemetry. Patient is on CHF pathway.  Monitor strict Is&Os and daily weights.  Place on fluid restriction of 1.5 L. Continue to stress to patient importance of self efficacy and  on diet for CHF. Last BNP  reviewed- and noted below   Recent Labs   Lab 01/07/23  1350   BNP 4,582*   .      Fluid overloaded, CKD     -Lasix 80 mg ivp bid  -continue metoprolol  -strict I&)  -ok to hold ARB  -Fluid restriction 1.5 liters a day; Na< 2 gm  -UA pending     Essential hypertension  Continue BB    Permanent atrial fibrillation  S/p PPM  V pacing    Continue BB and elquis          VTE Risk Mitigation (From admission, onward)         Ordered     apixaban tablet 2.5 mg  2 times daily         01/07/23 1540     Place sequential compression device  Until discontinued         01/07/23 1540     IP VTE HIGH RISK PATIENT  Once         01/07/23 1540     Place sequential compression device  Until discontinued         01/07/23 1540                Thank you for your consult. I will follow-up with patient. Please contact us if you have any additional questions.    Jorge Salgado MD  Cardiology   [facility] - Emergency Dept.

## 2023-01-07 NOTE — PHARMACY MED REC
"Admission Medication History     The home medication history was taken by Harsh Irene.    You may go to "Admission" then "Reconcile Home Medications" tabs to review and/or act upon these items.     The home medication list has been updated by the Pharmacy department.   Please read ALL comments highlighted in yellow.   Please address this information as you see fit.    Feel free to contact us if you have any questions or require assistance.      Medications listed below were obtained from: Patient/family and Analytic software- [employer]  (Not in a hospital admission)        Harsh Irene  [id number]    Current Outpatient Medications on File Prior to Encounter   Medication Sig Dispense Refill Last Dose    diclofenac sodium (VOLTAREN) 1 % Gel Apply topically 4 (four) times daily as needed.       donepeziL (ARICEPT) 5 MG tablet Take 1 tablet (5 mg total) by mouth every evening. 90 tablet 3     ELIQUIS 2.5 mg Tab TAKE 1 TABLET BY MOUTH TWICE A DAY 60 tablet 6     metoprolol succinate (TOPROL-XL) 25 MG 24 hr tablet Take 25 mg by mouth once daily.       rosuvastatin (CRESTOR) 10 MG tablet TAKE 1 TABLET BY MOUTH EVERY DAY 90 tablet 0     torsemide (DEMADEX) 20 MG Tab Take 2 tablets (40 mg total) by mouth 2 (two) times a day. 180 tablet 3                            .        "

## 2023-01-07 NOTE — H&P
[facility] - Emergency Dept.  [facility]  History & Physical    Patient Name: Christy Webber  MRN: [id number]  Patient Class: OP- Observation  Admission Date: 1/7/2023  Attending Physician: Naomi Cannon MD   Primary Care Provider: Christian Douglass MD         Patient information was obtained from patient and ER records.     Subjective:     Principal Problem:Chronic kidney disease, stage 4 (severe)    Chief Complaint:   Chief Complaint   Patient presents with    Fatigue     Generalized weakness and shortness of breath on exertion        HPI: Mrs. Webber is a 86 yo female with a history of heart failure; EF 35%, COPD not on home oxygen, CAD s/p PCi, chronic afib, SSS s/p PPM, HTN, HLD, CKD3 who presented to ED with complaints of dyspnea with activity and fatigue x 3 days.  Symptoms are constant and moderate in nature, no allievating or exacerbating factors; assosciated s/sx are dry non-productive cough and orthopnea.  Of note, she was recently admitted to [facility] from 12/29-1/1 where she was treated for CHF exacerbation and Ecoli UTI.  Per chart review patient approx dry weight is 130lbs; today in ED weight 135lbs.  Work up in the ED notable for BNP 4,582, Trop 0.09, creatinine 3.6 (was 1.6 on 1/1).  Chest xray showed cardiomegaly, no infiltrates or pneumothorax.  O2 sats are stable on room air.  Will consult cards and nephrology.  Will obtain flu/covid testing. Patient will be admitted to observation for acute on chronic kidney disease and dyspnea.   Cardiology/nephrology will be consulted    Code Status: DNR  Surrogate Decision Maker: Telma Strange (daughter)        Past Medical History:   Diagnosis Date    Atrial flutter     Biatrial enlargement     CAD S/P percutaneous coronary angioplasty     CHF (congestive heart failure)     Chronic respiratory failure with hypoxia, on home oxygen therapy     Hyperlipidemia     Hypertension     Non-STEMI (non-ST elevated myocardial infarction)     Pacemaker  2016    Single lead [employer] Pacemaker for sick sinus syndrome    Sick sinus syndrome     SSS (sick sinus syndrome) 2016    - pacemaker in place       Past Surgical History:   Procedure Laterality Date    CARDIAC PACEMAKER PLACEMENT      HYSTERECTOMY      KIDNEY SURGERY         Review of patient's allergies indicates:  No Known Allergies    No current facility-administered medications on file prior to encounter.     Current Outpatient Medications on File Prior to Encounter   Medication Sig    diclofenac sodium (VOLTAREN) 1 % Gel Apply topically 4 (four) times daily as needed.    donepeziL (ARICEPT) 5 MG tablet Take 1 tablet (5 mg total) by mouth every evening.    ELIQUIS 2.5 mg Tab TAKE 1 TABLET BY MOUTH TWICE A DAY    metoprolol succinate (TOPROL-XL) 25 MG 24 hr tablet Take 25 mg by mouth once daily.    rosuvastatin (CRESTOR) 10 MG tablet TAKE 1 TABLET BY MOUTH EVERY DAY    torsemide (DEMADEX) 20 MG Tab Take 2 tablets (40 mg total) by mouth 2 (two) times a day.    [DISCONTINUED] cefdinir (OMNICEF) 300 MG capsule Take 1 capsule (300 mg total) by mouth 2 (two) times daily. for 5 days (Patient not taking: Reported on 2023)     Family History    None       Tobacco Use    Smoking status: Former     Packs/day: 1.50     Years: 63.00     Pack years: 94.50     Types: Cigarettes     Quit date: 2016     Years since quittin.5    Smokeless tobacco: Never   Substance and Sexual Activity    Alcohol use: Not Currently    Drug use: No    Sexual activity: Not Currently     Review of Systems   Constitutional:  Positive for fatigue.   Respiratory:  Positive for cough and shortness of breath.    Objective:     Vital Signs (Most Recent):  Temp: 97 °F (36.1 °C) (23 1314)  Pulse: 86 (23 1339)  Resp: 20 (23 1301)  BP: 132/80 (23 1301)  SpO2: 100 % (23 1301) Vital Signs (24h Range):  Temp:  [97 °F (36.1 °C)] 97 °F (36.1 °C)  Pulse:  [86-94] 86  Resp:  [20] 20  SpO2:  [100  %] 100 %  BP: (132)/(80) 132/80     Weight: 61.3 kg (135 lb 1.6 oz)  Body mass index is 23.93 kg/m².    Physical Exam  Vitals and nursing note reviewed.   Cardiovascular:      Rate and Rhythm: Normal rate. Rhythm irregular.      Pulses: Normal pulses.      Heart sounds: Normal heart sounds.   Pulmonary:      Effort: Pulmonary effort is normal.      Breath sounds: Normal breath sounds. No wheezing.   Abdominal:      General: Bowel sounds are normal. There is no distension.      Palpations: Abdomen is soft.      Tenderness: There is no abdominal tenderness.   Musculoskeletal:         General: No swelling.   Skin:     General: Skin is warm and dry.   Neurological:      Mental Status: She is alert and oriented to person, place, and time.           Significant Labs: All pertinent labs within the past 24 hours have been reviewed.    Significant Imaging: I have reviewed all pertinent imaging results/findings within the past 24 hours.    Assessment/Plan:     * Chronic kidney disease, stage 4 (severe)  - Patient is in acute on chronic renal failure  - Baseline cr 2, currently 3.6  - Consult nephrology for assistance given hx of partial left nephrectomy and worsening renal function  - Will await starting fluids until evaluation by cardiology given elevation of BNP       Elevated brain natriuretic peptide (BNP) level  - patient has a hx of CHF, but does not currently appear volume overloaded.  - BNP is approximately 4500.  - Will hold off on diuresis until evaluation by cardiology as she is asymptomatic and has Acute on chronic renal failure.  - Cardiology consulted.      Dyslipidemia  - continue statin    Essential hypertension  - continue home meds      Permanent atrial fibrillation  Patient with Paroxysmal (<7 days) atrial fibrillation which is controlled currently with Beta Blocker. Patient is currently in sinus rhythm.[id number] Score: 4. Anticoagulation indicated. Anticoagulation done with Eliquis.          VTE Risk  Mitigation (From admission, onward)         Ordered     apixaban tablet 2.5 mg  2 times daily         01/07/23 1540     Place sequential compression device  Until discontinued         01/07/23 1540     IP VTE HIGH RISK PATIENT  Once         01/07/23 1540     Place sequential compression device  Until discontinued         01/07/23 1540                   Naomi [facility] MD  Department of Hospital Medicine   [facility] - Emergency Dept.

## 2023-01-07 NOTE — ED PROVIDER NOTES
SCRIBE #1 NOTE: I, Rosey Martinez, am scribing for, and in the presence of, Ayla Kelly MD. I have scribed the entire note.      History      Chief Complaint   Patient presents with    Fatigue     Generalized weakness and shortness of breath on exertion       Review of patient's allergies indicates:  No Known Allergies     HPI   HPI    2023, 1:58 PM   History obtained from the patient      History of Present Illness: Christy Webber is a 87 y.o. female patient who presents to the Emergency Department for generalized weakness which onset earlier today. No mitigating or exacerbating factors reported.  Patient denies any n/v, headache, numbness, sleep disturbance, back pain and all other sxs at this time. No further complaints or concerns at this time.        Arrival mode: EMS    PCP: Christian Douglass MD       Past Medical History:  Past Medical History:   Diagnosis Date    Atrial flutter     Biatrial enlargement     CAD S/P percutaneous coronary angioplasty     CHF (congestive heart failure)     Chronic respiratory failure with hypoxia, on home oxygen therapy     Hyperlipidemia     Hypertension     Non-STEMI (non-ST elevated myocardial infarction)     Pacemaker 2016    Single lead [employer] Pacemaker for sick sinus syndrome    Sick sinus syndrome     SSS (sick sinus syndrome) 2016    - pacemaker in place       Past Surgical History:  Past Surgical History:   Procedure Laterality Date    CARDIAC PACEMAKER PLACEMENT      HYSTERECTOMY      KIDNEY SURGERY           Family History:  No family history on file.    Social History:  Social History     Tobacco Use    Smoking status: Former     Packs/day: 1.50     Years: 63.00     Pack years: 94.50     Types: Cigarettes     Quit date: 2016     Years since quittin.5    Smokeless tobacco: Never   Substance and Sexual Activity    Alcohol use: Not Currently    Drug use: No    Sexual activity: Not Currently       ROS   Review of Systems    Constitutional:  Negative for fever.   HENT:  Negative for sore throat.    Respiratory:  Negative for shortness of breath.    Cardiovascular:  Negative for chest pain.   Gastrointestinal:  Negative for nausea and vomiting.   Genitourinary:  Negative for dysuria.   Musculoskeletal:  Negative for back pain.   Skin:  Negative for rash.   Neurological:  Positive for weakness (generalized). Negative for numbness and headaches.   Hematological:  Does not bruise/bleed easily.   Psychiatric/Behavioral:  Negative for sleep disturbance.    All other systems reviewed and are negative.    Physical Exam      Initial Vitals   BP Pulse Resp Temp SpO2   01/07/23 1301 01/07/23 1301 01/07/23 1301 01/07/23 1314 01/07/23 1301   132/80 94 20 97 °F (36.1 °C) 100 %      MAP       --                 Physical Exam  Nursing Notes and Vital Signs Reviewed.  Constitutional: Patient is in no acute distress. Chronically ill appearing.   Head: Atraumatic. Normocephalic.  Eyes: PERRL. EOM intact. Conjunctivae are not pale. No scleral icterus.  ENT: Mucous membranes are moist. Oropharynx is clear and symmetric.    Neck: Supple. Full ROM. No lymphadenopathy.  Cardiovascular: Regular rate. Regular rhythm. No murmurs, rubs, or gallops. Distal pulses are 2+ and symmetric.  Pulmonary/Chest: No respiratory distress. Clear to auscultation bilaterally. No wheezing or rales.  Abdominal: Soft and non-distended.  There is no tenderness.  No rebound, guarding, or rigidity.   Musculoskeletal: Moves all extremities. No obvious deformities. No edema. No calf tenderness.  Skin: Warm and dry.  Neurological:  Alert, awake, and appropriate.  Normal speech.  No acute focal neurological deficits are appreciated.  Psychiatric: Normal affect. Good eye contact. Appropriate in content. Poor historian.    ED Course    Critical Care    Date/Time: 1/7/2023 3:22 PM  Performed by: Ayla Kelly MD  Authorized by: Ayla Kelly MD   Direct patient critical care time: 25  minutes  Additional history critical care time: 5 minutes  Ordering / reviewing critical care time: 5 minutes  Documentation critical care time: 5 minutes  Consulting other physicians critical care time: 5 minutes  Total critical care time (exclusive of procedural time) : 45 minutes  Critical care was necessary to treat or prevent imminent or life-threatening deterioration of the following conditions: dehydration, renal failure and cardiac failure.  Critical care was time spent personally by me on the following activities: blood draw for specimens, development of treatment plan with patient or surrogate, discussions with consultants, discussions with primary provider, interpretation of cardiac output measurements, evaluation of patient's response to treatment, examination of patient, obtaining history from patient or surrogate, ordering and performing treatments and interventions, ordering and review of laboratory studies, ordering and review of radiographic studies, pulse oximetry, re-evaluation of patient's condition and review of old charts.      ED Vital Signs:  Vitals:    01/07/23 1301 01/07/23 1311 01/07/23 1314 01/07/23 1339   BP: 132/80      Pulse: 94   86   Resp: 20      Temp:   97 °F (36.1 °C)    TempSrc:   Axillary    SpO2: 100%      Weight:  61.3 kg (135 lb 1.6 oz)         Abnormal Lab Results:  Labs Reviewed   CBC W/ AUTO DIFFERENTIAL - Abnormal; Notable for the following components:       Result Value    RBC 3.77 (*)     Hematocrit 36.6 (*)     MCH 31.8 (*)     Immature Granulocytes 0.6 (*)     Lymph % 14.5 (*)     All other components within normal limits   COMPREHENSIVE METABOLIC PANEL - Abnormal; Notable for the following components:    CO2 14 (*)     BUN 76 (*)     Creatinine 3.6 (*)     Total Bilirubin 2.7 (*)     AST 89 (*)     ALT 55 (*)     Anion Gap 20 (*)     eGFR 12 (*)     All other components within normal limits   TROPONIN I - Abnormal; Notable for the following components:    Troponin I  0.090 (*)     All other components within normal limits   B-TYPE NATRIURETIC PEPTIDE - Abnormal; Notable for the following components:    BNP 4,582 (*)     All other components within normal limits   MAGNESIUM   URINALYSIS, REFLEX TO URINE CULTURE   SARS-COV-2 RNA AMPLIFICATION, QUAL   MAGNESIUM   POCT RAPID INFLUENZA A/B        All Lab Results:  Results for orders placed or performed during the hospital encounter of 01/07/23   CBC auto differential   Result Value Ref Range    WBC 6.67 3.90 - 12.70 K/uL    RBC 3.77 (L) 4.00 - 5.40 M/uL    Hemoglobin 12.0 12.0 - 16.0 g/dL    Hematocrit 36.6 (L) 37.0 - 48.5 %    MCV 97 82 - 98 fL    MCH 31.8 (H) 27.0 - 31.0 pg    MCHC 32.8 32.0 - 36.0 g/dL    RDW 14.5 11.5 - 14.5 %    Platelets 192 150 - 450 K/uL    MPV 11.3 9.2 - 12.9 fL    Immature Granulocytes 0.6 (H) 0.0 - 0.5 %    Gran # (ANC) 4.5 1.8 - 7.7 K/uL    Immature Grans (Abs) 0.04 0.00 - 0.04 K/uL    Lymph # 1.0 1.0 - 4.8 K/uL    Mono # 1.0 0.3 - 1.0 K/uL    Eos # 0.1 0.0 - 0.5 K/uL    Baso # 0.11 0.00 - 0.20 K/uL    nRBC 0 0 /100 WBC    Gran % 66.8 38.0 - 73.0 %    Lymph % 14.5 (L) 18.0 - 48.0 %    Mono % 14.4 4.0 - 15.0 %    Eosinophil % 2.1 0.0 - 8.0 %    Basophil % 1.6 0.0 - 1.9 %    Differential Method Automated    Comprehensive metabolic panel   Result Value Ref Range    Sodium 141 136 - 145 mmol/L    Potassium 5.1 3.5 - 5.1 mmol/L    Chloride 107 95 - 110 mmol/L    CO2 14 (L) 23 - 29 mmol/L    Glucose 88 70 - 110 mg/dL    BUN 76 (H) 8 - 23 mg/dL    Creatinine 3.6 (H) 0.5 - 1.4 mg/dL    Calcium 10.2 8.7 - 10.5 mg/dL    Total Protein 6.8 6.0 - 8.4 g/dL    Albumin 3.5 3.5 - 5.2 g/dL    Total Bilirubin 2.7 (H) 0.1 - 1.0 mg/dL    Alkaline Phosphatase 108 55 - 135 U/L    AST 89 (H) 10 - 40 U/L    ALT 55 (H) 10 - 44 U/L    Anion Gap 20 (H) 8 - 16 mmol/L    eGFR 12 (A) >60 mL/min/1.73 m^2   Troponin I #1   Result Value Ref Range    Troponin I 0.090 (H) 0.000 - 0.026 ng/mL   BNP   Result Value Ref Range    BNP 4,582 (H) 0  - 99 pg/mL        Imaging Results:  Imaging Results              X-Ray Chest AP Portable (Final result)  Result time 01/07/23 13:53:33      Final result by Mushtaq Nolasco MD (01/07/23 13:53:33)                   Impression:      No acute findings.      Electronically signed by: Mushtaq Nolasco MD  Date:    01/07/2023  Time:    13:53               Narrative:    EXAMINATION:  XR CHEST AP PORTABLE    CLINICAL HISTORY:  shortness of breath;    TECHNIQUE:  AP view of the chest was performed.    COMPARISON:  12/29/2022    FINDINGS:  Marked cardiomegaly.  Limited evaluation of the left lower lobe secondary to left ventricular enlargement.  No acute infiltrates detected.  Left-sided pacemaker in stable position.  No pneumothorax.  Aortic atherosclerosis.  No acute osseous findings demonstrated.                                          The EKG was ordered, reviewed, and independently interpreted by the ED provider.  Interpretation time: 13:25  Rate: 87 BPM  Rhythm: Ventricular paced rhythm  Interpretation: No acute ST changes. No STEMI.    The Emergency Provider reviewed the vital signs and test results, which are outlined above.    ED Discussion     2:54 PM: Discussed case with Dr. Cannon ([facility]). Dr. Cannon agrees with current care and management of pt and accepts admission.   Admitting Service: [facility]  Admitting Physician: Dr. Cannon  Admit to: Obs      2:55 PM: Re-evaluated pt. I have discussed test results, shared treatment plan, and the need for admission with patient and family at bedside. Pt and family express understanding at this time and agree with all information. All questions answered. Pt and family have no further questions or concerns at this time. Pt is ready for admit.     ED Medication(s):  Medications - No data to display          Medical Decision Making    Medical Decision Making:   Clinical Tests:   Lab Tests: Ordered and Reviewed  Radiological Study: Ordered and Reviewed  Medical Tests:  Ordered and Reviewed         Scribe Attestation:   Scribe #1: I performed the above scribed service and the documentation accurately describes the services I performed. I attest to the accuracy of the note.    Attending:   Physician Attestation Statement for Scribe #1: I, Ayla Kelly MD, personally performed the services described in this documentation, as scribed by Rosey Martinez, in my presence, and it is both accurate and complete.          Clinical Impression       ICD-10-CM ICD-9-CM   1. Acute renal failure superimposed on stage 3 chronic kidney disease, unspecified acute renal failure type, unspecified whether stage 3a or 3b CKD  N17.9 584.9    N18.30 585.3   2. Shortness of breath  R06.02 786.05   3. Chronic combined systolic and diastolic heart failure  I50.42 428.42       Disposition:   Disposition: Placed in Observation  Condition: Fair      Ayla Kelly MD  01/07/23 5121

## 2023-01-07 NOTE — HPI
87 y.o. female patient with a PMHx of HTN, HLD, pacemaker, a-fib, CHF, NSTEMI, chronic respiratory failure with hypoxia, and CAD and dementia who presents to the Emergency Department for evaluation of SOB which onset gradually. SOB worse with exertion and improved at rest.  Pt notes she is on Eliquis. Symptoms are constant and moderate in severity. No mitigating or exacerbating factors reported. Associated sxs include difficulty swallowing, diaphoresis, cough, and CP. Pt describes CP as a discomfort that is centralized on the left side of her chest. Patient denies any black stool, blood in stool, n/v, fever, and all other sxs at this time. Cardiology consulted for CHF. Pt seen and examined today c/o a little SOB, pt denies CP at this time. Labs reviewed Echo EF 35% in May 2022, repeat pending   BNP 3569, Crt 1.7, Troponin 0.049->0.061    88 yo female, cardiology consult for CHF exacerbation  PMH CAD s/p PCi chronic afib, s/p PPM, CHfrEF 40%, RVF, pulm HTN, valvular dz,  HTN HLD CKD, lower back pain  D/c for chf exacerbation one week ago and now dyspnea has been worse,   Work up in the ED notable for BNP 4,582 (3000's last week), Trop 0.09, creatinine 3.6 (was 1.6 on 1/1).  Chest xray showed cardiomegaly, no infiltrates or pneumothorax.  O2 sats are stable on room air.    Labor breathing.   Ekg today V pacing and underlying AFIB    12/30/2022 echo  The left ventricle is mildly enlarged with eccentric hypertrophy and  Left ventricular diastolic dysfunction.  The estimated PA systolic pressure is 65 mmHg.  Mild right ventricular enlargement with mildly reduced right ventricular systolic function.  There is pulmonary hypertension.  Elevated central venous pressure (15 mmHg).  The estimated ejection fraction is 30%.  There is severe left ventricular global hypokinesis.  Severe left atrial enlargement.  Severe right atrial enlargement.  Moderate aortic regurgitation.  Moderate mitral regurgitation.  Moderate to severe  tricuspid regurgitation.

## 2023-01-08 LAB
25(OH)D3+25(OH)D2 SERPL-MCNC: 18 NG/ML (ref 30–96)
ALBUMIN SERPL BCP-MCNC: 3.3 G/DL (ref 3.5–5.2)
ANION GAP SERPL CALC-SCNC: 16 MMOL/L (ref 8–16)
BASOPHILS # BLD AUTO: 0.1 K/UL (ref 0–0.2)
BASOPHILS NFR BLD: 1.4 % (ref 0–1.9)
BUN SERPL-MCNC: 87 MG/DL (ref 8–23)
CALCIUM SERPL-MCNC: 9.8 MG/DL (ref 8.7–10.5)
CHLORIDE SERPL-SCNC: 107 MMOL/L (ref 95–110)
CO2 SERPL-SCNC: 15 MMOL/L (ref 23–29)
CREAT SERPL-MCNC: 3.8 MG/DL (ref 0.5–1.4)
DIFFERENTIAL METHOD: ABNORMAL
EOSINOPHIL # BLD AUTO: 0.1 K/UL (ref 0–0.5)
EOSINOPHIL NFR BLD: 1.9 % (ref 0–8)
ERYTHROCYTE [DISTWIDTH] IN BLOOD BY AUTOMATED COUNT: 14.5 % (ref 11.5–14.5)
EST. GFR  (NO RACE VARIABLE): 11 ML/MIN/1.73 M^2
ESTIMATED AVG GLUCOSE: 114 MG/DL (ref 68–131)
GLUCOSE SERPL-MCNC: 102 MG/DL (ref 70–110)
HBA1C MFR BLD: 5.6 % (ref 4–5.6)
HCT VFR BLD AUTO: 37.1 % (ref 37–48.5)
HGB BLD-MCNC: 12.2 G/DL (ref 12–16)
IMM GRANULOCYTES # BLD AUTO: 0.05 K/UL (ref 0–0.04)
IMM GRANULOCYTES NFR BLD AUTO: 0.7 % (ref 0–0.5)
LYMPHOCYTES # BLD AUTO: 1.2 K/UL (ref 1–4.8)
LYMPHOCYTES NFR BLD: 17 % (ref 18–48)
MAGNESIUM SERPL-MCNC: 2.6 MG/DL (ref 1.6–2.6)
MCH RBC QN AUTO: 31.5 PG (ref 27–31)
MCHC RBC AUTO-ENTMCNC: 32.9 G/DL (ref 32–36)
MCV RBC AUTO: 96 FL (ref 82–98)
MONOCYTES # BLD AUTO: 1.2 K/UL (ref 0.3–1)
MONOCYTES NFR BLD: 15.8 % (ref 4–15)
NEUTROPHILS # BLD AUTO: 4.6 K/UL (ref 1.8–7.7)
NEUTROPHILS NFR BLD: 63.2 % (ref 38–73)
NRBC BLD-RTO: 0 /100 WBC
PHOSPHATE SERPL-MCNC: 4.8 MG/DL (ref 2.7–4.5)
PLATELET # BLD AUTO: 207 K/UL (ref 150–450)
PMV BLD AUTO: 11.4 FL (ref 9.2–12.9)
POTASSIUM SERPL-SCNC: 4.4 MMOL/L (ref 3.5–5.1)
PTH-INTACT SERPL-MCNC: 1117.5 PG/ML (ref 9–77)
RBC # BLD AUTO: 3.87 M/UL (ref 4–5.4)
SODIUM SERPL-SCNC: 138 MMOL/L (ref 136–145)
WBC # BLD AUTO: 7.3 K/UL (ref 3.9–12.7)

## 2023-01-08 PROCEDURE — 96376 TX/PRO/DX INJ SAME DRUG ADON: CPT

## 2023-01-08 PROCEDURE — 25000003 PHARM REV CODE 250: Mod: HCNC | Performed by: FAMILY MEDICINE

## 2023-01-08 PROCEDURE — 80069 RENAL FUNCTION PANEL: CPT | Mod: HCNC | Performed by: INTERNAL MEDICINE

## 2023-01-08 PROCEDURE — 36415 COLL VENOUS BLD VENIPUNCTURE: CPT | Mod: HCNC | Performed by: INTERNAL MEDICINE

## 2023-01-08 PROCEDURE — 85025 COMPLETE CBC W/AUTO DIFF WBC: CPT | Mod: HCNC | Performed by: FAMILY MEDICINE

## 2023-01-08 PROCEDURE — 83970 ASSAY OF PARATHORMONE: CPT | Mod: HCNC | Performed by: INTERNAL MEDICINE

## 2023-01-08 PROCEDURE — 99214 OFFICE O/P EST MOD 30 MIN: CPT | Mod: HCNC,,, | Performed by: INTERNAL MEDICINE

## 2023-01-08 PROCEDURE — 99214 PR OFFICE/OUTPT VISIT, EST, LEVL IV, 30-39 MIN: ICD-10-PCS | Mod: HCNC,ICN,, | Performed by: INTERNAL MEDICINE

## 2023-01-08 PROCEDURE — 82306 VITAMIN D 25 HYDROXY: CPT | Mod: HCNC | Performed by: INTERNAL MEDICINE

## 2023-01-08 PROCEDURE — G0378 HOSPITAL OBSERVATION PER HR: HCPCS | Mod: HCNC

## 2023-01-08 PROCEDURE — 83735 ASSAY OF MAGNESIUM: CPT | Mod: HCNC | Performed by: INTERNAL MEDICINE

## 2023-01-08 PROCEDURE — 25000003 PHARM REV CODE 250: Mod: HCNC | Performed by: INTERNAL MEDICINE

## 2023-01-08 PROCEDURE — 25000003 PHARM REV CODE 250: Mod: HCNC | Performed by: NURSE PRACTITIONER

## 2023-01-08 PROCEDURE — 63600175 PHARM REV CODE 636 W HCPCS: Mod: HCNC | Performed by: FAMILY MEDICINE

## 2023-01-08 PROCEDURE — 99214 PR OFFICE/OUTPT VISIT, EST, LEVL IV, 30-39 MIN: ICD-10-PCS | Mod: HCNC,,, | Performed by: INTERNAL MEDICINE

## 2023-01-08 PROCEDURE — 99214 OFFICE O/P EST MOD 30 MIN: CPT | Mod: HCNC,ICN,, | Performed by: INTERNAL MEDICINE

## 2023-01-08 RX ORDER — SODIUM BICARBONATE 650 MG/1
1300 TABLET ORAL 2 TIMES DAILY
Status: DISCONTINUED | OUTPATIENT
Start: 2023-01-08 | End: 2023-01-10 | Stop reason: HOSPADM

## 2023-01-08 RX ORDER — SODIUM BICARBONATE 650 MG/1
650 TABLET ORAL 3 TIMES DAILY
Status: DISCONTINUED | OUTPATIENT
Start: 2023-01-08 | End: 2023-01-08

## 2023-01-08 RX ADMIN — SODIUM BICARBONATE 1300 MG: 650 TABLET ORAL at 08:01

## 2023-01-08 RX ADMIN — SODIUM BICARBONATE 650 MG TABLET 650 MG: at 10:01

## 2023-01-08 RX ADMIN — SENNOSIDES AND DOCUSATE SODIUM 1 TABLET: 50; 8.6 TABLET ORAL at 08:01

## 2023-01-08 RX ADMIN — DONEPEZIL HYDROCHLORIDE 5 MG: 5 TABLET, FILM COATED ORAL at 08:01

## 2023-01-08 RX ADMIN — FUROSEMIDE 80 MG: 10 INJECTION, SOLUTION INTRAMUSCULAR; INTRAVENOUS at 05:01

## 2023-01-08 RX ADMIN — APIXABAN 2.5 MG: 2.5 TABLET, FILM COATED ORAL at 08:01

## 2023-01-08 RX ADMIN — METOPROLOL SUCCINATE 25 MG: 25 TABLET, EXTENDED RELEASE ORAL at 08:01

## 2023-01-08 NOTE — ASSESSMENT & PLAN NOTE
Patient is identified as having Systolic (HFrEF) heart failure that is Acute on chronic. CHF is currently uncontrolled due to Continued edema of extremities. Latest ECHO performed and demonstrates- Results for orders placed during the hospital encounter of 12/29/22    Echo    Interpretation Summary  · The left ventricle is mildly enlarged with eccentric hypertrophy and  · Left ventricular diastolic dysfunction.  · The estimated PA systolic pressure is 65 mmHg.  · Mild right ventricular enlargement with mildly reduced right ventricular systolic function.  · There is pulmonary hypertension.  · Elevated central venous pressure (15 mmHg).  · The estimated ejection fraction is 30%.  · There is severe left ventricular global hypokinesis.  · Severe left atrial enlargement.  · Severe right atrial enlargement.  · Moderate aortic regurgitation.  · Moderate mitral regurgitation.  · Moderate to severe tricuspid regurgitation.  . Continue Beta Blocker, ACE/ARB and Furosemide and monitor clinical status closely. Monitor on telemetry. Patient is on CHF pathway.  Monitor strict Is&Os and daily weights.  Place on fluid restriction of 1.5 L. Continue to stress to patient importance of self efficacy and  on diet for CHF. Last BNP reviewed- and noted below   Recent Labs   Lab 01/07/23  1350   BNP 4,582*   .      Fluid overloaded, CKD     -Lasix 80 mg ivp bid  -continue metoprolol  -strict I&)  -ok to hold ARB  -Fluid restriction 1.5 liters a day; Na< 2 gm  -UA pending     01/08/2023  Contiune lasix

## 2023-01-08 NOTE — ASSESSMENT & PLAN NOTE
-Creatine stable for now  -BMP reviewed- noted Estimated Creatinine Clearance: 9.7 mL/min (A) (based on SCr of 3.8 mg/dL (H)). according to latest data  -Monitor UOP and serial BMP and adjust therapy as needed  -Renally dose meds and avoid nephrotoxins

## 2023-01-08 NOTE — SUBJECTIVE & OBJECTIVE
Interval History: drowsy, easily arousable, NAD. Able to speak complete sentences without difficulty. Reports she is not SOB at the moment. Denies any complaints. Nephrology consult pending, cardiology following.     Review of Systems   Constitutional:  Negative for fatigue and fever.   Respiratory:  Negative for cough and shortness of breath.    Cardiovascular:  Negative for chest pain and palpitations.   Gastrointestinal:  Negative for nausea and vomiting.   All other systems reviewed and are negative.  Objective:     Vital Signs (Most Recent):  Temp: 98 °F (36.7 °C) (01/08/23 1106)  Pulse: 84 (01/08/23 1106)  Resp: 18 (01/08/23 1106)  BP: (!) 117/58 (01/08/23 1106)  SpO2: 100 % (01/08/23 1106)   Vital Signs (24h Range):  Temp:  [95.8 °F (35.4 °C)-98.2 °F (36.8 °C)] 98 °F (36.7 °C)  Pulse:  [84-98] 84  Resp:  [14-20] 18  SpO2:  [93 %-100 %] 100 %  BP: (113-144)/(55-80) 117/58     Weight: 65.4 kg (144 lb 2.9 oz)  Body mass index is 25.54 kg/m².  No intake or output data in the 24 hours ending 01/08/23 1157   Physical Exam  Vitals and nursing note reviewed.   Constitutional:       Appearance: Normal appearance.   Cardiovascular:      Rate and Rhythm: Normal rate and regular rhythm.      Heart sounds: Murmur heard.   Pulmonary:      Effort: Pulmonary effort is normal.      Breath sounds: Normal breath sounds.   Abdominal:      General: Bowel sounds are normal.      Palpations: Abdomen is soft.   Musculoskeletal:         General: Normal range of motion.   Skin:     General: Skin is warm and dry.   Neurological:      General: No focal deficit present.      Mental Status: She is alert and oriented to person, place, and time.   Psychiatric:         Mood and Affect: Mood normal.         Behavior: Behavior normal.       Significant Labs: All pertinent labs within the past 24 hours have been reviewed.  BMP:   Recent Labs   Lab 01/08/23  0847         K 4.4      CO2 15*   BUN 87*   CREATININE 3.8*   CALCIUM  9.8   MG 2.6     CBC:   Recent Labs   Lab 01/07/23  1350 01/08/23  0601   WBC 6.67 7.30   HGB 12.0 12.2   HCT 36.6* 37.1    207     Troponin:   Recent Labs   Lab 01/07/23  1350   TROPONINI 0.090*       Significant Imaging: I have reviewed all pertinent imaging results/findings within the past 24 hours.

## 2023-01-08 NOTE — PROGRESS NOTES
[facility] Medicine  Progress Note    Patient Name: Christy Webber  MRN: [id number]  Patient Class: OP- Observation   Admission Date: 1/7/2023  Length of Stay: 0 days  Attending Physician: Itzel Pagan MD  Primary Care Provider: Christian Douglass MD        Subjective:     Principal Problem:Acute on chronic systolic congestive heart failure      HPI:  Mrs. Webber is a 86 yo female with a history of heart failure; EF 35%, COPD not on home oxygen, CAD s/p PCi, chronic afib, SSS s/p PPM, HTN, HLD, CKD3 who presented to ED with complaints of dyspnea with activity and fatigue x 3 days.  Symptoms are constant and moderate in nature, no allievating or exacerbating factors; assosciated s/sx are dry non-productive cough and orthopnea.  Of note, she was recently admitted to [facility] from 12/29-1/1 where she was treated for CHF exacerbation and Ecoli UTI.  Per chart review patient approx dry weight is 130lbs; today in ED weight 135lbs.  Work up in the ED notable for BNP 4,582, Trop 0.09, creatinine 3.6 (was 1.6 on 1/1).  Chest xray showed cardiomegaly, no infiltrates or pneumothorax.  O2 sats are stable on room air.  Will consult cards and nephrology.  Will obtain flu/covid testing. Patient will be admitted to observation for acute on chronic kidney disease and dyspnea.   Cardiology/nephrology will be consulted    Code Status: DNR  Surrogate Decision Maker: Telma Strange (daughter)      Overview/Hospital Course:  Patient admitted with c/o dyspnea and increased fatigue x 3 days. She was started on Lasix 80 mg IVP BID by cardiology with fluid restriction of 1.5 L and <2gm Na daily. UA pending. Patient incontinent, will order purewick for patient to keep adequate I&Os. SOB improved.   Nephrology consult pending. Creatinine 3.6 on admission, repeat is 3.8; last Cr on 1/1/2023 was 1.6.   CO 2 15, started on NaBicarb TID      Interval History: drowsy, easily arousable, NAD. Able to speak complete  sentences without difficulty. Reports she is not SOB at the moment. Denies any complaints. Nephrology consult pending, cardiology following.     Review of Systems   Constitutional:  Negative for fatigue and fever.   Respiratory:  Negative for cough and shortness of breath.    Cardiovascular:  Negative for chest pain and palpitations.   Gastrointestinal:  Negative for nausea and vomiting.   All other systems reviewed and are negative.  Objective:     Vital Signs (Most Recent):  Temp: 98 °F (36.7 °C) (01/08/23 1106)  Pulse: 84 (01/08/23 1106)  Resp: 18 (01/08/23 1106)  BP: (!) 117/58 (01/08/23 1106)  SpO2: 100 % (01/08/23 1106)   Vital Signs (24h Range):  Temp:  [95.8 °F (35.4 °C)-98.2 °F (36.8 °C)] 98 °F (36.7 °C)  Pulse:  [84-98] 84  Resp:  [14-20] 18  SpO2:  [93 %-100 %] 100 %  BP: (113-144)/(55-80) 117/58     Weight: 65.4 kg (144 lb 2.9 oz)  Body mass index is 25.54 kg/m².  No intake or output data in the 24 hours ending 01/08/23 1216   Physical Exam  Vitals and nursing note reviewed.   Constitutional:       Appearance: Normal appearance.   Cardiovascular:      Rate and Rhythm: Normal rate and regular rhythm.      Heart sounds: Murmur heard.   Pulmonary:      Effort: Pulmonary effort is normal.      Breath sounds: Normal breath sounds.   Abdominal:      General: Bowel sounds are normal.      Palpations: Abdomen is soft.   Musculoskeletal:         General: Normal range of motion.   Skin:     General: Skin is warm and dry.   Neurological:      General: No focal deficit present.      Mental Status: She is alert and oriented to person, place, and time.   Psychiatric:         Mood and Affect: Mood normal.         Behavior: Behavior normal.       Significant Labs: All pertinent labs within the past 24 hours have been reviewed.  BMP:   Recent Labs   Lab 01/08/23  0847         K 4.4      CO2 15*   BUN 87*   CREATININE 3.8*   CALCIUM 9.8   MG 2.6       CBC:   Recent Labs   Lab 01/07/23  1350  01/08/23  0601   WBC 6.67 7.30   HGB 12.0 12.2   HCT 36.6* 37.1    207       Troponin:   Recent Labs   Lab 01/07/23  1350   TROPONINI 0.090*         Significant Imaging: I have reviewed all pertinent imaging results/findings within the past 24 hours.    Assessment/Plan:      * Acute on chronic systolic congestive heart failure  Patient is identified as having Systolic (HFrEF) heart failure that is Acute on chronic. CHF is currently controlled. Latest ECHO performed and demonstrates- Results for orders placed during the hospital encounter of 12/29/22    Echo    Interpretation Summary  · The left ventricle is mildly enlarged with eccentric hypertrophy and  · Left ventricular diastolic dysfunction.  · The estimated PA systolic pressure is 65 mmHg.  · Mild right ventricular enlargement with mildly reduced right ventricular systolic function.  · There is pulmonary hypertension.  · Elevated central venous pressure (15 mmHg).  · The estimated ejection fraction is 30%.  · There is severe left ventricular global hypokinesis.  · Severe left atrial enlargement.  · Severe right atrial enlargement.  · Moderate aortic regurgitation.  · Moderate mitral regurgitation.  · Moderate to severe tricuspid regurgitation.  . Continue Beta Blocker and Furosemide and monitor clinical status closely. Monitor on telemetry. Patient is on CHF pathway.  Monitor strict Is&Os and daily weights.  Place on fluid restriction of 1.5 L. Continue to stress to patient importance of self efficacy and  on diet for CHF. Last BNP reviewed- and noted below   Recent Labs   Lab 01/07/23  1350   BNP 4,582*   .  -cardiology following, appreciate  -cont Lasix 80 mg BID per card recs  -monitor labs    Elevated brain natriuretic peptide (BNP) level  -see above    Metabolic acidosis  -CO2 15, anion gap 16  -start on NaBicarb  -monitor labs    Chronic kidney disease, stage 4 (severe)  -Creatine stable for now  -BMP reviewed- noted Estimated Creatinine  Clearance: 9.7 mL/min (A) (based on SCr of 3.8 mg/dL (H)). according to latest data  -Monitor UOP and serial BMP and adjust therapy as needed  -Renally dose meds and avoid nephrotoxins    Essential hypertension  Chronic, controlled  -Latest blood pressure and vitals reviewed-   Temp:  [95.8 °F (35.4 °C)-98.2 °F (36.8 °C)]   Pulse:  [84-98]   Resp:  [14-20]   BP: (113-144)/(55-80)   SpO2:  [93 %-100 %] .   -Home meds for hypertension were reviewed and noted below.   Hypertension Medications               metoprolol succinate (TOPROL-XL) 25 MG 24 hr tablet Take 25 mg by mouth once daily.    torsemide (DEMADEX) 20 MG Tab Take 2 tablets (40 mg total) by mouth 2 (two) times a day.          -While in the hospital, will manage blood pressure as follows; Continue home antihypertensive regimen  -Will utilize PRN blood pressure medication only if patient's blood pressure greater than  180/110 and she develops symptoms such as worsening chest pain or shortness of breath.    Permanent atrial fibrillation  Patient with Paroxysmal (<7 days) atrial fibrillation which is controlled currently with Beta Blocker. Patient is currently in sinus rhythm.[id number] Score: 4. Anticoagulation indicated. Anticoagulation done with Eliquis.    Dyslipidemia   -Patient is not chronically on statin. will not continue for now. -Monitor clinically  -Last LDL was   Lab Results   Component Value Date    LDLCALC 102.6 09/23/2021      -follow up with PCP/cardiology OP      VTE Risk Mitigation (From admission, onward)           Ordered     apixaban tablet 2.5 mg  2 times daily         01/07/23 1540     Place sequential compression device  Until discontinued         01/07/23 1540     IP VTE HIGH RISK PATIENT  Once         01/07/23 1540     Place sequential compression device  Until discontinued         01/07/23 1540                    Discharge Planning   KAREN:      Code Status: DNR   Is the patient medically ready for discharge?:     Reason for patient  still in hospital (select all that apply): Patient trending condition, Laboratory test and Consult recommendations               Laurel Ortiz NP  Department of Hospital Medicine   [facility] - Telemetry ([facility])

## 2023-01-08 NOTE — PROGRESS NOTES
[facility] - Tele[facility] ([facility])  Cardiology  Progress Note    Patient Name: Christy Webber  MRN: [id number]  Admission Date: 1/7/2023  Hospital Length of Stay: 0 days  Code Status: DNR   Attending Physician: Itzel Pagan MD   Primary Care Physician: Christian Douglass MD  Expected Discharge Date:   Principal Problem:Acute on chronic systolic congestive heart failure    Subjective:     Hospital Course:   87 y.o. female patient with a PMHx of HTN, HLD, pacemaker, a-fib, CHF, NSTEMI, chronic respiratory failure with hypoxia, and CAD and dementia who presents to the Emergency Department for evaluation of SOB which onset gradually. SOB worse with exertion and improved at rest.  Pt notes she is on Eliquis. Symptoms are constant and moderate in severity. No mitigating or exacerbating factors reported. Associated sxs include difficulty swallowing, diaphoresis, cough, and CP. Pt describes CP as a discomfort that is centralized on the left side of her chest. Patient denies any black stool, blood in stool, n/v, fever, and all other sxs at this time. Cardiology consulted for CHF. Pt seen and examined today c/o a little SOB, pt denies CP at this time. Labs reviewed Echo EF 35% in May 2022, repeat pending   BNP 3569, Crt 1.7, Troponin 0.049->0.061    86 yo female, cardiology consult for CHF exacerbation  PMH CAD s/p PCi chronic afib, s/p PPM, CHfrEF 40%, RVF, pulm HTN, valvular dz,  HTN HLD CKD, lower back pain  D/c for chf exacerbation one week ago and now dyspnea has been worse,   Work up in the ED notable for BNP 4,582 (3000's last week), Trop 0.09, creatinine 3.6 (was 1.6 on 1/1).  Chest xray showed cardiomegaly, no infiltrates or pneumothorax.  O2 sats are stable on room air.    Labor breathing.   Ekg today V pacing and underlying AFIB    12/30/2022 echo   The left ventricle is mildly enlarged with eccentric hypertrophy and   Left ventricular diastolic dysfunction.   The estimated PA systolic pressure is 65  mmHg.   Mild right ventricular enlargement with mildly reduced right ventricular systolic function.   There is pulmonary hypertension.   Elevated central venous pressure (15 mmHg).   The estimated ejection fraction is 30%.   There is severe left ventricular global hypokinesis.   Severe left atrial enlargement.   Severe right atrial enlargement.   Moderate aortic regurgitation.   Moderate mitral regurgitation.   Moderate to severe tricuspid regurgitation.    01/08/2023 decent UOP per nurse. Cr 3.8 stable VSS. Improved SOB        ROS  Objective:     Vital Signs (Most Recent):  Temp: 98.1 °F (36.7 °C) (01/08/23 1505)  Pulse: 86 (01/08/23 1505)  Resp: 18 (01/08/23 1505)  BP: 118/64 (01/08/23 1505)  SpO2: 96 % (01/08/23 1505) Vital Signs (24h Range):  Temp:  [95.8 °F (35.4 °C)-98.2 °F (36.8 °C)] 98.1 °F (36.7 °C)  Pulse:  [84-87] 86  Resp:  [16-19] 18  SpO2:  [93 %-100 %] 96 %  BP: (113-144)/(55-74) 118/64     Weight: 65.4 kg (144 lb 2.9 oz)  Body mass index is 25.54 kg/m².     SpO2: 96 %       No intake or output data in the 24 hours ending 01/08/23 1751    Lines/Drains/Airways       Peripheral Intravenous Line  Duration                  Peripheral IV - Single Lumen 01/07/23 1600 20 G Anterior;Left;Proximal Forearm 1 day                    Physical Exam  HENT:      Head: Normocephalic.   Eyes:      Pupils: Pupils are equal, round, and reactive to light.   Neck:      Thyroid: No thyromegaly.      Vascular: Normal carotid pulses. JVD present. No carotid bruit.   Cardiovascular:      Rate and Rhythm: Normal rate. Rhythm irregular. No extrasystoles are present.     Chest Wall: PMI is not displaced.      Pulses: Normal pulses.           Carotid pulses are 2+ on the right side and 2+ on the left side.     Heart sounds: Normal heart sounds. No murmur heard.    No gallop. No S3 sounds.   Pulmonary:      Effort: No respiratory distress.      Breath sounds: No stridor. Examination of the right-lower field reveals rales.  Examination of the left-lower field reveals rales. Rales present.   Abdominal:      General: Bowel sounds are normal.      Palpations: Abdomen is soft.      Tenderness: There is no abdominal tenderness. There is no rebound.   Musculoskeletal:         General: Normal range of motion.   Skin:     General: Skin is warm and dry.      Findings: No rash.   Neurological:      Mental Status: She is alert and oriented to person, place, and time.   Psychiatric:         Behavior: Behavior normal.       Significant Labs: ABG: No results for input(s): PH, PCO2, HCO3, POCSATURATED, BE in the last 48 hours., Blood Culture: No results for input(s): LABBLOO in the last 48 hours., BMP:   Recent Labs   Lab 01/07/23  1350 01/08/23  0847   GLU 88 102    138   K 5.1 4.4    107   CO2 14* 15*   BUN 76* 87*   CREATININE 3.6* 3.8*   CALCIUM 10.2 9.8   MG 2.7* 2.6   , CMP   Recent Labs   Lab 01/07/23  1350 01/08/23  0847    138   K 5.1 4.4    107   CO2 14* 15*   GLU 88 102   BUN 76* 87*   CREATININE 3.6* 3.8*   CALCIUM 10.2 9.8   PROT 6.8  --    ALBUMIN 3.5 3.3*   BILITOT 2.7*  --    ALKPHOS 108  --    AST 89*  --    ALT 55*  --    ANIONGAP 20* 16   , CBC   Recent Labs   Lab 01/07/23  1350 01/08/23  0601   WBC 6.67 7.30   HGB 12.0 12.2   HCT 36.6* 37.1    207   , INR No results for input(s): INR, PROTIME in the last 48 hours., Lipid Panel No results for input(s): CHOL, HDL, LDLCALC, TRIG, CHOLHDL in the last 48 hours., and Troponin   Recent Labs   Lab 01/07/23  1350   TROPONINI 0.090*       Significant Imaging: EKG:      Assessment and Plan:     * Acute on chronic systolic congestive heart failure  Patient is identified as having Systolic (HFrEF) heart failure that is Acute on chronic. CHF is currently uncontrolled due to Continued edema of extremities. Latest ECHO performed and demonstrates- Results for orders placed during the hospital encounter of 12/29/22    Echo    Interpretation Summary  · The left  ventricle is mildly enlarged with eccentric hypertrophy and  · Left ventricular diastolic dysfunction.  · The estimated PA systolic pressure is 65 mmHg.  · Mild right ventricular enlargement with mildly reduced right ventricular systolic function.  · There is pulmonary hypertension.  · Elevated central venous pressure (15 mmHg).  · The estimated ejection fraction is 30%.  · There is severe left ventricular global hypokinesis.  · Severe left atrial enlargement.  · Severe right atrial enlargement.  · Moderate aortic regurgitation.  · Moderate mitral regurgitation.  · Moderate to severe tricuspid regurgitation.  . Continue Beta Blocker, ACE/ARB and Furosemide and monitor clinical status closely. Monitor on telemetry. Patient is on CHF pathway.  Monitor strict Is&Os and daily weights.  Place on fluid restriction of 1.5 L. Continue to stress to patient importance of self efficacy and  on diet for CHF. Last BNP reviewed- and noted below   Recent Labs   Lab 01/07/23  1350   BNP 4,582*   .      Fluid overloaded, CKD     -Lasix 80 mg ivp bid  -continue metoprolol  -strict I&)  -ok to hold ARB  -Fluid restriction 1.5 liters a day; Na< 2 gm  -UA pending     01/08/2023  Contiune lasix     Chronic kidney disease, stage 4 (severe)  F/u Cr and UA    Essential hypertension  Continue BB    Permanent atrial fibrillation  S/p PPM  V pacing    01/08/2023  Continue BB and elquis          VTE Risk Mitigation (From admission, onward)         Ordered     apixaban tablet 2.5 mg  2 times daily         01/07/23 1540     Place sequential compression device  Until discontinued         01/07/23 1540     IP VTE HIGH RISK PATIENT  Once         01/07/23 1540     Place sequential compression device  Until discontinued         01/07/23 1540                Jorge Salgado MD  Cardiology  [facility] - Telemetry ([facility])

## 2023-01-08 NOTE — ASSESSMENT & PLAN NOTE
Chronic, controlled  -Latest blood pressure and vitals reviewed-   Temp:  [95.8 °F (35.4 °C)-98.2 °F (36.8 °C)]   Pulse:  [84-98]   Resp:  [14-20]   BP: (113-144)/(55-80)   SpO2:  [93 %-100 %] .   -Home meds for hypertension were reviewed and noted below.   Hypertension Medications             metoprolol succinate (TOPROL-XL) 25 MG 24 hr tablet Take 25 mg by mouth once daily.    torsemide (DEMADEX) 20 MG Tab Take 2 tablets (40 mg total) by mouth 2 (two) times a day.        -While in the hospital, will manage blood pressure as follows; Continue home antihypertensive regimen  -Will utilize PRN blood pressure medication only if patient's blood pressure greater than  180/110 and she develops symptoms such as worsening chest pain or shortness of breath.

## 2023-01-08 NOTE — PLAN OF CARE
POC reviewed with pt. Pt verbalizes understanding of POC. No questions at this time.  AAOx4. NADN.  NSR on cardiac monitor.  Pt remains free of falls.  No complaints at this time.  Safety measures in place.  Informed pt to call for assistance before getting up. Pt verbalizes understanding.

## 2023-01-08 NOTE — CONSULTS
..Christy Webber is a 87 y.o. female for whom nephrology consult has been requested to evaluate and give opinion.     HPI: 87 year old AA female who is a poor historian admitted with abdominal pain as well as shortness of breath. Has multiple co-morbidities including HTN, HLD, CHF, NSTEMI, CAD, and dementia as well as CKD stage 4.  EF reportedly 35% as well. Nephrology consult requested for further evaluation of the patient given admission Scr worse than usual baseline (3.6) from 2's. She has since been seen by Cards and is now on diuretic strategy; again, main c/o now is abdominal pain.     PAST MEDICAL HISTORY:  She  has a past medical history of Atrial flutter, Biatrial enlargement, CAD S/P percutaneous coronary angioplasty, CHF (congestive heart failure), Chronic respiratory failure with hypoxia, on home oxygen therapy, Hyperlipidemia, Hypertension, Non-STEMI (non-ST elevated myocardial infarction), Pacemaker (08/2016), Sick sinus syndrome, and SSS (sick sinus syndrome) (8/24/2016).    PAST SURGICAL HISTORY:  She  has a past surgical history that includes Kidney surgery; Hysterectomy; and Cardiac pacemaker placement.    SOCIAL HISTORY:  She  reports that she quit smoking about 6 years ago. Her smoking use included cigarettes. She has a 94.50 pack-year smoking history. She has never used smokeless tobacco. She reports that she does not currently use alcohol. She reports that she does not use drugs.      FAMILY MEDICAL HISTORY:  Her family history is not on file.    Review of patient's allergies indicates:  No Known Allergies     apixaban  2.5 mg Oral BID    donepeziL  5 mg Oral QHS    furosemide (LASIX) injection  80 mg Intravenous Q12H    metoprolol succinate  25 mg Oral Daily    senna-docusate 8.6-50 mg  1 tablet Oral BID    sodium bicarbonate  650 mg Oral TID       Prior to Admission medications    Medication Sig Start Date End Date Taking? Authorizing Provider   donepeziL (ARICEPT) 5 MG tablet Take 1 tablet  "(5 mg total) by mouth every evening. 1/25/22 1/25/23 Yes Christian Douglass MD   ELIQUIS 2.5 mg Tab TAKE 1 TABLET BY MOUTH TWICE A DAY 11/11/22  Yes Christian Douglass MD   metoprolol succinate (TOPROL-XL) 25 MG 24 hr tablet Take 25 mg by mouth once daily.   Yes Historical Provider   rosuvastatin (CRESTOR) 10 MG tablet TAKE 1 TABLET BY MOUTH EVERY DAY 11/9/22  Yes Christian Douglass MD   torsemide (DEMADEX) 20 MG Tab Take 2 tablets (40 mg total) by mouth 2 (two) times a day. 1/1/23  Yes Mile Castillo NP   diclofenac sodium (VOLTAREN) 1 % Gel Apply topically 4 (four) times daily as needed. 12/16/22   Historical Provider        REVIEW OF SYSTEMS:  Patient has no fever, fatigue, visual changes, chest pain, edema, cough, dyspnea, nausea, vomiting, constipation, diarrhea, arthralgias, pruritis, dizziness, weakness, depression, confusion.        PHYSICAL EXAM:   height is 5' 3" (1.6 m) and weight is 65.4 kg (144 lb 2.9 oz). Her axillary temperature is 98 °F (36.7 °C). Her blood pressure is 117/58 (abnormal) and her pulse is 84. Her respiration is 18 and oxygen saturation is 100%.   Gen: WDWN female in no apparent distress  Psych: Normal mood and affect  Skin: No rashes or ulcers  Eyes: Normal conjunctiva and lids, PERRLA  ENT: Normal hearing with no oropharyngeal lesions  Neck: No JVD  Chest: Clear with no rales, rhonchi, wheezing with normal effort  CV: Regular with no murmurs, gallops or rubs  Abd: Soft, nontender, no distension, positive bowel sounds; mild abdominal pain.   Ext: No cyanosis, clubbing; 1+ edema          IMPRESSION AND RECOMMENDATIONS:    NURIA on CKD stage 4  CHF  Dementia  CAD  Metabolic acidosis    Plan: Patient seen and examined; has been started on diuretic strategy by Cards team; from renal perspective, likely dealing with cardio renal syndrome physiology; she appears mildly volume overloaded on exam; no urgent HD needed; she is of advanced age with dementia and would be a poor " candidate for such; I will check the usual outpt CKD labs and ask RN to check strict I and O and document; meds and labs have been reviewed in detail.  Will follow closely with you and make further recommendations as necessary; RAAS agents on hold at present time appropriately until eGFR returns to normal/baseline.             Anurag Chowdary MD
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
## 2023-01-08 NOTE — ASSESSMENT & PLAN NOTE
Patient is identified as having Systolic (HFrEF) heart failure that is Acute on chronic. CHF is currently controlled. Latest ECHO performed and demonstrates- Results for orders placed during the hospital encounter of 12/29/22    Echo    Interpretation Summary  · The left ventricle is mildly enlarged with eccentric hypertrophy and  · Left ventricular diastolic dysfunction.  · The estimated PA systolic pressure is 65 mmHg.  · Mild right ventricular enlargement with mildly reduced right ventricular systolic function.  · There is pulmonary hypertension.  · Elevated central venous pressure (15 mmHg).  · The estimated ejection fraction is 30%.  · There is severe left ventricular global hypokinesis.  · Severe left atrial enlargement.  · Severe right atrial enlargement.  · Moderate aortic regurgitation.  · Moderate mitral regurgitation.  · Moderate to severe tricuspid regurgitation.  . Continue Beta Blocker and Furosemide and monitor clinical status closely. Monitor on telemetry. Patient is on CHF pathway.  Monitor strict Is&Os and daily weights.  Place on fluid restriction of 1.5 L. Continue to stress to patient importance of self efficacy and  on diet for CHF. Last BNP reviewed- and noted below   Recent Labs   Lab 01/07/23  1350   BNP 4,582*   .  -cardiology following, appreciate  -cont Lasix 80 mg BID per card recs  -monitor labs

## 2023-01-08 NOTE — ASSESSMENT & PLAN NOTE
-Patient is not chronically on statin. will not continue for now. -Monitor clinically  -Last LDL was   Lab Results   Component Value Date    LDLCALC 102.6 09/23/2021      -follow up with PCP/cardiology OP

## 2023-01-08 NOTE — SUBJECTIVE & OBJECTIVE
Interval History: drowsy, easily arousable, NAD. Able to speak complete sentences without difficulty. Reports she is not SOB at the moment. Denies any complaints. Nephrology consult pending, cardiology following.     Review of Systems   Constitutional:  Negative for fatigue and fever.   Respiratory:  Negative for cough and shortness of breath.    Cardiovascular:  Negative for chest pain and palpitations.   Gastrointestinal:  Negative for nausea and vomiting.   All other systems reviewed and are negative.  Objective:     Vital Signs (Most Recent):  Temp: 98 °F (36.7 °C) (01/08/23 1106)  Pulse: 84 (01/08/23 1106)  Resp: 18 (01/08/23 1106)  BP: (!) 117/58 (01/08/23 1106)  SpO2: 100 % (01/08/23 1106)   Vital Signs (24h Range):  Temp:  [95.8 °F (35.4 °C)-98.2 °F (36.8 °C)] 98 °F (36.7 °C)  Pulse:  [84-98] 84  Resp:  [14-20] 18  SpO2:  [93 %-100 %] 100 %  BP: (113-144)/(55-80) 117/58     Weight: 65.4 kg (144 lb 2.9 oz)  Body mass index is 25.54 kg/m².  No intake or output data in the 24 hours ending 01/08/23 1216   Physical Exam  Vitals and nursing note reviewed.   Constitutional:       Appearance: Normal appearance.   Cardiovascular:      Rate and Rhythm: Normal rate and regular rhythm.      Heart sounds: Murmur heard.   Pulmonary:      Effort: Pulmonary effort is normal.      Breath sounds: Normal breath sounds.   Abdominal:      General: Bowel sounds are normal.      Palpations: Abdomen is soft.   Musculoskeletal:         General: Normal range of motion.   Skin:     General: Skin is warm and dry.   Neurological:      General: No focal deficit present.      Mental Status: She is alert and oriented to person, place, and time.   Psychiatric:         Mood and Affect: Mood normal.         Behavior: Behavior normal.       Significant Labs: All pertinent labs within the past 24 hours have been reviewed.  BMP:   Recent Labs   Lab 01/08/23  0847         K 4.4      CO2 15*   BUN 87*   CREATININE 3.8*   CALCIUM  9.8   MG 2.6       CBC:   Recent Labs   Lab 01/07/23  1350 01/08/23  0601   WBC 6.67 7.30   HGB 12.0 12.2   HCT 36.6* 37.1    207       Troponin:   Recent Labs   Lab 01/07/23  1350   TROPONINI 0.090*         Significant Imaging: I have reviewed all pertinent imaging results/findings within the past 24 hours.

## 2023-01-08 NOTE — HOSPITAL COURSE
87 y.o. female patient with a PMHx of HTN, HLD, pacemaker, a-fib, CHF, NSTEMI, chronic respiratory failure with hypoxia, and CAD and dementia who presents to the Emergency Department for evaluation of SOB which onset gradually. SOB worse with exertion and improved at rest.  Pt notes she is on Eliquis. Symptoms are constant and moderate in severity. No mitigating or exacerbating factors reported. Associated sxs include difficulty swallowing, diaphoresis, cough, and CP. Pt describes CP as a discomfort that is centralized on the left side of her chest. Patient denies any black stool, blood in stool, n/v, fever, and all other sxs at this time. Cardiology consulted for CHF. Pt seen and examined today c/o a little SOB, pt denies CP at this time. Labs reviewed Echo EF 35% in May 2022, repeat pending   BNP 3569, Crt 1.7, Troponin 0.049->0.061    88 yo female, cardiology consult for CHF exacerbation  PMH CAD s/p PCi chronic afib, s/p PPM, CHfrEF 40%, RVF, pulm HTN, valvular dz,  HTN HLD CKD, lower back pain  D/c for chf exacerbation one week ago and now dyspnea has been worse,   Work up in the ED notable for BNP 4,582 (3000's last week), Trop 0.09, creatinine 3.6 (was 1.6 on 1/1).  Chest xray showed cardiomegaly, no infiltrates or pneumothorax.  O2 sats are stable on room air.    Labor breathing.   Ekg today V pacing and underlying AFIB    12/30/2022 echo  The left ventricle is mildly enlarged with eccentric hypertrophy and  Left ventricular diastolic dysfunction.  The estimated PA systolic pressure is 65 mmHg.  Mild right ventricular enlargement with mildly reduced right ventricular systolic function.  There is pulmonary hypertension.  Elevated central venous pressure (15 mmHg).  The estimated ejection fraction is 30%.  There is severe left ventricular global hypokinesis.  Severe left atrial enlargement.  Severe right atrial enlargement.  Moderate aortic regurgitation.  Moderate mitral regurgitation.  Moderate to severe  tricuspid regurgitation.    01/08/2023 decent UOP per nurse. Cr 3.8 stable VSS. Improved SOB

## 2023-01-08 NOTE — SUBJECTIVE & OBJECTIVE
ROS  Objective:     Vital Signs (Most Recent):  Temp: 98.1 °F (36.7 °C) (01/08/23 1505)  Pulse: 86 (01/08/23 1505)  Resp: 18 (01/08/23 1505)  BP: 118/64 (01/08/23 1505)  SpO2: 96 % (01/08/23 1505) Vital Signs (24h Range):  Temp:  [95.8 °F (35.4 °C)-98.2 °F (36.8 °C)] 98.1 °F (36.7 °C)  Pulse:  [84-87] 86  Resp:  [16-19] 18  SpO2:  [93 %-100 %] 96 %  BP: (113-144)/(55-74) 118/64     Weight: 65.4 kg (144 lb 2.9 oz)  Body mass index is 25.54 kg/m².     SpO2: 96 %       No intake or output data in the 24 hours ending 01/08/23 1751    Lines/Drains/Airways       Peripheral Intravenous Line  Duration                  Peripheral IV - Single Lumen 01/07/23 1600 20 G Anterior;Left;Proximal Forearm 1 day                    Physical Exam  HENT:      Head: Normocephalic.   Eyes:      Pupils: Pupils are equal, round, and reactive to light.   Neck:      Thyroid: No thyromegaly.      Vascular: Normal carotid pulses. JVD present. No carotid bruit.   Cardiovascular:      Rate and Rhythm: Normal rate. Rhythm irregular. No extrasystoles are present.     Chest Wall: PMI is not displaced.      Pulses: Normal pulses.           Carotid pulses are 2+ on the right side and 2+ on the left side.     Heart sounds: Normal heart sounds. No murmur heard.    No gallop. No S3 sounds.   Pulmonary:      Effort: No respiratory distress.      Breath sounds: No stridor. Examination of the right-lower field reveals rales. Examination of the left-lower field reveals rales. Rales present.   Abdominal:      General: Bowel sounds are normal.      Palpations: Abdomen is soft.      Tenderness: There is no abdominal tenderness. There is no rebound.   Musculoskeletal:         General: Normal range of motion.   Skin:     General: Skin is warm and dry.      Findings: No rash.   Neurological:      Mental Status: She is alert and oriented to person, place, and time.   Psychiatric:         Behavior: Behavior normal.       Significant Labs: ABG: No results for  input(s): PH, PCO2, HCO3, POCSATURATED, BE in the last 48 hours., Blood Culture: No results for input(s): LABBLOO in the last 48 hours., BMP:   Recent Labs   Lab 01/07/23  1350 01/08/23  0847   GLU 88 102    138   K 5.1 4.4    107   CO2 14* 15*   BUN 76* 87*   CREATININE 3.6* 3.8*   CALCIUM 10.2 9.8   MG 2.7* 2.6   , CMP   Recent Labs   Lab 01/07/23  1350 01/08/23  0847    138   K 5.1 4.4    107   CO2 14* 15*   GLU 88 102   BUN 76* 87*   CREATININE 3.6* 3.8*   CALCIUM 10.2 9.8   PROT 6.8  --    ALBUMIN 3.5 3.3*   BILITOT 2.7*  --    ALKPHOS 108  --    AST 89*  --    ALT 55*  --    ANIONGAP 20* 16   , CBC   Recent Labs   Lab 01/07/23  1350 01/08/23  0601   WBC 6.67 7.30   HGB 12.0 12.2   HCT 36.6* 37.1    207   , INR No results for input(s): INR, PROTIME in the last 48 hours., Lipid Panel No results for input(s): CHOL, HDL, LDLCALC, TRIG, CHOLHDL in the last 48 hours., and Troponin   Recent Labs   Lab 01/07/23  1350   TROPONINI 0.090*       Significant Imaging: EKG:

## 2023-01-08 NOTE — PLAN OF CARE
[facility] - Telemetry (Hospital)  Discharge Assessment    Primary Care Provider: Christian Douglass MD     Discharge Assessment (most recent)       BRIEF DISCHARGE ASSESSMENT - 01/08/23 1412          Discharge Planning    Assessment Type Discharge Planning Brief Assessment     Resource/Environmental Concerns none     Support Systems Family members     Equipment Currently Used at Home none     Current Living Arrangements home     Patient/Family Anticipates Transition to --   dependent upon recovery    Patient/Family Anticipated Services at Transition none     DME Needed Upon Discharge  none     Discharge Plan A Home     Discharge Plan B Skilled Nursing Facility                   Spoke to patient's daughter Yeimi [phone], reports patient previously independent with care, no hh or dme, but has had rapid decline and may now need placement in a facility.

## 2023-01-08 NOTE — ASSESSMENT & PLAN NOTE
Patient with Paroxysmal (<7 days) atrial fibrillation which is controlled currently with Beta Blocker. Patient is currently in sinus rhythm.[id number] Score: 4. Anticoagulation indicated. Anticoagulation done with Eliquis.

## 2023-01-09 LAB
ALBUMIN SERPL BCP-MCNC: 3.2 G/DL (ref 3.5–5.2)
ANION GAP SERPL CALC-SCNC: 12 MMOL/L (ref 8–16)
ANION GAP SERPL CALC-SCNC: 12 MMOL/L (ref 8–16)
BASOPHILS # BLD AUTO: 0.12 K/UL (ref 0–0.2)
BASOPHILS NFR BLD: 2.1 % (ref 0–1.9)
BILIRUB UR QL STRIP: NEGATIVE
BUN SERPL-MCNC: 84 MG/DL (ref 8–23)
BUN SERPL-MCNC: 84 MG/DL (ref 8–23)
CALCIUM SERPL-MCNC: 10 MG/DL (ref 8.7–10.5)
CALCIUM SERPL-MCNC: 10 MG/DL (ref 8.7–10.5)
CHLORIDE SERPL-SCNC: 107 MMOL/L (ref 95–110)
CHLORIDE SERPL-SCNC: 107 MMOL/L (ref 95–110)
CLARITY UR: CLEAR
CO2 SERPL-SCNC: 19 MMOL/L (ref 23–29)
CO2 SERPL-SCNC: 19 MMOL/L (ref 23–29)
COLOR UR: YELLOW
CREAT SERPL-MCNC: 3.5 MG/DL (ref 0.5–1.4)
CREAT SERPL-MCNC: 3.5 MG/DL (ref 0.5–1.4)
CREAT UR-MCNC: 40.7 MG/DL (ref 15–325)
DIFFERENTIAL METHOD: ABNORMAL
EOSINOPHIL # BLD AUTO: 0.3 K/UL (ref 0–0.5)
EOSINOPHIL NFR BLD: 5.4 % (ref 0–8)
ERYTHROCYTE [DISTWIDTH] IN BLOOD BY AUTOMATED COUNT: 14.3 % (ref 11.5–14.5)
EST. GFR  (NO RACE VARIABLE): 12 ML/MIN/1.73 M^2
EST. GFR  (NO RACE VARIABLE): 12 ML/MIN/1.73 M^2
GLUCOSE SERPL-MCNC: 91 MG/DL (ref 70–110)
GLUCOSE SERPL-MCNC: 91 MG/DL (ref 70–110)
GLUCOSE UR QL STRIP: NEGATIVE
HCT VFR BLD AUTO: 35.9 % (ref 37–48.5)
HGB BLD-MCNC: 12 G/DL (ref 12–16)
HGB UR QL STRIP: ABNORMAL
HYALINE CASTS #/AREA URNS LPF: 1 /LPF
IMM GRANULOCYTES # BLD AUTO: 0.02 K/UL (ref 0–0.04)
IMM GRANULOCYTES NFR BLD AUTO: 0.4 % (ref 0–0.5)
KETONES UR QL STRIP: NEGATIVE
LEUKOCYTE ESTERASE UR QL STRIP: ABNORMAL
LYMPHOCYTES # BLD AUTO: 1.2 K/UL (ref 1–4.8)
LYMPHOCYTES NFR BLD: 21.2 % (ref 18–48)
MAGNESIUM SERPL-MCNC: 2.5 MG/DL (ref 1.6–2.6)
MCH RBC QN AUTO: 31.7 PG (ref 27–31)
MCHC RBC AUTO-ENTMCNC: 33.4 G/DL (ref 32–36)
MCV RBC AUTO: 95 FL (ref 82–98)
MICROSCOPIC COMMENT: NORMAL
MONOCYTES # BLD AUTO: 0.9 K/UL (ref 0.3–1)
MONOCYTES NFR BLD: 15.4 % (ref 4–15)
NEUTROPHILS # BLD AUTO: 3.2 K/UL (ref 1.8–7.7)
NEUTROPHILS NFR BLD: 55.5 % (ref 38–73)
NITRITE UR QL STRIP: NEGATIVE
NRBC BLD-RTO: 1 /100 WBC
PH UR STRIP: 7 [PH] (ref 5–8)
PHOSPHATE SERPL-MCNC: 4 MG/DL (ref 2.7–4.5)
PHOSPHATE SERPL-MCNC: 4 MG/DL (ref 2.7–4.5)
PLATELET # BLD AUTO: 208 K/UL (ref 150–450)
PMV BLD AUTO: 11.1 FL (ref 9.2–12.9)
POTASSIUM SERPL-SCNC: 4.6 MMOL/L (ref 3.5–5.1)
POTASSIUM SERPL-SCNC: 4.6 MMOL/L (ref 3.5–5.1)
PROT UR QL STRIP: NEGATIVE
PROT UR-MCNC: <7 MG/DL (ref 0–15)
PROT/CREAT UR: NORMAL MG/G{CREAT} (ref 0–0.2)
RBC # BLD AUTO: 3.79 M/UL (ref 4–5.4)
RBC #/AREA URNS HPF: 1 /HPF (ref 0–4)
SODIUM SERPL-SCNC: 138 MMOL/L (ref 136–145)
SODIUM SERPL-SCNC: 138 MMOL/L (ref 136–145)
SP GR UR STRIP: 1.01 (ref 1–1.03)
SQUAMOUS #/AREA URNS HPF: 2 /HPF
URN SPEC COLLECT METH UR: ABNORMAL
UROBILINOGEN UR STRIP-ACNC: NEGATIVE EU/DL
WBC # BLD AUTO: 5.7 K/UL (ref 3.9–12.7)
WBC #/AREA URNS HPF: 4 /HPF (ref 0–5)

## 2023-01-09 PROCEDURE — 99214 PR OFFICE/OUTPT VISIT, EST, LEVL IV, 30-39 MIN: ICD-10-PCS | Mod: HCNC,,, | Performed by: INTERNAL MEDICINE

## 2023-01-09 PROCEDURE — 96374 THER/PROPH/DIAG INJ IV PUSH: CPT | Mod: 59

## 2023-01-09 PROCEDURE — 81000 URINALYSIS NONAUTO W/SCOPE: CPT | Mod: HCNC | Performed by: FAMILY MEDICINE

## 2023-01-09 PROCEDURE — 25000003 PHARM REV CODE 250: Mod: HCNC | Performed by: INTERNAL MEDICINE

## 2023-01-09 PROCEDURE — 63600175 PHARM REV CODE 636 W HCPCS: Mod: HCNC | Performed by: FAMILY MEDICINE

## 2023-01-09 PROCEDURE — 85025 COMPLETE CBC W/AUTO DIFF WBC: CPT | Mod: HCNC | Performed by: INTERNAL MEDICINE

## 2023-01-09 PROCEDURE — 96376 TX/PRO/DX INJ SAME DRUG ADON: CPT

## 2023-01-09 PROCEDURE — 83735 ASSAY OF MAGNESIUM: CPT | Mod: HCNC | Performed by: INTERNAL MEDICINE

## 2023-01-09 PROCEDURE — 97162 PT EVAL MOD COMPLEX 30 MIN: CPT | Mod: HCNC

## 2023-01-09 PROCEDURE — 36415 COLL VENOUS BLD VENIPUNCTURE: CPT | Mod: HCNC | Performed by: INTERNAL MEDICINE

## 2023-01-09 PROCEDURE — 82570 ASSAY OF URINE CREATININE: CPT | Mod: HCNC | Performed by: INTERNAL MEDICINE

## 2023-01-09 PROCEDURE — 97530 THERAPEUTIC ACTIVITIES: CPT | Mod: HCNC

## 2023-01-09 PROCEDURE — 80069 RENAL FUNCTION PANEL: CPT | Mod: HCNC | Performed by: INTERNAL MEDICINE

## 2023-01-09 PROCEDURE — 97166 OT EVAL MOD COMPLEX 45 MIN: CPT | Mod: HCNC

## 2023-01-09 PROCEDURE — 99214 OFFICE O/P EST MOD 30 MIN: CPT | Mod: HCNC,,, | Performed by: INTERNAL MEDICINE

## 2023-01-09 PROCEDURE — 25000003 PHARM REV CODE 250: Mod: HCNC | Performed by: FAMILY MEDICINE

## 2023-01-09 PROCEDURE — G0378 HOSPITAL OBSERVATION PER HR: HCPCS | Mod: HCNC

## 2023-01-09 PROCEDURE — 97116 GAIT TRAINING THERAPY: CPT | Mod: HCNC

## 2023-01-09 RX ORDER — CINACALCET 30 MG/1
30 TABLET, FILM COATED ORAL
Status: DISCONTINUED | OUTPATIENT
Start: 2023-01-10 | End: 2023-01-10 | Stop reason: HOSPADM

## 2023-01-09 RX ORDER — ERGOCALCIFEROL 1.25 MG/1
50000 CAPSULE ORAL
Status: DISCONTINUED | OUTPATIENT
Start: 2023-01-10 | End: 2023-01-10 | Stop reason: HOSPADM

## 2023-01-09 RX ADMIN — SODIUM BICARBONATE 1300 MG: 650 TABLET ORAL at 08:01

## 2023-01-09 RX ADMIN — SENNOSIDES AND DOCUSATE SODIUM 1 TABLET: 50; 8.6 TABLET ORAL at 08:01

## 2023-01-09 RX ADMIN — APIXABAN 2.5 MG: 2.5 TABLET, FILM COATED ORAL at 08:01

## 2023-01-09 RX ADMIN — METOPROLOL SUCCINATE 25 MG: 25 TABLET, EXTENDED RELEASE ORAL at 08:01

## 2023-01-09 RX ADMIN — FUROSEMIDE 80 MG: 10 INJECTION, SOLUTION INTRAMUSCULAR; INTRAVENOUS at 05:01

## 2023-01-09 RX ADMIN — DONEPEZIL HYDROCHLORIDE 5 MG: 5 TABLET, FILM COATED ORAL at 08:01

## 2023-01-09 NOTE — PLAN OF CARE
POC reviewed with pt. Pt verbalizes understanding of POC. No questions at this time  NADN.  NSR on cardiac monitor.  Pt remains free of falls   No complaints at this time.  Safety measures in place.   Informed pt to call for assistance before getting up. Pt verbalizes understanding.

## 2023-01-09 NOTE — SUBJECTIVE & OBJECTIVE
Interval History:  No acute events overnight.  Seen and examined without any family present.  Patient is sitting up in chair eating her lunch on examination.  Denies any complaints at this time including shortness breath, chest pain, nausea.  States that she had bowel movement yesterday.  PT/OT consulted.    Review of Systems   Constitutional:  Negative for chills and fever.   Respiratory:  Negative for cough, shortness of breath and wheezing.    Cardiovascular:  Negative for chest pain and palpitations.   Gastrointestinal:  Negative for abdominal pain, constipation, diarrhea, nausea and vomiting.   Neurological:  Negative for dizziness and headaches.   All other systems reviewed and are negative.  Objective:     Vital Signs (Most Recent):  Temp: 98.7 °F (37.1 °C) (01/09/23 1532)  Pulse: 85 (01/09/23 1700)  Resp: 18 (01/09/23 1532)  BP: (!) 118/59 (01/09/23 1532)  SpO2: 99 % (01/09/23 1532)   Vital Signs (24h Range):  Temp:  [95.8 °F (35.4 °C)-98.7 °F (37.1 °C)] 98.7 °F (37.1 °C)  Pulse:  [85-87] 85  Resp:  [18-21] 18  SpO2:  [94 %-99 %] 99 %  BP: ()/(56-66) 118/59     Weight: 60.4 kg (133 lb 2.5 oz)  Body mass index is 23.59 kg/m².    Intake/Output Summary (Last 24 hours) at 1/9/2023 1721  Last data filed at 1/9/2023 0547  Gross per 24 hour   Intake 100 ml   Output 400 ml   Net -300 ml      Physical Exam  Vitals and nursing note reviewed.   Constitutional:       General: She is not in acute distress.  HENT:      Head: Normocephalic and atraumatic.      Right Ear: External ear normal.      Left Ear: External ear normal.      Nose: Nose normal.      Mouth/Throat:      Mouth: Mucous membranes are moist.   Eyes:      Pupils: Pupils are equal, round, and reactive to light.   Cardiovascular:      Rate and Rhythm: Normal rate and regular rhythm.   Pulmonary:      Effort: Pulmonary effort is normal. No accessory muscle usage or respiratory distress.      Breath sounds: No wheezing.   Abdominal:      General: Bowel  sounds are normal. There is no distension.      Palpations: Abdomen is soft.      Tenderness: There is no abdominal tenderness. There is no guarding or rebound.   Musculoskeletal:      Cervical back: Neck supple.   Skin:     General: Skin is warm and dry.   Neurological:      Mental Status: She is alert. Mental status is at baseline.   Psychiatric:         Mood and Affect: Mood normal.       Significant Labs: All pertinent labs within the past 24 hours have been reviewed.  CBC:   Recent Labs   Lab 01/08/23  0601 01/09/23  0447   WBC 7.30 5.70   HGB 12.2 12.0   HCT 37.1 35.9*    208     CMP:   Recent Labs   Lab 01/08/23  0847 01/09/23 0447    138  138   K 4.4 4.6  4.6    107  107   CO2 15* 19*  19*    91  91   BUN 87* 84*  84*   CREATININE 3.8* 3.5*  3.5*   CALCIUM 9.8 10.0  10.0   ALBUMIN 3.3* 3.2*   ANIONGAP 16 12  12     Magnesium:   Recent Labs   Lab 01/08/23  0847 01/09/23 0447   MG 2.6 2.5       Significant Imaging: I have reviewed all pertinent imaging results/findings within the past 24 hours.

## 2023-01-09 NOTE — ASSESSMENT & PLAN NOTE
Patient with Paroxysmal (<7 days) atrial fibrillation which is controlled currently with Beta Blocker. Patient is currently in sinus rhythm.[id number] Score: 4. Anticoagulation indicated. Anticoagulation done with Eliquis.   within functional limits

## 2023-01-09 NOTE — PT/OT/SLP EVAL
Occupational Therapy   Evaluation    Name: Christy Webber  MRN: [id number]  Admitting Diagnosis: Acute on chronic systolic congestive heart failure  Recent Surgery: * No surgery found *      Recommendations:     Discharge Recommendations: home health OT (PT REQUEST HH O.T.)  Discharge Equipment Recommendations:  walker, rolling  Barriers to discharge:       Assessment:     Christy Webber is a 87 y.o. female with a medical diagnosis of Acute on chronic systolic congestive heart failure.  She presents with DEBILITY AND GENERALIZED WEAKNESS. Performance deficits affecting function: weakness, decreased safety awareness, impaired endurance, gait instability, impaired balance, impaired self care skills.      Rehab Prognosis: Fair; patient would benefit from acute skilled OT services to address these deficits and reach maximum level of function.       Plan:     Patient to be seen 2 x/week to address the above listed problems via self-care/home management, therapeutic activities, therapeutic exercises  Plan of Care Expires: 01/23/23  Plan of Care Reviewed with: patient    Subjective     Chief Complaint: DEBILITY  AND GENERALIZED WEAKNESS  Patient/Family Comments/goals:     Occupational Profile:  Living Environment:  LIVES  WITH  DAUGHTER  IN 1 STORY HOUSE AND NO STEPS TO ENTER  Previous level of function: (I) WITH ADL'S AND FUNCTIONAL MOBILITY  Roles and Routines: OCCUPATIONAL THERAPY  Equipment Used at Home: shower chair  Assistance upon Discharge:   Pain/Comfort:  Pain Rating 1: 0/10    Patients cultural, spiritual, Gnosticism conflicts given the current situation:      Objective:     Communicated with: NURSE AND Epic CHART REVIEW prior to session.  Patient found HOB elevated with peripheral IV, PureWick, telemetry upon OT entry to room.    General Precautions: Standard, fall  Orthopedic Precautions: N/A  Braces: N/A  Respiratory Status: Room air    Occupational Performance:    Bed Mobility:    Patient completed  Rolling/Turning to Left with  minimum assistance  Patient completed Scooting/Bridging with minimum assistance  Patient completed Supine to Sit with minimum assistance    Functional Mobility/Transfers:  Patient completed Sit <> Stand Transfer with minimum assistance  with  rolling walker   Patient completed Bed <> Chair Transfer using Step Transfer technique with moderate assistance with rolling walker  Functional Mobility: PT AMBULATED 8 FEET WITH MOD A X 2     Activities of Daily Living:  Upper Body Dressing: minimum assistance MAX VC FOR INSTRUCTION  Lower Body Dressing: moderate assistance DOFF BRIEF CGA WITH ANAMIKA/DOFF SOCKS  Toileting: moderate assistance .    Cognitive/Visual Perceptual:  Cognitive/Psychosocial Skills:     -       Oriented to: Person, Place, Time, and Situation   -       Follows Commands/attention:Follows multistep  commands  -       Communication: clear/fluent  -       Memory: No Deficits noted  -       Safety awareness/insight to disability: impaired     Physical Exam:  Upper Extremity Range of Motion:     -       Right Upper Extremity: WFL  -       Left Upper Extremity: WFL  Upper Extremity Strength:    -       Right Upper Extremity: MMT: 3/5 GROSSLY  -       Left Upper Extremity: MMT: 3/5 GROSSLY   Strength:    -       Right Upper Extremity: MMT: 3/5 GROSSLY  -       Left Upper Extremity: MMT: 3/5 GROSSLY    AMPAC 6 Click ADL:  AMPAC Total Score: 17    Treatment & Education:  Patient educated on role of OT in acute setting and benefits of participation. Educated on techniques to use to increase independence and decrease fall risk with functional transfers. Educated on importance of OOB activity and calling for A to transfer back to bed.Pt educated on HEP.Pt verbalized and returned demonstration of HEP.  Encouraged completion of B UE AROM therex throughout the day to tolerance to increase functional strength and activity tolerance. Patient stated understanding and in agreement with  POC.      Patient left up in chair with all lines intact, call button in reach, chair alarm on, and nurse notified    GOALS:   Multidisciplinary Problems       Occupational Therapy Goals          Problem: Occupational Therapy    Goal Priority Disciplines Outcome Interventions   Occupational Therapy Goal     OT, PT/OT Ongoing, Progressing    Description: O.T. GOALS TO BE MET BY 1-23-23  PT WILL TOLERATE 1 SET X 15 REPS B UE ROM   SBA WITH TOILET ING  SBA WITH TOILET TRANSFERS  SBA WITH UE DRESSING                           History:     Past Medical History:   Diagnosis Date    Atrial flutter     Biatrial enlargement     CAD S/P percutaneous coronary angioplasty     CHF (congestive heart failure)     Chronic respiratory failure with hypoxia, on home oxygen therapy     Hyperlipidemia     Hypertension     Non-STEMI (non-ST elevated myocardial infarction)     Pacemaker 08/2016    Single lead [employer] Pacemaker for sick sinus syndrome    Sick sinus syndrome     SSS (sick sinus syndrome) 8/24/2016    - pacemaker in place         Past Surgical History:   Procedure Laterality Date    CARDIAC PACEMAKER PLACEMENT      HYSTERECTOMY      KIDNEY SURGERY         Time Tracking:     OT Date of Treatment: 01/10/23  OT Start Time: 1205  OT Stop Time: 1230  OT Total Time (min): 25 min    Billable Minutes:Evaluation 15 minutes  Therapeutic Activity 10 minutes    1/10/2023

## 2023-01-09 NOTE — PLAN OF CARE
POC reviewed with pt. Pt verbalizes understanding of POC. No questions at this time.  AAOx3. NADN.  NSR on cardiac monitor.  Pt remains free of falls   No complaints at this time.  Safety measures in place.   Informed pt to call for assistance before getting up. Pt verbalizes understanding.

## 2023-01-09 NOTE — PROGRESS NOTES
..Christy Webber is a 87 y.o. female for whom nephrology consult has been requested to evaluate and give opinion.     HPI: 87 year old AA female who is a poor historian admitted with abdominal pain as well as shortness of breath. Has multiple co-morbidities including HTN, HLD, CHF, NSTEMI, CAD, and dementia as well as CKD stage 4.  EF reportedly 35% as well. Nephrology consult requested for further evaluation of the patient given admission Scr worse than usual baseline (3.6) from 2's. She has since been seen by Cards and is now on diuretic strategy; again, main c/o now is abdominal pain.     *For 1/9/23: Sleepy but arousable; UOP weak.     PAST MEDICAL HISTORY:  She  has a past medical history of Atrial flutter, Biatrial enlargement, CAD S/P percutaneous coronary angioplasty, CHF (congestive heart failure), Chronic respiratory failure with hypoxia, on home oxygen therapy, Hyperlipidemia, Hypertension, Non-STEMI (non-ST elevated myocardial infarction), Pacemaker (08/2016), Sick sinus syndrome, and SSS (sick sinus syndrome) (8/24/2016).    PAST SURGICAL HISTORY:  She  has a past surgical history that includes Kidney surgery; Hysterectomy; and Cardiac pacemaker placement.    SOCIAL HISTORY:  She  reports that she quit smoking about 6 years ago. Her smoking use included cigarettes. She has a 94.50 pack-year smoking history. She has never used smokeless tobacco. She reports that she does not currently use alcohol. She reports that she does not use drugs.      FAMILY MEDICAL HISTORY:  Her family history is not on file.    Review of patient's allergies indicates:  No Known Allergies     apixaban  2.5 mg Oral BID    donepeziL  5 mg Oral QHS    furosemide (LASIX) injection  80 mg Intravenous Q12H    metoprolol succinate  25 mg Oral Daily    senna-docusate 8.6-50 mg  1 tablet Oral BID    sodium bicarbonate  1,300 mg Oral BID       Prior to Admission medications    Medication Sig Start Date End Date Taking? Authorizing  "Provider   donepeziL (ARICEPT) 5 MG tablet Take 1 tablet (5 mg total) by mouth every evening. 1/25/22 1/25/23 Yes Christian Douglass MD   ELIQUIS 2.5 mg Tab TAKE 1 TABLET BY MOUTH TWICE A DAY 11/11/22  Yes Christian Douglass MD   metoprolol succinate (TOPROL-XL) 25 MG 24 hr tablet Take 25 mg by mouth once daily.   Yes Historical Provider   rosuvastatin (CRESTOR) 10 MG tablet TAKE 1 TABLET BY MOUTH EVERY DAY 11/9/22  Yes Christian Douglass MD   torsemide (DEMADEX) 20 MG Tab Take 2 tablets (40 mg total) by mouth 2 (two) times a day. 1/1/23  Yes Mile Castillo NP   diclofenac sodium (VOLTAREN) 1 % Gel Apply topically 4 (four) times daily as needed. 12/16/22   Historical Provider        REVIEW OF SYSTEMS:  Patient has no fever, fatigue, visual changes, chest pain, edema, cough, dyspnea, nausea, vomiting, constipation, diarrhea, arthralgias, pruritis, dizziness, weakness, depression, confusion.        PHYSICAL EXAM:   height is 5' 3" (1.6 m) and weight is 60.4 kg (133 lb 2.5 oz). Her oral temperature is 98.7 °F (37.1 °C). Her blood pressure is 118/59 (abnormal) and her pulse is 86. Her respiration is 18 and oxygen saturation is 99%.   Gen: WDWN female in no apparent distress  Psych: Normal mood and affect  Skin: No rashes or ulcers  Eyes: Normal conjunctiva and lids, PERRLA  ENT: Normal hearing with no oropharyngeal lesions  Neck: No JVD  Chest: Clear with no rales, rhonchi, wheezing with normal effort  CV: Regular with no murmurs, gallops or rubs  Abd: Soft, nontender, no distension, positive bowel sounds; mild abdominal pain.   Ext: No cyanosis, clubbing; 1+ edema          IMPRESSION AND RECOMMENDATIONS:    NURIA on CKD stage 4  CHF  Dementia  CAD  Metabolic acidosis    Plan: Patient seen and examined; has been started on diuretic strategy by Cards team; from renal perspective, likely dealing with cardio renal syndrome physiology; she appears mildly volume overloaded on exam; no urgent HD needed; " she is of advanced age with dementia and would be a poor candidate for such; I will check the usual outpt CKD labs and ask RN to check strict I and O and document; meds and labs have been reviewed in detail.  Will follow closely with you and make further recommendations as necessary; RAAS agents on hold at present time appropriately until eGFR returns to normal/baseline.     *For 1/9/23: iPTH quite elevated; will add sensipar; lasix per cards team. UOP weak. Ergo to be added for low Vit D stores. No urgent HD needed; poor candidate for such. Bicarb on board for acidosis; Following.               Anurag Chowdary MD

## 2023-01-09 NOTE — ASSESSMENT & PLAN NOTE
-Creatine stable  -BMP reviewed- noted Estimated Creatinine Clearance: 9.5 mL/min (A) (based on SCr of 3.5 mg/dL (H)). according to latest data  -Monitor UOP and serial BMP and adjust therapy as needed  -Renally dose meds and avoid nephrotoxins  -started on ergocalciferol, sodium bicarbonate and Sensipar

## 2023-01-09 NOTE — PROGRESS NOTES
[facility] Medicine  Progress Note    Patient Name: Christy Webber  MRN: [id number]  Patient Class: OP- Observation   Admission Date: 1/7/2023  Length of Stay: 0 days  Attending Physician: Renay Wright MD  Primary Care Provider: Christian Douglass MD        Subjective:     Principal Problem:Acute on chronic systolic congestive heart failure        HPI:  Mrs. Webber is a 86 yo female with a history of heart failure; EF 35%, COPD not on home oxygen, CAD s/p PCi, chronic afib, SSS s/p PPM, HTN, HLD, CKD3 who presented to ED with complaints of dyspnea with activity and fatigue x 3 days.  Symptoms are constant and moderate in nature, no allievating or exacerbating factors; assosciated s/sx are dry non-productive cough and orthopnea.  Of note, she was recently admitted to [facility] from 12/29-1/1 where she was treated for CHF exacerbation and Ecoli UTI.  Per chart review patient approx dry weight is 130lbs; today in ED weight 135lbs.  Work up in the ED notable for BNP 4,582, Trop 0.09, creatinine 3.6 (was 1.6 on 1/1).  Chest xray showed cardiomegaly, no infiltrates or pneumothorax.  O2 sats are stable on room air.  Will consult cards and nephrology.  Will obtain flu/covid testing. Patient will be admitted to observation for acute on chronic kidney disease and dyspnea.   Cardiology/nephrology will be consulted    Code Status: DNR  Surrogate Decision Maker: Telma Strange (daughter)        Overview/Hospital Course:  Patient admitted with c/o dyspnea and increased fatigue x 3 days. She was started on Lasix 80 mg IVP BID by cardiology with fluid restriction of 1.5 L and <2gm Na daily.  As patient is incontinent, purewick ordered for patient to enable I&Os monitoring. SOB improved.   Nephrology following, appears to be cardiorenal syndrome physiology with mild volume overload.  Started on sodium bicarbonate for acidosis, ergocalciferol for vitamin-D deficiency, and Sensipar for elevated  [facility].      Interval History:  No acute events overnight.  Seen and examined without any family present.  Patient is sitting up in chair eating her lunch on examination.  Denies any complaints at this time including shortness breath, chest pain, nausea.  States that she had bowel movement yesterday.  PT/OT consulted.    Review of Systems   Constitutional:  Negative for chills and fever.   Respiratory:  Negative for cough, shortness of breath and wheezing.    Cardiovascular:  Negative for chest pain and palpitations.   Gastrointestinal:  Negative for abdominal pain, constipation, diarrhea, nausea and vomiting.   Neurological:  Negative for dizziness and headaches.   All other systems reviewed and are negative.  Objective:     Vital Signs (Most Recent):  Temp: 98.7 °F (37.1 °C) (01/09/23 1532)  Pulse: 85 (01/09/23 1700)  Resp: 18 (01/09/23 1532)  BP: (!) 118/59 (01/09/23 1532)  SpO2: 99 % (01/09/23 1532)   Vital Signs (24h Range):  Temp:  [95.8 °F (35.4 °C)-98.7 °F (37.1 °C)] 98.7 °F (37.1 °C)  Pulse:  [85-87] 85  Resp:  [18-21] 18  SpO2:  [94 %-99 %] 99 %  BP: ()/(56-66) 118/59     Weight: 60.4 kg (133 lb 2.5 oz)  Body mass index is 23.59 kg/m².    Intake/Output Summary (Last 24 hours) at 1/9/2023 1721  Last data filed at 1/9/2023 0547  Gross per 24 hour   Intake 100 ml   Output 400 ml   Net -300 ml      Physical Exam  Vitals and nursing note reviewed.   Constitutional:       General: She is not in acute distress.  HENT:      Head: Normocephalic and atraumatic.      Right Ear: External ear normal.      Left Ear: External ear normal.      Nose: Nose normal.      Mouth/Throat:      Mouth: Mucous membranes are moist.   Eyes:      Pupils: Pupils are equal, round, and reactive to light.   Cardiovascular:      Rate and Rhythm: Normal rate and regular rhythm.   Pulmonary:      Effort: Pulmonary effort is normal. No accessory muscle usage or respiratory distress.      Breath sounds: No wheezing.   Abdominal:       General: Bowel sounds are normal. There is no distension.      Palpations: Abdomen is soft.      Tenderness: There is no abdominal tenderness. There is no guarding or rebound.   Musculoskeletal:      Cervical back: Neck supple.   Skin:     General: Skin is warm and dry.   Neurological:      Mental Status: She is alert. Mental status is at baseline.   Psychiatric:         Mood and Affect: Mood normal.       Significant Labs: All pertinent labs within the past 24 hours have been reviewed.  CBC:   Recent Labs   Lab 01/08/23  0601 01/09/23  0447   WBC 7.30 5.70   HGB 12.2 12.0   HCT 37.1 35.9*    208     CMP:   Recent Labs   Lab 01/08/23  0847 01/09/23  0447    138  138   K 4.4 4.6  4.6    107  107   CO2 15* 19*  19*    91  91   BUN 87* 84*  84*   CREATININE 3.8* 3.5*  3.5*   CALCIUM 9.8 10.0  10.0   ALBUMIN 3.3* 3.2*   ANIONGAP 16 12  12     Magnesium:   Recent Labs   Lab 01/08/23  0847 01/09/23 0447   MG 2.6 2.5       Significant Imaging: I have reviewed all pertinent imaging results/findings within the past 24 hours.      Assessment/Plan:      * Acute on chronic systolic congestive heart failure  Patient is identified as having Systolic (HFrEF) heart failure that is Acute on chronic. CHF is currently controlled. Latest ECHO performed and demonstrates- Results for orders placed during the hospital encounter of 12/29/22    Echo    Interpretation Summary  · The left ventricle is mildly enlarged with eccentric hypertrophy and  · Left ventricular diastolic dysfunction.  · The estimated PA systolic pressure is 65 mmHg.  · Mild right ventricular enlargement with mildly reduced right ventricular systolic function.  · There is pulmonary hypertension.  · Elevated central venous pressure (15 mmHg).  · The estimated ejection fraction is 30%.  · There is severe left ventricular global hypokinesis.  · Severe left atrial enlargement.  · Severe right atrial enlargement.  · Moderate aortic  regurgitation.  · Moderate mitral regurgitation.  · Moderate to severe tricuspid regurgitation.  . Continue Beta Blocker and Furosemide and monitor clinical status closely. Monitor on telemetry. Patient is on CHF pathway.  Monitor strict Is&Os and daily weights.  Place on fluid restriction of 1.5 L. Continue to stress to patient importance of self efficacy and  on diet for CHF. Last BNP reviewed- and noted below   Recent Labs   Lab 01/07/23  1350   BNP 4,582*   .  -cardiology following, appreciate  -cont IV Lasix 80 mg BID per card recs  -monitor labs  -strict Is&Os    Elevated brain natriuretic peptide (BNP) level  -see above    Metabolic acidosis  -improving with sodium bicarbonate started this admission  -monitor labs    Dyslipidemia   -Patient is not chronically on statin. will not continue for now. -Monitor clinically  -Last LDL was   Lab Results   Component Value Date    LDLCALC 102.6 09/23/2021      -follow up with PCP/cardiology OP    Chronic kidney disease, stage 4 (severe)  -Creatine stable  -BMP reviewed- noted Estimated Creatinine Clearance: 9.5 mL/min (A) (based on SCr of 3.5 mg/dL (H)). according to latest data  -Monitor UOP and serial BMP and adjust therapy as needed  -Renally dose meds and avoid nephrotoxins  -started on ergocalciferol, sodium bicarbonate and Sensipar    Essential hypertension  Chronic, controlled  -Latest blood pressure and vitals reviewed-   Temp:  [95.8 °F (35.4 °C)-98.7 °F (37.1 °C)]   Pulse:  [85-87]   Resp:  [18-21]   BP: ()/(56-66)   SpO2:  [94 %-99 %] .   -Home meds for hypertension were reviewed and noted below.   Hypertension Medications             metoprolol succinate (TOPROL-XL) 25 MG 24 hr tablet Take 25 mg by mouth once daily.    torsemide (DEMADEX) 20 MG Tab Take 2 tablets (40 mg total) by mouth 2 (two) times a day.        -While in the hospital, will manage blood pressure as follows; Continue home antihypertensive regimen  -Will utilize PRN blood  pressure medication only if patient's blood pressure greater than  180/110 and she develops symptoms such as worsening chest pain or shortness of breath.    Permanent atrial fibrillation  Patient with Paroxysmal (<7 days) atrial fibrillation which is controlled currently with Beta Blocker. Patient is currently in sinus rhythm.[id number] Score: 4. Anticoagulation indicated. Anticoagulation done with Eliquis.      VTE Risk Mitigation (From admission, onward)         Ordered     apixaban tablet 2.5 mg  2 times daily         01/07/23 1540     Place sequential compression device  Until discontinued         01/07/23 1540     IP VTE HIGH RISK PATIENT  Once         01/07/23 1540     Place sequential compression device  Until discontinued         01/07/23 1540                Discharge Planning   KAREN:      Code Status: DNR   Is the patient medically ready for discharge?:     Reason for patient still in hospital (select all that apply): Patient trending condition, Treatment, Consult recommendations, PT / OT recommendations and Pending disposition  Discharge Plan A: Home                  Renay Wright MD  Department of Hospital Medicine   [facility])

## 2023-01-09 NOTE — ASSESSMENT & PLAN NOTE
Chronic, controlled  -Latest blood pressure and vitals reviewed-   Temp:  [95.8 °F (35.4 °C)-98.7 °F (37.1 °C)]   Pulse:  [85-87]   Resp:  [18-21]   BP: ()/(56-66)   SpO2:  [94 %-99 %] .   -Home meds for hypertension were reviewed and noted below.   Hypertension Medications             metoprolol succinate (TOPROL-XL) 25 MG 24 hr tablet Take 25 mg by mouth once daily.    torsemide (DEMADEX) 20 MG Tab Take 2 tablets (40 mg total) by mouth 2 (two) times a day.        -While in the hospital, will manage blood pressure as follows; Continue home antihypertensive regimen  -Will utilize PRN blood pressure medication only if patient's blood pressure greater than  180/110 and she develops symptoms such as worsening chest pain or shortness of breath.

## 2023-01-09 NOTE — ASSESSMENT & PLAN NOTE
Patient is identified as having Systolic (HFrEF) heart failure that is Acute on chronic. CHF is currently controlled. Latest ECHO performed and demonstrates- Results for orders placed during the hospital encounter of 12/29/22    Echo    Interpretation Summary  · The left ventricle is mildly enlarged with eccentric hypertrophy and  · Left ventricular diastolic dysfunction.  · The estimated PA systolic pressure is 65 mmHg.  · Mild right ventricular enlargement with mildly reduced right ventricular systolic function.  · There is pulmonary hypertension.  · Elevated central venous pressure (15 mmHg).  · The estimated ejection fraction is 30%.  · There is severe left ventricular global hypokinesis.  · Severe left atrial enlargement.  · Severe right atrial enlargement.  · Moderate aortic regurgitation.  · Moderate mitral regurgitation.  · Moderate to severe tricuspid regurgitation.  . Continue Beta Blocker and Furosemide and monitor clinical status closely. Monitor on telemetry. Patient is on CHF pathway.  Monitor strict Is&Os and daily weights.  Place on fluid restriction of 1.5 L. Continue to stress to patient importance of self efficacy and  on diet for CHF. Last BNP reviewed- and noted below   Recent Labs   Lab 01/07/23  1350   BNP 4,582*   .  -cardiology following, appreciate  -cont IV Lasix 80 mg BID per card recs  -monitor labs  -strict Is&Os

## 2023-01-09 NOTE — PT/OT/SLP EVAL
Physical Therapy Evaluation    Patient Name:  Christy Webber   MRN:  [id number]    Recommendations:     Discharge Recommendations: home health PT   Discharge Equipment Recommendations: walker, rolling   Barriers to discharge: None    Assessment:     Christy Webber is a 87 y.o. female admitted with a medical diagnosis of Acute on chronic systolic congestive heart failure.  She presents with the following impairments/functional limitations: impaired balance, impaired cardiopulmonary response to activity, impaired coordination, impaired endurance, impaired functional mobility, gait instability, decreased safety awareness.    Rehab Prognosis: Good; patient would benefit from acute skilled PT services to address these deficits and reach maximum level of function.    Recent Surgery: * No surgery found *      Plan:     During this hospitalization, patient to be seen 3 x/week to address the identified rehab impairments via gait training, therapeutic activities, therapeutic exercises and progress toward the following goals:    Plan of Care Expires:  01/23/23    Subjective     Chief Complaint: None stated  Patient/Family Comments/goals: Return to prior level of function.   Pain/Comfort:  Pain Rating 1: 0/10    Patients cultural, spiritual, Christianity conflicts given the current situation: no    Living Environment:    Patient lives with her daughter in a single level home with no steps to enter.   Prior to admission, patients level of function was independent.  Equipment used at home: shower chair.  DME owned (not currently used): none.  Upon discharge, patient will have assistance from unknown.    Objective:     Communicated with IVETTE Gillespie prior to session.  Patient found supine with telemetry, peripheral IV, PureWick  upon PT entry to room.    General Precautions: Standard,    Orthopedic Precautions: (None)   Braces: N/A  Respiratory Status: Room air    Exams:  Cognitive Exam:  Patient is oriented to Person, Place, Time, and  Situation  RLE ROM: WFL  RLE Strength: WFL  LLE ROM: WFL  LLE Strength: WFL    Functional Mobility:  Bed Mobility:     Supine to Sit: contact guard assistance  Transfers:     Sit to Stand:  contact guard assistance with rolling walker  Bed to Chair: contact guard assistance with  rolling walker  using  Step Transfer  Toilet Transfer: contact guard assistance with  rolling walker  using  Step Transfer  Gait: 8 feet to restroom, CGA with RW; 8 to bedside chair, CGA with RW      AM-PAC 6 CLICK MOBILITY  Total Score:16       Treatment & Education:    Patient found supine in bed upon PT arrival to room. PT provided education on role of PT and POC. Patient completed all functional mobility, CGA with verbal cues and tactile cues for safety. Patient Min A with perineal care while in the restroom.     Patient left sitting edge of bed with all lines intact and call button in reach.    GOALS:   Multidisciplinary Problems       Physical Therapy Goals          Problem: Physical Therapy    Goal Priority Disciplines Outcome Goal Variances Interventions   Physical Therapy Goal     PT, PT/OT      Description: Patient will be seen a minimal of 3 out of 7 days a week.     LTG to be met by 01/23/23:     Bed mobility: Mod I  Transfers: Mod I with RW  Gait: Mod I with RW x50 feet                       History:     Past Medical History:   Diagnosis Date    Atrial flutter     Biatrial enlargement     CAD S/P percutaneous coronary angioplasty     CHF (congestive heart failure)     Chronic respiratory failure with hypoxia, on home oxygen therapy     Hyperlipidemia     Hypertension     Non-STEMI (non-ST elevated myocardial infarction)     Pacemaker 08/2016    Single lead [employer] Pacemaker for sick sinus syndrome    Sick sinus syndrome     SSS (sick sinus syndrome) 8/24/2016    - pacemaker in place       Past Surgical History:   Procedure Laterality Date    CARDIAC PACEMAKER PLACEMENT      HYSTERECTOMY      KIDNEY SURGERY         Time  Tracking:     PT Received On: 01/09/23  PT Start Time: 1045     PT Stop Time: 1108  PT Total Time (min): 23 min     Billable Minutes: Evaluation 11 and Gait Training 12      01/09/2023

## 2023-01-09 NOTE — PLAN OF CARE
SEE EVAL FOR DETAILS. PT DISPLAYED DEFICITS  WITH FUNCTIONAL MOBILITY/ TRANSFERS, DECREASE B UE STRENGTH/ENDURANCE AND DECREASE ADL'S SKILLS. RECOMMEND: TBD

## 2023-01-09 NOTE — PLAN OF CARE
Initial PT eval completed. Patient ambulated a total of 16 feet in the room, CGA with RW. Recommend home with HH.

## 2023-01-10 ENCOUNTER — TELEPHONE (OUTPATIENT)
Dept: FAMILY MEDICINE | Facility: CLINIC | Age: 88
End: 2023-01-10
Payer: MEDICARE

## 2023-01-10 ENCOUNTER — DOCUMENTATION ONLY (OUTPATIENT)
Dept: CARDIOLOGY | Facility: CLINIC | Age: 88
End: 2023-01-10
Payer: MEDICARE

## 2023-01-10 VITALS
SYSTOLIC BLOOD PRESSURE: 124 MMHG | HEART RATE: 86 BPM | WEIGHT: 140.44 LBS | OXYGEN SATURATION: 96 % | BODY MASS INDEX: 24.88 KG/M2 | RESPIRATION RATE: 18 BRPM | TEMPERATURE: 98 F | DIASTOLIC BLOOD PRESSURE: 59 MMHG | HEIGHT: 63 IN

## 2023-01-10 LAB
ALBUMIN SERPL BCP-MCNC: 3 G/DL (ref 3.5–5.2)
ALBUMIN SERPL BCP-MCNC: 3 G/DL (ref 3.5–5.2)
ALP SERPL-CCNC: 117 U/L (ref 55–135)
ALT SERPL W/O P-5'-P-CCNC: 98 U/L (ref 10–44)
ANION GAP SERPL CALC-SCNC: 13 MMOL/L (ref 8–16)
ANION GAP SERPL CALC-SCNC: 13 MMOL/L (ref 8–16)
AST SERPL-CCNC: 75 U/L (ref 10–40)
BILIRUB DIRECT SERPL-MCNC: 0.6 MG/DL (ref 0.1–0.3)
BILIRUB SERPL-MCNC: 1.2 MG/DL (ref 0.1–1)
BUN SERPL-MCNC: 72 MG/DL (ref 8–23)
BUN SERPL-MCNC: 72 MG/DL (ref 8–23)
CALCIUM SERPL-MCNC: 9.5 MG/DL (ref 8.7–10.5)
CALCIUM SERPL-MCNC: 9.5 MG/DL (ref 8.7–10.5)
CHLORIDE SERPL-SCNC: 107 MMOL/L (ref 95–110)
CHLORIDE SERPL-SCNC: 107 MMOL/L (ref 95–110)
CO2 SERPL-SCNC: 19 MMOL/L (ref 23–29)
CO2 SERPL-SCNC: 19 MMOL/L (ref 23–29)
CREAT SERPL-MCNC: 2.7 MG/DL (ref 0.5–1.4)
CREAT SERPL-MCNC: 2.7 MG/DL (ref 0.5–1.4)
EST. GFR  (NO RACE VARIABLE): 17 ML/MIN/1.73 M^2
EST. GFR  (NO RACE VARIABLE): 17 ML/MIN/1.73 M^2
GLUCOSE SERPL-MCNC: 90 MG/DL (ref 70–110)
GLUCOSE SERPL-MCNC: 90 MG/DL (ref 70–110)
MAGNESIUM SERPL-MCNC: 2.4 MG/DL (ref 1.6–2.6)
PHOSPHATE SERPL-MCNC: 3.7 MG/DL (ref 2.7–4.5)
PHOSPHATE SERPL-MCNC: 3.7 MG/DL (ref 2.7–4.5)
POTASSIUM SERPL-SCNC: 3.7 MMOL/L (ref 3.5–5.1)
POTASSIUM SERPL-SCNC: 3.7 MMOL/L (ref 3.5–5.1)
PROT SERPL-MCNC: 6.2 G/DL (ref 6–8.4)
SODIUM SERPL-SCNC: 139 MMOL/L (ref 136–145)
SODIUM SERPL-SCNC: 139 MMOL/L (ref 136–145)

## 2023-01-10 PROCEDURE — 99214 PR OFFICE/OUTPT VISIT, EST, LEVL IV, 30-39 MIN: ICD-10-PCS | Mod: HCNC,,, | Performed by: INTERNAL MEDICINE

## 2023-01-10 PROCEDURE — 36415 COLL VENOUS BLD VENIPUNCTURE: CPT | Mod: HCNC | Performed by: INTERNAL MEDICINE

## 2023-01-10 PROCEDURE — G0378 HOSPITAL OBSERVATION PER HR: HCPCS | Mod: HCNC

## 2023-01-10 PROCEDURE — 99214 OFFICE O/P EST MOD 30 MIN: CPT | Mod: HCNC,,, | Performed by: INTERNAL MEDICINE

## 2023-01-10 PROCEDURE — 25000003 PHARM REV CODE 250: Mod: HCNC | Performed by: INTERNAL MEDICINE

## 2023-01-10 PROCEDURE — 80076 HEPATIC FUNCTION PANEL: CPT | Mod: HCNC,59 | Performed by: INTERNAL MEDICINE

## 2023-01-10 PROCEDURE — 25000003 PHARM REV CODE 250: Mod: HCNC | Performed by: FAMILY MEDICINE

## 2023-01-10 PROCEDURE — 63600175 PHARM REV CODE 636 W HCPCS: Mod: HCNC | Performed by: FAMILY MEDICINE

## 2023-01-10 PROCEDURE — 96376 TX/PRO/DX INJ SAME DRUG ADON: CPT

## 2023-01-10 PROCEDURE — 63600175 PHARM REV CODE 636 W HCPCS: Mod: HCNC | Performed by: INTERNAL MEDICINE

## 2023-01-10 PROCEDURE — 97530 THERAPEUTIC ACTIVITIES: CPT | Mod: HCNC

## 2023-01-10 PROCEDURE — 83735 ASSAY OF MAGNESIUM: CPT | Mod: HCNC | Performed by: INTERNAL MEDICINE

## 2023-01-10 PROCEDURE — 80069 RENAL FUNCTION PANEL: CPT | Mod: HCNC | Performed by: INTERNAL MEDICINE

## 2023-01-10 RX ORDER — CINACALCET 30 MG/1
30 TABLET, FILM COATED ORAL
Qty: 30 TABLET | Refills: 0 | Status: SHIPPED | OUTPATIENT
Start: 2023-01-11 | End: 2023-10-30

## 2023-01-10 RX ORDER — SODIUM BICARBONATE 650 MG/1
1300 TABLET ORAL 2 TIMES DAILY
Qty: 120 TABLET | Refills: 0 | Status: SHIPPED | OUTPATIENT
Start: 2023-01-10 | End: 2023-01-13

## 2023-01-10 RX ORDER — ERGOCALCIFEROL 1.25 MG/1
50000 CAPSULE ORAL
Qty: 3 CAPSULE | Refills: 0 | Status: SHIPPED | OUTPATIENT
Start: 2023-01-10 | End: 2023-10-30 | Stop reason: DRUGHIGH

## 2023-01-10 RX ORDER — ONDANSETRON 4 MG/1
4 TABLET, ORALLY DISINTEGRATING ORAL EVERY 8 HOURS PRN
Qty: 20 TABLET | Refills: 0 | Status: SHIPPED | OUTPATIENT
Start: 2023-01-10 | End: 2023-10-30

## 2023-01-10 RX ADMIN — CINACALCET 30 MG: 30 TABLET, FILM COATED ORAL at 08:01

## 2023-01-10 RX ADMIN — FUROSEMIDE 80 MG: 10 INJECTION, SOLUTION INTRAMUSCULAR; INTRAVENOUS at 05:01

## 2023-01-10 RX ADMIN — METOPROLOL SUCCINATE 25 MG: 25 TABLET, EXTENDED RELEASE ORAL at 08:01

## 2023-01-10 RX ADMIN — ERGOCALCIFEROL 50000 UNITS: 1.25 CAPSULE ORAL at 08:01

## 2023-01-10 RX ADMIN — SODIUM BICARBONATE 1300 MG: 650 TABLET ORAL at 08:01

## 2023-01-10 RX ADMIN — SENNOSIDES AND DOCUSATE SODIUM 1 TABLET: 50; 8.6 TABLET ORAL at 08:01

## 2023-01-10 RX ADMIN — APIXABAN 2.5 MG: 2.5 TABLET, FILM COATED ORAL at 08:01

## 2023-01-10 NOTE — PROGRESS NOTES
"[facility]) nurse navigator notified of H[facility] candidate in need of education and introduction to 4-6 week program.      PT aao x 3 while lying in bed. Introduced self to pt as HFT[facility] nurse navigator.     Reviewed "Home Care Guide for Heart Failure Patients" given to patient at the previous visit.  Encouraged pt  to review information.      Reviewed the following key points of HFT[facility] program with pt and family:   1.) Take your medications as directed.    2.) Weight yourself daily   3.) Follow low salt and limited fluid diet.    4.) Stop smoking and start exercising   5.) Go to your appointments and call your team.      Pt reminded to follow Symptom tracker and to call at the onset of symptoms according to tracker.     Reviewed plan for follow up once discharged to include phone calls, in person and virtual visits to assist pt optimizing their heart failure medication regimen and encouraging healthy lifestyle modifications.  Reminded pt that program will assist them over the next 4-6 weeks and then patient will be transferred to long term care provider .  Reminded pt how to contact HFT[facility] navigator via phone and or via [employer].     Pt instructed appointment with CHELSI Padilla will be printed on hospital discharge paperwork.     Pt also reminded HF nurse will call 48-72 hours after discharge to check on them.     PT verbalize read back of information given.  Encouraged pt and family to read over information often and contact team with any questions or concerns.      "

## 2023-01-10 NOTE — TELEPHONE ENCOUNTER
----- Message from Vee Dean sent at 1/10/2023  4:19 PM CST -----  Contact: Megan/Daughter  Megan is needing a referral sent to [facility] in [place]. The facilities phone number is [phone]. Please call back with any questions at [phone]
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
## 2023-01-10 NOTE — PLAN OF CARE
[facility] - Telemetry (Hospital)  Discharge Final Note    Primary Care Provider: Christian Douglass MD    Expected Discharge Date: 1/10/2023    Final Discharge Note (most recent)       Final Note - 01/10/23 1535          Final Note    Assessment Type Final Discharge Note     Anticipated Discharge Disposition Home-Health Care [facility]     Hospital Resources/Appts/Education Provided Post-Acute resouces added to AVS        Post-Acute Status    Post-Acute Authorization Home Health     Home Health Status Set-up Complete/Auth obtained     Discharge Delays None known at this time                   Pt to discharge home with daughterMegan and [facility]. [facility]. NP at Home ordered by MD. SW sent in-basket message to Cardiology to arrange hospital follow up.     No CM needs for discharge.     Important Message from Medicare             After-discharge care                Home Medical Care       [facility]   Service: Home Health Services    [address] [facility] [address]   Phone: [phone]

## 2023-01-10 NOTE — CONSULTS
Food & Nutrition Education    Diet Education: Heart Failure  Time Spent: 10 minutes    Learners: Pt    Nutrition Education provided with handouts:  Heart Failure Nutrition Therapy, Fluid-Restricted Diet, Low-Sodium Nutrition Therapy ([email or web address])    Comments:  Dietitian educated patient on low sodium diet and fluid restriction related to hospital diagnosis. Discussed the importance of limiting sodium to 2,000 mg per day and reading food labels to avoid further complications of CHF. Discussed using salt free seasonings and other herbs and spices in meals to enhance flavor without additional sodium. Discussed 1500 ml fluid restriction per MD and dietary sources of fluid. Dietitian recommended using a cup with measurements for fluids and to try to consume small sips spread throughout the day rather than a lot at one time.    NFPE not performed, pt appears well nourished.    All questions and concerns answered.    Provided handout with dietitian's contact information.    *Please re-consult as needed.    Thank You!  Ruben Ng, Registration Eligible, Provisional LDN

## 2023-01-10 NOTE — PT/OT/SLP PROGRESS
Occupational Therapy  Treatment    Christy Webber   MRN: [id number]   Admitting Diagnosis: Acute on chronic systolic congestive heart failure    OT Date of Treatment: 01/10/23   OT Start Time: 1200  OT Stop Time: 1225  OT Total Time (min): 25 min    Billable Minutes:  Therapeutic Activity 25 minutes    OT/TRU: OT          General Precautions: Standard, fall  Orthopedic Precautions: N/A  Braces: N/A  Respiratory Status: Room air         Subjective:  Communicated with nurse and epic chart review prior to session.    Pain/Comfort  Pain Rating 1: 0/10    Objective:  Patient found with: peripheral IV, PureWick, telemetry     Functional Mobility:  Bed Mobility:  Sba with rolling r<>l  Sba with supine<SIT  SBA WITH FORWARD SEATED SCOOTING EOB WITH FAIR    Transfers:  SBA / CGA  WITH ROLLING WALKER    Functional Ambulation: SBA/ CGA WITH FUNCTIONAL MOBILITY X 28 FEET WITH MIN A WITH NAVIGATING ROLLING WALKER DURING TURNS. PT C/O OF FEELING REALLY DIZZY    Activities of Daily Living:  UE  MIN A WITH [facility]    Balance:   Static Sit: FAIR+: Able to take MINIMAL challenges from all directions  Dynamic Sit: FAIR+: Maintains balance through MINIMAL excursions of active trunk motion  Static Stand: FAIR: Maintains without assist but unable to take challenges  Dynamic stand: FAIR: Needs CONTACT GUARD during gait    Therapeutic Activities and Exercises:  PT    AM-PAC 6 CLICK ADL   How much help from another person does this patient currently need?   1 = Unable, Total/Dependent Assistance  2 = A lot, Maximum/Moderate Assistance  3 = A little, Minimum/Contact Guard/Supervision  4 = None, Modified [place]/Independent    Putting on and taking off regular lower body clothing? : 2  Bathing (including washing, rinsing, drying)?: 2  Toileting, which includes using toilet, bedpan, or urinal? : 2  Putting on and taking off regular upper body clothing?: 3  Taking care of personal grooming such as brushing teeth?: 4  Eating  "meals?: 4  Daily Activity Total Score: 17     AM-PAC Raw Score CMS "G-Code Modifier Level of Impairment Assistance   6 % Total / Unable   7 - 8 CM 80 - 100% Maximal Assist   9-13 CL 60 - 80% Moderate Assist   14 - 19 CK 40 - 60% Moderate Assist   20 - 22 CJ 20 - 40% Minimal Assist   23 CI 1-20% SBA / CGA   24 CH 0% Independent/ Mod I       Patient left up in chair with all lines intact, call button in reach, and NURSE notified    ASSESSMENT:  Christy Webber is a 87 y.o. female with a medical diagnosis of Acute on chronic systolic congestive heart failure and presents with DEBILITY AND GENERALIZED WEAKNESS.    Rehab identified problem list/impairments:  weakness, decreased safety awareness, impaired endurance, gait instability, impaired balance, impaired self care skills    Rehab potential is good.    Activity tolerance: Good    Discharge recommendations: home health OT (PT REQUEST HH O.T.)   Barriers to discharge:      Equipment recommendations: walker, rolling    GOALS:   Multidisciplinary Problems       Occupational Therapy Goals          Problem: Occupational Therapy    Goal Priority Disciplines Outcome Interventions   Occupational Therapy Goal     OT, PT/OT Ongoing, Progressing    Description: O.T. GOALS TO BE MET BY 1-23-23  PT WILL TOLERATE 1 SET X 15 REPS B UE ROM   SBA WITH TOILET ING  SBA WITH TOILET TRANSFERS  SBA WITH UE DRESSING                           Plan:  Patient to be seen 2 x/week to address the above listed problems via self-care/home management, therapeutic activities, therapeutic exercises  Plan of Care expires: 01/23/23  Plan of Care reviewed with: patient         01/10/2023  "

## 2023-01-10 NOTE — PLAN OF CARE
[facility] - Telemetry (Hospital)  Discharge Reassessment    Primary Care Provider: Christian Douglass MD    Expected Discharge Date:     Reassessment (most recent)       Discharge Reassessment - 01/10/23 1441          Discharge Reassessment    Assessment Type Discharge Planning Reassessment     Did the patient's condition or plan change since previous assessment? No     Discharge Plan discussed with: Patient;Adult children     Communicated KAREN with patient/caregiver Yes     Discharge Plan A Home with family;Home Health     Discharge Plan B Home with family;Home Health     DME Needed Upon Discharge  none     Discharge Barriers Identified None     Why the patient remains in the hospital Social issues;Requires continued medical care        Post-Acute Status    Discharge Delays None known at this time                   SW spoke with pt's children, Megan and Milo by phone regarding discharge plan. Milo states that they've been working on getting patient into [facility] in [place]. Milo state they've just initiated the process and states facility needs referral from hospital. SW offered to fax referral to [facility] and obtain PASRR/142 to provide to facility. SW explained that patient cannot remain in hospital while family pursue's jail placement. SW explained that pt does not meet criteria for SNF placement and PT/OT recommend home health. SW confirmed that pt will discharge to Megan's home (address on file) with home health services. SW advised that MD will likely want to discharge today or tomorrow.   SW to f/u to coordinate discharge.

## 2023-01-10 NOTE — PROGRESS NOTES
..Christy Webber is a 87 y.o. female for whom nephrology consult has been requested to evaluate and give opinion.     HPI: 87 year old AA female who is a poor historian admitted with abdominal pain as well as shortness of breath. Has multiple co-morbidities including HTN, HLD, CHF, NSTEMI, CAD, and dementia as well as CKD stage 4.  EF reportedly 35% as well. Nephrology consult requested for further evaluation of the patient given admission Scr worse than usual baseline (3.6) from 2's. She has since been seen by Cards and is now on diuretic strategy; again, main c/o now is abdominal pain.     *For 1/9/23: Sleepy but arousable; UOP weak.     *For 1/10/23: None voiced; a bit confused; thinks her mother is visiting. Son at bedside.     PAST MEDICAL HISTORY:  She  has a past medical history of Atrial flutter, Biatrial enlargement, CAD S/P percutaneous coronary angioplasty, CHF (congestive heart failure), Chronic respiratory failure with hypoxia, on home oxygen therapy, Hyperlipidemia, Hypertension, Non-STEMI (non-ST elevated myocardial infarction), Pacemaker (08/2016), Sick sinus syndrome, and SSS (sick sinus syndrome) (8/24/2016).    PAST SURGICAL HISTORY:  She  has a past surgical history that includes Kidney surgery; Hysterectomy; and Cardiac pacemaker placement.    SOCIAL HISTORY:  She  reports that she quit smoking about 6 years ago. Her smoking use included cigarettes. She has a 94.50 pack-year smoking history. She has never used smokeless tobacco. She reports that she does not currently use alcohol. She reports that she does not use drugs.      FAMILY MEDICAL HISTORY:  Her family history is not on file.    Review of patient's allergies indicates:  No Known Allergies     apixaban  2.5 mg Oral BID    cinacalcet  30 mg Oral Daily with breakfast    donepeziL  5 mg Oral QHS    ergocalciferol  50,000 Units Oral Q7 Days    furosemide (LASIX) injection  80 mg Intravenous Q12H    metoprolol succinate  25 mg Oral Daily     "senna-docusate 8.6-50 mg  1 tablet Oral BID    sodium bicarbonate  1,300 mg Oral BID       Prior to Admission medications    Medication Sig Start Date End Date Taking? Authorizing Provider   donepeziL (ARICEPT) 5 MG tablet Take 1 tablet (5 mg total) by mouth every evening. 1/25/22 1/25/23 Yes Christian Douglass MD   ELIQUIS 2.5 mg Tab TAKE 1 TABLET BY MOUTH TWICE A DAY 11/11/22  Yes Christian Douglass MD   metoprolol succinate (TOPROL-XL) 25 MG 24 hr tablet Take 25 mg by mouth once daily.   Yes Historical Provider   rosuvastatin (CRESTOR) 10 MG tablet TAKE 1 TABLET BY MOUTH EVERY DAY 11/9/22  Yes Christian Douglass MD   torsemide (DEMADEX) 20 MG Tab Take 2 tablets (40 mg total) by mouth 2 (two) times a day. 1/1/23  Yes Mile Castillo NP   diclofenac sodium (VOLTAREN) 1 % Gel Apply topically 4 (four) times daily as needed. 12/16/22   Historical Provider        REVIEW OF SYSTEMS:  Patient has no fever, fatigue, visual changes, chest pain, edema, cough, dyspnea, nausea, vomiting, constipation, diarrhea, arthralgias, pruritis, dizziness, weakness, depression, confusion.        PHYSICAL EXAM:   height is 5' 3" (1.6 m) and weight is 63.7 kg (140 lb 6.9 oz). Her axillary temperature is 97.4 °F (36.3 °C). Her blood pressure is 124/62 and her pulse is 84. Her respiration is 18 and oxygen saturation is 91% (abnormal).   Gen: WDWN female in no apparent distress  Psych: Normal mood and affect  Skin: No rashes or ulcers  Eyes: Normal conjunctiva and lids, PERRLA  ENT: Normal hearing with no oropharyngeal lesions  Neck: No JVD  Chest: Clear with no rales, rhonchi, wheezing with normal effort  CV: Regular with no murmurs, gallops or rubs  Abd: Soft, nontender, no distension, positive bowel sounds; mild abdominal pain.   Ext: No cyanosis, clubbing; 1+ edema          IMPRESSION AND RECOMMENDATIONS:    NURIA on CKD stage 4  CHF  Dementia  CAD  Metabolic acidosis    Plan: Patient seen and examined; has been started " on diuretic strategy by Cards team; from renal perspective, likely dealing with cardio renal syndrome physiology; she appears mildly volume overloaded on exam; no urgent HD needed; she is of advanced age with dementia and would be a poor candidate for such; I will check the usual outpt CKD labs and ask RN to check strict I and O and document; meds and labs have been reviewed in detail.  Will follow closely with you and make further recommendations as necessary; RAAS agents on hold at present time appropriately until eGFR returns to normal/baseline.     *For 1/9/23: iPTH quite elevated; will add sensipar; lasix per cards team. UOP weak. Ergo to be added for low Vit D stores. No urgent HD needed; poor candidate for such. Bicarb on board for acidosis; Following.     *For 1/10/23:  No urgent HD need; meds and labs reviewed; Sensipar added for 2 HPT; can f/u with us in office setting; no further renal recommendations.  Updated son in detail. eGFR nearing baseline.       Anurag Chowdary MD

## 2023-01-11 DIAGNOSIS — N18.32 STAGE 3B CHRONIC KIDNEY DISEASE: Primary | ICD-10-CM

## 2023-01-12 ENCOUNTER — TELEPHONE (OUTPATIENT)
Dept: CARDIOLOGY | Facility: CLINIC | Age: 88
End: 2023-01-12
Payer: MEDICARE

## 2023-01-12 ENCOUNTER — OUTPATIENT CASE MANAGEMENT (OUTPATIENT)
Dept: ADMINISTRATIVE | Facility: OTHER | Age: 88
End: 2023-01-12
Payer: MEDICARE

## 2023-01-12 NOTE — TELEPHONE ENCOUNTER
"[facility]) hospital discharge 48-72 hour phone follow up completed.     Most Recent Hospital Discharge Date: 1/7/2023  Last admission Diagnosis/chief complaint:Acute on chronic systolic CHF    TCC nurse Navigator spoke with Megan daughter.   Megan informed [facility] nurse came out for visit yesterday.     Current Patient reported weight: 126 lb    Current Patient reported blood pressure and heart rate: Could not remember BP readings.    Pt reports the following:  []  Shortness of Breath with Activity  []  Shortness of Breath at rest   []  Fatigue  []  Edema   [] Chest pain or tightness  [] Weight Increase since discharge  [x] None of the above    Medications:   Discharge medication reviewed with pt.  Pt reports having medication list available and has all medications at home for use per list.     Education:   Confirmed pt received "Home Care Guide for Heart Failure Patients" while admitted.   Reviewed key points as listed below.     Recommend 2 -3 gram sodium restriction and 1500 cc-2000 cc fluid restriction.  Encourage physical activity with graded exercise program.  Requested patient to weigh themselves daily, and to notify us if their weight increases by more than 3 lbs in 1 day or 5 lbs in 3 days.   Reminded patient to use "Daily weight and symptom tracker" to record and guide patient on when and how to call HFT[facility]. PT may also use symptom tracker if no scale available  Pt reports being in the green(color) Zone. If in yellow/red, reminded that they should be calling HFT[facility] today or now.     Watch for these Signs and Symptoms: If any of these occur, contact HFT[facility] immediately:   Increase in shortness of breath with movement   Increase in swelling in your legs and ankles   Weight gain of more than 3 pounds in a day or 5 pounds in 3 days.   Difficulty breathing when you are lying down   Worsening fatigue or tiredness   Stomach bloating, a full feeling or a loss of appetite   Increased " coughing--especially when you are lying down      Megan was able to verbalize back to nurse in their own words correct diet/fluid restrictions, necessity for exercise, warning signs and symptoms, when and how to contact their TCC team.      Megan educated on follow-up plan while in HFT[facility] program to include:   Week 1 -  F/u appt with CHELSI Padilla on 1/13/2023.   Week 2-5 - In person/ Virtual/ phone call check ins    Week 5-7 - Pt will discharge from [facility] and transition to longterm care provider (Cardiology/PCP/ Advanced Heart Failure).      Patient active on myChart? yes      Megan given the following contact information for ease of communication: [phone] (Mon-Fri, 8a-5p) & for urgent issues on the weekend call Janee on-call [phone] to page the Cardiology MD on call.  Pt also encouraged utilize [facility] messaging as well.      Will follow up with pt at first clinic visit and HF nurse navigator available for pt questions, issues or concerns.

## 2023-01-13 ENCOUNTER — OFFICE VISIT (OUTPATIENT)
Dept: CARDIOLOGY | Facility: CLINIC | Age: 88
End: 2023-01-13
Payer: MEDICARE

## 2023-01-13 VITALS
HEIGHT: 63 IN | BODY MASS INDEX: 22.89 KG/M2 | DIASTOLIC BLOOD PRESSURE: 56 MMHG | HEART RATE: 82 BPM | SYSTOLIC BLOOD PRESSURE: 130 MMHG | WEIGHT: 129.19 LBS

## 2023-01-13 DIAGNOSIS — I50.9 ACUTE DECOMPENSATED HEART FAILURE: ICD-10-CM

## 2023-01-13 DIAGNOSIS — I50.23 ACUTE ON CHRONIC SYSTOLIC CONGESTIVE HEART FAILURE: Primary | ICD-10-CM

## 2023-01-13 DIAGNOSIS — E78.5 DYSLIPIDEMIA: Chronic | ICD-10-CM

## 2023-01-13 PROCEDURE — 1126F PR PAIN SEVERITY QUANTIFIED, NO PAIN PRESENT: ICD-10-PCS | Mod: HCNC,CPTII,S$GLB, | Performed by: PHYSICIAN ASSISTANT

## 2023-01-13 PROCEDURE — 1126F AMNT PAIN NOTED NONE PRSNT: CPT | Mod: HCNC,CPTII,S$GLB, | Performed by: PHYSICIAN ASSISTANT

## 2023-01-13 PROCEDURE — 99204 OFFICE O/P NEW MOD 45 MIN: CPT | Mod: HCNC,S$GLB,, | Performed by: PHYSICIAN ASSISTANT

## 2023-01-13 PROCEDURE — 99204 PR OFFICE/OUTPT VISIT, NEW, LEVL IV, 45-59 MIN: ICD-10-PCS | Mod: HCNC,S$GLB,, | Performed by: PHYSICIAN ASSISTANT

## 2023-01-13 PROCEDURE — 99999 PR PBB SHADOW E&M-EST. PATIENT-LVL III: ICD-10-PCS | Mod: PBBFAC,HCNC,, | Performed by: PHYSICIAN ASSISTANT

## 2023-01-13 PROCEDURE — 99999 PR PBB SHADOW E&M-EST. PATIENT-LVL III: CPT | Mod: PBBFAC,HCNC,, | Performed by: PHYSICIAN ASSISTANT

## 2023-01-13 RX ORDER — ROSUVASTATIN CALCIUM 10 MG/1
10 TABLET, COATED ORAL DAILY
Qty: 90 TABLET | Refills: 3 | Status: SHIPPED | OUTPATIENT
Start: 2023-01-13 | End: 2023-10-30

## 2023-01-13 RX ORDER — SODIUM BICARBONATE 650 MG/1
1300 TABLET ORAL 2 TIMES DAILY
Qty: 220 TABLET | Refills: 3 | Status: SHIPPED | OUTPATIENT
Start: 2023-01-13 | End: 2023-10-30
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
## 2023-01-16 PROCEDURE — G0179 MD RECERTIFICATION HHA PT: HCPCS | Mod: ,,, | Performed by: INTERNAL MEDICINE

## 2023-01-16 PROCEDURE — G0179 PR HOME HEALTH MD RECERTIFICATION: ICD-10-PCS | Mod: ,,, | Performed by: INTERNAL MEDICINE

## 2023-01-17 ENCOUNTER — PES CALL (OUTPATIENT)
Dept: ADMINISTRATIVE | Facility: CLINIC | Age: 88
End: 2023-01-17
Payer: MEDICARE

## 2023-01-17 ENCOUNTER — OUTPATIENT CASE MANAGEMENT (OUTPATIENT)
Dept: ADMINISTRATIVE | Facility: OTHER | Age: 88
End: 2023-01-17
Payer: MEDICARE

## 2023-01-17 NOTE — PROGRESS NOTES
Outpatient Care Management   - High Risk Patient Assessment    Patient: Christy Webber  MRN:  [id number]  Date of Service:  1/17/2023  Completed by:  Thalia Medina LMSW  Referral Date: 12/21/2022    Reason for Visit   Patient presents with    [facility] Chart Review    Social Work Assessment - High Risk    Plan Of Care     1/17/2023       Brief Summary:  received a referral from OPCM RN Zoila Machado for the following patient identified psycho-social needs: financial resources, renovation resources. SW completed social assessment with pt's caregiver. Megan due to patient having dementia and experiences confusion. Pt lives with her adult children, Megan and Milo. Megan is pt's primary caregiver due to her brother having partial paralysis. Pt is independent with getting dressed, but needs assistance with bathing and cooking. Pt was living at [facility] before [facility], but facility has not been fixed yet. Family has started working on nursing home placement at [facility] but have not completed the paperwork.  Megan stated they need renovations completed on their bathroom (shower wall and floor). Pt is getting home health services. Pt uses a walker and a wheelchair at times (going to the store and MD appmts). Megan has been taking her to MD appointments herself. JOHANNA gave her [employer] contact information for repairs. JOHANNA will send referral to [employer] for utility assistance. JOHANNA will follow up in 1 week. Care plan was created in collaboration with patient/caregiver input.

## 2023-01-19 ENCOUNTER — OUTPATIENT CASE MANAGEMENT (OUTPATIENT)
Dept: ADMINISTRATIVE | Facility: OTHER | Age: 88
End: 2023-01-19
Payer: MEDICARE

## 2023-01-19 NOTE — LETTER
January 19, 2023           Dear Christy,     Welcome to [facility] Complex Care Management Program.  It was a pleasure talking with you today.  My name is Zoila Machado. I look forward to working with you as your .  My goal is to help you function at the healthiest and highest level possible.  You can contact me directly at [phone].    As an [facility] patient with [employer], some of the services we may be able to provide include:      Development of an individualized care plan with a Registered Nurse    Connection with a    Connection with available resources and services     Coordinate communication among your care team members    Provide coaching and education    Help you understand your doctors treatment plan   Help you obtain information about your insurance coverage.     All services provided by [facility]s Complex Care Managers and other care team members are coordinated with and communicated to your primary care team.    As part of your enrollment, you will be receiving education materials and more information about these services in your [facility] account, by phone or through the mail.  If you do not wish to participate or receive information, please contact our office at [phone].      Sincerely,        Zoila Machado, RN  [facility]   Outpatient RN Complex Care Manager

## 2023-01-19 NOTE — PROGRESS NOTES
"  [facility]  Transition of Care Home Visit    Visit Date: 1/20/2023  Encounter Provider: Rolanda Martinez   PCP:  Christian Douglass MD    PRESENTING HISTORY      Patient ID: Christy Webber is a 87 y.o. female.    Consult Requested By:  Dr. Renay Wright  Reason for Consult:  Hospital Follow Up.    Christy is being seen at home due to being seen at home due to physical debility that presents a taxing effort to leave the home, to mitigate high risk of hospital readmission and/or due to the limited availability of reliable or safe options for transportation to the point of access to the provider. Prior to treatment on this visit the chart was reviewed and patient verbal consent was obtained.      Chief Complaint: Transitional Care    Patient was admitted to hospital on 01/07 and discharged to home on 01/10    History of Present Illness: Ms. Christy Webber is a 87 y.o. female who was recently admitted to the hospital who presented to the Emergency Department for generalized weakness which onset earlier today. No mitigating or exacerbating factors reported.  Patient denies any n/v, headache, numbness, sleep disturbance, back pain and all other sxs at this time. No further complaints or concerns at this time       ___________________________________________________________________    Today:    HPI:  Patient is being being seen today for a transitional care visit. See hospital course for details. Upon arrival patient was laying in bed. Her daughter and son were present for the visit. Patient ambulated into the living area. Patient was alert and oriented x 3 in no acute distress. Daughter reports that the patient has "good and bad days" when it comes to her memory. Daughter report that the patient has a poor appetite because she does not like to eat low salt foods. Her weight was 127 lbs during the visit. Patient denies any chest pain and reports shortness of breath with exertion. Medications reviewed with " "daughter and she reports compliance with all medications. Patient does report that she has issues with constipation. VSS.          Review of Systems   Constitutional:  Positive for appetite change (poor per daughter) and fatigue.   HENT: Negative.     Eyes:  Positive for visual disturbance (wears glasses).   Respiratory:  Positive for cough ("ever once and a while" non productive) and shortness of breath (with exertion).    Cardiovascular:  Negative for chest pain and leg swelling.   Gastrointestinal:  Positive for constipation. Negative for diarrhea, nausea and vomiting.   Endocrine: Negative.    Genitourinary: Negative.    Musculoskeletal: Negative.    Skin: Negative.    Allergic/Immunologic: Negative.    Neurological:  Positive for light-headedness.   Psychiatric/Behavioral:  Positive for sleep disturbance.      Assessments:  Environmental: Patient lives in a single story home without steps at the entrance. Lighting is dim and temperature is comfortable  Functional Status: Patient is ambulatory and independent with ADLS  Safety: Fall Precautions   Nutritional: Adequate food in the home  Home Health/DME/Supplies: [facility], DMEs: shower chair, wheelchair    PAST HISTORY:     Past Medical History:   Diagnosis Date    Atrial flutter     Biatrial enlargement     CAD S/P percutaneous coronary angioplasty     CHF (congestive heart failure)     Chronic respiratory failure with hypoxia, on home oxygen therapy     Hyperlipidemia     Hypertension     Non-STEMI (non-ST elevated myocardial infarction)     Pacemaker 08/2016    Single lead [employer] Pacemaker for sick sinus syndrome    Sick sinus syndrome     SSS (sick sinus syndrome) 8/24/2016    - pacemaker in place       Past Surgical History:   Procedure Laterality Date    CARDIAC PACEMAKER PLACEMENT      HYSTERECTOMY      KIDNEY SURGERY         No family history on file.    Social History     Socioeconomic History    Marital status:    Tobacco Use    " Smoking status: Former     Packs/day: 1.50     Years: 63.00     Pack years: 94.50     Types: Cigarettes     Quit date: 2016     Years since quittin.6    Smokeless tobacco: Never   Substance and Sexual Activity    Alcohol use: Not Currently    Drug use: No    Sexual activity: Not Currently     Social Determinants of Health     Financial Resource Strain: Low Risk     Difficulty of Paying Living Expenses: Not hard at all   Food Insecurity: No Food Insecurity    Worried About Running Out of Food in the Last Year: Never true    Ran Out of Food in the Last Year: Never true   Transportation Needs: No Transportation Needs    Lack of Transportation (Medical): No    Lack of Transportation (Non-Medical): No   Physical Activity: Inactive    Days of Exercise per Week: 0 days    Minutes of Exercise per Session: 0 min   Stress: Stress Concern Present    Feeling of Stress : Very much   Social Connections: Unknown    Frequency of Communication with Friends and Family: Once a week    Attends Taoism Services: Never    Active Member of Clubs or Organizations: No    Attends Club or Organization Meetings: Never    Marital Status:    Housing Stability: Low Risk     Unable to Pay for Housing in the Last Year: No    Number of Places Lived in the Last Year: 1    Unstable Housing in the Last Year: No       MEDICATIONS & ALLERGIES:     Current Outpatient Medications on File Prior to Visit   Medication Sig Dispense Refill    cinacalcet (SENSIPAR) 30 MG Tab Take 1 tablet (30 mg total) by mouth daily with breakfast. 30 tablet 0    donepeziL (ARICEPT) 5 MG tablet Take 1 tablet (5 mg total) by mouth every evening. 90 tablet 3    ELIQUIS 2.5 mg Tab TAKE 1 TABLET BY MOUTH TWICE A DAY 60 tablet 6    ergocalciferol (ERGOCALCIFEROL) 50,000 unit Cap Take 1 capsule (50,000 Units total) by mouth every Tuesday. 3 capsule 0    metoprolol succinate (TOPROL-XL) 25 MG 24 hr tablet Take 25 mg by mouth once daily.      rosuvastatin (CRESTOR) 10  MG tablet Take 1 tablet (10 mg total) by mouth once daily. 90 tablet 3    torsemide (DEMADEX) 20 MG Tab Take 2 tablets (40 mg total) by mouth 2 (two) times a day. 180 tablet 3    diclofenac sodium (VOLTAREN) 1 % Gel Apply topically 4 (four) times daily as needed.      ondansetron (ZOFRAN-ODT) 4 MG TbDL Take 1 tablet (4 mg total) by mouth every 8 (eight) hours as needed (Nausea/vomiting). 20 tablet 0    sodium bicarbonate 650 MG tablet Take 2 tablets (1,300 mg total) by mouth 2 (two) times daily. 220 tablet 3     No current facility-administered medications on file prior to visit.        Review of patient's allergies indicates:  No Known Allergies    OBJECTIVE:     Vital Signs:  Vitals:    01/20/23 1212   BP: (!) 96/50   Pulse: 82   Resp: 18   Temp: 98.3 °F (36.8 °C)     Body mass index is 22.6 kg/m².     Physical Exam:  Physical Exam  Vitals reviewed.   Constitutional:       General: She is not in acute distress.  HENT:      Head: Normocephalic and atraumatic.      Nose: Nose normal.      Mouth/Throat:      Mouth: Mucous membranes are moist.      Pharynx: Oropharynx is clear.   Eyes:      Pupils: Pupils are equal, round, and reactive to light.   Cardiovascular:      Rate and Rhythm: Normal rate and regular rhythm.      Pulses: Normal pulses.      Heart sounds: Normal heart sounds.   Pulmonary:      Effort: Pulmonary effort is normal.      Breath sounds: Normal breath sounds.   Abdominal:      General: Bowel sounds are normal. There is no distension.      Palpations: Abdomen is soft.      Tenderness: There is no abdominal tenderness.   Musculoskeletal:      Cervical back: Normal range of motion.      Right lower leg: No edema.      Left lower leg: No edema.   Skin:     General: Skin is warm and dry.      Capillary Refill: Capillary refill takes less than 2 seconds.   Neurological:      Mental Status: She is alert. Mental status is at baseline.   Psychiatric:         Mood and Affect: Mood normal.         Speech:  Speech normal.         Behavior: Behavior is cooperative.       Laboratory  Lab Results   Component Value Date    WBC 5.70 01/09/2023    HGB 12.0 01/09/2023    HCT 35.9 (L) 01/09/2023    MCV 95 01/09/2023     01/09/2023     Lab Results   Component Value Date    INR 1.3 (H) 12/29/2022    INR 1.2 01/08/2021    INR 1.1 12/02/2020     Lab Results   Component Value Date    HGBA1C 5.6 01/07/2023     No results for input(s): POCTGLUCOSE in the last 72 hours.    Diagnostic Results:      TRANSITION OF CARE:     [facility] Contact Note: 01/17/2023    Family and/or Caretaker present at visit?  Yes.  Diagnostic tests reviewed/disposition: No diagnosic tests pending after this hospitalization.  Disease/illness education: [facility]  Home health/community services discussion/referrals: Patient has home health established at [facility] .   Establishment or re-establishment of referral orders for community resources: No other necessary community resources.   Discussion with other health care providers: No discussion with other health care providers necessary.     Transition of Care Visit:  I have reviewed and updated the history and problem list.  I have reconciled the medication list.  I have discussed the hospitalization and current medical issues, prognosis and plans with the patient/family.  I  spent more than 50% of time discussing the care with the patient/family.  Total Face-to-Face Encounter: 60 minutes.    Medications Reconciliation:   I have reconciled the patient's home medications and discharge medications with the patient/family. I have updated all changes.  Refer to After-Visit Medication List.    ASSESSMENT & PLAN:     HIGH RISK CONDITION(S):  CHF, SSS w Pacemaker, HTN,     1. Constipation, unspecified constipation type  -     polyethylene glycol (GLYCOLAX) 17 gram/dose powder; Take 17 g by mouth once daily.  Dispense: 510 g; Refill: 11    2. Chronic combined systolic and diastolic heart  failure  Comments:  monitor weight for any sudden overnight weight gain of 3 lbs overnight and  any increased sudden shortness of breath (call [facility])    Orders:  -     Ambulatory referral/consult to [facility]    Encounter for Medical Follow-Up and Medication Review  - [facility] at NP to schedule follow-up visit with patient in 4 weeks or PRN     Patient Instructions Given:  - Continue all medications, treatments and therapies as ordered.   - Follow all instructions, recommendations as discussed.  - Maintain Safety Precautions at all times.  - Attend all medical appointments as scheduled.  - For worsening symptoms: call Primary Care Physician or Nurse Practitioner.  - For emergencies, call 911 or immediately report to the nearest emergency room        Were controlled substances prescribed?  No    Instructions for the patient:    Scheduled Follow-up :  Future Appointments   Date Time Provider Department Center   1/26/2023 11:00 AM Lynette Umanzor NP [facility]   3/3/2023  8:00 AM Rolanda Cohn NP [facility] [place]   4/19/2023 10:00 AM Christian Douglass MD [facility]   6/22/2023 11:20 AM Jorge Salgado MD [facility] [place]       After Visit Medication List :     Medication List            Accurate as of January 20, 2023  6:54 PM. If you have any questions, ask your nurse or doctor.                START taking these medications      polyethylene glycol 17 gram/dose powder  Commonly known as: GLYCOLAX  Take 17 g by mouth once daily.            CONTINUE taking these medications      cinacalcet 30 MG Tab  Commonly known as: SENSIPAR  Take 1 tablet (30 mg total) by mouth daily with breakfast.     diclofenac sodium 1 % Gel  Commonly known as: VOLTAREN     donepeziL 5 MG tablet  Commonly known as: ARICEPT  Take 1 tablet (5 mg total) by mouth every evening.     ELIQUIS 2.5 mg Tab  Generic drug: apixaban  TAKE 1 TABLET BY MOUTH TWICE A DAY     ergocalciferol  50,000 unit Cap  Commonly known as: ERGOCALCIFEROL  Take 1 capsule (50,000 Units total) by mouth every Tuesday.     metoprolol succinate 25 MG 24 hr tablet  Commonly known as: TOPROL-XL     ondansetron 4 MG Tbdl  Commonly known as: ZOFRAN-ODT  Take 1 tablet (4 mg total) by mouth every 8 (eight) hours as needed (Nausea/vomiting).     rosuvastatin 10 MG tablet  Commonly known as: CRESTOR  Take 1 tablet (10 mg total) by mouth once daily.     sodium bicarbonate 650 MG tablet  Take 2 tablets (1,300 mg total) by mouth 2 (two) times daily.     torsemide 20 MG Tab  Commonly known as: DEMADEX  Take 2 tablets (40 mg total) by mouth 2 (two) times a day.               Where to Get Your Medications        These medications were sent to [facility]/pharmacy #[id number] - [place] [address]      Phone: [phone]   polyethylene glycol 17 gram/dose powder         Signature: Rolanda Martinez NP

## 2023-01-19 NOTE — PROGRESS NOTES
Outpatient Care Management  Initial Patient Assessment    Patient: Christy Webber  MRN: [id number]  Date of Service: 01/19/2023  Completed by: Zoila Machado RN  Referral Date: 12/21/2022  Program: High Risk  Status: Ongoing  Effective Dates: 1/13/2023 - present  Responsible Staff: Zoila Machado RN        Reason for Visit   Patient presents with    OPCM Chart Review    OPCM Enrollment Call    OPCM RN Second Assessment Attempt    Initial Assessment    Plan Of Care    Nursing Assessment    OPCM Welcome Letter     01/19/23    Other Misc     Contacted [employer] - low sodium meals ordered - patient's daughter notified.       Brief Summary:  Christy Webber was referred by [facility] for senile debility. Patient qualifies for program based on risk score of 79.2%.   Active problem list, medical, surgical and social history reviewed. Active comorbidities include CHF, CAD, COPD, Afib, HTN, CKD, vascular dementia, pacemaker. Areas of need identified by patient include need for blood pressure monitor, manage CHF signs and symptoms, community assistance .   Complex care plan created with patient/caregiver input. By next encounter, patient agrees to work with nurse and insurance to obtain a blood pressure monitor and Mom[employer] meals.   Next steps: Plan to follow up in approximately one week per care plan.  VASYL Machado RN

## 2023-01-20 ENCOUNTER — OFFICE VISIT (OUTPATIENT)
Dept: HOME HEALTH SERVICES | Facility: CLINIC | Age: 88
End: 2023-01-20
Payer: MEDICARE

## 2023-01-20 VITALS
SYSTOLIC BLOOD PRESSURE: 96 MMHG | DIASTOLIC BLOOD PRESSURE: 50 MMHG | OXYGEN SATURATION: 97 % | TEMPERATURE: 98 F | HEART RATE: 82 BPM | BODY MASS INDEX: 22.6 KG/M2 | WEIGHT: 127.63 LBS | RESPIRATION RATE: 18 BRPM

## 2023-01-20 DIAGNOSIS — F01.50 VASCULAR DEMENTIA WITHOUT BEHAVIORAL DISTURBANCE: ICD-10-CM

## 2023-01-20 DIAGNOSIS — I50.42 CHRONIC COMBINED SYSTOLIC AND DIASTOLIC HEART FAILURE: ICD-10-CM

## 2023-01-20 DIAGNOSIS — I25.119 CORONARY ARTERY DISEASE INVOLVING NATIVE CORONARY ARTERY OF NATIVE HEART WITH ANGINA PECTORIS: ICD-10-CM

## 2023-01-20 DIAGNOSIS — K59.00 CONSTIPATION, UNSPECIFIED CONSTIPATION TYPE: Primary | ICD-10-CM

## 2023-01-20 DIAGNOSIS — E21.0 PRIMARY HYPERPARATHYROIDISM: ICD-10-CM

## 2023-01-20 DIAGNOSIS — I70.0 AORTIC ATHEROSCLEROSIS: ICD-10-CM

## 2023-01-20 PROCEDURE — 1159F PR MEDICATION LIST DOCUMENTED IN MEDICAL RECORD: ICD-10-PCS | Mod: CPTII,S$GLB,, | Performed by: NURSE PRACTITIONER

## 2023-01-20 PROCEDURE — 99495 TCM SERVICES (MODERATE COMPLEXITY): ICD-10-PCS | Mod: S$GLB,,, | Performed by: NURSE PRACTITIONER

## 2023-01-20 PROCEDURE — 99495 TRANSJ CARE MGMT MOD F2F 14D: CPT | Mod: S$GLB,,, | Performed by: NURSE PRACTITIONER

## 2023-01-20 PROCEDURE — 1159F MED LIST DOCD IN RCRD: CPT | Mod: CPTII,S$GLB,, | Performed by: NURSE PRACTITIONER

## 2023-01-20 PROCEDURE — 1160F PR REVIEW ALL MEDS BY PRESCRIBER/CLIN PHARMACIST DOCUMENTED: ICD-10-PCS | Mod: CPTII,S$GLB,, | Performed by: NURSE PRACTITIONER

## 2023-01-20 PROCEDURE — 1160F RVW MEDS BY RX/DR IN RCRD: CPT | Mod: CPTII,S$GLB,, | Performed by: NURSE PRACTITIONER

## 2023-01-20 PROCEDURE — 99499 RISK ADDL DX/OHS AUDIT: ICD-10-PCS | Mod: S$GLB,,, | Performed by: NURSE PRACTITIONER

## 2023-01-20 PROCEDURE — 1126F AMNT PAIN NOTED NONE PRSNT: CPT | Mod: CPTII,S$GLB,, | Performed by: NURSE PRACTITIONER

## 2023-01-20 PROCEDURE — 1126F PR PAIN SEVERITY QUANTIFIED, NO PAIN PRESENT: ICD-10-PCS | Mod: CPTII,S$GLB,, | Performed by: NURSE PRACTITIONER

## 2023-01-20 PROCEDURE — 99499 UNLISTED E&M SERVICE: CPT | Mod: S$GLB,,, | Performed by: NURSE PRACTITIONER

## 2023-01-20 RX ORDER — POLYETHYLENE GLYCOL 3350 17 G/17G
17 POWDER, FOR SOLUTION ORAL DAILY
Qty: 510 G | Refills: 11 | Status: SHIPPED | OUTPATIENT
Start: 2023-01-20 | End: 2024-01-20

## 2023-01-23 ENCOUNTER — TELEPHONE (OUTPATIENT)
Dept: FAMILY MEDICINE | Facility: CLINIC | Age: 88
End: 2023-01-23
Payer: MEDICARE

## 2023-01-23 NOTE — TELEPHONE ENCOUNTER
----- Message from Christian Douglass MD sent at 1/23/2023  3:38 PM CST -----  Contact: [phone]  ok  ----- Message -----  From: Alcira Crum MA  Sent: 1/23/2023   3:18 PM CST  To: Christian Douglass MD    Is this ok?    ----- Message -----  From: Renetta Petersen  Sent: 1/23/2023   3:14 PM CST  To: Rafat GOODMAN Staff    Jannet with [facility] would like to consult with a nurse in regards to an order for a home health aide. Please call back at [phone]. Thanks KD

## 2023-01-24 ENCOUNTER — OUTPATIENT CASE MANAGEMENT (OUTPATIENT)
Dept: ADMINISTRATIVE | Facility: OTHER | Age: 88
End: 2023-01-24
Payer: MEDICARE
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
## 2023-01-26 ENCOUNTER — OUTPATIENT CASE MANAGEMENT (OUTPATIENT)
Dept: ADMINISTRATIVE | Facility: OTHER | Age: 88
End: 2023-01-26
Payer: MEDICARE

## 2023-01-26 ENCOUNTER — OFFICE VISIT (OUTPATIENT)
Dept: NEPHROLOGY | Facility: CLINIC | Age: 88
End: 2023-01-26
Payer: MEDICARE

## 2023-01-26 VITALS
HEIGHT: 63 IN | BODY MASS INDEX: 18.25 KG/M2 | SYSTOLIC BLOOD PRESSURE: 90 MMHG | DIASTOLIC BLOOD PRESSURE: 50 MMHG | HEART RATE: 70 BPM | WEIGHT: 103 LBS

## 2023-01-26 DIAGNOSIS — I48.21 PERMANENT ATRIAL FIBRILLATION: Chronic | ICD-10-CM

## 2023-01-26 DIAGNOSIS — N18.4 CHRONIC KIDNEY DISEASE, STAGE 4 (SEVERE): Primary | Chronic | ICD-10-CM

## 2023-01-26 DIAGNOSIS — I50.22 CHRONIC SYSTOLIC (CONGESTIVE) HEART FAILURE: ICD-10-CM

## 2023-01-26 DIAGNOSIS — N25.81 SECONDARY HYPERPARATHYROIDISM OF RENAL ORIGIN: ICD-10-CM

## 2023-01-26 DIAGNOSIS — I13.0 BENIGN HYPERTENSIVE HEART AND KIDNEY DISEASE WITH HF AND CKD: Chronic | ICD-10-CM

## 2023-01-26 DIAGNOSIS — E87.20 METABOLIC ACIDOSIS: ICD-10-CM

## 2023-01-26 PROCEDURE — 1159F MED LIST DOCD IN RCRD: CPT | Mod: HCNC,CPTII,S$GLB, | Performed by: NURSE PRACTITIONER

## 2023-01-26 PROCEDURE — 99215 PR OFFICE/OUTPT VISIT, EST, LEVL V, 40-54 MIN: ICD-10-PCS | Mod: HCNC,S$GLB,, | Performed by: NURSE PRACTITIONER

## 2023-01-26 PROCEDURE — 1101F PT FALLS ASSESS-DOCD LE1/YR: CPT | Mod: HCNC,CPTII,S$GLB, | Performed by: NURSE PRACTITIONER

## 2023-01-26 PROCEDURE — 3288F FALL RISK ASSESSMENT DOCD: CPT | Mod: HCNC,CPTII,S$GLB, | Performed by: NURSE PRACTITIONER

## 2023-01-26 PROCEDURE — 1159F PR MEDICATION LIST DOCUMENTED IN MEDICAL RECORD: ICD-10-PCS | Mod: HCNC,CPTII,S$GLB, | Performed by: NURSE PRACTITIONER

## 2023-01-26 PROCEDURE — 3288F PR FALLS RISK ASSESSMENT DOCUMENTED: ICD-10-PCS | Mod: HCNC,CPTII,S$GLB, | Performed by: NURSE PRACTITIONER

## 2023-01-26 PROCEDURE — 1126F AMNT PAIN NOTED NONE PRSNT: CPT | Mod: HCNC,CPTII,S$GLB, | Performed by: NURSE PRACTITIONER

## 2023-01-26 PROCEDURE — 1126F PR PAIN SEVERITY QUANTIFIED, NO PAIN PRESENT: ICD-10-PCS | Mod: HCNC,CPTII,S$GLB, | Performed by: NURSE PRACTITIONER

## 2023-01-26 PROCEDURE — 99215 OFFICE O/P EST HI 40 MIN: CPT | Mod: HCNC,S$GLB,, | Performed by: NURSE PRACTITIONER

## 2023-01-26 PROCEDURE — 1160F PR REVIEW ALL MEDS BY PRESCRIBER/CLIN PHARMACIST DOCUMENTED: ICD-10-PCS | Mod: HCNC,CPTII,S$GLB, | Performed by: NURSE PRACTITIONER

## 2023-01-26 PROCEDURE — 99999 PR PBB SHADOW E&M-EST. PATIENT-LVL IV: CPT | Mod: PBBFAC,HCNC,, | Performed by: NURSE PRACTITIONER

## 2023-01-26 PROCEDURE — 1160F RVW MEDS BY RX/DR IN RCRD: CPT | Mod: HCNC,CPTII,S$GLB, | Performed by: NURSE PRACTITIONER

## 2023-01-26 PROCEDURE — 1101F PR PT FALLS ASSESS DOC 0-1 FALLS W/OUT INJ PAST YR: ICD-10-PCS | Mod: HCNC,CPTII,S$GLB, | Performed by: NURSE PRACTITIONER

## 2023-01-26 PROCEDURE — 99999 PR PBB SHADOW E&M-EST. PATIENT-LVL IV: ICD-10-PCS | Mod: PBBFAC,HCNC,, | Performed by: NURSE PRACTITIONER

## 2023-01-26 NOTE — PATIENT INSTRUCTIONS
Continue all medications as reviewed today:    Keep blood pressure controlled  Low sodium diet: avoid/limit salt, processed foods (boxed or canned), fried foods, fast foods, etc as discussed    Keep blood sugar controlled  Low carbohydrate/sugar diet: avoid/limit sugary drinks, white breads, pasta, rice, etc as discussed    Drink water, flavored water/diluted juice (drink to thirst) daily as tolerated with no swelling/shortness of breath    Avoid NSAIDS: Advil, Ibuprofen, Aleve, Naproxen, Meloxicam, etc. (Aspirin is ok), Tylenol is Best    Exercise as tolerated     Follow up in 4 months at [facility] and have labs drawn 1-2 weeks prior to visit

## 2023-01-31 ENCOUNTER — DOCUMENT SCAN (OUTPATIENT)
Dept: HOME HEALTH SERVICES | Facility: HOSPITAL | Age: 88
End: 2023-01-31
Payer: MEDICARE

## 2023-01-31 NOTE — PROGRESS NOTES
JOHANNA called and spoke with Isha at [facility]. She does not recall receiving a phone call from Megan, but is happy to give her a call and do the application over the phone. JOHANNA gave Isha Guzman's contact information.

## 2023-01-31 NOTE — PROGRESS NOTES
Outpatient Care Management   - Care Plan Follow Up    Patient: Christy Webber  MRN:  [id number]  Date of Service:  1/31/2023  Completed by:  Thalia Medina LMSW  Referral Date: 12/21/2022    Reason for Visit   Patient presents with    OPCM JOHANNA First Follow-up Attempt     1/24/2023       Brief Summary: JOHANNA followed up with pt's daughter, Megan, via telephone. She reported she left a message for [facility] but has not heard back from them. JOHANNA asked if she had any one who could help her with doing an online application for them and she stated no. JOHANNA informed Megan that she did send the referral to [facility]. Megan also reported she believes her brother is still working on paperwork for nursing home placement. Megan then inquired about electric heaters. She reported she already has 2 heaters and pt keeps the room hot in the bedroom, but she is looking for another one for the living room. JOHANNA suggested checking with her [employer], but she will search for other resources. JOHANNA will follow up with [facility]. JOHANNA will follow up with pt in 2 weeks.     Complex Care Plan     Care plan was discussed and completed today with input from patient and/or caregiver.    Patient Instructions     No follow-ups on file.

## 2023-02-03 ENCOUNTER — OUTPATIENT CASE MANAGEMENT (OUTPATIENT)
Dept: ADMINISTRATIVE | Facility: OTHER | Age: 88
End: 2023-02-03
Payer: MEDICARE

## 2023-02-07 ENCOUNTER — EXTERNAL HOME HEALTH (OUTPATIENT)
Dept: HOME HEALTH SERVICES | Facility: HOSPITAL | Age: 88
End: 2023-02-07
Payer: MEDICARE

## 2023-02-07 DIAGNOSIS — Z00.00 ENCOUNTER FOR MEDICARE ANNUAL WELLNESS EXAM: ICD-10-CM

## 2023-02-09 DIAGNOSIS — Z00.00 ENCOUNTER FOR MEDICARE ANNUAL WELLNESS EXAM: ICD-10-CM

## 2023-02-09 NOTE — PROGRESS NOTES
Outpatient Care Management  Plan of Care Follow Up Visit    Patient: Christy Webber  MRN: [id number]  Date of Service: 02/03/2023  Completed by: Zoila Machado RN  Referral Date: 12/21/2022    Reason for Visit   Patient presents with    OPCM Chart Review    OPCM RN Third Follow-Up Attempt    Nursing Assessment    Update Plan Of Care     02/09/23       Brief Summary: OPCM visit completed. Care plan reviewed and updated.   Next Steps: Plan to call in a couple of days once [employer] points verified.   VASYL Machado RN

## 2023-02-14 ENCOUNTER — OUTPATIENT CASE MANAGEMENT (OUTPATIENT)
Dept: ADMINISTRATIVE | Facility: OTHER | Age: 88
End: 2023-02-14
Payer: MEDICARE

## 2023-02-14 NOTE — PROGRESS NOTES
Outpatient Care Management   - Care Plan Follow Up    Patient: Christy Webber  MRN:  [id number]  Date of Service:  2/14/2023  Completed by:  Thalia Medina LMSW  Referral Date: 12/21/2022    Reason for Visit   Patient presents with    Update Plan Of Care     2/14/2023       Brief Summary: JOHANNA followed up with Megan, pt's daughter, via telephone. She reported pt is doing well. Megan also reported her brothers are still working on nursing home placement for patient. JOHANNA has not heard from Isha with [facility] nor the [facility] [facility]. JOHANNA will follow up with Isha with [facility] and task CHW referral status of [facility]. JOHANNA will follow up in 2 weeks.     Complex Care Plan     Care plan was discussed and completed today with input from patient and/or caregiver.    Patient Instructions     No follow-ups on file.

## 2023-02-14 NOTE — PROGRESS NOTES
02/14/23 Unable to clarify/verify patient's points on [employer] website x's several attempts with zip as [id number] and [id number], insurance # [id number]. Megan notified. Encouraged to call [employer] to inquire about - verbalized understanding. NUZHAT Machado RN

## 2023-02-16 ENCOUNTER — DOCUMENT SCAN (OUTPATIENT)
Dept: HOME HEALTH SERVICES | Facility: HOSPITAL | Age: 88
End: 2023-02-16
Payer: MEDICARE

## 2023-02-22 ENCOUNTER — TELEPHONE (OUTPATIENT)
Dept: CARDIOLOGY | Facility: CLINIC | Age: 88
End: 2023-02-22
Payer: MEDICARE

## 2023-02-22 ENCOUNTER — DOCUMENT SCAN (OUTPATIENT)
Dept: HOME HEALTH SERVICES | Facility: HOSPITAL | Age: 88
End: 2023-02-22
Payer: MEDICARE

## 2023-02-22 DIAGNOSIS — I50.33 ACUTE ON CHRONIC DIASTOLIC HEART FAILURE: Primary | ICD-10-CM

## 2023-02-22 NOTE — TELEPHONE ENCOUNTER
Called the number list below and left a message that we rtn call pb----- Message from Marizol St sent at 2/22/2023 11:29 AM CST -----  Contact: [phone]  Patient home health nurse Amalia would like to consult with a nurse in regards to the current care of the patient. She also has questions pertaining to her medications and weight gain. Please call back at [phone]. Thanks courtney

## 2023-02-24 ENCOUNTER — LAB VISIT (OUTPATIENT)
Dept: LAB | Facility: HOSPITAL | Age: 88
End: 2023-02-24
Attending: INTERNAL MEDICINE
Payer: MEDICARE

## 2023-02-24 ENCOUNTER — TELEPHONE (OUTPATIENT)
Dept: ADMINISTRATIVE | Facility: CLINIC | Age: 88
End: 2023-02-24
Payer: MEDICARE

## 2023-02-24 DIAGNOSIS — I50.33 ACUTE ON CHRONIC DIASTOLIC HEART FAILURE: ICD-10-CM

## 2023-02-24 LAB
ANION GAP SERPL CALC-SCNC: 12 MMOL/L (ref 8–16)
BNP SERPL-MCNC: 3879 PG/ML (ref 0–99)
BUN SERPL-MCNC: 39 MG/DL (ref 8–23)
CALCIUM SERPL-MCNC: 9.1 MG/DL (ref 8.7–10.5)
CHLORIDE SERPL-SCNC: 105 MMOL/L (ref 95–110)
CO2 SERPL-SCNC: 27 MMOL/L (ref 23–29)
CREAT SERPL-MCNC: 2.2 MG/DL (ref 0.5–1.4)
EST. GFR  (NO RACE VARIABLE): 21.3 ML/MIN/1.73 M^2
GLUCOSE SERPL-MCNC: 111 MG/DL (ref 70–110)
POTASSIUM SERPL-SCNC: 3.5 MMOL/L (ref 3.5–5.1)
SODIUM SERPL-SCNC: 144 MMOL/L (ref 136–145)

## 2023-02-24 PROCEDURE — 36415 COLL VENOUS BLD VENIPUNCTURE: CPT | Mod: HCNC | Performed by: INTERNAL MEDICINE

## 2023-02-24 PROCEDURE — 83880 ASSAY OF NATRIURETIC PEPTIDE: CPT | Mod: HCNC | Performed by: INTERNAL MEDICINE

## 2023-02-24 PROCEDURE — 80048 BASIC METABOLIC PNL TOTAL CA: CPT | Mod: HCNC | Performed by: INTERNAL MEDICINE

## 2023-02-27 ENCOUNTER — OUTPATIENT CASE MANAGEMENT (OUTPATIENT)
Dept: ADMINISTRATIVE | Facility: OTHER | Age: 88
End: 2023-02-27
Payer: MEDICARE

## 2023-02-28 ENCOUNTER — TELEPHONE (OUTPATIENT)
Dept: FAMILY MEDICINE | Facility: CLINIC | Age: 88
End: 2023-02-28
Payer: MEDICARE

## 2023-02-28 ENCOUNTER — OUTPATIENT CASE MANAGEMENT (OUTPATIENT)
Dept: ADMINISTRATIVE | Facility: OTHER | Age: 88
End: 2023-02-28
Payer: MEDICARE

## 2023-02-28 ENCOUNTER — TELEPHONE (OUTPATIENT)
Dept: CARDIOLOGY | Facility: CLINIC | Age: 88
End: 2023-02-28
Payer: MEDICARE

## 2023-02-28 DIAGNOSIS — I50.23 ACUTE ON CHRONIC SYSTOLIC HEART FAILURE: Primary | ICD-10-CM

## 2023-02-28 RX ORDER — TORSEMIDE 20 MG/1
80 TABLET ORAL 2 TIMES DAILY
Qty: 240 TABLET | Refills: 3 | Status: SHIPPED | OUTPATIENT
Start: 2023-02-28

## 2023-02-28 NOTE — TELEPHONE ENCOUNTER
Pt contacted about results, lvm for pt to call back.            ----- Message from Jorge Salgado MD sent at 2/28/2023  4:28 PM CST -----  The lab showed CHF  Increase torsemide from 40 mg bid to 80 mg bid   Repeat BNP and BMP in 1 week

## 2023-02-28 NOTE — TELEPHONE ENCOUNTER
----- Message from Ellie Schneider sent at 2/28/2023  4:46 PM CST -----  Contact: megan/ daughter  .Type:  Patient Returning Call    Who Called:Megan   Who Left Message for Patient:nurse   Does the patient know what this is regarding?:unknown   Would the patient rather a call back or a response via [facility]? Call   Best Call Back Number:[phone] or [phone]  Additional Information: patients daughter states call son.

## 2023-02-28 NOTE — TELEPHONE ENCOUNTER
----- Message from Merna Cassidy sent at 2/28/2023 12:08 PM CST -----  Contact: Marin Funes called in to get Dr to complete form for pt to enter nursing facility. Please call him back at [phone].      Thanks  TS

## 2023-02-28 NOTE — TELEPHONE ENCOUNTER
----- Message from Juliane Shirley sent at 2/28/2023  2:58 PM CST -----  Type:  Patient Returning Call    Who Called Alan:  Who Left Message for Patient:na  Does the patient know what this is regarding?:na  Would the patient rather a call back or a response via [facility]? Call back  Best Call Back Number [phone]  Additional Information: jenn

## 2023-02-28 NOTE — PROGRESS NOTES
Outpatient Care Management   - Care Plan Follow Up    Patient: Christy Webber  MRN:  [id number]  Date of Service:  2/28/2023  Completed by:  Thalia Medina LMSW  Referral Date: 12/21/2022    Reason for Visit   Patient presents with    Update Plan Of Care     2/28/2023       Brief Summary: JOHANNA followed up with pt's daughter, Megan, via telephone. Megan is out of town in [place] visiting with her grandchildren until next month. She stated her brother (pt's son, Milo) is caring for patient currently. Pt had to be taken to [facility] last night due to excess fluid. ED did not admit her and she was discharged. Megan did speak with Isha at [facility] and was sent information to fill out. Megan asked JOHANNA to send her the name of pt's PCP for nursing home paperwork. JOHANNA will follow up with Milo in 2 weeks, per Megan's request.     Complex Care Plan     Care plan was discussed and completed today with input from patient and/or caregiver.    Patient Instructions     No follow-ups on file.

## 2023-03-01 ENCOUNTER — DOCUMENT SCAN (OUTPATIENT)
Dept: HOME HEALTH SERVICES | Facility: HOSPITAL | Age: 88
End: 2023-03-01
Payer: MEDICARE

## 2023-03-02 ENCOUNTER — PATIENT OUTREACH (OUTPATIENT)
Dept: ADMINISTRATIVE | Facility: OTHER | Age: 88
End: 2023-03-02
Payer: MEDICARE

## 2023-03-02 NOTE — PROGRESS NOTES
CHW spoke with patients daughter (megan) and son (serina) whom both were not near Ms. Wbeber. Megan informed that the utility bills were in her name and she has heard back from [facility]. They have assist her but will not give her anymore assistance for another 9- days. Her brothers also lives with her and she is aware of [facility]. Due to being out of town she asked CHW to text her [facility] number so she can contact them after her 90 day. CHW will text number, additional needs at this time.

## 2023-03-03 ENCOUNTER — CARE AT HOME (OUTPATIENT)
Dept: HOME HEALTH SERVICES | Facility: CLINIC | Age: 88
End: 2023-03-03
Payer: MEDICARE

## 2023-03-03 VITALS
RESPIRATION RATE: 18 BRPM | DIASTOLIC BLOOD PRESSURE: 50 MMHG | HEART RATE: 84 BPM | OXYGEN SATURATION: 93 % | SYSTOLIC BLOOD PRESSURE: 110 MMHG | TEMPERATURE: 97 F

## 2023-03-03 DIAGNOSIS — I50.42 CHRONIC COMBINED SYSTOLIC AND DIASTOLIC HEART FAILURE: Primary | ICD-10-CM

## 2023-03-03 PROCEDURE — 99214 OFFICE O/P EST MOD 30 MIN: CPT | Mod: S$GLB,,, | Performed by: NURSE PRACTITIONER

## 2023-03-03 PROCEDURE — 99214 PR OFFICE/OUTPT VISIT, EST, LEVL IV, 30-39 MIN: ICD-10-PCS | Mod: S$GLB,,, | Performed by: NURSE PRACTITIONER

## 2023-03-03 NOTE — PROGRESS NOTES
[facility] Visit    Visit Date: 3/3/2023  Encounter Provider: Rolanda Martinez  PCP:  Christian Douglass MD    Subjective:      Patient ID: Christy Webber is a 87 y.o. female.    Consult Requested By:  No ref. provider found  Reason for Consult:  Follow Up     Christy is being seen at home due to being seen at home due to physical debility that presents a taxing effort to leave the home, to mitigate high risk of hospital readmission and/or due to the limited availability of reliable or safe options for transportation to the point of access to the provider. Prior to treatment on this visit the chart was reviewed and patient verbal consent was obtained.    Chief Complaint: Follow-up      Patient is being seen today for a follow up visit. Patient was seen in the ER on 02/27 for acute on chronic heart failure. She was discharged from the ER with torsemide. Patient ran out of the torsemide but had it refilled yesterday. She was out of her torsemide for 2 days. Patient reports some dizziness and lightheadedness. Son reports that she is not eating well.          Review of Systems   Constitutional:  Positive for appetite change (poor appetite) and fatigue.   HENT: Negative.     Eyes: Negative.    Respiratory:  Positive for cough and shortness of breath.    Cardiovascular: Negative.    Gastrointestinal: Negative.    Endocrine: Positive for polyuria (2/2 fluid pill).   Genitourinary: Negative.      Assessments:  Environmental: Patient lives in a single story home with her son. There are steps at the entrance. Lighting is dim and temperature is comfortable.   Functional Status: Patient is ambulatory and uses a walker when she leaves the home  Safety: Fall Precautions  Nutritional: [place] food in the home  Home Health/DME/Supplies: [facility], DMEs: walker    Objective:   Physical Exam  Vitals reviewed.   Constitutional:       General: She is not in acute distress.  HENT:      Head:  Normocephalic and atraumatic.      Nose: Nose normal.      Mouth/Throat:      Mouth: Mucous membranes are moist.      Pharynx: Oropharynx is clear.   Eyes:      Pupils: Pupils are equal, round, and reactive to light.   Cardiovascular:      Rate and Rhythm: Normal rate and regular rhythm.      Heart sounds: Murmur heard.   Pulmonary:      Effort: No respiratory distress.      Comments: Crackles to the lower base of the right lung otherwise clear  Abdominal:      General: Bowel sounds are normal.      Palpations: Abdomen is soft.   Musculoskeletal:      Right lower leg: No edema.      Left lower leg: No edema.   Skin:     General: Skin is warm and dry.      Capillary Refill: Capillary refill takes less than 2 seconds.   Neurological:      Mental Status: She is alert.   Psychiatric:         Attention and Perception: Attention normal.         Mood and Affect: Mood normal.         Speech: Speech normal.         Cognition and Memory: Memory is impaired.       Vitals:    03/03/23 1320   BP: (!) 110/50   Pulse: 84   Resp: 18   Temp: 97.4 °F (36.3 °C)   SpO2: (!) 93%     There is no height or weight on file to calculate BMI.    Assessment:     1. Chronic combined systolic and diastolic heart failure        Plan:     Ethical / Legal: Advance Care Planning   Surrogate decision maker:  Name Yeimi Webber, Relationship: Daughter  Code Status:  Full Code  [place]:  None  Other advance directive:  None, Capacity to make medical decisions:  no, Conflict None       1. Chronic combined systolic and diastolic heart failure  Comments:  prescription on torsemide was 2 20mg tablets twice a day and Dr Salgado prescribed 4 20mg tablets 2 times a day. Family is aware  verified with Dr Salgado       Encounter for Medical Follow-Up and Medication Review  - [facility] to schedule follow-up visit with patient in 4 weeks or PRN     Patient Instructions Given:  - Continue all medications, treatments and therapies as ordered.   - Follow  all instructions, recommendations as discussed.  - Maintain Safety Precautions at all times.  - Attend all medical appointments as scheduled.  - For worsening symptoms: call Primary Care Physician or Nurse Practitioner.  - For emergencies, call 911 or immediately report to the nearest emergency room     Were controlled substances prescribed?  No    Follow Up Appointments:   Future Appointments   Date Time Provider Department Center   4/19/2023 10:00 AM Christian Douglass MD [facility]   5/19/2023 10:20 AM LABORATORY, [facility] LAB [facility]   5/19/2023 10:30 AM SPECIMEN, [facility] SPECLAB [facility]   5/26/2023  9:30 AM Michael Cox MD [facility] NEPHRO [facility]   6/2/2023  8:00 AM Rolanda Cohn NP [address]   6/22/2023 11:20 AM Jorge Salgado MD HG CARDIO [facility]       Signature: Rolanda Cohn NP

## 2023-03-05 ENCOUNTER — DOCUMENT SCAN (OUTPATIENT)
Dept: HOME HEALTH SERVICES | Facility: HOSPITAL | Age: 88
End: 2023-03-05
Payer: MEDICARE

## 2023-03-07 ENCOUNTER — OUTPATIENT CASE MANAGEMENT (OUTPATIENT)
Dept: ADMINISTRATIVE | Facility: OTHER | Age: 88
End: 2023-03-07
Payer: MEDICARE

## 2023-03-08 ENCOUNTER — TELEPHONE (OUTPATIENT)
Dept: CARDIOLOGY | Facility: CLINIC | Age: 88
End: 2023-03-08
Payer: MEDICARE

## 2023-03-08 NOTE — TELEPHONE ENCOUNTER
Contacted pt home health nurse verbalized understanding will call back to give us fax number for lab orders          ----- Message from Jorge Salgado MD sent at 3/8/2023  2:07 PM CST -----  Contact: Joann ([facility] nurse)  Ok to take 2 tabs twice a day.   Do the lab this weeks. The order was already in.   ----- Message -----  From: Gina Vigil MA  Sent: 3/8/2023   1:55 PM CST  To: Jorge Salgado MD      ----- Message -----  From: Franchesca Mckeon  Sent: 3/8/2023  11:57 AM CST  To: Damian Patel Staff     Joann  would like a call back at [phone], In regards to the pt losing 11 pounds in one week. pt has a Furosemide 20 mg  prescribed for two pills 2X daily, but the pt son stated one of the home health nurses clarified with the nurse that she should be taking that medication 4X daily.

## 2023-03-09 ENCOUNTER — LAB VISIT (OUTPATIENT)
Dept: LAB | Facility: HOSPITAL | Age: 88
End: 2023-03-09
Payer: MEDICARE

## 2023-03-09 ENCOUNTER — TELEPHONE (OUTPATIENT)
Dept: CARDIOLOGY | Facility: CLINIC | Age: 88
End: 2023-03-09
Payer: MEDICARE

## 2023-03-09 DIAGNOSIS — I50.43 ACUTE ON CHRONIC COMBINED SYSTOLIC AND DIASTOLIC HEART FAILURE: ICD-10-CM

## 2023-03-09 DIAGNOSIS — N18.4 CHRONIC KIDNEY DISEASE, STAGE IV (SEVERE): ICD-10-CM

## 2023-03-09 DIAGNOSIS — I13.0 HYPERTENSIVE HEART AND RENAL DISEASE WITH CONGESTIVE HEART FAILURE: ICD-10-CM

## 2023-03-09 LAB
ANION GAP SERPL CALC-SCNC: 14 MMOL/L (ref 8–16)
BUN SERPL-MCNC: 26 MG/DL (ref 8–23)
CALCIUM SERPL-MCNC: 9.9 MG/DL (ref 8.7–10.5)
CHLORIDE SERPL-SCNC: 98 MMOL/L (ref 95–110)
CO2 SERPL-SCNC: 28 MMOL/L (ref 23–29)
CREAT SERPL-MCNC: 1.7 MG/DL (ref 0.5–1.4)
EST. GFR  (NO RACE VARIABLE): 29 ML/MIN/1.73 M^2
GLUCOSE SERPL-MCNC: 101 MG/DL (ref 70–110)
POTASSIUM SERPL-SCNC: 2.8 MMOL/L (ref 3.5–5.1)
SODIUM SERPL-SCNC: 140 MMOL/L (ref 136–145)

## 2023-03-09 PROCEDURE — 80048 BASIC METABOLIC PNL TOTAL CA: CPT | Mod: HCNC | Performed by: INTERNAL MEDICINE

## 2023-03-09 PROCEDURE — 83880 ASSAY OF NATRIURETIC PEPTIDE: CPT | Mod: HCNC | Performed by: INTERNAL MEDICINE

## 2023-03-09 NOTE — TELEPHONE ENCOUNTER
Spoke with home health nurse in regards to sending lab written order.         ----- Message from Aries Irene sent at 3/9/2023  9:37 AM CST -----      Name of Who is Calling:Rosa/home health nurse          What is the request in detail:Verbal order was placed for labs but home health nurse wants to know if your office can still fax over a written order. Please be Advised!          Can the clinic reply by [facility]:no          What Number to Call Back if not in [facility] FAX# [phone] or PH. [phone]

## 2023-03-10 LAB — BNP SERPL-MCNC: 2956 PG/ML (ref 0–99)

## 2023-03-10 NOTE — PROGRESS NOTES
Outpatient Care Management  Plan of Care Follow Up Visit    Patient: Christy Webber  MRN: [id number]  Date of Service: 03/07/2023  Completed by: Zoila Machado RN  Referral Date: 12/21/2022    Reason for Visit   Patient presents with    OPCM Chart Review    OPCM RN First Follow-Up Attempt     03/07/23 3/7/2023  1st attempt to complete Follow-Up  for Outpatient Care Management, left message for son Milo. (First time calling Milo, daughter out of town). VASYL Machado RN       Brief Summary: 03/10/23 OPCM visit completed. Care plan reviewed and updated. Encouraged to call with questions/concerns.   Next Steps: Plan to follow up in approximately 2 weeks (3/23/23)   VASYL Machado RN

## 2023-03-12 ENCOUNTER — TELEPHONE (OUTPATIENT)
Dept: CARDIOLOGY | Facility: CLINIC | Age: 88
End: 2023-03-12
Payer: MEDICARE

## 2023-03-12 DIAGNOSIS — I50.23 ACUTE ON CHRONIC SYSTOLIC CONGESTIVE HEART FAILURE: Primary | ICD-10-CM

## 2023-03-12 RX ORDER — POTASSIUM CHLORIDE 750 MG/1
20 TABLET, EXTENDED RELEASE ORAL 2 TIMES DAILY
Qty: 120 TABLET | Refills: 3 | Status: SHIPPED | OUTPATIENT
Start: 2023-03-12 | End: 2023-06-08

## 2023-03-13 ENCOUNTER — TELEPHONE (OUTPATIENT)
Dept: CARDIOLOGY | Facility: CLINIC | Age: 88
End: 2023-03-13
Payer: MEDICARE

## 2023-03-13 NOTE — TELEPHONE ENCOUNTER
----- Message from Jorge Salgado MD sent at 3/12/2023  7:41 PM CDT -----  The lab showed  CHF slightly improvement and low KCL  Continue torsemide 2 tabs bid  Add KCL 20 meq bid  Repeat Bmp in 1week

## 2023-03-13 NOTE — TELEPHONE ENCOUNTER
Pt contacted about results, spoke with daughter about results verbalized understanding.            ----- Message from Jorge Salgado MD sent at 3/12/2023  7:41 PM CDT -----  The lab showed  CHF slightly improvement and low KCL  Continue torsemide 2 tabs bid  Add KCL 20 meq bid  Repeat Bmp in 1week

## 2023-03-14 ENCOUNTER — OUTPATIENT CASE MANAGEMENT (OUTPATIENT)
Dept: ADMINISTRATIVE | Facility: OTHER | Age: 88
End: 2023-03-14
Payer: MEDICARE

## 2023-03-14 ENCOUNTER — TELEPHONE (OUTPATIENT)
Dept: FAMILY MEDICINE | Facility: CLINIC | Age: 88
End: 2023-03-14
Payer: MEDICARE

## 2023-03-14 NOTE — TELEPHONE ENCOUNTER
----- Message from Randi Cortés sent at 3/14/2023  1:01 PM CDT -----  States he is calling regarding some documents. Please call pt [phone]. Thank you

## 2023-03-14 NOTE — PROGRESS NOTES
"Outpatient Care Management   - Care Plan Follow Up    Patient: Christy Webber  MRN:  [id number]  Date of Service:  3/14/2023  Completed by:  Thalia Medina LMSW  Referral Date: 12/21/2022    Reason for Visit   Patient presents with    Update Plan Of Care     3/14/2023       Brief Summary: SW followed up with pt's son, Serina, via telephone. He reported pt is doing "okay" and is not coughing as much like she was. Megan, pt's daughter, is still out of town. Serina reported his younger brother is still working on NH paperwork. PCP clinic has faxed over paperwork to the Nursing home. Pt's family has been in contact with [facility] regarding utility assistance. Per CH[facility] note, "CHW spoke with patients daughter (megan) and son (serina) whom both were not near Ms. Webber. Megan informed that the utility bills were in her name and she has heard back from [employer]. They have assist her but will not give her anymore assistance for another 90 days. Her brothers also lives with her and she is aware of [employer] BR. Due to being out of town she asked CH[facility] to text her [employer] [facility] number so she can contact them after her 90 day. CHW will text number, additional needs at this time." SW will follow up in 2 weeks, Serina agreeable.     Complex Care Plan     Care plan was discussed and completed today with input from patient and/or caregiver.    Patient Instructions     No follow-ups on file.    "

## 2023-03-15 ENCOUNTER — TELEPHONE (OUTPATIENT)
Dept: FAMILY MEDICINE | Facility: CLINIC | Age: 88
End: 2023-03-15
Payer: MEDICARE

## 2023-03-15 NOTE — TELEPHONE ENCOUNTER
----- Message from Amena Prieto sent at 3/15/2023  4:25 PM CDT -----  Contact: Osiris/daughter in law  Patients daughter in law is calling regarding documents. Reports giving provider documents to fill out and wanted an update on if they're done and ready to  or if they've been directly faxed to the facility. Please return call at  [phone].

## 2023-03-17 PROCEDURE — G0179 PR HOME HEALTH MD RECERTIFICATION: ICD-10-PCS | Mod: ,,, | Performed by: INTERNAL MEDICINE

## 2023-03-17 PROCEDURE — G0179 MD RECERTIFICATION HHA PT: HCPCS | Mod: ,,, | Performed by: INTERNAL MEDICINE

## 2023-03-22 ENCOUNTER — DOCUMENT SCAN (OUTPATIENT)
Dept: HOME HEALTH SERVICES | Facility: HOSPITAL | Age: 88
End: 2023-03-22
Payer: MEDICARE

## 2023-03-23 ENCOUNTER — OUTPATIENT CASE MANAGEMENT (OUTPATIENT)
Dept: ADMINISTRATIVE | Facility: OTHER | Age: 88
End: 2023-03-23
Payer: MEDICARE

## 2023-03-28 ENCOUNTER — OUTPATIENT CASE MANAGEMENT (OUTPATIENT)
Dept: ADMINISTRATIVE | Facility: OTHER | Age: 88
End: 2023-03-28
Payer: MEDICARE

## 2023-03-28 ENCOUNTER — TELEPHONE (OUTPATIENT)
Dept: LAB | Facility: HOSPITAL | Age: 88
End: 2023-03-28
Payer: MEDICARE

## 2023-03-28 NOTE — PROGRESS NOTES
Outpatient Care Management   - Care Plan Follow Up    Patient: Christy Webber  MRN:  [id number]  Date of Service:  3/28/2023  Completed by:  Thalia Medina LMSW  Referral Date: 12/21/2022    Reason for Visit   Patient presents with    Update Plan Of Care     3/28/2023       Brief Summary: SW followed up with pt's daughter, Megan, via telephone. She reported she is back in town and pt is doing well. Megan asked if SW could contact PCP office regarding nursing home paperwork status. Pt's daughter in [facility] contacted clinic 2 weeks ago and has not heard back. Megan reported the facility has a bed waiting for the patient. Sw will send message to PCP regarding this. SW will follow up in 2 weeks, Megan agreeable.     Complex Care Plan     Care plan was discussed and completed today with input from patient and/or caregiver.    Patient Instructions     No follow-ups on file.

## 2023-03-28 NOTE — TELEPHONE ENCOUNTER
----- Message from Thalia Medina LMSW sent at 3/28/2023 10:09 AM CDT -----  Regarding: Nursing Home paperwork  Good morning,    I am the  following this patient for Outpatient Case Management. I spoke with her daughter, Megan, this morning. She is needing an update on the nursing home paperwork that was sent to the clinic. The facility has a bed waiting for her. I see that Yeimi, pt's daughter in law, reached out to the clinic 2 weeks ago and has not heard from anyone. Please give her a call back in regards to this. She needs to know if the paperwork can be directly faxed to the facility or if she needs to come pick them up. You can reach her at [phone].     Thanks!

## 2023-03-30 ENCOUNTER — LAB VISIT (OUTPATIENT)
Dept: LAB | Facility: HOSPITAL | Age: 88
End: 2023-03-30
Attending: INTERNAL MEDICINE
Payer: MEDICARE

## 2023-03-30 DIAGNOSIS — I50.23 ACUTE ON CHRONIC SYSTOLIC CONGESTIVE HEART FAILURE: ICD-10-CM

## 2023-03-30 LAB
ANION GAP SERPL CALC-SCNC: 9 MMOL/L (ref 8–16)
BUN SERPL-MCNC: 37 MG/DL (ref 8–23)
CALCIUM SERPL-MCNC: 10.7 MG/DL (ref 8.7–10.5)
CHLORIDE SERPL-SCNC: 103 MMOL/L (ref 95–110)
CO2 SERPL-SCNC: 26 MMOL/L (ref 23–29)
CREAT SERPL-MCNC: 1.9 MG/DL (ref 0.5–1.4)
EST. GFR  (NO RACE VARIABLE): 25.4 ML/MIN/1.73 M^2
GLUCOSE SERPL-MCNC: 86 MG/DL (ref 70–110)
POTASSIUM SERPL-SCNC: 5.5 MMOL/L (ref 3.5–5.1)
SODIUM SERPL-SCNC: 138 MMOL/L (ref 136–145)

## 2023-03-30 PROCEDURE — 36415 COLL VENOUS BLD VENIPUNCTURE: CPT | Mod: HCNC | Performed by: INTERNAL MEDICINE

## 2023-03-30 PROCEDURE — 80048 BASIC METABOLIC PNL TOTAL CA: CPT | Mod: HCNC | Performed by: INTERNAL MEDICINE

## 2023-03-31 ENCOUNTER — TELEPHONE (OUTPATIENT)
Dept: CARDIOLOGY | Facility: CLINIC | Age: 88
End: 2023-03-31
Payer: MEDICARE

## 2023-03-31 DIAGNOSIS — I50.23 ACUTE ON CHRONIC SYSTOLIC CONGESTIVE HEART FAILURE: Primary | ICD-10-CM

## 2023-03-31 NOTE — TELEPHONE ENCOUNTER
Pt Daughter was contacted about results:     The lab showed kidney function stable   K 5.5 high and hold KCL now   Repeat BNP and BMP in 1 week         Pt Daughter verbalized understanding with no questions or concerns. Stated that she was come some time next week to bring her mother to get ordered labs, refused scheduling.         ----- Message from Jorge Salgado MD sent at 3/31/2023  2:52 PM CDT -----  The lab showed kidney function stable  K 5.5 high and hold KCL now  Repeat BNP and BMP in 1 week

## 2023-04-05 ENCOUNTER — TELEPHONE (OUTPATIENT)
Dept: FAMILY MEDICINE | Facility: CLINIC | Age: 88
End: 2023-04-05
Payer: MEDICARE

## 2023-04-05 NOTE — TELEPHONE ENCOUNTER
----- Message from Sherry Sood sent at 4/5/2023  2:32 PM CDT -----  Regarding: Family Callback Request of a Patient  Contact: Megan Harrison's family is requesting a callback from the provider in regards to getting the patient an application/referral to a facility. Paperwork has been sent to the provider office.     Alan Webber (son) can be reached at [phone] thanks     
Called no answer.  Lvm that papers have been faxed to [facility]  
Unknown

## 2023-04-11 ENCOUNTER — OUTPATIENT CASE MANAGEMENT (OUTPATIENT)
Dept: ADMINISTRATIVE | Facility: OTHER | Age: 88
End: 2023-04-11
Payer: MEDICARE

## 2023-04-13 ENCOUNTER — OUTPATIENT CASE MANAGEMENT (OUTPATIENT)
Dept: ADMINISTRATIVE | Facility: OTHER | Age: 88
End: 2023-04-13
Payer: MEDICARE

## 2023-04-13 NOTE — PROGRESS NOTES
Outpatient Care Management   - Care Plan Follow Up    Patient: Christy Webber  MRN:  [id number]  Date of Service:  4/13/2023  Completed by:  Thalia Medina LMSW  Referral Date: 12/21/2022    Reason for Visit   Patient presents with    Update Plan Of Care     4/13/2023       Brief Summary: Sw followed up with pt's daughter, Megan, via telephone. Pt is currently at [facility] for breaking her hip yesterday. She stated [facility] didn't have any beds for her. Pt had surgery early this morning and she will probably need SNF afterwards. JOHANNA encouraged Megan to ask for [facility] to let [facility] know they have been working on nursing home placement so they can help facilitate that. JOHANNA will follow up in 2 weeks, pt agreeable. JOHANNA also notified OPCM RN.     Complex Care Plan     Care plan was discussed and completed today with input from patient and/or caregiver.    Patient Instructions     No follow-ups on file.

## 2023-04-13 NOTE — PROGRESS NOTES
04/13/23 Received update from ROSLYN Anders. Patient fractured hip last night and is currently admitted to [facility] (per her conversation with patient's daughter) . Plan to follow up in approximately one week to call daughter - is patient in hospital? Rehab? VASYL Machado RN

## 2023-04-14 ENCOUNTER — EXTERNAL HOME HEALTH (OUTPATIENT)
Dept: HOME HEALTH SERVICES | Facility: HOSPITAL | Age: 88
End: 2023-04-14
Payer: MEDICARE

## 2023-04-16 ENCOUNTER — DOCUMENT SCAN (OUTPATIENT)
Dept: HOME HEALTH SERVICES | Facility: HOSPITAL | Age: 88
End: 2023-04-16
Payer: MEDICARE

## 2023-04-18 ENCOUNTER — TELEPHONE (OUTPATIENT)
Dept: FAMILY MEDICINE | Facility: CLINIC | Age: 88
End: 2023-04-18
Payer: MEDICARE

## 2023-04-18 NOTE — TELEPHONE ENCOUNTER
----- Message from Megan Irene sent at 4/18/2023  8:45 AM CDT -----  Marin Webber,  the son of the patient said that his mother will not be able to make the appointment tomorrow, please contact him at [phone] for the reason.

## 2023-04-18 NOTE — TELEPHONE ENCOUNTER
----- Message from Megan Irene sent at 4/18/2023  8:45 AM CDT -----  Marin Webber,  the son of the patient said that his mother will not be able to make the appointment tomorrow, please contact him at [phone] for the reason.

## 2023-04-18 NOTE — TELEPHONE ENCOUNTER
Spoke with son.  Pt is in ICU at [facility].  Fell broke hip. Not doing well.

## 2023-04-20 ENCOUNTER — OUTPATIENT CASE MANAGEMENT (OUTPATIENT)
Dept: ADMINISTRATIVE | Facility: OTHER | Age: 88
End: 2023-04-20
Payer: MEDICARE

## 2023-04-20 NOTE — PROGRESS NOTES
04/20/23 Spoke with son, Milo. He says patient may be transferring today to a home where she can receive therapy. He says she is doing better. Agrees to a call next week for an update. VASYL Machado RN

## 2023-04-25 ENCOUNTER — OUTSIDE PLACE OF SERVICE (OUTPATIENT)
Dept: ADMINISTRATIVE | Facility: OTHER | Age: 88
End: 2023-04-25
Payer: MEDICARE

## 2023-04-25 PROCEDURE — 99305 1ST NF CARE MODERATE MDM 35: CPT | Mod: ,,, | Performed by: FAMILY MEDICINE

## 2023-04-25 PROCEDURE — 99305 PR NURSING FACILITY CARE, INIT, MOD SEVERITY: ICD-10-PCS | Mod: ,,, | Performed by: FAMILY MEDICINE

## 2023-04-26 ENCOUNTER — OUTPATIENT CASE MANAGEMENT (OUTPATIENT)
Dept: ADMINISTRATIVE | Facility: OTHER | Age: 88
End: 2023-04-26
Payer: MEDICARE

## 2023-04-26 NOTE — PROGRESS NOTES
04/26/23 ROSLYN Lam reports she spoke with family. They reported patient was admitted to a LTCF and is receiving SNF care.   OPCM case closed.   VASYL Machado RN

## 2023-05-03 ENCOUNTER — TELEPHONE (OUTPATIENT)
Dept: FAMILY MEDICINE | Facility: CLINIC | Age: 88
End: 2023-05-03
Payer: MEDICARE

## 2023-05-03 RX ORDER — TRAMADOL HYDROCHLORIDE 50 MG/1
50 TABLET ORAL EVERY 12 HOURS PRN
Qty: 60 EACH | Refills: 0 | Status: SHIPPED | OUTPATIENT
Start: 2023-05-03

## 2023-05-17 ENCOUNTER — OFFICE VISIT (OUTPATIENT)
Dept: GASTROENTEROLOGY | Facility: CLINIC | Age: 88
End: 2023-05-17
Payer: MEDICARE

## 2023-05-17 VITALS — BODY MASS INDEX: 18.24 KG/M2 | WEIGHT: 102.94 LBS | HEIGHT: 63 IN

## 2023-05-17 DIAGNOSIS — K59.01 SLOW TRANSIT CONSTIPATION: Primary | ICD-10-CM

## 2023-05-17 PROCEDURE — 99999 PR PBB SHADOW E&M-EST. PATIENT-LVL III: CPT | Mod: PBBFAC,,,

## 2023-05-17 PROCEDURE — 1100F PR PT FALLS ASSESS DOC 2+ FALLS/FALL W/INJURY/YR: ICD-10-PCS | Mod: CPTII,,,

## 2023-05-17 PROCEDURE — 99213 PR OFFICE/OUTPT VISIT, EST, LEVL III, 20-29 MIN: ICD-10-PCS | Mod: ,,,

## 2023-05-17 PROCEDURE — 3288F PR FALLS RISK ASSESSMENT DOCUMENTED: ICD-10-PCS | Mod: CPTII,,,

## 2023-05-17 PROCEDURE — 1126F PR PAIN SEVERITY QUANTIFIED, NO PAIN PRESENT: ICD-10-PCS | Mod: CPTII,,,

## 2023-05-17 PROCEDURE — 1100F PTFALLS ASSESS-DOCD GE2>/YR: CPT | Mod: CPTII,,,

## 2023-05-17 PROCEDURE — 3288F FALL RISK ASSESSMENT DOCD: CPT | Mod: CPTII,,,

## 2023-05-17 PROCEDURE — 1159F PR MEDICATION LIST DOCUMENTED IN MEDICAL RECORD: ICD-10-PCS | Mod: CPTII,,,

## 2023-05-17 PROCEDURE — 1126F AMNT PAIN NOTED NONE PRSNT: CPT | Mod: CPTII,,,

## 2023-05-17 PROCEDURE — 99213 OFFICE O/P EST LOW 20 MIN: CPT | Mod: ,,,

## 2023-05-17 PROCEDURE — 99999 PR PBB SHADOW E&M-EST. PATIENT-LVL III: ICD-10-PCS | Mod: PBBFAC,,,

## 2023-05-17 PROCEDURE — 1159F MED LIST DOCD IN RCRD: CPT | Mod: CPTII,,,

## 2023-05-17 NOTE — PROGRESS NOTES
GASTROENTEROLOGY CLINIC NOTE    Reason for visit: The encounter diagnosis was Slow transit constipation.  Referring provider/PCP: Christian Douglass MD    HPI:  Christy Webber is a 87 y.o. female accompanied by caregiver and son. Nursing home staff was told to make an appt for blood in stool. Pt notes seeing blood on front of her brief near her hip once recently. She reports never seeing blood/black stool and blood was not confirmed by staff at nursing home. She denies any abd pain. She has BM's about 1-2x/week, this is her baseline. Sometimes has straining with ball-like stool, sometimes normal stool. She is taking metamucil a few times a week. Hx of anemia, hgb currently stable. No previous colonoscopy completed. She is taking eliquis.     Prior Endoscopy:  EGD:  Colon:    (Portions of this note were dictated using voice recognition software and may contain dictation related errors in spelling/grammar/syntax not found on text review)    Review of Systems   Constitutional:  Negative for weight loss.   Gastrointestinal:  Negative for abdominal pain, blood in stool and melena.     Past Medical History: has a past medical history of Atrial flutter, Biatrial enlargement, CAD S/P percutaneous coronary angioplasty, CHF (congestive heart failure), Chronic respiratory failure with hypoxia, on home oxygen therapy, Hyperlipidemia, Hypertension, Non-STEMI (non-ST elevated myocardial infarction), Pacemaker, Sick sinus syndrome, and SSS (sick sinus syndrome).    Past Surgical History: has a past surgical history that includes Kidney surgery; Hysterectomy; and Cardiac pacemaker placement.    Home medications:   Current Outpatient Medications on File Prior to Visit   Medication Sig Dispense Refill    diclofenac sodium (VOLTAREN) 1 % Gel Apply topically 4 (four) times daily as needed.      donepeziL (ARICEPT) 5 MG tablet TAKE 1 TABLET BY MOUTH EVERY DAY IN THE EVENING 90 tablet 3    ELIQUIS 2.5 mg Tab TAKE 1 TABLET BY  "MOUTH TWICE A DAY 60 tablet 6    ergocalciferol (ERGOCALCIFEROL) 50,000 unit Cap Take 1 capsule (50,000 Units total) by mouth every Tuesday. 3 capsule 0    metoprolol succinate (TOPROL-XL) 25 MG 24 hr tablet TAKE 1 TABLET BY MOUTH EVERY DAY 90 tablet 2    ondansetron (ZOFRAN-ODT) 4 MG TbDL Take 1 tablet (4 mg total) by mouth every 8 (eight) hours as needed (Nausea/vomiting). 20 tablet 0    potassium chloride SA (K-DUR,KLOR-CON M) 10 MEQ tablet Take 2 tablets (20 mEq total) by mouth 2 (two) times daily. 120 tablet 3    rosuvastatin (CRESTOR) 10 MG tablet Take 1 tablet (10 mg total) by mouth once daily. 90 tablet 3    torsemide (DEMADEX) 20 MG Tab Take 4 tablets (80 mg total) by mouth 2 (two) times a day. 240 tablet 3    traMADoL (ULTRAM) 50 mg tablet Take 1 tablet (50 mg total) by mouth every 12 (twelve) hours as needed for Pain. 60 each 0    cinacalcet (SENSIPAR) 30 MG Tab Take 1 tablet (30 mg total) by mouth daily with breakfast. 30 tablet 0    polyethylene glycol (GLYCOLAX) 17 gram/dose powder Take 17 g by mouth once daily. (Patient not taking: Reported on 5/17/2023) 510 g 11    sodium bicarbonate 650 MG tablet Take 2 tablets (1,300 mg total) by mouth 2 (two) times daily. 220 tablet 3     Current Facility-Administered Medications on File Prior to Visit   Medication Dose Route Frequency Provider Last Rate Last Admin    [DISCONTINUED] GENERIC EXTERNAL MEDICATION     Generic External Data Provider        [DISCONTINUED] GENERIC EXTERNAL MEDICATION     Generic External Data Provider        [DISCONTINUED] GENERIC EXTERNAL MEDICATION     Generic External Data Provider           Vital signs:  Ht 5' 3" (1.6 m)   Wt 46.7 kg (102 lb 15.3 oz)   LMP  (LMP Unknown)   BMI 18.24 kg/m²     Physical Exam  Constitutional:       Appearance: Normal appearance. She is normal weight.   Abdominal:      General: Abdomen is flat. There is no distension.      Palpations: Abdomen is soft.      Tenderness: There is no abdominal tenderness. "   Neurological:      Mental Status: She is alert.       I have reviewed associated labs, imaging and notes.     Assessment:  1. Slow transit constipation    1-2 BM/week, baseline   Some associated straining, small ball like stool   Taking metamucil a few times/week  Appt was made for blood in stool- pt reports seeing blood on front of her brief one time, never in stool, staff could not confirm seeing blood in stool  Recommended occult blood stool test and colonoscopy   Pt refuses stool test or colonoscopy     Plan:     Start taking metamucil daily to facilitate BM's  Advised pt, son, and caregiver to f/u if they notice any bleeding or change in BM's or if pt changes her mind about undergoing colonoscopy    Lindsey Ray, NP [facility]

## 2023-05-23 ENCOUNTER — TELEPHONE (OUTPATIENT)
Dept: NEPHROLOGY | Facility: CLINIC | Age: 88
End: 2023-05-23
Payer: MEDICARE

## 2023-05-23 NOTE — TELEPHONE ENCOUNTER
Called and spoke with patient's daughter about upcoming appointment. Her daughter states to cancel the appointment as her mother is in [place] in a nursing facility. She fell and broke her hip.

## 2023-06-08 RX ORDER — POTASSIUM CHLORIDE 750 MG/1
TABLET, EXTENDED RELEASE ORAL
Qty: 360 TABLET | Refills: 3 | Status: SHIPPED | OUTPATIENT
Start: 2023-06-08

## 2023-08-29 NOTE — NURSING
Patient having c/o indigestion and MD Gerald. MD placed orders for Pepcid 20mg PO to be given now. Will carry out orders and cont to monitor patient.    Consent 1/Introductory Paragraph: The rationale for Mohs was explained to the patient and consent was obtained. The risks, benefits and alternatives to therapy were discussed in detail. Specifically, the risks of infection, scarring, bleeding, prolonged wound healing, incomplete removal, allergy to anesthesia, nerve injury and recurrence were addressed. Prior to the procedure, the treatment site was clearly identified and confirmed by the patient. All components of Universal Protocol/PAUSE Rule completed.

## 2023-10-11 NOTE — PROGRESS NOTES
Multiple views of the left coronary artery obtained using power injection. Outpatient Care Management   - Care Plan Follow Up    Patient: Christy Webber  MRN:  [id number]  Date of Service:  4/26/2023  Completed by:  Thalia Medina LMSW  Referral Date: 12/21/2022    Reason for Visit   Patient presents with    Case Closure     4/26/2023       Brief Summary: Johanna followed up with pt's son Milo and daughter, Megan. Pt discharged from [facility] to [facility] in [place]. Pt will be skilled for therapy first then be there custodially. JOHANNA and Megan discussed case closure. JOHANNA informed OPCM RN of closure.     Complex Care Plan     Care plan was discussed and completed today with input from patient and/or caregiver.    Patient Instructions     No follow-ups on file.

## 2023-10-17 ENCOUNTER — PATIENT MESSAGE (OUTPATIENT)
Dept: ADMINISTRATIVE | Facility: CLINIC | Age: 88
End: 2023-10-17
Payer: MEDICARE

## 2023-10-30 ENCOUNTER — HOSPITAL ENCOUNTER (EMERGENCY)
Facility: HOSPITAL | Age: 88
Discharge: NURSING, REHAB OR LONG-TERM CARE | End: 2023-10-30
Attending: EMERGENCY MEDICINE
Payer: MEDICARE

## 2023-10-30 VITALS
DIASTOLIC BLOOD PRESSURE: 60 MMHG | HEART RATE: 85 BPM | WEIGHT: 110 LBS | BODY MASS INDEX: 18.78 KG/M2 | OXYGEN SATURATION: 97 % | HEIGHT: 64 IN | TEMPERATURE: 98 F | RESPIRATION RATE: 17 BRPM | SYSTOLIC BLOOD PRESSURE: 129 MMHG

## 2023-10-30 DIAGNOSIS — R10.9 ABDOMINAL PAIN: ICD-10-CM

## 2023-10-30 DIAGNOSIS — N39.0 URINARY TRACT INFECTION WITHOUT HEMATURIA, SITE UNSPECIFIED: Primary | ICD-10-CM

## 2023-10-30 LAB
ALBUMIN SERPL BCP-MCNC: 3.2 G/DL (ref 3.5–5.2)
ALP SERPL-CCNC: 207 U/L (ref 55–135)
ALT SERPL W/O P-5'-P-CCNC: 18 U/L (ref 10–44)
ANION GAP SERPL CALC-SCNC: 13 MMOL/L (ref 8–16)
AST SERPL-CCNC: 22 U/L (ref 10–40)
BACTERIA #/AREA URNS HPF: ABNORMAL /HPF
BASOPHILS # BLD AUTO: 0.06 K/UL (ref 0–0.2)
BASOPHILS NFR BLD: 1.2 % (ref 0–1.9)
BILIRUB SERPL-MCNC: 0.9 MG/DL (ref 0.1–1)
BILIRUB UR QL STRIP: NEGATIVE
BUN SERPL-MCNC: 36 MG/DL (ref 8–23)
CALCIUM SERPL-MCNC: 10.7 MG/DL (ref 8.7–10.5)
CHLORIDE SERPL-SCNC: 106 MMOL/L (ref 95–110)
CLARITY UR: ABNORMAL
CO2 SERPL-SCNC: 18 MMOL/L (ref 23–29)
COLOR UR: YELLOW
CREAT SERPL-MCNC: 2.1 MG/DL (ref 0.5–1.4)
DIFFERENTIAL METHOD: ABNORMAL
EOSINOPHIL # BLD AUTO: 0.2 K/UL (ref 0–0.5)
EOSINOPHIL NFR BLD: 3.8 % (ref 0–8)
ERYTHROCYTE [DISTWIDTH] IN BLOOD BY AUTOMATED COUNT: 13.8 % (ref 11.5–14.5)
EST. GFR  (NO RACE VARIABLE): 22 ML/MIN/1.73 M^2
GLUCOSE SERPL-MCNC: 132 MG/DL (ref 70–110)
GLUCOSE UR QL STRIP: ABNORMAL
HCT VFR BLD AUTO: 46.2 % (ref 37–48.5)
HGB BLD-MCNC: 15.3 G/DL (ref 12–16)
HGB UR QL STRIP: ABNORMAL
IMM GRANULOCYTES # BLD AUTO: 0.06 K/UL (ref 0–0.04)
IMM GRANULOCYTES NFR BLD AUTO: 1.2 % (ref 0–0.5)
KETONES UR QL STRIP: NEGATIVE
LEUKOCYTE ESTERASE UR QL STRIP: ABNORMAL
LIPASE SERPL-CCNC: 15 U/L (ref 4–60)
LYMPHOCYTES # BLD AUTO: 1 K/UL (ref 1–4.8)
LYMPHOCYTES NFR BLD: 20 % (ref 18–48)
MCH RBC QN AUTO: 31.2 PG (ref 27–31)
MCHC RBC AUTO-ENTMCNC: 33.1 G/DL (ref 32–36)
MCV RBC AUTO: 94 FL (ref 82–98)
MICROSCOPIC COMMENT: ABNORMAL
MONOCYTES # BLD AUTO: 0.3 K/UL (ref 0.3–1)
MONOCYTES NFR BLD: 6.5 % (ref 4–15)
NEUTROPHILS # BLD AUTO: 3.4 K/UL (ref 1.8–7.7)
NEUTROPHILS NFR BLD: 68.5 % (ref 38–73)
NITRITE UR QL STRIP: NEGATIVE
NRBC BLD-RTO: 0 /100 WBC
PH UR STRIP: 5 [PH] (ref 5–8)
PLATELET # BLD AUTO: 232 K/UL (ref 150–450)
PMV BLD AUTO: 11.2 FL (ref 9.2–12.9)
POTASSIUM SERPL-SCNC: 4.2 MMOL/L (ref 3.5–5.1)
PROT SERPL-MCNC: 7.8 G/DL (ref 6–8.4)
PROT UR QL STRIP: ABNORMAL
RBC # BLD AUTO: 4.9 M/UL (ref 4–5.4)
RBC #/AREA URNS HPF: 8 /HPF (ref 0–4)
SODIUM SERPL-SCNC: 137 MMOL/L (ref 136–145)
SP GR UR STRIP: 1.01 (ref 1–1.03)
URN SPEC COLLECT METH UR: ABNORMAL
UROBILINOGEN UR STRIP-ACNC: NEGATIVE EU/DL
WBC # BLD AUTO: 4.95 K/UL (ref 3.9–12.7)
WBC #/AREA URNS HPF: 27 /HPF (ref 0–5)
WBC CLUMPS URNS QL MICRO: ABNORMAL
YEAST URNS QL MICRO: ABNORMAL

## 2023-10-30 PROCEDURE — 99284 EMERGENCY DEPT VISIT MOD MDM: CPT | Mod: HCNC,25

## 2023-10-30 PROCEDURE — 93010 EKG 12-LEAD: ICD-10-PCS | Mod: HCNC,,, | Performed by: INTERNAL MEDICINE

## 2023-10-30 PROCEDURE — 87186 SC STD MICRODIL/AGAR DIL: CPT | Mod: HCNC | Performed by: EMERGENCY MEDICINE

## 2023-10-30 PROCEDURE — 85025 COMPLETE CBC W/AUTO DIFF WBC: CPT | Mod: HCNC | Performed by: EMERGENCY MEDICINE

## 2023-10-30 PROCEDURE — 81000 URINALYSIS NONAUTO W/SCOPE: CPT | Mod: HCNC | Performed by: EMERGENCY MEDICINE

## 2023-10-30 PROCEDURE — 87077 CULTURE AEROBIC IDENTIFY: CPT | Mod: HCNC | Performed by: EMERGENCY MEDICINE

## 2023-10-30 PROCEDURE — 93010 ELECTROCARDIOGRAM REPORT: CPT | Mod: HCNC,,, | Performed by: INTERNAL MEDICINE

## 2023-10-30 PROCEDURE — 63600175 PHARM REV CODE 636 W HCPCS: Mod: HCNC | Performed by: EMERGENCY MEDICINE

## 2023-10-30 PROCEDURE — 96365 THER/PROPH/DIAG IV INF INIT: CPT | Mod: HCNC

## 2023-10-30 PROCEDURE — 80053 COMPREHEN METABOLIC PANEL: CPT | Mod: HCNC | Performed by: EMERGENCY MEDICINE

## 2023-10-30 PROCEDURE — 87147 CULTURE TYPE IMMUNOLOGIC: CPT | Mod: HCNC | Performed by: EMERGENCY MEDICINE

## 2023-10-30 PROCEDURE — 25000003 PHARM REV CODE 250: Mod: HCNC | Performed by: EMERGENCY MEDICINE

## 2023-10-30 PROCEDURE — 93005 ELECTROCARDIOGRAM TRACING: CPT | Mod: HCNC

## 2023-10-30 PROCEDURE — 87086 URINE CULTURE/COLONY COUNT: CPT | Mod: HCNC | Performed by: EMERGENCY MEDICINE

## 2023-10-30 PROCEDURE — 83690 ASSAY OF LIPASE: CPT | Mod: HCNC | Performed by: EMERGENCY MEDICINE

## 2023-10-30 PROCEDURE — 87088 URINE BACTERIA CULTURE: CPT | Mod: HCNC | Performed by: EMERGENCY MEDICINE

## 2023-10-30 RX ORDER — DONEPEZIL HYDROCHLORIDE 5 MG/1
5 TABLET, FILM COATED ORAL NIGHTLY
COMMUNITY

## 2023-10-30 RX ORDER — TORSEMIDE 20 MG/1
20 TABLET ORAL
COMMUNITY
Start: 2023-02-28 | End: 2023-10-30 | Stop reason: SDUPTHER

## 2023-10-30 RX ORDER — BENZONATATE 100 MG/1
100 CAPSULE ORAL 3 TIMES DAILY PRN
COMMUNITY
End: 2023-10-30

## 2023-10-30 RX ORDER — HYDRALAZINE HYDROCHLORIDE 50 MG/1
1 TABLET, FILM COATED ORAL EVERY 8 HOURS
COMMUNITY
End: 2023-10-30

## 2023-10-30 RX ORDER — CHOLECALCIFEROL (VITAMIN D3) 25 MCG
1000 TABLET ORAL DAILY
COMMUNITY

## 2023-10-30 RX ORDER — METOPROLOL SUCCINATE 25 MG/1
1 TABLET, EXTENDED RELEASE ORAL DAILY
COMMUNITY
Start: 2023-03-09 | End: 2023-10-30 | Stop reason: SDUPTHER

## 2023-10-30 RX ORDER — ISOSORBIDE DINITRATE 20 MG/1
1 TABLET ORAL 3 TIMES DAILY
COMMUNITY
End: 2023-10-30

## 2023-10-30 RX ORDER — EMPAGLIFLOZIN 10 MG/1
10 TABLET, FILM COATED ORAL
COMMUNITY
Start: 2023-10-21 | End: 2023-10-30 | Stop reason: SDUPTHER

## 2023-10-30 RX ORDER — HYDROCHLOROTHIAZIDE 12.5 MG/1
1 CAPSULE ORAL DAILY
COMMUNITY
End: 2023-10-30

## 2023-10-30 RX ORDER — ESCITALOPRAM OXALATE 10 MG/1
10 TABLET ORAL DAILY
COMMUNITY
Start: 2023-10-19

## 2023-10-30 RX ORDER — POTASSIUM CHLORIDE 750 MG/1
20 TABLET, EXTENDED RELEASE ORAL
COMMUNITY
Start: 2023-06-08 | End: 2023-10-30 | Stop reason: SDUPTHER

## 2023-10-30 RX ORDER — TRAMADOL HYDROCHLORIDE 50 MG/1
50 TABLET ORAL EVERY 6 HOURS PRN
COMMUNITY
End: 2023-10-30 | Stop reason: SDUPTHER

## 2023-10-30 RX ORDER — CEPHALEXIN 500 MG/1
500 CAPSULE ORAL 4 TIMES DAILY
Qty: 28 CAPSULE | Refills: 0 | Status: SHIPPED | OUTPATIENT
Start: 2023-10-30 | End: 2023-11-06

## 2023-10-30 RX ORDER — POTASSIUM CHLORIDE 750 MG/1
TABLET, EXTENDED RELEASE ORAL
COMMUNITY
Start: 2023-04-20 | End: 2023-10-30 | Stop reason: SDUPTHER

## 2023-10-30 RX ADMIN — CEFTRIAXONE SODIUM 1 G: 1 INJECTION, POWDER, FOR SOLUTION INTRAMUSCULAR; INTRAVENOUS at 05:10

## 2023-10-30 RX ADMIN — SODIUM CHLORIDE 250 ML: 9 INJECTION, SOLUTION INTRAVENOUS at 01:10

## 2023-10-30 NOTE — ED NOTES
Presents to ED via EMS from LTCF with c/o dizziness, RLQ pain, and diarrhea (LBM yesterday morning per patient). Pt has extensive medical hx. VSS. Pt does have a pacemaker and has a paced rhythm. No acute distress noted at this time.

## 2023-10-30 NOTE — PHARMACY MED REC
"Admission Medication History     The home medication history was taken by Amara Simmons CPhT.    Medication history obtained from, [facility] Verified    You may go to "Admission" then "Reconcile Home Medications" tabs to review and/or act upon these items.     The home medication list has been updated by the Pharmacy department.   Please read ALL comments highlighted in yellow.   Please address this information as you see fit.    Feel free to contact us if you have any questions or require assistance.      The medications listed below were removed from the home medication list.  Please reorder if appropriate:  Patient reports no longer taking the following medication(s):  Benzonatate 100 mg  Cinacalcet 30 mg  Diclofenac 1% gel  Hydralazine 50 mg  HCTZ 12.5 mg  Isosorbide Dinitrate 20 mg  Ondansetron ODT 4 mg  Rosuvastatin 10 mg  Sodium Bicarbonate 650 mg        Amara Simmons CPhT.  Ext [phone]               .          "

## 2023-10-30 NOTE — ED NOTES
Pt had a BM on bedpan when attempting to obtain urine specimen. Dr. Betancourt aware. Will I&O cath pt in 1 hour to obtain urine specimen per Dr. Betancourt.

## 2023-10-30 NOTE — ED PROVIDER NOTES
Emergency Department Encounter  Provider Note  Encounter Date: 10/30/2023    Patient Name: Christy Webber  MRN: [id number]    History of Present Illness   HPI  History of Present Illness:    Chief Complaint:   Chief Complaint   Patient presents with    Dizziness     Patient started with dizziness and LLQ abdominal pain following breakfast,. Patient became sweaty with episode. The LLQ abdominal pain is interminent with no nausea or vomiting. Patient arrived vis ems glucose was 98. No iv access and came from [place].        88f pw dizziness this AM  Rolled into bed, where nursing staff found her  No chest pain, no SOB  Feels nauseated   Pw cramping lower abd pain as well, no radiation  C/o dysuria, polyuria    The following PMH/PSH/SocHx/FamHx has been reviewed by myself:    Past Medical History:   Diagnosis Date    Atrial flutter     Biatrial enlargement     CAD S/P percutaneous coronary angioplasty     CHF (congestive heart failure)     Chronic respiratory failure with hypoxia, on home oxygen therapy     Hyperlipidemia     Hypertension     Non-STEMI (non-ST elevated myocardial infarction)     Pacemaker 2016    Single lead [employer] Pacemaker for sick sinus syndrome    Sick sinus syndrome     SSS (sick sinus syndrome) 2016    - pacemaker in place     Past Surgical History:   Procedure Laterality Date    CARDIAC PACEMAKER PLACEMENT      HYSTERECTOMY      KIDNEY SURGERY       Social History     Tobacco Use    Smoking status: Former     Current packs/day: 0.00     Average packs/day: 1.5 packs/day for 63.0 years (94.5 ttl pk-yrs)     Types: Cigarettes     Start date: 1953     Quit date: 2016     Years since quittin.3    Smokeless tobacco: Never   Substance Use Topics    Alcohol use: Not Currently    Drug use: No     No family history on file.    Allergies reviewed:  Review of patient's allergies indicates:  No Known Allergies    Medications reviewed:  Medication List with Changes/Refills   New  Medications    CEPHALEXIN (KEFLEX) 500 MG CAPSULE    Take 1 capsule (500 mg total) by mouth 4 (four) times daily. for 7 days   Current Medications    APIXABAN (ELIQUIS) 2.5 MG TAB    Take 2.5 mg by mouth 2 (two) times daily.    DONEPEZIL (ARICEPT) 5 MG TABLET    Take 5 mg by mouth every evening.    EMPAGLIFLOZIN (JARDIANCE) 10 MG TABLET    Take 10 mg by mouth once daily.    ESCITALOPRAM OXALATE (LEXAPRO) 10 MG TABLET    Take 10 mg by mouth once daily.    METOPROLOL SUCCINATE (TOPROL-XL) 25 MG 24 HR TABLET    TAKE 1 TABLET BY MOUTH EVERY DAY    POLYETHYLENE GLYCOL (GLYCOLAX) 17 GRAM/DOSE POWDER    Take 17 g by mouth once daily.    POTASSIUM CHLORIDE SA (K-DUR,KLOR-CON M) 10 MEQ TABLET    TAKE 2 TABLETS BY MOUTH 2 TIMES DAILY.    TORSEMIDE (DEMADEX) 20 MG TAB    Take 4 tablets (80 mg total) by mouth 2 (two) times a day.    TRAMADOL (ULTRAM) 50 MG TABLET    Take 1 tablet (50 mg total) by mouth every 12 (twelve) hours as needed for Pain.    VITAMIN D (VITAMIN D3) 1000 UNITS TAB    Take 1,000 Units by mouth once daily.   Discontinued Medications    BENZONATATE (TESSALON) 100 MG CAPSULE    Take 100 mg by mouth 3 (three) times daily as needed.    CINACALCET (SENSIPAR) 30 MG TAB    Take 1 tablet (30 mg total) by mouth daily with breakfast.    DICLOFENAC SODIUM (VOLTAREN) 1 % GEL    Apply topically 4 (four) times daily as needed.    DONEPEZIL (ARICEPT) 5 MG TABLET    TAKE 1 TABLET BY MOUTH EVERY DAY IN THE EVENING    ELIQUIS 2.5 MG TAB    TAKE 1 TABLET BY MOUTH TWICE A DAY    ERGOCALCIFEROL (ERGOCALCIFEROL) 50,000 UNIT CAP    Take 1 capsule (50,000 Units total) by mouth every Tuesday.    HYDRALAZINE (APRESOLINE) 50 MG TABLET    Take 1 tablet by mouth every 8 (eight) hours.    HYDROCHLOROTHIAZIDE (MICROZIDE) 12.5 MG CAPSULE    Take 1 capsule by mouth once daily.    ISOSORBIDE DINITRATE (ISORDIL) 20 MG TABLET    Take 1 tablet by mouth 3 (three) times daily.    JARDIANCE 10 MG TABLET    Take 10 mg by mouth.    METOPROLOL  SUCCINATE (TOPROL-XL) 25 MG 24 HR TABLET    Take 1 tablet by mouth once daily.    ONDANSETRON (ZOFRAN-ODT) 4 MG TBDL    Take 1 tablet (4 mg total) by mouth every 8 (eight) hours as needed (Nausea/vomiting).    POTASSIUM CHLORIDE (KLOR-CON) 10 MEQ TBSR    Take 10 mEq (1 tablet) daily when taking diuretic    POTASSIUM CHLORIDE SA (K-DUR,KLOR-CON M) 10 MEQ TABLET    Take 20 mEq by mouth.    ROSUVASTATIN (CRESTOR) 10 MG TABLET    Take 1 tablet (10 mg total) by mouth once daily.    SODIUM BICARBONATE 650 MG TABLET    Take 2 tablets (1,300 mg total) by mouth 2 (two) times daily.    TORSEMIDE (DEMADEX) 20 MG TAB    Take 20 mg by mouth.    TRAMADOL (ULTRAM) 50 MG TABLET    Take 50 mg by mouth every 6 (six) hours as needed.       ROS  Review of Systems:    Constitutional:  Negative for fever.   HENT:  Negative for sore throat.    Eyes:  Negative for redness.   Respiratory:  Negative for shortness of breath.    Cardiovascular:  Negative for chest pain.   Gastrointestinal:  Negative for nausea.   Genitourinary:  + for dysuria.   Musculoskeletal:  Negative for back pain.   Skin:  Negative for rash.   Neurological:  Negative for weakness.   Hematological:  Does not bruise/bleed easily.   Psychiatric/Behavioral:  The patient is not nervous/anxious.      Physical Exam   Physical Exam    Initial Vitals [10/30/23 1033]   BP Pulse Resp Temp SpO2   (!) 127/92 91 16 98.3 °F (36.8 °C) 96 %      MAP       --           Triage vital signs reviewed.    Constitutional: Well-nourished, well-developed. Not in acute distress.  HENT: Normocephalic, atraumatic. Moist mucous membranes.  Eyes: No conjunctival injection.  Resp: Normal respiratory effort, breathing unlabored.  Cardio: Regular rate and rhythm.  GI: Abdomen non-distended.  Mild suprapubic ttp. Non peritoneal  MSK: Extremities without any deformities noted. No lower extremity edema.  Skin: Warm and dry. No rashes or lesions noted.  Neuro: Awake and alert. Moves all extremities.    ED  Course   Procedures    Medical Decision Making    History Acquisition     The history is provided by the patient.     Review of prior external/non ED notes: previous ucx sensitive to ctx    Differential Diagnoses   Based on available information and initial assessment, the working Differential Diagnosis includes, but is not limited to:  AAA, aortic dissection, mesenteric ischemia, perforated viscous, MI/ACS, SBO/volvulus, incarcerated/strangulated hernia, intussusception, ileus, appendicitis, cholecystitis, cholangitis, diverticulitis, esophagitis, hepatitis, nephrolithiasis, pancreatitis, gastroenteritis, colitis, IBD/IBS, biliary colic, GERD, PUD, constipation, UTI/pyelonephritis,  disorder.  .    EKG   EKG ordered and independently reviewed by me:   Paced rhythm, no STEMI, R axis, wide QRS    Labs   Lab tests ordered and independently reviewed by me:    Labs Reviewed   CBC W/ AUTO DIFFERENTIAL - Abnormal; Notable for the following components:       Result Value    MCH 31.2 (*)     Immature Granulocytes 1.2 (*)     Immature Grans (Abs) 0.06 (*)     All other components within normal limits   URINALYSIS, REFLEX TO URINE CULTURE - Abnormal; Notable for the following components:    Appearance, UA Hazy (*)     Protein, UA Trace (*)     Glucose, UA 3+ (*)     Occult Blood UA 1+ (*)     Leukocytes, UA 3+ (*)     All other components within normal limits    Narrative:     Specimen Source->Urine   COMPREHENSIVE METABOLIC PANEL - Abnormal; Notable for the following components:    CO2 18 (*)     Glucose 132 (*)     BUN 36 (*)     Creatinine 2.1 (*)     Calcium 10.7 (*)     Albumin 3.2 (*)     Alkaline Phosphatase 207 (*)     eGFR 22 (*)     All other components within normal limits   URINALYSIS MICROSCOPIC - Abnormal; Notable for the following components:    RBC, UA 8 (*)     WBC, UA 27 (*)     WBC Clumps, UA Few (*)     Bacteria Moderate (*)     All other components within normal limits    Narrative:     Specimen  Source->Urine   CULTURE, URINE   LIPASE       Imaging   Imaging ordered and independently reviewed by me:   Imaging Results    None            Additional Consideration   Christy Webber  presents to the Emergency Department today with abd pain, dysuria. No fevers, chills. Plan for labs, ua, if ua bland, will  order CT.     Additional testing considered but not indicated during the course of this workup: further imaging and labwork, not indicated  Co-morbidities/chronic illness/exacerbation of chronic illness considered which impacted clinical decision making: advanced age, CAD  Procedures done in the ED or plan for the OR: No  Social determinants of care considered during development of treatment plan include: Decreased medical literacy  Discussion of management or test interpretation with external provider: No  DNR discussion: No    The patient's list of active medications and allergies as documented per RN staff has been reviewed.  Medications given in the ED and/or prescribed:   Medications   cefTRIAXone (ROCEPHIN) 1 g in dextrose 5 % in water (D5W) 100 mL IVPB (MB+) (1 g Intravenous New Bag 10/30/23 1708)   sodium chloride 0.9% bolus 250 mL 250 mL (0 mLs Intravenous Stopped 10/30/23 1330)             ED Course as of 10/30/23 1736   Mon Oct 30, 2023   1326 CBC W/ AUTO DIFFERENTIAL(!)  Independently interpreted by me, unremarkable    [CS]   1326 Comprehensive metabolic panel(!)  Slight elevation in BUN creatinine, will give light fluids given EF 35% [CS]   1326 Lipase: 15  wnl [CS]   1701 CBC W/ AUTO DIFFERENTIAL(!)  Independently interpreted by me, unremarkable    [CS]   1701 Urinalysis, Reflex to Urine Culture Urine, Clean Catch(!)  UTI, ctx and keflex [CS]   1701 Urinalysis Microscopic(!) [CS]   1701 No indication for hospitalization. Will dc with keflex  [CS]      ED Course User Index  [CS] Anna Betancourt MD       Explanation of disposition: Discharge    Clinical Impression:     1. Urinary tract infection  without hematuria, site unspecified    2. Abdominal pain         All results from the workup were reviewed with the patient/family in detail. I discussed with the patient and/or the family/caregiver that today's evaluation in the ED does not suggest any emergent or life-threatening medical conditions that would require hospitalization or immediate intervention beyond what was provided in the ED. I believe the patient is safe for discharge.  However, a reassuring evaluation in the ED does not preclude the presence or development of a serious or life-threatening condition. As such, strict return precautions were discussed with the patient expressing understanding of instructions, and all questions answered. The patient/family communicates understanding of all this information and all remaining questions and concerns were addressed at this time.    The patient/family has been provided with verbal and printed direction regarding our final diagnosis(es) as well as instructions regarding use of OTC and/or Rx medications intended to manage the patient's aforementioned conditions including:  ED Prescriptions       Medication Sig Dispense Start Date End Date Auth. Provider    cephALEXin (KEFLEX) 500 MG capsule Take 1 capsule (500 mg total) by mouth 4 (four) times daily. for 7 days 28 capsule 10/30/2023 11/6/2023 Anna Betancourt MD          The patient's condition does not warrant review of the  and prescription of controlled substances.      ED Disposition Condition    Discharge Stable               Anna Betancourt MD  10/30/23 0909

## 2023-10-30 NOTE — DISCHARGE INSTRUCTIONS
You have a urine infection. Take the antibiotics and finish all of them even if you start to feel better. Christian Douglass MD  [address] [place] [address] [phone]    Schedule an appointment as soon as possible for a visit

## 2023-11-02 LAB
BACTERIA UR CULT: ABNORMAL
BACTERIA UR CULT: ABNORMAL

## 2024-01-26 ENCOUNTER — PATIENT OUTREACH (OUTPATIENT)
Dept: ADMINISTRATIVE | Facility: HOSPITAL | Age: 89
End: 2024-01-26
Payer: MEDICARE

## 2024-01-26 NOTE — PROGRESS NOTES
Working not seen in 12 months.  Pt last seen Jan 2023.    LM offering to schedule annual exam.

## 2024-02-16 ENCOUNTER — PATIENT OUTREACH (OUTPATIENT)
Dept: ADMINISTRATIVE | Facility: HOSPITAL | Age: 89
End: 2024-02-16
Payer: MEDICARE

## 2024-02-16 NOTE — PROGRESS NOTES
LAST PCP VISIT > 12 MONTHS: per chart review pt is overdue for annual visit, spoke to pt daughter who stated she resides in a Nursing home near [facility], but could not name or spell the nursing facility.

## 2025-09-05 DIAGNOSIS — N18.4 STAGE 4 CHRONIC KIDNEY DISEASE: Primary | ICD-10-CM
